# Patient Record
Sex: FEMALE | Race: WHITE | Employment: UNEMPLOYED | URBAN - METROPOLITAN AREA
[De-identification: names, ages, dates, MRNs, and addresses within clinical notes are randomized per-mention and may not be internally consistent; named-entity substitution may affect disease eponyms.]

---

## 2017-01-01 ENCOUNTER — OFFICE VISIT (OUTPATIENT)
Dept: CARDIOLOGY | Age: 47
End: 2017-01-01

## 2017-01-01 ENCOUNTER — APPOINTMENT (OUTPATIENT)
Dept: GENERAL RADIOLOGY | Age: 47
DRG: 420 | End: 2017-01-01
Payer: MEDICAID

## 2017-01-01 ENCOUNTER — TELEPHONE (OUTPATIENT)
Dept: WOUND CARE | Age: 47
End: 2017-01-01

## 2017-01-01 ENCOUNTER — APPOINTMENT (OUTPATIENT)
Dept: CT IMAGING | Age: 47
DRG: 420 | End: 2017-01-01
Payer: MEDICAID

## 2017-01-01 ENCOUNTER — CARE COORDINATION (OUTPATIENT)
Dept: CARE COORDINATION | Age: 47
End: 2017-01-01

## 2017-01-01 ENCOUNTER — OFFICE VISIT (OUTPATIENT)
Dept: FAMILY MEDICINE CLINIC | Age: 47
End: 2017-01-01

## 2017-01-01 ENCOUNTER — HOSPITAL ENCOUNTER (INPATIENT)
Age: 47
LOS: 5 days | Discharge: HOME | DRG: 420 | End: 2017-11-17
Attending: FAMILY MEDICINE | Admitting: INTERNAL MEDICINE
Payer: MEDICAID

## 2017-01-01 ENCOUNTER — OFFICE VISIT (OUTPATIENT)
Dept: SURGERY | Age: 47
End: 2017-01-01

## 2017-01-01 VITALS
SYSTOLIC BLOOD PRESSURE: 100 MMHG | TEMPERATURE: 97.9 F | HEIGHT: 63 IN | WEIGHT: 94.8 LBS | DIASTOLIC BLOOD PRESSURE: 57 MMHG | OXYGEN SATURATION: 100 % | RESPIRATION RATE: 16 BRPM | HEART RATE: 91 BPM | BODY MASS INDEX: 16.8 KG/M2

## 2017-01-01 VITALS
SYSTOLIC BLOOD PRESSURE: 111 MMHG | BODY MASS INDEX: 16.48 KG/M2 | DIASTOLIC BLOOD PRESSURE: 70 MMHG | WEIGHT: 93 LBS | HEIGHT: 63 IN | HEART RATE: 95 BPM

## 2017-01-01 VITALS
RESPIRATION RATE: 16 BRPM | WEIGHT: 110 LBS | DIASTOLIC BLOOD PRESSURE: 60 MMHG | TEMPERATURE: 98.1 F | BODY MASS INDEX: 19.49 KG/M2 | SYSTOLIC BLOOD PRESSURE: 102 MMHG | HEIGHT: 63 IN | HEART RATE: 79 BPM | OXYGEN SATURATION: 99 %

## 2017-01-01 VITALS
TEMPERATURE: 98 F | WEIGHT: 110 LBS | HEIGHT: 63 IN | HEART RATE: 76 BPM | RESPIRATION RATE: 14 BRPM | OXYGEN SATURATION: 98 % | SYSTOLIC BLOOD PRESSURE: 96 MMHG | DIASTOLIC BLOOD PRESSURE: 64 MMHG | BODY MASS INDEX: 19.49 KG/M2

## 2017-01-01 DIAGNOSIS — R56.9 SEIZURE (HCC): ICD-10-CM

## 2017-01-01 DIAGNOSIS — I82.402 ACUTE DEEP VEIN THROMBOSIS (DVT) OF LEFT LOWER EXTREMITY, UNSPECIFIED VEIN (HCC): ICD-10-CM

## 2017-01-01 DIAGNOSIS — E11.65 TYPE 2 DIABETES MELLITUS WITH HYPERGLYCEMIA, WITHOUT LONG-TERM CURRENT USE OF INSULIN (HCC): Primary | Chronic | ICD-10-CM

## 2017-01-01 DIAGNOSIS — I50.32 CHRONIC DIASTOLIC HEART FAILURE (HCC): Primary | ICD-10-CM

## 2017-01-01 DIAGNOSIS — L03.116 CELLULITIS OF LEFT LOWER EXTREMITY: ICD-10-CM

## 2017-01-01 DIAGNOSIS — E72.20 HYPERAMMONEMIA (HCC): Chronic | ICD-10-CM

## 2017-01-01 DIAGNOSIS — I21.4 NSTEMI (NON-ST ELEVATED MYOCARDIAL INFARCTION) (HCC): Primary | ICD-10-CM

## 2017-01-01 DIAGNOSIS — K70.30 ALCOHOLIC CIRRHOSIS OF LIVER WITHOUT ASCITES (HCC): ICD-10-CM

## 2017-01-01 DIAGNOSIS — E11.65 UNCONTROLLED TYPE 2 DIABETES MELLITUS WITH COMPLICATION, UNSPECIFIED LONG TERM INSULIN USE STATUS: Primary | ICD-10-CM

## 2017-01-01 DIAGNOSIS — R56.9 SEIZURE (HCC): Primary | ICD-10-CM

## 2017-01-01 DIAGNOSIS — G93.41 METABOLIC ENCEPHALOPATHY: ICD-10-CM

## 2017-01-01 DIAGNOSIS — I21.4 NSTEMI (NON-ST ELEVATED MYOCARDIAL INFARCTION) (HCC): ICD-10-CM

## 2017-01-01 DIAGNOSIS — K70.30 ALCOHOLIC CIRRHOSIS OF LIVER WITHOUT ASCITES (HCC): Chronic | ICD-10-CM

## 2017-01-01 DIAGNOSIS — E11.65 TYPE 2 DIABETES MELLITUS WITH HYPERGLYCEMIA, WITHOUT LONG-TERM CURRENT USE OF INSULIN (HCC): Chronic | ICD-10-CM

## 2017-01-01 DIAGNOSIS — E10.10 DIABETIC KETOACIDOSIS WITHOUT COMA ASSOCIATED WITH TYPE 1 DIABETES MELLITUS (HCC): ICD-10-CM

## 2017-01-01 DIAGNOSIS — F10.10 ALCOHOL ABUSE: ICD-10-CM

## 2017-01-01 DIAGNOSIS — I50.32 CHRONIC DIASTOLIC HEART FAILURE (HCC): ICD-10-CM

## 2017-01-01 DIAGNOSIS — R60.0 LEG EDEMA: ICD-10-CM

## 2017-01-01 DIAGNOSIS — E11.8 UNCONTROLLED TYPE 2 DIABETES MELLITUS WITH COMPLICATION, UNSPECIFIED LONG TERM INSULIN USE STATUS: Primary | ICD-10-CM

## 2017-01-01 LAB
ALBUMIN SERPL-MCNC: 2.3 G/DL (ref 3.9–4.9)
ALBUMIN SERPL-MCNC: 3 G/DL (ref 3.9–4.9)
ALBUMIN SERPL-MCNC: 3.6 G/DL (ref 3.9–4.9)
ALBUMIN SERPL-MCNC: 4.2 G/DL (ref 3.9–4.9)
ALP BLD-CCNC: 102 U/L (ref 40–130)
ALP BLD-CCNC: 130 U/L (ref 40–130)
ALP BLD-CCNC: 160 U/L (ref 40–130)
ALP BLD-CCNC: 166 U/L (ref 40–130)
ALT SERPL-CCNC: 11 U/L (ref 0–33)
ALT SERPL-CCNC: 14 U/L (ref 0–33)
ALT SERPL-CCNC: 14 U/L (ref 0–33)
ALT SERPL-CCNC: 17 U/L (ref 0–33)
AMMONIA: 41 UMOL/L (ref 11–51)
AMMONIA: 48 UMOL/L (ref 11–51)
AMMONIA: 80 UMOL/L (ref 11–51)
AMMONIA: 83 UMOL/L (ref 11–51)
AMMONIA: 85 UMOL/L (ref 11–51)
AMPHETAMINE SCREEN, URINE: NORMAL
AMYLASE: 41 U/L (ref 28–100)
ANION GAP SERPL CALCULATED.3IONS-SCNC: 10 MEQ/L (ref 7–13)
ANION GAP SERPL CALCULATED.3IONS-SCNC: 11 MEQ/L (ref 7–13)
ANION GAP SERPL CALCULATED.3IONS-SCNC: 12 MEQ/L (ref 7–13)
ANION GAP SERPL CALCULATED.3IONS-SCNC: 13 MEQ/L (ref 7–13)
ANION GAP SERPL CALCULATED.3IONS-SCNC: 14 MEQ/L (ref 7–13)
ANION GAP SERPL CALCULATED.3IONS-SCNC: 14 MEQ/L (ref 7–13)
ANION GAP SERPL CALCULATED.3IONS-SCNC: 15 MEQ/L (ref 7–13)
ANION GAP SERPL CALCULATED.3IONS-SCNC: 18 MEQ/L (ref 7–13)
ANION GAP SERPL CALCULATED.3IONS-SCNC: 23 MEQ/L (ref 7–13)
ANION GAP SERPL CALCULATED.3IONS-SCNC: 31 MEQ/L (ref 7–13)
AST SERPL-CCNC: 35 U/L (ref 0–35)
AST SERPL-CCNC: 36 U/L (ref 0–35)
AST SERPL-CCNC: 43 U/L (ref 0–35)
AST SERPL-CCNC: 52 U/L (ref 0–35)
BARBITURATE SCREEN URINE: NORMAL
BASE EXCESS ARTERIAL: 2 (ref -3–3)
BASOPHILS ABSOLUTE: 0 K/UL (ref 0–0.2)
BASOPHILS RELATIVE PERCENT: 0.3 %
BASOPHILS RELATIVE PERCENT: 0.4 %
BASOPHILS RELATIVE PERCENT: 0.4 %
BASOPHILS RELATIVE PERCENT: 0.5 %
BENZODIAZEPINE SCREEN, URINE: NORMAL
BETA-HYDROXYBUTYRATE: 14.5 MG/DL (ref 0.2–2.8)
BETA-HYDROXYBUTYRATE: 17.8 MG/DL (ref 0.2–2.8)
BILIRUB SERPL-MCNC: 1 MG/DL (ref 0–1.2)
BILIRUB SERPL-MCNC: 1 MG/DL (ref 0–1.2)
BILIRUB SERPL-MCNC: 1.1 MG/DL (ref 0–1.2)
BILIRUB SERPL-MCNC: 1.6 MG/DL (ref 0–1.2)
BILIRUBIN DIRECT: 0.4 MG/DL (ref 0–0.3)
BILIRUBIN DIRECT: 0.5 MG/DL (ref 0–0.3)
BILIRUBIN DIRECT: 0.6 MG/DL (ref 0–0.3)
BILIRUBIN URINE: NEGATIVE
BILIRUBIN, INDIRECT: 0.4 MG/DL (ref 0–0.6)
BILIRUBIN, INDIRECT: 0.6 MG/DL (ref 0–0.6)
BILIRUBIN, INDIRECT: 0.6 MG/DL (ref 0–0.6)
BLOOD CULTURE, ROUTINE: NORMAL
BLOOD, URINE: NEGATIVE
BUN BLDV-MCNC: 11 MG/DL (ref 6–20)
BUN BLDV-MCNC: 13 MG/DL (ref 6–20)
BUN BLDV-MCNC: 14 MG/DL (ref 6–20)
BUN BLDV-MCNC: 19 MG/DL (ref 6–20)
BUN BLDV-MCNC: 23 MG/DL (ref 6–20)
BUN BLDV-MCNC: 31 MG/DL (ref 6–20)
BUN BLDV-MCNC: 5 MG/DL (ref 6–20)
BUN BLDV-MCNC: 6 MG/DL (ref 6–20)
BUN BLDV-MCNC: 7 MG/DL (ref 6–20)
BUN BLDV-MCNC: 7 MG/DL (ref 6–20)
BUN BLDV-MCNC: 8 MG/DL (ref 6–20)
BUN BLDV-MCNC: 9 MG/DL (ref 6–20)
C-PEPTIDE: 0.4 NG/ML (ref 0.8–3.5)
C-PEPTIDE: 1.9 NG/ML (ref 0.8–3.5)
CALCIUM SERPL-MCNC: 11 MG/DL (ref 8.6–10.2)
CALCIUM SERPL-MCNC: 8.4 MG/DL (ref 8.6–10.2)
CALCIUM SERPL-MCNC: 8.5 MG/DL (ref 8.6–10.2)
CALCIUM SERPL-MCNC: 8.5 MG/DL (ref 8.6–10.2)
CALCIUM SERPL-MCNC: 8.6 MG/DL (ref 8.6–10.2)
CALCIUM SERPL-MCNC: 8.7 MG/DL (ref 8.6–10.2)
CALCIUM SERPL-MCNC: 8.7 MG/DL (ref 8.6–10.2)
CALCIUM SERPL-MCNC: 8.8 MG/DL (ref 8.6–10.2)
CALCIUM SERPL-MCNC: 8.9 MG/DL (ref 8.6–10.2)
CALCIUM SERPL-MCNC: 9 MG/DL (ref 8.6–10.2)
CALCIUM SERPL-MCNC: 9 MG/DL (ref 8.6–10.2)
CALCIUM SERPL-MCNC: 9.1 MG/DL (ref 8.6–10.2)
CALCIUM SERPL-MCNC: 9.1 MG/DL (ref 8.6–10.2)
CALCIUM SERPL-MCNC: 9.6 MG/DL (ref 8.6–10.2)
CANNABINOID SCREEN URINE: NORMAL
CHLORIDE BLD-SCNC: 104 MEQ/L (ref 98–107)
CHLORIDE BLD-SCNC: 106 MEQ/L (ref 98–107)
CHLORIDE BLD-SCNC: 107 MEQ/L (ref 98–107)
CHLORIDE BLD-SCNC: 108 MEQ/L (ref 98–107)
CHLORIDE BLD-SCNC: 109 MEQ/L (ref 98–107)
CHLORIDE BLD-SCNC: 110 MEQ/L (ref 98–107)
CHLORIDE BLD-SCNC: 112 MEQ/L (ref 98–107)
CHLORIDE BLD-SCNC: 113 MEQ/L (ref 98–107)
CHLORIDE BLD-SCNC: 67 MEQ/L (ref 98–107)
CHLORIDE BLD-SCNC: 93 MEQ/L (ref 98–107)
CHLORIDE BLD-SCNC: 99 MEQ/L (ref 98–107)
CHP ED QC CHECK: NORMAL
CLARITY: CLEAR
CO2: 15 MEQ/L (ref 22–29)
CO2: 17 MEQ/L (ref 22–29)
CO2: 18 MEQ/L (ref 22–29)
CO2: 19 MEQ/L (ref 22–29)
CO2: 21 MEQ/L (ref 22–29)
CO2: 21 MEQ/L (ref 22–29)
CO2: 22 MEQ/L (ref 22–29)
CO2: 24 MEQ/L (ref 22–29)
CO2: 25 MEQ/L (ref 22–29)
CO2: 26 MEQ/L (ref 22–29)
COCAINE METABOLITE SCREEN URINE: NORMAL
COLOR: YELLOW
CREAT SERPL-MCNC: 0.45 MG/DL (ref 0.5–0.9)
CREAT SERPL-MCNC: 0.46 MG/DL (ref 0.5–0.9)
CREAT SERPL-MCNC: 0.5 MG/DL (ref 0.5–0.9)
CREAT SERPL-MCNC: 0.57 MG/DL (ref 0.5–0.9)
CREAT SERPL-MCNC: 0.59 MG/DL (ref 0.5–0.9)
CREAT SERPL-MCNC: 0.59 MG/DL (ref 0.5–0.9)
CREAT SERPL-MCNC: 0.6 MG/DL (ref 0.5–0.9)
CREAT SERPL-MCNC: 0.62 MG/DL (ref 0.5–0.9)
CREAT SERPL-MCNC: 0.63 MG/DL (ref 0.5–0.9)
CREAT SERPL-MCNC: 0.64 MG/DL (ref 0.5–0.9)
CREAT SERPL-MCNC: 0.67 MG/DL (ref 0.5–0.9)
CREAT SERPL-MCNC: 0.7 MG/DL (ref 0.5–0.9)
CREAT SERPL-MCNC: 0.72 MG/DL (ref 0.5–0.9)
CREAT SERPL-MCNC: 0.73 MG/DL (ref 0.5–0.9)
CREAT SERPL-MCNC: 0.78 MG/DL (ref 0.5–0.9)
CREAT SERPL-MCNC: 0.82 MG/DL (ref 0.5–0.9)
CREAT SERPL-MCNC: 0.96 MG/DL (ref 0.5–0.9)
CREAT SERPL-MCNC: 1.2 MG/DL (ref 0.5–0.9)
CREAT SERPL-MCNC: 1.45 MG/DL (ref 0.5–0.9)
CULTURE, BLOOD 2: NORMAL
EKG ATRIAL RATE: 101 BPM
EKG ATRIAL RATE: 84 BPM
EKG ATRIAL RATE: 85 BPM
EKG ATRIAL RATE: 86 BPM
EKG ATRIAL RATE: 87 BPM
EKG ATRIAL RATE: 90 BPM
EKG ATRIAL RATE: 91 BPM
EKG ATRIAL RATE: 93 BPM
EKG ATRIAL RATE: 95 BPM
EKG P AXIS: 29 DEGREES
EKG P AXIS: 32 DEGREES
EKG P AXIS: 33 DEGREES
EKG P AXIS: 36 DEGREES
EKG P AXIS: 42 DEGREES
EKG P AXIS: 45 DEGREES
EKG P AXIS: 49 DEGREES
EKG P AXIS: 53 DEGREES
EKG P AXIS: 61 DEGREES
EKG P-R INTERVAL: 122 MS
EKG P-R INTERVAL: 126 MS
EKG P-R INTERVAL: 128 MS
EKG P-R INTERVAL: 128 MS
EKG P-R INTERVAL: 132 MS
EKG P-R INTERVAL: 134 MS
EKG P-R INTERVAL: 134 MS
EKG P-R INTERVAL: 136 MS
EKG P-R INTERVAL: 148 MS
EKG Q-T INTERVAL: 350 MS
EKG Q-T INTERVAL: 374 MS
EKG Q-T INTERVAL: 376 MS
EKG Q-T INTERVAL: 378 MS
EKG Q-T INTERVAL: 384 MS
EKG Q-T INTERVAL: 384 MS
EKG Q-T INTERVAL: 390 MS
EKG Q-T INTERVAL: 442 MS
EKG Q-T INTERVAL: 454 MS
EKG QRS DURATION: 66 MS
EKG QRS DURATION: 76 MS
EKG QRS DURATION: 76 MS
EKG QRS DURATION: 80 MS
EKG QRS DURATION: 80 MS
EKG QRS DURATION: 82 MS
EKG QRS DURATION: 82 MS
EKG QRS DURATION: 84 MS
EKG QRS DURATION: 84 MS
EKG QTC CALCULATION (BAZETT): 453 MS
EKG QTC CALCULATION (BAZETT): 459 MS
EKG QTC CALCULATION (BAZETT): 459 MS
EKG QTC CALCULATION (BAZETT): 464 MS
EKG QTC CALCULATION (BAZETT): 465 MS
EKG QTC CALCULATION (BAZETT): 472 MS
EKG QTC CALCULATION (BAZETT): 475 MS
EKG QTC CALCULATION (BAZETT): 522 MS
EKG QTC CALCULATION (BAZETT): 546 MS
EKG R AXIS: 64 DEGREES
EKG R AXIS: 67 DEGREES
EKG R AXIS: 69 DEGREES
EKG R AXIS: 71 DEGREES
EKG R AXIS: 73 DEGREES
EKG R AXIS: 74 DEGREES
EKG R AXIS: 75 DEGREES
EKG R AXIS: 82 DEGREES
EKG R AXIS: 99 DEGREES
EKG T AXIS: -11 DEGREES
EKG T AXIS: 16 DEGREES
EKG T AXIS: 16 DEGREES
EKG T AXIS: 17 DEGREES
EKG T AXIS: 19 DEGREES
EKG T AXIS: 30 DEGREES
EKG T AXIS: 32 DEGREES
EKG T AXIS: 38 DEGREES
EKG T AXIS: 47 DEGREES
EKG VENTRICULAR RATE: 101 BPM
EKG VENTRICULAR RATE: 84 BPM
EKG VENTRICULAR RATE: 85 BPM
EKG VENTRICULAR RATE: 86 BPM
EKG VENTRICULAR RATE: 87 BPM
EKG VENTRICULAR RATE: 90 BPM
EKG VENTRICULAR RATE: 91 BPM
EKG VENTRICULAR RATE: 93 BPM
EKG VENTRICULAR RATE: 95 BPM
EOSINOPHILS ABSOLUTE: 0 K/UL (ref 0–0.7)
EOSINOPHILS ABSOLUTE: 0.1 K/UL (ref 0–0.7)
EOSINOPHILS RELATIVE PERCENT: 0.2 %
EOSINOPHILS RELATIVE PERCENT: 1.1 %
EOSINOPHILS RELATIVE PERCENT: 1.3 %
EOSINOPHILS RELATIVE PERCENT: 1.5 %
ETHANOL PERCENT: 0.07 G/DL
ETHANOL PERCENT: NORMAL G/DL
ETHANOL: 85 MG/DL (ref 0–0.08)
ETHANOL: <10 MG/DL (ref 0–0.08)
FOLATE: >20 NG/ML (ref 7.3–26.1)
GFR AFRICAN AMERICAN: 46.7
GFR AFRICAN AMERICAN: 58.1
GFR AFRICAN AMERICAN: >60
GFR NON-AFRICAN AMERICAN: 38.6
GFR NON-AFRICAN AMERICAN: 48.1
GFR NON-AFRICAN AMERICAN: >60
GLOBULIN: 4.1 G/DL (ref 2.3–3.5)
GLUCOSE BLD-MCNC: 1151 MG/DL (ref 74–109)
GLUCOSE BLD-MCNC: 116 MG/DL (ref 60–115)
GLUCOSE BLD-MCNC: 128 MG/DL (ref 60–115)
GLUCOSE BLD-MCNC: 138 MG/DL (ref 60–115)
GLUCOSE BLD-MCNC: 141 MG/DL (ref 60–115)
GLUCOSE BLD-MCNC: 144 MG/DL (ref 60–115)
GLUCOSE BLD-MCNC: 158 MG/DL (ref 74–109)
GLUCOSE BLD-MCNC: 159 MG/DL (ref 60–115)
GLUCOSE BLD-MCNC: 160 MG/DL (ref 60–115)
GLUCOSE BLD-MCNC: 162 MG/DL (ref 60–115)
GLUCOSE BLD-MCNC: 164 MG/DL (ref 60–115)
GLUCOSE BLD-MCNC: 164 MG/DL (ref 60–115)
GLUCOSE BLD-MCNC: 168 MG/DL (ref 60–115)
GLUCOSE BLD-MCNC: 171 MG/DL (ref 74–109)
GLUCOSE BLD-MCNC: 175 MG/DL (ref 60–115)
GLUCOSE BLD-MCNC: 179 MG/DL
GLUCOSE BLD-MCNC: 183 MG/DL (ref 74–109)
GLUCOSE BLD-MCNC: 19 MG/DL (ref 60–115)
GLUCOSE BLD-MCNC: 192 MG/DL (ref 74–109)
GLUCOSE BLD-MCNC: 194 MG/DL (ref 74–109)
GLUCOSE BLD-MCNC: 196 MG/DL (ref 60–115)
GLUCOSE BLD-MCNC: 197 MG/DL (ref 60–115)
GLUCOSE BLD-MCNC: 200 MG/DL (ref 60–115)
GLUCOSE BLD-MCNC: 202 MG/DL (ref 74–109)
GLUCOSE BLD-MCNC: 204 MG/DL (ref 60–115)
GLUCOSE BLD-MCNC: 210 MG/DL (ref 60–115)
GLUCOSE BLD-MCNC: 211 MG/DL (ref 60–115)
GLUCOSE BLD-MCNC: 212 MG/DL (ref 74–109)
GLUCOSE BLD-MCNC: 217 MG/DL (ref 60–115)
GLUCOSE BLD-MCNC: 22 MG/DL (ref 60–115)
GLUCOSE BLD-MCNC: 223 MG/DL (ref 60–115)
GLUCOSE BLD-MCNC: 227 MG/DL (ref 74–109)
GLUCOSE BLD-MCNC: 229 MG/DL (ref 60–115)
GLUCOSE BLD-MCNC: 229 MG/DL (ref 60–115)
GLUCOSE BLD-MCNC: 231 MG/DL (ref 74–109)
GLUCOSE BLD-MCNC: 232 MG/DL (ref 74–109)
GLUCOSE BLD-MCNC: 234 MG/DL (ref 60–115)
GLUCOSE BLD-MCNC: 237 MG/DL (ref 60–115)
GLUCOSE BLD-MCNC: 239 MG/DL (ref 60–115)
GLUCOSE BLD-MCNC: 243 MG/DL (ref 60–115)
GLUCOSE BLD-MCNC: 247 MG/DL (ref 60–115)
GLUCOSE BLD-MCNC: 247 MG/DL (ref 60–115)
GLUCOSE BLD-MCNC: 251 MG/DL (ref 60–115)
GLUCOSE BLD-MCNC: 255 MG/DL (ref 74–109)
GLUCOSE BLD-MCNC: 256 MG/DL (ref 60–115)
GLUCOSE BLD-MCNC: 258 MG/DL (ref 60–115)
GLUCOSE BLD-MCNC: 263 MG/DL (ref 60–115)
GLUCOSE BLD-MCNC: 263 MG/DL (ref 60–115)
GLUCOSE BLD-MCNC: 265 MG/DL (ref 60–115)
GLUCOSE BLD-MCNC: 282 MG/DL (ref 60–115)
GLUCOSE BLD-MCNC: 286 MG/DL (ref 74–109)
GLUCOSE BLD-MCNC: 304 MG/DL (ref 60–115)
GLUCOSE BLD-MCNC: 309 MG/DL (ref 74–109)
GLUCOSE BLD-MCNC: 317 MG/DL (ref 60–115)
GLUCOSE BLD-MCNC: 335 MG/DL (ref 74–109)
GLUCOSE BLD-MCNC: 399 MG/DL (ref 60–115)
GLUCOSE BLD-MCNC: 41 MG/DL (ref 74–109)
GLUCOSE BLD-MCNC: 468 MG/DL (ref 60–115)
GLUCOSE BLD-MCNC: 54 MG/DL (ref 60–115)
GLUCOSE BLD-MCNC: 54 MG/DL (ref 60–115)
GLUCOSE BLD-MCNC: 553 MG/DL (ref 74–109)
GLUCOSE BLD-MCNC: 563 MG/DL (ref 60–115)
GLUCOSE BLD-MCNC: 574 MG/DL (ref 60–115)
GLUCOSE BLD-MCNC: 591 MG/DL
GLUCOSE BLD-MCNC: 591 MG/DL (ref 60–115)
GLUCOSE BLD-MCNC: 65 MG/DL (ref 74–109)
GLUCOSE BLD-MCNC: 82 MG/DL (ref 74–109)
GLUCOSE BLD-MCNC: 97 MG/DL (ref 60–115)
GLUCOSE BLD-MCNC: >600 MG/DL (ref 60–115)
GLUCOSE BLD-MCNC: >600 MG/DL (ref 60–115)
GLUCOSE BLD-MCNC: NORMAL MG/DL
GLUCOSE URINE: >=1000 MG/DL
HBA1C MFR BLD: 13.2 % (ref 4.8–5.9)
HCO3 ARTERIAL: 27 MMOL/L (ref 21–29)
HCT VFR BLD CALC: 28.3 % (ref 37–47)
HCT VFR BLD CALC: 31.5 % (ref 37–47)
HCT VFR BLD CALC: 36.6 % (ref 37–47)
HCT VFR BLD CALC: 44.1 % (ref 37–47)
HEMOGLOBIN: 10.4 G/DL (ref 12–16)
HEMOGLOBIN: 12.4 G/DL (ref 12–16)
HEMOGLOBIN: 14.2 G/DL (ref 12–16)
HEMOGLOBIN: 9.4 G/DL (ref 12–16)
KETONES, URINE: NEGATIVE MG/DL
LACTATE: 2.85 MMOL/L (ref 0.4–2)
LACTIC ACID: 1.6 MMOL/L (ref 0.5–2.2)
LACTIC ACID: 11.1 MMOL/L (ref 0.5–2.2)
LACTIC ACID: 2.5 MMOL/L (ref 0.5–2.2)
LACTIC ACID: 2.7 MMOL/L (ref 0.5–2.2)
LACTIC ACID: 4 MMOL/L (ref 0.5–2.2)
LACTIC ACID: 4.4 MMOL/L (ref 0.5–2.2)
LACTIC ACID: 5.1 MMOL/L (ref 0.5–2.2)
LEUKOCYTE ESTERASE, URINE: NEGATIVE
LIPASE: 22 U/L (ref 13–60)
LV EF: 70 %
LVEF MODALITY: NORMAL
LYMPHOCYTES ABSOLUTE: 1.5 K/UL (ref 1–4.8)
LYMPHOCYTES ABSOLUTE: 1.6 K/UL (ref 1–4.8)
LYMPHOCYTES ABSOLUTE: 1.8 K/UL (ref 1–4.8)
LYMPHOCYTES ABSOLUTE: 2 K/UL (ref 1–4.8)
LYMPHOCYTES RELATIVE PERCENT: 22.7 %
LYMPHOCYTES RELATIVE PERCENT: 22.9 %
LYMPHOCYTES RELATIVE PERCENT: 31.1 %
LYMPHOCYTES RELATIVE PERCENT: 33.4 %
Lab: NORMAL
MAGNESIUM: 1.6 MG/DL (ref 1.7–2.3)
MAGNESIUM: 1.6 MG/DL (ref 1.7–2.3)
MAGNESIUM: 1.7 MG/DL (ref 1.7–2.3)
MAGNESIUM: 1.7 MG/DL (ref 1.7–2.3)
MAGNESIUM: 1.8 MG/DL (ref 1.7–2.3)
MAGNESIUM: 1.9 MG/DL (ref 1.7–2.3)
MAGNESIUM: 1.9 MG/DL (ref 1.7–2.3)
MAGNESIUM: 2 MG/DL (ref 1.7–2.3)
MAGNESIUM: 2.1 MG/DL (ref 1.7–2.3)
MAGNESIUM: 2.2 MG/DL (ref 1.7–2.3)
MAGNESIUM: 2.3 MG/DL (ref 1.7–2.3)
MAGNESIUM: 2.3 MG/DL (ref 1.7–2.3)
MAGNESIUM: 2.5 MG/DL (ref 1.7–2.3)
MAGNESIUM: 3.3 MG/DL (ref 1.7–2.3)
MCH RBC QN AUTO: 31.3 PG (ref 27–31.3)
MCH RBC QN AUTO: 31.6 PG (ref 27–31.3)
MCH RBC QN AUTO: 31.9 PG (ref 27–31.3)
MCH RBC QN AUTO: 31.9 PG (ref 27–31.3)
MCHC RBC AUTO-ENTMCNC: 32.1 % (ref 33–37)
MCHC RBC AUTO-ENTMCNC: 33.2 % (ref 33–37)
MCHC RBC AUTO-ENTMCNC: 33.2 % (ref 33–37)
MCHC RBC AUTO-ENTMCNC: 34 % (ref 33–37)
MCV RBC AUTO: 93.8 FL (ref 82–100)
MCV RBC AUTO: 95.4 FL (ref 82–100)
MCV RBC AUTO: 96.1 FL (ref 82–100)
MCV RBC AUTO: 97.4 FL (ref 82–100)
MONOCYTES ABSOLUTE: 0.7 K/UL (ref 0.2–0.8)
MONOCYTES ABSOLUTE: 0.9 K/UL (ref 0.2–0.8)
MONOCYTES RELATIVE PERCENT: 10.6 %
MONOCYTES RELATIVE PERCENT: 19.8 %
MONOCYTES RELATIVE PERCENT: 20 %
MONOCYTES RELATIVE PERCENT: 8.6 %
NEUTROPHILS ABSOLUTE: 2.1 K/UL (ref 1.4–6.5)
NEUTROPHILS ABSOLUTE: 2.2 K/UL (ref 1.4–6.5)
NEUTROPHILS ABSOLUTE: 5.5 K/UL (ref 1.4–6.5)
NEUTROPHILS ABSOLUTE: 5.8 K/UL (ref 1.4–6.5)
NEUTROPHILS RELATIVE PERCENT: 44.7 %
NEUTROPHILS RELATIVE PERCENT: 47.6 %
NEUTROPHILS RELATIVE PERCENT: 64.9 %
NEUTROPHILS RELATIVE PERCENT: 68 %
NITRITE, URINE: NEGATIVE
O2 SAT, ARTERIAL: 99 % (ref 93–100)
OPIATE SCREEN URINE: NORMAL
PCO2 ARTERIAL: 47 MM HG (ref 35–45)
PDW BLD-RTO: 14.7 % (ref 11.5–14.5)
PDW BLD-RTO: 15.5 % (ref 11.5–14.5)
PDW BLD-RTO: 16.6 % (ref 11.5–14.5)
PDW BLD-RTO: 16.8 % (ref 11.5–14.5)
PERFORMED ON: ABNORMAL
PERFORMED ON: NORMAL
PH ARTERIAL: 7.37 (ref 7.35–7.45)
PH UA: 6 (ref 5–9)
PHENCYCLIDINE SCREEN URINE: NORMAL
PHOSPHORUS: 0.6 MG/DL (ref 2.5–4.5)
PHOSPHORUS: 0.8 MG/DL (ref 2.5–4.5)
PHOSPHORUS: 0.9 MG/DL (ref 2.5–4.5)
PHOSPHORUS: 1.1 MG/DL (ref 2.5–4.5)
PHOSPHORUS: 1.2 MG/DL (ref 2.5–4.5)
PHOSPHORUS: 1.7 MG/DL (ref 2.5–4.5)
PHOSPHORUS: 1.9 MG/DL (ref 2.5–4.5)
PHOSPHORUS: 2 MG/DL (ref 2.5–4.5)
PHOSPHORUS: 2.5 MG/DL (ref 2.5–4.5)
PHOSPHORUS: 2.6 MG/DL (ref 2.5–4.5)
PHOSPHORUS: 2.7 MG/DL (ref 2.5–4.5)
PHOSPHORUS: <0.4 MG/DL (ref 2.5–4.5)
PHOSPHORUS: <0.4 MG/DL (ref 2.5–4.5)
PLATELET # BLD: 130 K/UL (ref 130–400)
PLATELET # BLD: 51 K/UL (ref 130–400)
PLATELET # BLD: 65 K/UL (ref 130–400)
PLATELET # BLD: 89 K/UL (ref 130–400)
PLATELET SLIDE REVIEW: ABNORMAL
PLATELET SLIDE REVIEW: ABNORMAL
PO2 ARTERIAL: 138 MM HG (ref 75–108)
POC FIO2: 2
POC SAMPLE TYPE: ABNORMAL
POTASSIUM SERPL-SCNC: 2.7 MEQ/L (ref 3.5–5.1)
POTASSIUM SERPL-SCNC: 2.8 MEQ/L (ref 3.5–5.1)
POTASSIUM SERPL-SCNC: 2.8 MEQ/L (ref 3.5–5.1)
POTASSIUM SERPL-SCNC: 2.9 MEQ/L (ref 3.5–5.1)
POTASSIUM SERPL-SCNC: 3.1 MEQ/L (ref 3.5–5.1)
POTASSIUM SERPL-SCNC: 3.7 MEQ/L (ref 3.5–5.1)
POTASSIUM SERPL-SCNC: 3.7 MEQ/L (ref 3.5–5.1)
POTASSIUM SERPL-SCNC: 3.9 MEQ/L (ref 3.5–5.1)
POTASSIUM SERPL-SCNC: 4 MEQ/L (ref 3.5–5.1)
POTASSIUM SERPL-SCNC: 4.2 MEQ/L (ref 3.5–5.1)
POTASSIUM SERPL-SCNC: 4.3 MEQ/L (ref 3.5–5.1)
POTASSIUM SERPL-SCNC: 4.6 MEQ/L (ref 3.5–5.1)
POTASSIUM SERPL-SCNC: 4.6 MEQ/L (ref 3.5–5.1)
POTASSIUM SERPL-SCNC: 4.9 MEQ/L (ref 3.5–5.1)
POTASSIUM SERPL-SCNC: 5.4 MEQ/L (ref 3.5–5.1)
POTASSIUM SERPL-SCNC: 5.5 MEQ/L (ref 3.5–5.1)
PREGNANCY TEST URINE, POC: NEGATIVE
PRO-BNP: 331 PG/ML
PROTEIN UA: NEGATIVE MG/DL
RBC # BLD: 2.97 M/UL (ref 4.2–5.4)
RBC # BLD: 3.28 M/UL (ref 4.2–5.4)
RBC # BLD: 3.9 M/UL (ref 4.2–5.4)
RBC # BLD: 4.52 M/UL (ref 4.2–5.4)
SODIUM BLD-SCNC: 120 MEQ/L (ref 132–144)
SODIUM BLD-SCNC: 136 MEQ/L (ref 132–144)
SODIUM BLD-SCNC: 137 MEQ/L (ref 132–144)
SODIUM BLD-SCNC: 138 MEQ/L (ref 132–144)
SODIUM BLD-SCNC: 139 MEQ/L (ref 132–144)
SODIUM BLD-SCNC: 141 MEQ/L (ref 132–144)
SODIUM BLD-SCNC: 142 MEQ/L (ref 132–144)
SODIUM BLD-SCNC: 142 MEQ/L (ref 132–144)
SODIUM BLD-SCNC: 146 MEQ/L (ref 132–144)
SODIUM BLD-SCNC: 148 MEQ/L (ref 132–144)
SODIUM BLD-SCNC: 148 MEQ/L (ref 132–144)
SODIUM BLD-SCNC: 149 MEQ/L (ref 132–144)
SPECIFIC GRAVITY UA: 1.02 (ref 1–1.03)
TCO2 ARTERIAL: 29 (ref 22–29)
TOTAL PROTEIN: 4.6 G/DL (ref 6.4–8.1)
TOTAL PROTEIN: 6 G/DL (ref 6.4–8.1)
TOTAL PROTEIN: 7.1 G/DL (ref 6.4–8.1)
TOTAL PROTEIN: 8.3 G/DL (ref 6.4–8.1)
TROPONIN: 0.02 NG/ML (ref 0–0.01)
TROPONIN: 0.03 NG/ML (ref 0–0.01)
TROPONIN: 0.05 NG/ML (ref 0–0.01)
TROPONIN: 0.06 NG/ML (ref 0–0.01)
TROPONIN: <0.01 NG/ML (ref 0–0.01)
TROPONIN: <0.01 NG/ML (ref 0–0.01)
TSH SERPL DL<=0.05 MIU/L-ACNC: 2.13 UIU/ML (ref 0.27–4.2)
URINE REFLEX TO CULTURE: ABNORMAL
UROBILINOGEN, URINE: 0.2 E.U./DL
VITAMIN B-12: >2000 PG/ML (ref 211–946)
VITAMIN D 25-HYDROXY: 20.2 NG/ML (ref 30–100)
WBC # BLD: 4.7 K/UL (ref 4.8–10.8)
WBC # BLD: 4.7 K/UL (ref 4.8–10.8)
WBC # BLD: 8.1 K/UL (ref 4.8–10.8)
WBC # BLD: 8.8 K/UL (ref 4.8–10.8)

## 2017-01-01 PROCEDURE — 82010 KETONE BODYS QUAN: CPT

## 2017-01-01 PROCEDURE — 6370000000 HC RX 637 (ALT 250 FOR IP): Performed by: PSYCHIATRY & NEUROLOGY

## 2017-01-01 PROCEDURE — 97116 GAIT TRAINING THERAPY: CPT

## 2017-01-01 PROCEDURE — 6370000000 HC RX 637 (ALT 250 FOR IP): Performed by: INTERNAL MEDICINE

## 2017-01-01 PROCEDURE — 99213 OFFICE O/P EST LOW 20 MIN: CPT | Performed by: INTERNAL MEDICINE

## 2017-01-01 PROCEDURE — 82962 GLUCOSE BLOOD TEST: CPT | Performed by: INTERNAL MEDICINE

## 2017-01-01 PROCEDURE — 2580000003 HC RX 258: Performed by: PHYSICIAN ASSISTANT

## 2017-01-01 PROCEDURE — 93005 ELECTROCARDIOGRAM TRACING: CPT

## 2017-01-01 PROCEDURE — 97165 OT EVAL LOW COMPLEX 30 MIN: CPT

## 2017-01-01 PROCEDURE — 85025 COMPLETE CBC W/AUTO DIFF WBC: CPT

## 2017-01-01 PROCEDURE — 83036 HEMOGLOBIN GLYCOSYLATED A1C: CPT

## 2017-01-01 PROCEDURE — 3046F HEMOGLOBIN A1C LEVEL >9.0%: CPT | Performed by: FAMILY MEDICINE

## 2017-01-01 PROCEDURE — 99232 SBSQ HOSP IP/OBS MODERATE 35: CPT | Performed by: INTERNAL MEDICINE

## 2017-01-01 PROCEDURE — 2580000003 HC RX 258: Performed by: INTERNAL MEDICINE

## 2017-01-01 PROCEDURE — 2580000003 HC RX 258: Performed by: FAMILY MEDICINE

## 2017-01-01 PROCEDURE — 80048 BASIC METABOLIC PNL TOTAL CA: CPT

## 2017-01-01 PROCEDURE — 6360000002 HC RX W HCPCS: Performed by: INTERNAL MEDICINE

## 2017-01-01 PROCEDURE — 93010 ELECTROCARDIOGRAM REPORT: CPT | Performed by: INTERNAL MEDICINE

## 2017-01-01 PROCEDURE — 6360000002 HC RX W HCPCS: Performed by: FAMILY MEDICINE

## 2017-01-01 PROCEDURE — 95816 EEG AWAKE AND DROWSY: CPT

## 2017-01-01 PROCEDURE — 99231 SBSQ HOSP IP/OBS SF/LOW 25: CPT | Performed by: NURSE PRACTITIONER

## 2017-01-01 PROCEDURE — 82948 REAGENT STRIP/BLOOD GLUCOSE: CPT

## 2017-01-01 PROCEDURE — G8428 CUR MEDS NOT DOCUMENT: HCPCS | Performed by: INTERNAL MEDICINE

## 2017-01-01 PROCEDURE — 1210000000 HC MED SURG R&B

## 2017-01-01 PROCEDURE — G8420 CALC BMI NORM PARAMETERS: HCPCS | Performed by: INTERNAL MEDICINE

## 2017-01-01 PROCEDURE — 36415 COLL VENOUS BLD VENIPUNCTURE: CPT

## 2017-01-01 PROCEDURE — G9162 LANG EXPRESS CURRENT STATUS: HCPCS

## 2017-01-01 PROCEDURE — 84100 ASSAY OF PHOSPHORUS: CPT

## 2017-01-01 PROCEDURE — 1036F TOBACCO NON-USER: CPT | Performed by: INTERNAL MEDICINE

## 2017-01-01 PROCEDURE — 82140 ASSAY OF AMMONIA: CPT

## 2017-01-01 PROCEDURE — 84484 ASSAY OF TROPONIN QUANT: CPT

## 2017-01-01 PROCEDURE — 82306 VITAMIN D 25 HYDROXY: CPT

## 2017-01-01 PROCEDURE — 6370000000 HC RX 637 (ALT 250 FOR IP): Performed by: PHYSICIAN ASSISTANT

## 2017-01-01 PROCEDURE — 84443 ASSAY THYROID STIM HORMONE: CPT

## 2017-01-01 PROCEDURE — 83735 ASSAY OF MAGNESIUM: CPT

## 2017-01-01 PROCEDURE — 84132 ASSAY OF SERUM POTASSIUM: CPT

## 2017-01-01 PROCEDURE — 99214 OFFICE O/P EST MOD 30 MIN: CPT | Performed by: INTERNAL MEDICINE

## 2017-01-01 PROCEDURE — 80307 DRUG TEST PRSMV CHEM ANLYZR: CPT

## 2017-01-01 PROCEDURE — P9612 CATHETERIZE FOR URINE SPEC: HCPCS

## 2017-01-01 PROCEDURE — 93306 TTE W/DOPPLER COMPLETE: CPT

## 2017-01-01 PROCEDURE — 92523 SPEECH SOUND LANG COMPREHEN: CPT

## 2017-01-01 PROCEDURE — 97161 PT EVAL LOW COMPLEX 20 MIN: CPT

## 2017-01-01 PROCEDURE — G8598 ASA/ANTIPLAT THER USED: HCPCS | Performed by: INTERNAL MEDICINE

## 2017-01-01 PROCEDURE — 80076 HEPATIC FUNCTION PANEL: CPT

## 2017-01-01 PROCEDURE — 99212 OFFICE O/P EST SF 10 MIN: CPT

## 2017-01-01 PROCEDURE — 84681 ASSAY OF C-PEPTIDE: CPT

## 2017-01-01 PROCEDURE — 96376 TX/PRO/DX INJ SAME DRUG ADON: CPT

## 2017-01-01 PROCEDURE — 99214 OFFICE O/P EST MOD 30 MIN: CPT | Performed by: FAMILY MEDICINE

## 2017-01-01 PROCEDURE — G8988 SELF CARE GOAL STATUS: HCPCS

## 2017-01-01 PROCEDURE — G9163 LANG EXPRESS GOAL STATUS: HCPCS

## 2017-01-01 PROCEDURE — 81003 URINALYSIS AUTO W/O SCOPE: CPT

## 2017-01-01 PROCEDURE — 6370000000 HC RX 637 (ALT 250 FOR IP): Performed by: FAMILY MEDICINE

## 2017-01-01 PROCEDURE — G0108 DIAB MANAGE TRN  PER INDIV: HCPCS

## 2017-01-01 PROCEDURE — 2500000003 HC RX 250 WO HCPCS: Performed by: INTERNAL MEDICINE

## 2017-01-01 PROCEDURE — 6370000000 HC RX 637 (ALT 250 FOR IP): Performed by: NURSE PRACTITIONER

## 2017-01-01 PROCEDURE — 6360000002 HC RX W HCPCS: Performed by: NURSE PRACTITIONER

## 2017-01-01 PROCEDURE — 83690 ASSAY OF LIPASE: CPT

## 2017-01-01 PROCEDURE — G8484 FLU IMMUNIZE NO ADMIN: HCPCS | Performed by: FAMILY MEDICINE

## 2017-01-01 PROCEDURE — 2000000000 HC ICU R&B

## 2017-01-01 PROCEDURE — G8427 DOCREV CUR MEDS BY ELIG CLIN: HCPCS | Performed by: INTERNAL MEDICINE

## 2017-01-01 PROCEDURE — G8598 ASA/ANTIPLAT THER USED: HCPCS | Performed by: FAMILY MEDICINE

## 2017-01-01 PROCEDURE — 96374 THER/PROPH/DIAG INJ IV PUSH: CPT

## 2017-01-01 PROCEDURE — G8987 SELF CARE CURRENT STATUS: HCPCS

## 2017-01-01 PROCEDURE — 83605 ASSAY OF LACTIC ACID: CPT

## 2017-01-01 PROCEDURE — 70450 CT HEAD/BRAIN W/O DYE: CPT

## 2017-01-01 PROCEDURE — G8420 CALC BMI NORM PARAMETERS: HCPCS | Performed by: FAMILY MEDICINE

## 2017-01-01 PROCEDURE — G8484 FLU IMMUNIZE NO ADMIN: HCPCS | Performed by: INTERNAL MEDICINE

## 2017-01-01 PROCEDURE — G8427 DOCREV CUR MEDS BY ELIG CLIN: HCPCS | Performed by: FAMILY MEDICINE

## 2017-01-01 PROCEDURE — 82150 ASSAY OF AMYLASE: CPT

## 2017-01-01 PROCEDURE — 71010 XR CHEST PORTABLE: CPT

## 2017-01-01 PROCEDURE — G0480 DRUG TEST DEF 1-7 CLASSES: HCPCS

## 2017-01-01 PROCEDURE — 99285 EMERGENCY DEPT VISIT HI MDM: CPT

## 2017-01-01 PROCEDURE — 82607 VITAMIN B-12: CPT

## 2017-01-01 PROCEDURE — G9164 LANG EXPRESS D/C STATUS: HCPCS

## 2017-01-01 PROCEDURE — 82803 BLOOD GASES ANY COMBINATION: CPT

## 2017-01-01 PROCEDURE — G8419 CALC BMI OUT NRM PARAM NOF/U: HCPCS | Performed by: INTERNAL MEDICINE

## 2017-01-01 PROCEDURE — 6360000002 HC RX W HCPCS: Performed by: PHYSICIAN ASSISTANT

## 2017-01-01 PROCEDURE — 3046F HEMOGLOBIN A1C LEVEL >9.0%: CPT | Performed by: INTERNAL MEDICINE

## 2017-01-01 PROCEDURE — 96361 HYDRATE IV INFUSION ADD-ON: CPT

## 2017-01-01 PROCEDURE — 1036F TOBACCO NON-USER: CPT | Performed by: FAMILY MEDICINE

## 2017-01-01 PROCEDURE — 83880 ASSAY OF NATRIURETIC PEPTIDE: CPT

## 2017-01-01 PROCEDURE — 87040 BLOOD CULTURE FOR BACTERIA: CPT

## 2017-01-01 PROCEDURE — 99222 1ST HOSP IP/OBS MODERATE 55: CPT | Performed by: INTERNAL MEDICINE

## 2017-01-01 PROCEDURE — 82746 ASSAY OF FOLIC ACID SERUM: CPT

## 2017-01-01 PROCEDURE — 96375 TX/PRO/DX INJ NEW DRUG ADDON: CPT

## 2017-01-01 PROCEDURE — 99255 IP/OBS CONSLTJ NEW/EST HI 80: CPT | Performed by: INTERNAL MEDICINE

## 2017-01-01 PROCEDURE — 80053 COMPREHEN METABOLIC PANEL: CPT

## 2017-01-01 RX ORDER — 0.9 % SODIUM CHLORIDE 0.9 %
1000 INTRAVENOUS SOLUTION INTRAVENOUS ONCE
Status: COMPLETED | OUTPATIENT
Start: 2017-01-01 | End: 2017-01-01

## 2017-01-01 RX ORDER — MIDODRINE HYDROCHLORIDE 5 MG/1
5 TABLET ORAL 3 TIMES DAILY
Status: ON HOLD | COMMUNITY
End: 2017-01-01 | Stop reason: HOSPADM

## 2017-01-01 RX ORDER — MIDAZOLAM HYDROCHLORIDE 1 MG/ML
2 INJECTION INTRAMUSCULAR; INTRAVENOUS
Status: DISCONTINUED | OUTPATIENT
Start: 2017-01-01 | End: 2017-01-01 | Stop reason: HOSPADM

## 2017-01-01 RX ORDER — FUROSEMIDE 20 MG/1
20 TABLET ORAL EVERY OTHER DAY
Status: DISCONTINUED | OUTPATIENT
Start: 2017-01-01 | End: 2017-01-01 | Stop reason: HOSPADM

## 2017-01-01 RX ORDER — NICOTINE POLACRILEX 4 MG
15 LOZENGE BUCCAL PRN
Status: DISCONTINUED | OUTPATIENT
Start: 2017-01-01 | End: 2017-01-01 | Stop reason: HOSPADM

## 2017-01-01 RX ORDER — LEVETIRACETAM 500 MG/1
500 TABLET ORAL 2 TIMES DAILY
Qty: 60 TABLET | Refills: 1 | Status: SHIPPED | OUTPATIENT
Start: 2017-01-01 | End: 2017-01-01

## 2017-01-01 RX ORDER — SODIUM CHLORIDE 9 MG/ML
INJECTION, SOLUTION INTRAVENOUS CONTINUOUS
Status: ACTIVE | OUTPATIENT
Start: 2017-01-01 | End: 2017-01-01

## 2017-01-01 RX ORDER — MAGNESIUM SULFATE IN WATER 40 MG/ML
2 INJECTION, SOLUTION INTRAVENOUS ONCE
Status: COMPLETED | OUTPATIENT
Start: 2017-01-01 | End: 2017-01-01

## 2017-01-01 RX ORDER — POTASSIUM CHLORIDE 7.45 MG/ML
10 INJECTION INTRAVENOUS ONCE
Status: DISCONTINUED | OUTPATIENT
Start: 2017-01-01 | End: 2017-01-01 | Stop reason: RX

## 2017-01-01 RX ORDER — LANCETS 30 GAUGE
EACH MISCELLANEOUS
Qty: 100 EACH | Refills: 3 | Status: SHIPPED | OUTPATIENT
Start: 2017-01-01

## 2017-01-01 RX ORDER — INSULIN GLARGINE 100 [IU]/ML
40 INJECTION, SOLUTION SUBCUTANEOUS NIGHTLY
Qty: 1 VIAL | Refills: 3 | DISCHARGE
Start: 2017-01-01 | End: 2017-01-01

## 2017-01-01 RX ORDER — INSULIN GLARGINE 100 [IU]/ML
10 INJECTION, SOLUTION SUBCUTANEOUS NIGHTLY
Status: COMPLETED | OUTPATIENT
Start: 2017-01-01 | End: 2017-01-01

## 2017-01-01 RX ORDER — KETOROLAC TROMETHAMINE 30 MG/ML
30 INJECTION, SOLUTION INTRAMUSCULAR; INTRAVENOUS ONCE
Status: COMPLETED | OUTPATIENT
Start: 2017-01-01 | End: 2017-01-01

## 2017-01-01 RX ORDER — INSULIN GLARGINE 100 [IU]/ML
40 INJECTION, SOLUTION SUBCUTANEOUS NIGHTLY
Status: DISCONTINUED | OUTPATIENT
Start: 2017-01-01 | End: 2017-01-01 | Stop reason: HOSPADM

## 2017-01-01 RX ORDER — INSULIN GLARGINE 100 [IU]/ML
40 INJECTION, SOLUTION SUBCUTANEOUS NIGHTLY
Qty: 1 VIAL | Refills: 3 | Status: SHIPPED | OUTPATIENT
Start: 2017-01-01 | End: 2017-01-01 | Stop reason: HOSPADM

## 2017-01-01 RX ORDER — CEPHALEXIN 500 MG/1
500 CAPSULE ORAL 3 TIMES DAILY
Qty: 30 CAPSULE | Refills: 0 | Status: ON HOLD | OUTPATIENT
Start: 2017-01-01 | End: 2018-01-01 | Stop reason: HOSPADM

## 2017-01-01 RX ORDER — ASPIRIN 81 MG/1
81 TABLET ORAL DAILY
Qty: 30 TABLET | Refills: 5 | Status: SHIPPED | OUTPATIENT
Start: 2017-01-01 | End: 2017-01-01

## 2017-01-01 RX ORDER — FUROSEMIDE 40 MG/1
40 TABLET ORAL 2 TIMES DAILY
Qty: 60 TABLET | Refills: 3 | Status: ON HOLD | OUTPATIENT
Start: 2017-01-01 | End: 2018-01-01 | Stop reason: HOSPADM

## 2017-01-01 RX ORDER — LACTULOSE 10 G/15ML
20 SOLUTION ORAL 3 TIMES DAILY
Refills: 1 | DISCHARGE
Start: 2017-01-01 | End: 2017-01-01

## 2017-01-01 RX ORDER — POTASSIUM CHLORIDE 20 MEQ/1
10 TABLET, EXTENDED RELEASE ORAL
Status: DISCONTINUED | OUTPATIENT
Start: 2017-01-01 | End: 2017-01-01 | Stop reason: HOSPADM

## 2017-01-01 RX ORDER — SODIUM CHLORIDE 9 MG/ML
INJECTION, SOLUTION INTRAVENOUS CONTINUOUS
Status: DISCONTINUED | OUTPATIENT
Start: 2017-01-01 | End: 2017-01-01

## 2017-01-01 RX ORDER — LACTULOSE 10 G/15ML
20 SOLUTION ORAL 3 TIMES DAILY
Qty: 1892 ML | Refills: 3 | Status: SHIPPED | OUTPATIENT
Start: 2017-01-01 | End: 2017-01-01 | Stop reason: HOSPADM

## 2017-01-01 RX ORDER — POTASSIUM CHLORIDE 29.8 MG/ML
60 INJECTION INTRAVENOUS ONCE
Status: DISCONTINUED | OUTPATIENT
Start: 2017-01-01 | End: 2017-01-01 | Stop reason: RX

## 2017-01-01 RX ORDER — POTASSIUM CHLORIDE 20 MEQ/1
40 TABLET, EXTENDED RELEASE ORAL ONCE
Status: COMPLETED | OUTPATIENT
Start: 2017-01-01 | End: 2017-01-01

## 2017-01-01 RX ORDER — DEXTROSE, SODIUM CHLORIDE, AND POTASSIUM CHLORIDE 5; .45; .3 G/100ML; G/100ML; G/100ML
INJECTION INTRAVENOUS CONTINUOUS
Status: DISCONTINUED | OUTPATIENT
Start: 2017-01-01 | End: 2017-01-01

## 2017-01-01 RX ORDER — TRAZODONE HYDROCHLORIDE 50 MG/1
25 TABLET ORAL NIGHTLY
Status: ON HOLD | COMMUNITY
End: 2017-01-01 | Stop reason: HOSPADM

## 2017-01-01 RX ORDER — LORAZEPAM 2 MG/ML
2 INJECTION INTRAMUSCULAR
Status: DISCONTINUED | OUTPATIENT
Start: 2017-01-01 | End: 2017-01-01 | Stop reason: RX

## 2017-01-01 RX ORDER — LORAZEPAM 2 MG/ML
2 INJECTION INTRAMUSCULAR EVERY 10 MIN PRN
Status: DISCONTINUED | OUTPATIENT
Start: 2017-01-01 | End: 2017-01-01 | Stop reason: RX

## 2017-01-01 RX ORDER — FUROSEMIDE 20 MG/1
10 TABLET ORAL EVERY OTHER DAY
Status: DISCONTINUED | OUTPATIENT
Start: 2017-01-01 | End: 2017-01-01

## 2017-01-01 RX ORDER — ISOPROPYL ALCOHOL 0.7 ML/1
1 SWAB TOPICAL
Qty: 1 EACH | Refills: 3 | Status: SHIPPED | OUTPATIENT
Start: 2017-01-01

## 2017-01-01 RX ORDER — SODIUM CHLORIDE 9 MG/ML
250 INJECTION, SOLUTION INTRAVENOUS ONCE
Status: DISCONTINUED | OUTPATIENT
Start: 2017-01-01 | End: 2017-01-01 | Stop reason: HOSPADM

## 2017-01-01 RX ORDER — PANTOPRAZOLE SODIUM 40 MG/1
40 TABLET, DELAYED RELEASE ORAL DAILY
Status: ON HOLD | COMMUNITY
End: 2018-01-01 | Stop reason: HOSPADM

## 2017-01-01 RX ORDER — PANTOPRAZOLE SODIUM 40 MG/1
40 TABLET, DELAYED RELEASE ORAL
Qty: 30 TABLET | Refills: 3 | Status: SHIPPED | OUTPATIENT
Start: 2017-01-01

## 2017-01-01 RX ORDER — BLOOD-GLUCOSE METER
1 KIT MISCELLANEOUS
Qty: 1 KIT | Refills: 0 | Status: SHIPPED | OUTPATIENT
Start: 2017-01-01

## 2017-01-01 RX ORDER — ASCORBIC ACID 500 MG
500 TABLET ORAL DAILY
Status: ON HOLD | COMMUNITY
End: 2018-01-01 | Stop reason: HOSPADM

## 2017-01-01 RX ORDER — ATORVASTATIN CALCIUM 40 MG/1
40 TABLET, FILM COATED ORAL NIGHTLY
Status: DISCONTINUED | OUTPATIENT
Start: 2017-01-01 | End: 2017-01-01

## 2017-01-01 RX ORDER — PROPRANOLOL HCL 60 MG
60 CAPSULE, EXTENDED RELEASE 24HR ORAL DAILY
Qty: 30 CAPSULE | Refills: 3
Start: 2017-01-01 | End: 2017-01-01 | Stop reason: HOSPADM

## 2017-01-01 RX ORDER — TORSEMIDE 20 MG/1
40 TABLET ORAL DAILY
Status: ON HOLD | COMMUNITY
End: 2017-01-01 | Stop reason: HOSPADM

## 2017-01-01 RX ORDER — INSULIN GLARGINE 100 [IU]/ML
35 INJECTION, SOLUTION SUBCUTANEOUS NIGHTLY
Status: DISCONTINUED | OUTPATIENT
Start: 2017-01-01 | End: 2017-01-01

## 2017-01-01 RX ORDER — LORAZEPAM 2 MG/ML
4 INJECTION INTRAMUSCULAR ONCE
Status: COMPLETED | OUTPATIENT
Start: 2017-01-01 | End: 2017-01-01

## 2017-01-01 RX ORDER — ASPIRIN 81 MG/1
81 TABLET ORAL DAILY
Status: DISCONTINUED | OUTPATIENT
Start: 2017-01-01 | End: 2017-01-01 | Stop reason: HOSPADM

## 2017-01-01 RX ORDER — POTASSIUM CHLORIDE 20 MEQ/1
20 TABLET, EXTENDED RELEASE ORAL DAILY
Status: ON HOLD | COMMUNITY
End: 2017-01-01 | Stop reason: HOSPADM

## 2017-01-01 RX ORDER — LIDOCAINE HYDROCHLORIDE 20 MG/ML
5 INJECTION, SOLUTION INFILTRATION; PERINEURAL ONCE
Status: DISCONTINUED | OUTPATIENT
Start: 2017-01-01 | End: 2017-01-01 | Stop reason: HOSPADM

## 2017-01-01 RX ORDER — SODIUM CHLORIDE 0.9 % (FLUSH) 0.9 %
10 SYRINGE (ML) INJECTION EVERY 12 HOURS SCHEDULED
Status: DISCONTINUED | OUTPATIENT
Start: 2017-01-01 | End: 2017-01-01 | Stop reason: HOSPADM

## 2017-01-01 RX ORDER — DEXTROSE MONOHYDRATE 25 G/50ML
12.5 INJECTION, SOLUTION INTRAVENOUS PRN
Status: DISCONTINUED | OUTPATIENT
Start: 2017-01-01 | End: 2017-01-01 | Stop reason: SDUPTHER

## 2017-01-01 RX ORDER — LEVETIRACETAM 500 MG/1
500 TABLET ORAL 2 TIMES DAILY
Status: ON HOLD | COMMUNITY
End: 2018-01-01 | Stop reason: HOSPADM

## 2017-01-01 RX ORDER — LACTULOSE 10 G/15ML
20 SOLUTION ORAL 3 TIMES DAILY
Qty: 1892 ML | Refills: 3 | Status: ON HOLD | OUTPATIENT
Start: 2017-01-01 | End: 2018-01-01

## 2017-01-01 RX ORDER — SODIUM CHLORIDE 0.9 % (FLUSH) 0.9 %
10 SYRINGE (ML) INJECTION PRN
Status: DISCONTINUED | OUTPATIENT
Start: 2017-01-01 | End: 2017-01-01 | Stop reason: HOSPADM

## 2017-01-01 RX ORDER — LORAZEPAM 2 MG/ML
2 INJECTION INTRAMUSCULAR ONCE
Status: COMPLETED | OUTPATIENT
Start: 2017-01-01 | End: 2017-01-01

## 2017-01-01 RX ORDER — POTASSIUM CHLORIDE 750 MG/1
10 TABLET, EXTENDED RELEASE ORAL
Qty: 60 TABLET | Refills: 3 | Status: SHIPPED | OUTPATIENT
Start: 2017-01-01

## 2017-01-01 RX ORDER — GLUCOSAMINE HCL/CHONDROITIN SU 500-400 MG
CAPSULE ORAL
Qty: 200 STRIP | Refills: 3 | Status: SHIPPED | OUTPATIENT
Start: 2017-01-01

## 2017-01-01 RX ORDER — LEVETIRACETAM 500 MG/1
500 TABLET ORAL 2 TIMES DAILY
Qty: 60 TABLET | Refills: 1 | Status: SHIPPED | OUTPATIENT
Start: 2017-01-01

## 2017-01-01 RX ORDER — PROPRANOLOL HCL 60 MG
60 CAPSULE, EXTENDED RELEASE 24HR ORAL DAILY
Status: ON HOLD | COMMUNITY
End: 2017-01-01 | Stop reason: HOSPADM

## 2017-01-01 RX ORDER — LACTULOSE 10 G/15ML
20 SOLUTION ORAL 3 TIMES DAILY
Status: DISCONTINUED | OUTPATIENT
Start: 2017-01-01 | End: 2017-01-01 | Stop reason: HOSPADM

## 2017-01-01 RX ORDER — AMOXICILLIN 500 MG/1
500 CAPSULE ORAL 2 TIMES DAILY
Qty: 10 CAPSULE | Refills: 0 | Status: SHIPPED | OUTPATIENT
Start: 2017-01-01 | End: 2017-01-01

## 2017-01-01 RX ORDER — LEVETIRACETAM 500 MG/1
500 TABLET ORAL 2 TIMES DAILY
Status: DISCONTINUED | OUTPATIENT
Start: 2017-01-01 | End: 2017-01-01 | Stop reason: HOSPADM

## 2017-01-01 RX ORDER — LACTULOSE 10 G/15ML
20 SOLUTION ORAL 3 TIMES DAILY
Status: DISCONTINUED | OUTPATIENT
Start: 2017-01-01 | End: 2017-01-01 | Stop reason: ALTCHOICE

## 2017-01-01 RX ORDER — INSULIN GLARGINE 100 [IU]/ML
INJECTION, SOLUTION SUBCUTANEOUS
Qty: 10 PEN | Refills: 2 | Status: ON HOLD
Start: 2017-01-01 | End: 2018-01-01 | Stop reason: HOSPADM

## 2017-01-01 RX ORDER — FUROSEMIDE 20 MG/1
20 TABLET ORAL
COMMUNITY
End: 2017-01-01 | Stop reason: ALTCHOICE

## 2017-01-01 RX ORDER — DEXTROSE AND SODIUM CHLORIDE 5; .45 G/100ML; G/100ML
INJECTION, SOLUTION INTRAVENOUS CONTINUOUS PRN
Status: DISCONTINUED | OUTPATIENT
Start: 2017-01-01 | End: 2017-01-01

## 2017-01-01 RX ORDER — PANTOPRAZOLE SODIUM 40 MG/1
40 TABLET, DELAYED RELEASE ORAL
Status: DISCONTINUED | OUTPATIENT
Start: 2017-01-01 | End: 2017-01-01 | Stop reason: HOSPADM

## 2017-01-01 RX ORDER — POTASSIUM CHLORIDE AND SODIUM CHLORIDE 900; 300 MG/100ML; MG/100ML
INJECTION, SOLUTION INTRAVENOUS CONTINUOUS
Status: DISCONTINUED | OUTPATIENT
Start: 2017-01-01 | End: 2017-01-01

## 2017-01-01 RX ORDER — FOLIC ACID 1 MG/1
1 TABLET ORAL DAILY
Status: ON HOLD | COMMUNITY
End: 2018-01-01 | Stop reason: HOSPADM

## 2017-01-01 RX ORDER — SPIRONOLACTONE 100 MG/1
100 TABLET, FILM COATED ORAL DAILY
Status: ON HOLD | COMMUNITY
End: 2017-01-01 | Stop reason: HOSPADM

## 2017-01-01 RX ORDER — DEXTROSE MONOHYDRATE 25 G/50ML
12.5 INJECTION, SOLUTION INTRAVENOUS PRN
Status: DISCONTINUED | OUTPATIENT
Start: 2017-01-01 | End: 2017-01-01 | Stop reason: HOSPADM

## 2017-01-01 RX ORDER — SODIUM CHLORIDE 9 MG/ML
INJECTION, SOLUTION INTRAVENOUS CONTINUOUS
Status: DISCONTINUED | OUTPATIENT
Start: 2017-01-01 | End: 2017-01-01 | Stop reason: HOSPADM

## 2017-01-01 RX ORDER — ASPIRIN 81 MG/1
81 TABLET ORAL DAILY
Qty: 30 TABLET | Refills: 5 | Status: ON HOLD | OUTPATIENT
Start: 2017-01-01 | End: 2018-01-01 | Stop reason: HOSPADM

## 2017-01-01 RX ORDER — PANTOPRAZOLE SODIUM 40 MG/1
40 TABLET, DELAYED RELEASE ORAL
Qty: 30 TABLET | Refills: 3 | Status: SHIPPED | OUTPATIENT
Start: 2017-01-01 | End: 2017-01-01

## 2017-01-01 RX ORDER — FUROSEMIDE 20 MG/1
20 TABLET ORAL EVERY OTHER DAY
Qty: 60 TABLET | Refills: 3 | Status: ON HOLD | OUTPATIENT
Start: 2017-01-01 | End: 2018-01-01

## 2017-01-01 RX ORDER — DEXTROSE MONOHYDRATE 50 MG/ML
100 INJECTION, SOLUTION INTRAVENOUS PRN
Status: DISCONTINUED | OUTPATIENT
Start: 2017-01-01 | End: 2017-01-01 | Stop reason: HOSPADM

## 2017-01-01 RX ORDER — SULFAMETHOXAZOLE AND TRIMETHOPRIM 800; 160 MG/1; MG/1
1 TABLET ORAL 2 TIMES DAILY
Qty: 10 TABLET | Refills: 0 | Status: SHIPPED | OUTPATIENT
Start: 2017-01-01 | End: 2017-01-01

## 2017-01-01 RX ADMIN — INSULIN HUMAN 20 UNITS: 100 INJECTION, SOLUTION PARENTERAL at 14:16

## 2017-01-01 RX ADMIN — POTASSIUM CHLORIDE AND SODIUM CHLORIDE: 900; 300 INJECTION, SOLUTION INTRAVENOUS at 23:41

## 2017-01-01 RX ADMIN — PANTOPRAZOLE SODIUM 40 MG: 40 TABLET, DELAYED RELEASE ORAL at 13:31

## 2017-01-01 RX ADMIN — POTASSIUM CHLORIDE 10 MEQ: 20 TABLET, EXTENDED RELEASE ORAL at 10:02

## 2017-01-01 RX ADMIN — LACTULOSE 20 G: 20 SOLUTION ORAL at 13:50

## 2017-01-01 RX ADMIN — LACTULOSE 20 G: 20 SOLUTION ORAL at 13:48

## 2017-01-01 RX ADMIN — LEVETIRACETAM 500 MG: 500 TABLET, FILM COATED ORAL at 20:18

## 2017-01-01 RX ADMIN — PANTOPRAZOLE SODIUM 40 MG: 40 TABLET, DELAYED RELEASE ORAL at 05:49

## 2017-01-01 RX ADMIN — KETOROLAC TROMETHAMINE 30 MG: 30 INJECTION, SOLUTION INTRAMUSCULAR at 21:32

## 2017-01-01 RX ADMIN — ASPIRIN 81 MG: 81 TABLET, COATED ORAL at 09:27

## 2017-01-01 RX ADMIN — METOPROLOL TARTRATE 25 MG: 25 TABLET ORAL at 13:39

## 2017-01-01 RX ADMIN — Medication 400 MG: at 21:30

## 2017-01-01 RX ADMIN — ENOXAPARIN SODIUM 40 MG: 40 INJECTION, SOLUTION INTRAVENOUS; SUBCUTANEOUS at 09:48

## 2017-01-01 RX ADMIN — LACTULOSE 20 G: 20 SOLUTION ORAL at 07:57

## 2017-01-01 RX ADMIN — ENOXAPARIN SODIUM 40 MG: 40 INJECTION, SOLUTION INTRAVENOUS; SUBCUTANEOUS at 21:36

## 2017-01-01 RX ADMIN — LACTULOSE 20 G: 20 SOLUTION ORAL at 20:54

## 2017-01-01 RX ADMIN — SODIUM CHLORIDE, PRESERVATIVE FREE 10 ML: 5 INJECTION INTRAVENOUS at 10:09

## 2017-01-01 RX ADMIN — LACTULOSE 20 G: 20 SOLUTION ORAL at 20:18

## 2017-01-01 RX ADMIN — DEXTROSE MONOHYDRATE 12.5 G: 25 INJECTION, SOLUTION INTRAVENOUS at 16:59

## 2017-01-01 RX ADMIN — ENOXAPARIN SODIUM 40 MG: 40 INJECTION, SOLUTION INTRAVENOUS; SUBCUTANEOUS at 20:53

## 2017-01-01 RX ADMIN — ENOXAPARIN SODIUM 40 MG: 40 INJECTION, SOLUTION INTRAVENOUS; SUBCUTANEOUS at 21:30

## 2017-01-01 RX ADMIN — INSULIN GLARGINE 40 UNITS: 100 INJECTION, SOLUTION SUBCUTANEOUS at 21:36

## 2017-01-01 RX ADMIN — LORAZEPAM 2 MG: 2 INJECTION INTRAMUSCULAR; INTRAVENOUS at 13:10

## 2017-01-01 RX ADMIN — ASPIRIN 81 MG: 81 TABLET, COATED ORAL at 13:39

## 2017-01-01 RX ADMIN — LACTULOSE 20 G: 20 SOLUTION ORAL at 22:30

## 2017-01-01 RX ADMIN — SODIUM CHLORIDE 1000 ML: 9 INJECTION, SOLUTION INTRAVENOUS at 13:39

## 2017-01-01 RX ADMIN — LACTULOSE 20 G: 20 SOLUTION ORAL at 08:53

## 2017-01-01 RX ADMIN — LEVETIRACETAM 500 MG: 500 TABLET, FILM COATED ORAL at 10:02

## 2017-01-01 RX ADMIN — METOPROLOL TARTRATE 25 MG: 25 TABLET ORAL at 09:26

## 2017-01-01 RX ADMIN — SODIUM CHLORIDE 1000 ML: 9 INJECTION, SOLUTION INTRAVENOUS at 15:42

## 2017-01-01 RX ADMIN — SODIUM CHLORIDE: 900 INJECTION, SOLUTION INTRAVENOUS at 20:35

## 2017-01-01 RX ADMIN — SODIUM CHLORIDE: 900 INJECTION, SOLUTION INTRAVENOUS at 02:10

## 2017-01-01 RX ADMIN — ASPIRIN 81 MG: 81 TABLET, COATED ORAL at 10:04

## 2017-01-01 RX ADMIN — ENOXAPARIN SODIUM 40 MG: 40 INJECTION, SOLUTION INTRAVENOUS; SUBCUTANEOUS at 08:51

## 2017-01-01 RX ADMIN — LIDOCAINE HYDROCHLORIDE: 10 INJECTION, SOLUTION EPIDURAL; INFILTRATION; INTRACAUDAL; PERINEURAL at 10:38

## 2017-01-01 RX ADMIN — SODIUM CHLORIDE: 9 INJECTION, SOLUTION INTRAVENOUS at 17:16

## 2017-01-01 RX ADMIN — LEVETIRACETAM 500 MG: 500 TABLET, FILM COATED ORAL at 09:30

## 2017-01-01 RX ADMIN — SODIUM CHLORIDE 1 UNITS/HR: 9 INJECTION, SOLUTION INTRAVENOUS at 14:29

## 2017-01-01 RX ADMIN — SODIUM CHLORIDE, PRESERVATIVE FREE 10 ML: 5 INJECTION INTRAVENOUS at 21:31

## 2017-01-01 RX ADMIN — SODIUM CHLORIDE: 900 INJECTION, SOLUTION INTRAVENOUS at 16:28

## 2017-01-01 RX ADMIN — SODIUM CHLORIDE, PRESERVATIVE FREE 10 ML: 5 INJECTION INTRAVENOUS at 20:11

## 2017-01-01 RX ADMIN — METOPROLOL TARTRATE 25 MG: 25 TABLET ORAL at 21:22

## 2017-01-01 RX ADMIN — POTASSIUM CHLORIDE, DEXTROSE MONOHYDRATE AND SODIUM CHLORIDE: 300; 5; 450 INJECTION, SOLUTION INTRAVENOUS at 09:21

## 2017-01-01 RX ADMIN — SODIUM CHLORIDE: 900 INJECTION, SOLUTION INTRAVENOUS at 12:23

## 2017-01-01 RX ADMIN — METOPROLOL TARTRATE 25 MG: 25 TABLET ORAL at 09:48

## 2017-01-01 RX ADMIN — ASPIRIN 81 MG: 81 TABLET, COATED ORAL at 09:26

## 2017-01-01 RX ADMIN — LEVETIRACETAM 500 MG: 500 TABLET, FILM COATED ORAL at 21:30

## 2017-01-01 RX ADMIN — ASPIRIN 81 MG: 81 TABLET, COATED ORAL at 09:48

## 2017-01-01 RX ADMIN — LACTULOSE 20 G: 20 SOLUTION ORAL at 20:32

## 2017-01-01 RX ADMIN — METOPROLOL TARTRATE 25 MG: 25 TABLET ORAL at 10:02

## 2017-01-01 RX ADMIN — LEVETIRACETAM 500 MG: 500 TABLET, FILM COATED ORAL at 09:22

## 2017-01-01 RX ADMIN — LEVETIRACETAM 500 MG: 500 TABLET, FILM COATED ORAL at 20:09

## 2017-01-01 RX ADMIN — LACTULOSE 20 G: 20 SOLUTION ORAL at 09:27

## 2017-01-01 RX ADMIN — INSULIN GLARGINE 40 UNITS: 100 INJECTION, SOLUTION SUBCUTANEOUS at 21:05

## 2017-01-01 RX ADMIN — PANTOPRAZOLE SODIUM 40 MG: 40 TABLET, DELAYED RELEASE ORAL at 06:37

## 2017-01-01 RX ADMIN — METOPROLOL TARTRATE 25 MG: 25 TABLET ORAL at 10:04

## 2017-01-01 RX ADMIN — ASPIRIN 81 MG: 81 TABLET, COATED ORAL at 08:54

## 2017-01-01 RX ADMIN — SODIUM CHLORIDE: 900 INJECTION, SOLUTION INTRAVENOUS at 05:06

## 2017-01-01 RX ADMIN — POTASSIUM CHLORIDE 40 MEQ: 20 TABLET, EXTENDED RELEASE ORAL at 21:35

## 2017-01-01 RX ADMIN — ENOXAPARIN SODIUM 40 MG: 40 INJECTION, SOLUTION INTRAVENOUS; SUBCUTANEOUS at 09:27

## 2017-01-01 RX ADMIN — PANTOPRAZOLE SODIUM 40 MG: 40 TABLET, DELAYED RELEASE ORAL at 05:40

## 2017-01-01 RX ADMIN — LEVETIRACETAM 500 MG: 500 TABLET, FILM COATED ORAL at 10:04

## 2017-01-01 RX ADMIN — INSULIN GLARGINE 10 UNITS: 100 INJECTION, SOLUTION SUBCUTANEOUS at 23:27

## 2017-01-01 RX ADMIN — POTASSIUM CHLORIDE 40 MEQ: 20 TABLET, EXTENDED RELEASE ORAL at 10:08

## 2017-01-01 RX ADMIN — DEXTROSE AND SODIUM CHLORIDE: 5; 450 INJECTION, SOLUTION INTRAVENOUS at 07:55

## 2017-01-01 RX ADMIN — METOPROLOL TARTRATE 25 MG: 25 TABLET ORAL at 20:10

## 2017-01-01 RX ADMIN — LEVETIRACETAM 500 MG: 500 TABLET, FILM COATED ORAL at 21:22

## 2017-01-01 RX ADMIN — LORAZEPAM 4 MG: 2 INJECTION INTRAMUSCULAR; INTRAVENOUS at 04:00

## 2017-01-01 RX ADMIN — SODIUM CHLORIDE, PRESERVATIVE FREE 10 ML: 5 INJECTION INTRAVENOUS at 21:22

## 2017-01-01 RX ADMIN — MAGNESIUM SULFATE IN WATER 2 G: 40 INJECTION, SOLUTION INTRAVENOUS at 08:53

## 2017-01-01 RX ADMIN — METOPROLOL TARTRATE 25 MG: 25 TABLET ORAL at 09:30

## 2017-01-01 RX ADMIN — LEVETIRACETAM 500 MG: 500 TABLET, FILM COATED ORAL at 09:26

## 2017-01-01 RX ADMIN — ASPIRIN 81 MG: 81 TABLET, COATED ORAL at 08:51

## 2017-01-01 RX ADMIN — SODIUM CHLORIDE, PRESERVATIVE FREE 10 ML: 5 INJECTION INTRAVENOUS at 21:33

## 2017-01-01 RX ADMIN — LACTULOSE 20 G: 20 SOLUTION ORAL at 21:30

## 2017-01-01 RX ADMIN — POTASSIUM CHLORIDE: 149 INJECTION, SOLUTION, CONCENTRATE INTRAVENOUS at 22:30

## 2017-01-01 RX ADMIN — SODIUM CHLORIDE: 900 INJECTION, SOLUTION INTRAVENOUS at 23:46

## 2017-01-01 RX ADMIN — SODIUM CHLORIDE: 9 INJECTION, SOLUTION INTRAVENOUS at 21:17

## 2017-01-01 RX ADMIN — MAGNESIUM SULFATE IN WATER 2 G: 40 INJECTION, SOLUTION INTRAVENOUS at 11:19

## 2017-01-01 RX ADMIN — LEVETIRACETAM 500 MG: 500 TABLET, FILM COATED ORAL at 22:30

## 2017-01-01 RX ADMIN — PANTOPRAZOLE SODIUM 40 MG: 40 TABLET, DELAYED RELEASE ORAL at 08:51

## 2017-01-01 RX ADMIN — INSULIN GLARGINE 40 UNITS: 100 INJECTION, SOLUTION SUBCUTANEOUS at 21:24

## 2017-01-01 RX ADMIN — SODIUM CHLORIDE 1000 ML: 9 INJECTION, SOLUTION INTRAVENOUS at 13:10

## 2017-01-01 RX ADMIN — PANTOPRAZOLE SODIUM 40 MG: 40 TABLET, DELAYED RELEASE ORAL at 05:05

## 2017-01-01 RX ADMIN — LEVETIRACETAM 500 MG: 500 TABLET, FILM COATED ORAL at 08:54

## 2017-01-01 RX ADMIN — INSULIN LISPRO 1 UNITS: 100 INJECTION, SOLUTION INTRAVENOUS; SUBCUTANEOUS at 21:04

## 2017-01-01 RX ADMIN — INSULIN LISPRO 2 UNITS: 100 INJECTION, SOLUTION INTRAVENOUS; SUBCUTANEOUS at 21:36

## 2017-01-01 RX ADMIN — INSULIN LISPRO 1 UNITS: 100 INJECTION, SOLUTION INTRAVENOUS; SUBCUTANEOUS at 23:26

## 2017-01-01 RX ADMIN — LACTULOSE 20 G: 20 SOLUTION ORAL at 21:35

## 2017-01-01 RX ADMIN — LACTULOSE 20 G: 20 SOLUTION ORAL at 08:51

## 2017-01-01 RX ADMIN — LEVETIRACETAM 500 MG: 500 TABLET, FILM COATED ORAL at 09:47

## 2017-01-01 RX ADMIN — ASPIRIN 81 MG: 81 TABLET, COATED ORAL at 10:02

## 2017-01-01 RX ADMIN — ENOXAPARIN SODIUM 40 MG: 40 INJECTION, SOLUTION INTRAVENOUS; SUBCUTANEOUS at 20:17

## 2017-01-01 RX ADMIN — INSULIN LISPRO 4 UNITS: 100 INJECTION, SOLUTION INTRAVENOUS; SUBCUTANEOUS at 21:23

## 2017-01-01 RX ADMIN — INSULIN GLARGINE 40 UNITS: 100 INJECTION, SOLUTION SUBCUTANEOUS at 22:32

## 2017-01-01 RX ADMIN — SODIUM CHLORIDE 1000 ML: 9 INJECTION, SOLUTION INTRAVENOUS at 13:40

## 2017-01-01 RX ADMIN — LACTULOSE 20 G: 20 SOLUTION ORAL at 10:06

## 2017-01-01 RX ADMIN — LACTULOSE 20 G: 20 SOLUTION ORAL at 13:38

## 2017-01-01 RX ADMIN — LEVETIRACETAM 500 MG: 500 TABLET, FILM COATED ORAL at 20:33

## 2017-01-01 RX ADMIN — SODIUM CHLORIDE: 9 INJECTION, SOLUTION INTRAVENOUS at 23:07

## 2017-01-01 RX ADMIN — LACTULOSE 20 G: 20 SOLUTION ORAL at 10:05

## 2017-01-01 RX ADMIN — Medication 400 MG: at 08:51

## 2017-01-01 RX ADMIN — DEXTROSE AND SODIUM CHLORIDE: 5; 450 INJECTION, SOLUTION INTRAVENOUS at 01:16

## 2017-01-01 RX ADMIN — POTASSIUM CHLORIDE AND SODIUM CHLORIDE: 900; 300 INJECTION, SOLUTION INTRAVENOUS at 13:39

## 2017-01-01 RX ADMIN — SODIUM CHLORIDE, PRESERVATIVE FREE 10 ML: 5 INJECTION INTRAVENOUS at 20:19

## 2017-01-01 RX ADMIN — SODIUM CHLORIDE: 900 INJECTION, SOLUTION INTRAVENOUS at 03:34

## 2017-01-01 RX ADMIN — LACTULOSE 20 G: 20 SOLUTION ORAL at 09:26

## 2017-01-01 RX ADMIN — SODIUM CHLORIDE, PRESERVATIVE FREE 10 ML: 5 INJECTION INTRAVENOUS at 09:23

## 2017-01-01 ASSESSMENT — ENCOUNTER SYMPTOMS
COUGH: 0
COUGH: 0
EYES NEGATIVE: 1
NAUSEA: 0
NAUSEA: 0
ABDOMINAL PAIN: 0
WHEEZING: 0
VOMITING: 0
BLOOD IN STOOL: 0
CHEST TIGHTNESS: 0
RESPIRATORY NEGATIVE: 1
NAUSEA: 0
BLOOD IN STOOL: 0
COUGH: 0
RESPIRATORY NEGATIVE: 1
STRIDOR: 0
COUGH: 0
CHEST TIGHTNESS: 0
WHEEZING: 0
STRIDOR: 0
SHORTNESS OF BREATH: 0
SHORTNESS OF BREATH: 0
ABDOMINAL DISTENTION: 0
WHEEZING: 0
STRIDOR: 0
COUGH: 0
WHEEZING: 0
WHEEZING: 0
STRIDOR: 0
ABDOMINAL DISTENTION: 0
SHORTNESS OF BREATH: 0
ABDOMINAL PAIN: 1
GASTROINTESTINAL NEGATIVE: 1
ABDOMINAL DISTENTION: 0
ABDOMINAL PAIN: 0
EYES NEGATIVE: 1
SHORTNESS OF BREATH: 0
SHORTNESS OF BREATH: 0
ABDOMINAL PAIN: 0
STRIDOR: 0
SHORTNESS OF BREATH: 0

## 2017-01-01 ASSESSMENT — PAIN SCALES - GENERAL
PAINLEVEL_OUTOF10: 0
PAINLEVEL_OUTOF10: 9
PAINLEVEL_OUTOF10: 0
PAINLEVEL_OUTOF10: 9
PAINLEVEL_OUTOF10: 0

## 2017-01-01 ASSESSMENT — PAIN DESCRIPTION - PAIN TYPE
TYPE: ACUTE PAIN
TYPE: ACUTE PAIN

## 2017-01-01 ASSESSMENT — PAIN DESCRIPTION - ORIENTATION
ORIENTATION: RIGHT;LEFT
ORIENTATION: RIGHT;LEFT

## 2017-01-01 ASSESSMENT — PAIN DESCRIPTION - LOCATION
LOCATION: KNEE
LOCATION: KNEE

## 2017-01-03 DIAGNOSIS — D64.9 ANEMIA, UNSPECIFIED TYPE: Primary | ICD-10-CM

## 2017-01-03 DIAGNOSIS — F10.10 ALCOHOL ABUSE: ICD-10-CM

## 2017-05-06 ENCOUNTER — HOSPITAL ENCOUNTER (INPATIENT)
Age: 47
LOS: 3 days | Discharge: HOME | DRG: 710 | End: 2017-05-09
Attending: FAMILY MEDICINE | Admitting: INTERNAL MEDICINE
Payer: MEDICAID

## 2017-05-06 ENCOUNTER — APPOINTMENT (OUTPATIENT)
Dept: ULTRASOUND IMAGING | Age: 47
DRG: 710 | End: 2017-05-06
Payer: MEDICAID

## 2017-05-06 ENCOUNTER — APPOINTMENT (OUTPATIENT)
Dept: CT IMAGING | Age: 47
DRG: 710 | End: 2017-05-06
Payer: MEDICAID

## 2017-05-06 DIAGNOSIS — E80.6 HYPERBILIRUBINEMIA: ICD-10-CM

## 2017-05-06 DIAGNOSIS — K70.11 ALCOHOLIC HEPATITIS WITH ASCITES: Primary | ICD-10-CM

## 2017-05-06 LAB
ABO/RH: NORMAL
ALBUMIN FLUID: 0.3 G/DL
ALBUMIN SERPL-MCNC: 2.1 G/DL (ref 3.9–4.9)
ALP BLD-CCNC: 216 U/L (ref 40–130)
ALT SERPL-CCNC: 17 U/L (ref 0–33)
AMPHETAMINE SCREEN, URINE: NORMAL
AMYLASE: 29 U/L (ref 28–100)
ANION GAP SERPL CALCULATED.3IONS-SCNC: 16 MEQ/L (ref 7–13)
ANISOCYTOSIS: ABNORMAL
ANTIBODY SCREEN: NORMAL
APPEARANCE FLUID: CLEAR
APTT: 47.8 SEC (ref 21.6–35.4)
AST SERPL-CCNC: 85 U/L (ref 0–35)
BACTERIA: ABNORMAL /HPF
BARBITURATE SCREEN URINE: NORMAL
BASOPHILS ABSOLUTE: 0 K/UL (ref 0–0.2)
BASOPHILS RELATIVE PERCENT: 0.1 %
BENZODIAZEPINE SCREEN, URINE: NORMAL
BILIRUB SERPL-MCNC: 24.3 MG/DL (ref 0–1.2)
BILIRUBIN URINE: ABNORMAL
BLOOD, URINE: ABNORMAL
BUN BLDV-MCNC: 10 MG/DL (ref 6–20)
BURR CELLS: ABNORMAL
CALCIUM SERPL-MCNC: 8.1 MG/DL (ref 8.6–10.2)
CANNABINOID SCREEN URINE: NORMAL
CHLORIDE BLD-SCNC: 92 MEQ/L (ref 98–107)
CLARITY: ABNORMAL
CO2: 23 MEQ/L (ref 22–29)
COCAINE METABOLITE SCREEN URINE: NORMAL
COLOR: ABNORMAL
CREAT SERPL-MCNC: 0.96 MG/DL (ref 0.5–0.9)
EOSINOPHILS ABSOLUTE: 0.2 K/UL (ref 0–0.7)
EOSINOPHILS RELATIVE PERCENT: 1 %
EPITHELIAL CELLS, UA: ABNORMAL /HPF
ETHANOL PERCENT: NORMAL G/DL
ETHANOL: <10 MG/DL (ref 0–0.08)
FLUID TYPE: NORMAL
GFR AFRICAN AMERICAN: >60
GFR NON-AFRICAN AMERICAN: >60
GLOBULIN: 3.5 G/DL (ref 2.3–3.5)
GLUCOSE BLD-MCNC: 131 MG/DL (ref 74–109)
GLUCOSE URINE: NEGATIVE MG/DL
HAV IGM SER IA-ACNC: NORMAL
HCT VFR BLD CALC: 25.1 % (ref 37–47)
HEMOGLOBIN: 8 G/DL (ref 12–16)
HEPATITIS B CORE IGM ANTIBODY: NORMAL
HEPATITIS B SURFACE ANTIGEN INTERPRETATION: NORMAL
HEPATITIS C ANTIBODY INTERPRETATION: NORMAL
HEPATITIS INTERPRETATION:: NORMAL
HYPOCHROMIA: ABNORMAL
INR BLD: 2.2
KETONES, URINE: 15 MG/DL
LEUKOCYTE ESTERASE, URINE: ABNORMAL
LIPASE: 7 U/L (ref 13–60)
LYMPHOCYTES ABSOLUTE: 1.3 K/UL (ref 1–4.8)
LYMPHOCYTES RELATIVE PERCENT: 6 %
Lab: NORMAL
MCH RBC QN AUTO: 31.4 PG (ref 27–31.3)
MCHC RBC AUTO-ENTMCNC: 31.9 % (ref 33–37)
MCV RBC AUTO: 98.5 FL (ref 82–100)
MONOCYTES ABSOLUTE: 0.2 K/UL (ref 0.2–0.8)
MONOCYTES RELATIVE PERCENT: 1 %
NEUTROPHILS ABSOLUTE: 19.5 K/UL (ref 1.4–6.5)
NEUTROPHILS RELATIVE PERCENT: 92 %
NITRITE, URINE: POSITIVE
OPIATE SCREEN URINE: NORMAL
PDW BLD-RTO: 25.3 % (ref 11.5–14.5)
PH UA: 5.5 (ref 5–9)
PHENCYCLIDINE SCREEN URINE: NORMAL
PLATELET # BLD: 140 K/UL (ref 130–400)
PLATELET SLIDE REVIEW: NORMAL
POIKILOCYTES: ABNORMAL
POLYCHROMASIA: ABNORMAL
POTASSIUM SERPL-SCNC: 3 MEQ/L (ref 3.5–5.1)
PROTEIN FLUID: 1 G/DL
PROTEIN UA: ABNORMAL MG/DL
PROTHROMBIN TIME: 24.6 SEC (ref 8.1–13.7)
RBC # BLD: 2.55 M/UL (ref 4.2–5.4)
RBC UA: ABNORMAL /HPF (ref 0–2)
SMUDGE CELLS: 1.9
SODIUM BLD-SCNC: 131 MEQ/L (ref 132–144)
SPECIFIC GRAVITY UA: 1.02 (ref 1–1.03)
TARGET CELLS: ABNORMAL
TOTAL PROTEIN: 5.6 G/DL (ref 6.4–8.1)
UROBILINOGEN, URINE: 1 E.U./DL
WBC # BLD: 21.2 K/UL (ref 4.8–10.8)
WBC UA: ABNORMAL /HPF (ref 0–5)

## 2017-05-06 PROCEDURE — 49083 ABD PARACENTESIS W/IMAGING: CPT | Performed by: RADIOLOGY

## 2017-05-06 PROCEDURE — 6360000002 HC RX W HCPCS: Performed by: FAMILY MEDICINE

## 2017-05-06 PROCEDURE — 82042 OTHER SOURCE ALBUMIN QUAN EA: CPT

## 2017-05-06 PROCEDURE — 81001 URINALYSIS AUTO W/SCOPE: CPT

## 2017-05-06 PROCEDURE — 85730 THROMBOPLASTIN TIME PARTIAL: CPT

## 2017-05-06 PROCEDURE — 82248 BILIRUBIN DIRECT: CPT

## 2017-05-06 PROCEDURE — 87077 CULTURE AEROBIC IDENTIFY: CPT

## 2017-05-06 PROCEDURE — 49083 ABD PARACENTESIS W/IMAGING: CPT

## 2017-05-06 PROCEDURE — 86920 COMPATIBILITY TEST SPIN: CPT

## 2017-05-06 PROCEDURE — 85025 COMPLETE CBC W/AUTO DIFF WBC: CPT

## 2017-05-06 PROCEDURE — 85610 PROTHROMBIN TIME: CPT

## 2017-05-06 PROCEDURE — 1210000000 HC MED SURG R&B

## 2017-05-06 PROCEDURE — 82150 ASSAY OF AMYLASE: CPT

## 2017-05-06 PROCEDURE — 80307 DRUG TEST PRSMV CHEM ANLYZR: CPT

## 2017-05-06 PROCEDURE — 87205 SMEAR GRAM STAIN: CPT

## 2017-05-06 PROCEDURE — 84157 ASSAY OF PROTEIN OTHER: CPT

## 2017-05-06 PROCEDURE — 0W9G3ZX DRAINAGE OF PERITONEAL CAVITY, PERCUTANEOUS APPROACH, DIAGNOSTIC: ICD-10-PCS | Performed by: RADIOLOGY

## 2017-05-06 PROCEDURE — 74176 CT ABD & PELVIS W/O CONTRAST: CPT

## 2017-05-06 PROCEDURE — 2580000003 HC RX 258: Performed by: FAMILY MEDICINE

## 2017-05-06 PROCEDURE — 87186 SC STD MICRODIL/AGAR DIL: CPT

## 2017-05-06 PROCEDURE — 86901 BLOOD TYPING SEROLOGIC RH(D): CPT

## 2017-05-06 PROCEDURE — G0480 DRUG TEST DEF 1-7 CLASSES: HCPCS

## 2017-05-06 PROCEDURE — 80074 ACUTE HEPATITIS PANEL: CPT

## 2017-05-06 PROCEDURE — 86850 RBC ANTIBODY SCREEN: CPT

## 2017-05-06 PROCEDURE — 80053 COMPREHEN METABOLIC PANEL: CPT

## 2017-05-06 PROCEDURE — 99285 EMERGENCY DEPT VISIT HI MDM: CPT

## 2017-05-06 PROCEDURE — 83690 ASSAY OF LIPASE: CPT

## 2017-05-06 PROCEDURE — 89051 BODY FLUID CELL COUNT: CPT

## 2017-05-06 PROCEDURE — 36415 COLL VENOUS BLD VENIPUNCTURE: CPT

## 2017-05-06 PROCEDURE — 86900 BLOOD TYPING SEROLOGIC ABO: CPT

## 2017-05-06 PROCEDURE — 87086 URINE CULTURE/COLONY COUNT: CPT

## 2017-05-06 PROCEDURE — 87070 CULTURE OTHR SPECIMN AEROBIC: CPT

## 2017-05-06 RX ORDER — ACETAMINOPHEN 325 MG/1
325 TABLET ORAL EVERY 12 HOURS
Status: DISCONTINUED | OUTPATIENT
Start: 2017-05-06 | End: 2017-05-10 | Stop reason: HOSPADM

## 2017-05-06 RX ORDER — 0.9 % SODIUM CHLORIDE 0.9 %
250 INTRAVENOUS SOLUTION INTRAVENOUS ONCE
Status: COMPLETED | OUTPATIENT
Start: 2017-05-06 | End: 2017-05-07

## 2017-05-06 RX ORDER — SODIUM CHLORIDE 0.9 % (FLUSH) 0.9 %
10 SYRINGE (ML) INJECTION EVERY 12 HOURS SCHEDULED
Status: DISCONTINUED | OUTPATIENT
Start: 2017-05-06 | End: 2017-05-10 | Stop reason: HOSPADM

## 2017-05-06 RX ORDER — SODIUM CHLORIDE 9 MG/ML
INJECTION, SOLUTION INTRAVENOUS CONTINUOUS
Status: DISCONTINUED | OUTPATIENT
Start: 2017-05-06 | End: 2017-05-10 | Stop reason: HOSPADM

## 2017-05-06 RX ORDER — SODIUM CHLORIDE 0.9 % (FLUSH) 0.9 %
10 SYRINGE (ML) INJECTION PRN
Status: DISCONTINUED | OUTPATIENT
Start: 2017-05-06 | End: 2017-05-10 | Stop reason: HOSPADM

## 2017-05-06 RX ORDER — ONDANSETRON 2 MG/ML
4 INJECTION INTRAMUSCULAR; INTRAVENOUS EVERY 6 HOURS PRN
Status: DISCONTINUED | OUTPATIENT
Start: 2017-05-06 | End: 2017-05-10 | Stop reason: HOSPADM

## 2017-05-06 RX ORDER — THIAMINE HYDROCHLORIDE 100 MG/ML
100 INJECTION, SOLUTION INTRAMUSCULAR; INTRAVENOUS ONCE
Status: COMPLETED | OUTPATIENT
Start: 2017-05-06 | End: 2017-05-07

## 2017-05-06 RX ORDER — LACTULOSE 10 G/15ML
20 SOLUTION ORAL 3 TIMES DAILY
Status: DISCONTINUED | OUTPATIENT
Start: 2017-05-06 | End: 2017-05-10 | Stop reason: HOSPADM

## 2017-05-06 RX ORDER — MULTIVITAMIN WITH FOLIC ACID 400 MCG
1 TABLET ORAL DAILY
Status: DISCONTINUED | OUTPATIENT
Start: 2017-05-07 | End: 2017-05-10 | Stop reason: HOSPADM

## 2017-05-06 RX ORDER — FOLIC ACID 1 MG/1
1 TABLET ORAL DAILY
Status: DISCONTINUED | OUTPATIENT
Start: 2017-05-07 | End: 2017-05-10 | Stop reason: HOSPADM

## 2017-05-06 RX ORDER — THIAMINE MONONITRATE (VIT B1) 100 MG
100 TABLET ORAL DAILY
Status: DISCONTINUED | OUTPATIENT
Start: 2017-05-07 | End: 2017-05-10 | Stop reason: HOSPADM

## 2017-05-06 RX ADMIN — CEFTRIAXONE 1 G: 1 INJECTION, POWDER, FOR SOLUTION INTRAMUSCULAR; INTRAVENOUS at 17:32

## 2017-05-06 ASSESSMENT — ENCOUNTER SYMPTOMS
ABDOMINAL PAIN: 1
BLOOD IN STOOL: 0
RESPIRATORY NEGATIVE: 1
DIARRHEA: 0
CONSTIPATION: 0
NAUSEA: 0
VOMITING: 0
EYES NEGATIVE: 1

## 2017-05-06 ASSESSMENT — PAIN DESCRIPTION - ORIENTATION: ORIENTATION: MID;LOWER

## 2017-05-06 ASSESSMENT — PAIN DESCRIPTION - LOCATION: LOCATION: ABDOMEN

## 2017-05-06 ASSESSMENT — PAIN DESCRIPTION - FREQUENCY: FREQUENCY: INTERMITTENT

## 2017-05-06 ASSESSMENT — PAIN DESCRIPTION - PAIN TYPE: TYPE: ACUTE PAIN

## 2017-05-06 ASSESSMENT — PAIN DESCRIPTION - DESCRIPTORS: DESCRIPTORS: BURNING;PRESSURE

## 2017-05-06 ASSESSMENT — PAIN SCALES - GENERAL: PAINLEVEL_OUTOF10: 4

## 2017-05-07 ENCOUNTER — APPOINTMENT (OUTPATIENT)
Dept: ULTRASOUND IMAGING | Age: 47
DRG: 710 | End: 2017-05-07
Payer: MEDICAID

## 2017-05-07 LAB
ANION GAP SERPL CALCULATED.3IONS-SCNC: 15 MEQ/L (ref 7–13)
ANISOCYTOSIS: ABNORMAL
APPEARANCE FLUID: CLEAR
BASOPHILS ABSOLUTE: 0 K/UL (ref 0–0.2)
BASOPHILS RELATIVE PERCENT: 0.2 %
BILIRUBIN DIRECT: 16.8 MG/DL (ref 0–0.3)
BILIRUBIN, INDIRECT: 7.5 MG/DL (ref 0–0.6)
BLOOD BANK DISPENSE STATUS: NORMAL
BLOOD BANK DISPENSE STATUS: NORMAL
BLOOD BANK PRODUCT CODE: NORMAL
BLOOD BANK PRODUCT CODE: NORMAL
BPU ID: NORMAL
BPU ID: NORMAL
BUN BLDV-MCNC: 11 MG/DL (ref 6–20)
CALCIUM SERPL-MCNC: 7.6 MG/DL (ref 8.6–10.2)
CELL COUNT FLUID TYPE: NORMAL
CHLORIDE BLD-SCNC: 94 MEQ/L (ref 98–107)
CLOT EVALUATION: NORMAL
CO2: 24 MEQ/L (ref 22–29)
COLOR FLUID: YELLOW
CREAT SERPL-MCNC: 0.88 MG/DL (ref 0.5–0.9)
DESCRIPTION BLOOD BANK: NORMAL
DESCRIPTION BLOOD BANK: NORMAL
EOSINOPHILS ABSOLUTE: 0 K/UL (ref 0–0.7)
EOSINOPHILS RELATIVE PERCENT: 0.6 %
FLUID PATH CONSULT: YES
GFR AFRICAN AMERICAN: >60
GFR NON-AFRICAN AMERICAN: >60
GLUCOSE BLD-MCNC: 179 MG/DL (ref 74–109)
GRAM STAIN RESULT: NORMAL
HCT VFR BLD CALC: 20 % (ref 37–47)
HEMOGLOBIN: 6.5 G/DL (ref 12–16)
HYPOCHROMIA: ABNORMAL
LACTIC ACID: 2.6 MMOL/L (ref 0.5–2.2)
LACTIC ACID: 3.4 MMOL/L (ref 0.5–2.2)
LYMPHOCYTES ABSOLUTE: 1 K/UL (ref 1–4.8)
LYMPHOCYTES RELATIVE PERCENT: 7 %
LYMPHOCYTES, BODY FLUID: 51 %
MACROCYTES: 0
MAGNESIUM: 1.6 MG/DL (ref 1.7–2.3)
MCH RBC QN AUTO: 31.8 PG (ref 27–31.3)
MCHC RBC AUTO-ENTMCNC: 32.5 % (ref 33–37)
MCV RBC AUTO: 97.9 FL (ref 82–100)
MICROCYTES: 0
MONOCYTE, FLUID: 10 %
MONOCYTES ABSOLUTE: 0.6 K/UL (ref 0.2–0.8)
MONOCYTES RELATIVE PERCENT: 3.9 %
NEUTROPHIL, FLUID: 39 %
NEUTROPHILS ABSOLUTE: 12.5 K/UL (ref 1.4–6.5)
NEUTROPHILS RELATIVE PERCENT: 89 %
NUCLEATED CELLS FLUID: 84 /CUMM
NUMBER OF CELLS COUNTED FLUID: 100
OVALOCYTES: ABNORMAL
PDW BLD-RTO: 24.8 % (ref 11.5–14.5)
PLATELET # BLD: 104 K/UL (ref 130–400)
PLATELET SLIDE REVIEW: ABNORMAL
POIKILOCYTES: ABNORMAL
POLYCHROMASIA: ABNORMAL
POTASSIUM SERPL-SCNC: 2.8 MEQ/L (ref 3.5–5.1)
RBC # BLD: 2.04 M/UL (ref 4.2–5.4)
RBC FLUID: 10 /CUMM
SMUDGE CELLS: 1
SODIUM BLD-SCNC: 133 MEQ/L (ref 132–144)
TARGET CELLS: ABNORMAL
WBC # BLD: 14 K/UL (ref 4.8–10.8)

## 2017-05-07 PROCEDURE — 2580000003 HC RX 258: Performed by: FAMILY MEDICINE

## 2017-05-07 PROCEDURE — G8980 MOBILITY D/C STATUS: HCPCS

## 2017-05-07 PROCEDURE — G8978 MOBILITY CURRENT STATUS: HCPCS

## 2017-05-07 PROCEDURE — 80048 BASIC METABOLIC PNL TOTAL CA: CPT

## 2017-05-07 PROCEDURE — 76705 ECHO EXAM OF ABDOMEN: CPT

## 2017-05-07 PROCEDURE — 1210000000 HC MED SURG R&B

## 2017-05-07 PROCEDURE — 36430 TRANSFUSION BLD/BLD COMPNT: CPT

## 2017-05-07 PROCEDURE — G8989 SELF CARE D/C STATUS: HCPCS

## 2017-05-07 PROCEDURE — 6360000002 HC RX W HCPCS: Performed by: INTERNAL MEDICINE

## 2017-05-07 PROCEDURE — 2580000003 HC RX 258: Performed by: INTERNAL MEDICINE

## 2017-05-07 PROCEDURE — G8988 SELF CARE GOAL STATUS: HCPCS

## 2017-05-07 PROCEDURE — 2580000003 HC RX 258

## 2017-05-07 PROCEDURE — P9017 PLASMA 1 DONOR FRZ W/IN 8 HR: HCPCS

## 2017-05-07 PROCEDURE — G8979 MOBILITY GOAL STATUS: HCPCS

## 2017-05-07 PROCEDURE — 6370000000 HC RX 637 (ALT 250 FOR IP): Performed by: SPECIALIST

## 2017-05-07 PROCEDURE — 36415 COLL VENOUS BLD VENIPUNCTURE: CPT

## 2017-05-07 PROCEDURE — 83605 ASSAY OF LACTIC ACID: CPT

## 2017-05-07 PROCEDURE — 85018 HEMOGLOBIN: CPT

## 2017-05-07 PROCEDURE — 83735 ASSAY OF MAGNESIUM: CPT

## 2017-05-07 PROCEDURE — 85025 COMPLETE CBC W/AUTO DIFF WBC: CPT

## 2017-05-07 PROCEDURE — 97161 PT EVAL LOW COMPLEX 20 MIN: CPT

## 2017-05-07 PROCEDURE — G8987 SELF CARE CURRENT STATUS: HCPCS

## 2017-05-07 PROCEDURE — 97165 OT EVAL LOW COMPLEX 30 MIN: CPT

## 2017-05-07 PROCEDURE — P9016 RBC LEUKOCYTES REDUCED: HCPCS

## 2017-05-07 PROCEDURE — 6370000000 HC RX 637 (ALT 250 FOR IP): Performed by: INTERNAL MEDICINE

## 2017-05-07 PROCEDURE — 85014 HEMATOCRIT: CPT

## 2017-05-07 RX ORDER — POTASSIUM CHLORIDE 20MEQ/15ML
40 LIQUID (ML) ORAL PRN
Status: DISCONTINUED | OUTPATIENT
Start: 2017-05-07 | End: 2017-05-10 | Stop reason: HOSPADM

## 2017-05-07 RX ORDER — POTASSIUM CHLORIDE 20 MEQ/1
40 TABLET, EXTENDED RELEASE ORAL PRN
Status: DISCONTINUED | OUTPATIENT
Start: 2017-05-07 | End: 2017-05-10 | Stop reason: HOSPADM

## 2017-05-07 RX ORDER — 0.9 % SODIUM CHLORIDE 0.9 %
250 INTRAVENOUS SOLUTION INTRAVENOUS ONCE
Status: COMPLETED | OUTPATIENT
Start: 2017-05-07 | End: 2017-05-08

## 2017-05-07 RX ORDER — MORPHINE SULFATE 2 MG/ML
2 INJECTION, SOLUTION INTRAMUSCULAR; INTRAVENOUS EVERY 4 HOURS PRN
Status: DISCONTINUED | OUTPATIENT
Start: 2017-05-07 | End: 2017-05-10 | Stop reason: HOSPADM

## 2017-05-07 RX ORDER — 0.9 % SODIUM CHLORIDE 0.9 %
1000 INTRAVENOUS SOLUTION INTRAVENOUS ONCE
Status: COMPLETED | OUTPATIENT
Start: 2017-05-07 | End: 2017-05-07

## 2017-05-07 RX ORDER — PREDNISOLONE SODIUM PHOSPHATE 15 MG/5ML
30 SOLUTION ORAL DAILY
Status: DISCONTINUED | OUTPATIENT
Start: 2017-05-07 | End: 2017-05-10 | Stop reason: HOSPADM

## 2017-05-07 RX ORDER — POTASSIUM CHLORIDE 7.45 MG/ML
10 INJECTION INTRAVENOUS
Status: DISPENSED | OUTPATIENT
Start: 2017-05-07 | End: 2017-05-07

## 2017-05-07 RX ORDER — POTASSIUM CHLORIDE 7.45 MG/ML
10 INJECTION INTRAVENOUS PRN
Status: DISCONTINUED | OUTPATIENT
Start: 2017-05-07 | End: 2017-05-10 | Stop reason: HOSPADM

## 2017-05-07 RX ORDER — SPIRONOLACTONE 50 MG/1
50 TABLET, FILM COATED ORAL DAILY
Status: DISCONTINUED | OUTPATIENT
Start: 2017-05-07 | End: 2017-05-10 | Stop reason: HOSPADM

## 2017-05-07 RX ORDER — SODIUM CHLORIDE 9 MG/ML
INJECTION, SOLUTION INTRAVENOUS
Status: COMPLETED
Start: 2017-05-07 | End: 2017-05-07

## 2017-05-07 RX ORDER — MAGNESIUM SULFATE IN WATER 40 MG/ML
2 INJECTION, SOLUTION INTRAVENOUS ONCE
Status: COMPLETED | OUTPATIENT
Start: 2017-05-07 | End: 2017-05-07

## 2017-05-07 RX ORDER — FUROSEMIDE 40 MG/1
40 TABLET ORAL DAILY
Status: DISCONTINUED | OUTPATIENT
Start: 2017-05-07 | End: 2017-05-07

## 2017-05-07 RX ADMIN — POTASSIUM CHLORIDE 10 MEQ: 7.46 INJECTION, SOLUTION INTRAVENOUS at 23:21

## 2017-05-07 RX ADMIN — LACTULOSE 20 G: 10 SOLUTION ORAL at 22:06

## 2017-05-07 RX ADMIN — Medication 100 MG: at 10:08

## 2017-05-07 RX ADMIN — SODIUM CHLORIDE, PRESERVATIVE FREE 10 ML: 5 INJECTION INTRAVENOUS at 20:14

## 2017-05-07 RX ADMIN — LACTULOSE 20 G: 10 SOLUTION ORAL at 10:09

## 2017-05-07 RX ADMIN — ACETAMINOPHEN 325 MG: 325 TABLET ORAL at 00:25

## 2017-05-07 RX ADMIN — FUROSEMIDE 40 MG: 40 TABLET ORAL at 10:08

## 2017-05-07 RX ADMIN — MAGNESIUM SULFATE IN WATER 2 G: 40 INJECTION, SOLUTION INTRAVENOUS at 17:33

## 2017-05-07 RX ADMIN — SODIUM CHLORIDE 250 ML: 9 INJECTION, SOLUTION INTRAVENOUS at 15:43

## 2017-05-07 RX ADMIN — CEFTRIAXONE 1 G: 1 INJECTION, POWDER, FOR SOLUTION INTRAMUSCULAR; INTRAVENOUS at 20:13

## 2017-05-07 RX ADMIN — Medication 30 MG: at 13:37

## 2017-05-07 RX ADMIN — LACTULOSE 20 G: 10 SOLUTION ORAL at 13:36

## 2017-05-07 RX ADMIN — POTASSIUM CHLORIDE 10 MEQ: 7.46 INJECTION, SOLUTION INTRAVENOUS at 22:07

## 2017-05-07 RX ADMIN — FOLIC ACID 1 MG: 1 TABLET ORAL at 10:08

## 2017-05-07 RX ADMIN — SODIUM CHLORIDE 1000 ML: 9 INJECTION, SOLUTION INTRAVENOUS at 22:05

## 2017-05-07 RX ADMIN — POTASSIUM CHLORIDE 10 MEQ: 7.46 INJECTION, SOLUTION INTRAVENOUS at 16:32

## 2017-05-07 RX ADMIN — ACETAMINOPHEN 325 MG: 325 TABLET ORAL at 13:35

## 2017-05-07 RX ADMIN — SPIRONOLACTONE 50 MG: 50 TABLET, FILM COATED ORAL at 10:08

## 2017-05-07 RX ADMIN — ENOXAPARIN SODIUM 40 MG: 40 INJECTION SUBCUTANEOUS at 10:09

## 2017-05-07 RX ADMIN — SODIUM CHLORIDE 250 ML: 9 INJECTION, SOLUTION INTRAVENOUS at 02:41

## 2017-05-07 RX ADMIN — SODIUM CHLORIDE: 9 INJECTION, SOLUTION INTRAVENOUS at 05:03

## 2017-05-07 RX ADMIN — LACTULOSE 20 G: 10 SOLUTION ORAL at 00:25

## 2017-05-07 RX ADMIN — SODIUM CHLORIDE 250 ML: 9 INJECTION, SOLUTION INTRAVENOUS at 00:24

## 2017-05-07 RX ADMIN — THERA TABS 1 TABLET: TAB at 10:08

## 2017-05-07 RX ADMIN — SODIUM CHLORIDE, PRESERVATIVE FREE 10 ML: 5 INJECTION INTRAVENOUS at 10:11

## 2017-05-07 RX ADMIN — THIAMINE HYDROCHLORIDE 100 MG: 100 INJECTION, SOLUTION INTRAMUSCULAR; INTRAVENOUS at 00:25

## 2017-05-07 ASSESSMENT — ENCOUNTER SYMPTOMS
HEMATOCHEZIA: 0
COUGH: 0
VOMITING: 0
SORE THROAT: 0
NAUSEA: 1
BELCHING: 0
SHORTNESS OF BREATH: 0
HEMATEMESIS: 0
DIARRHEA: 0
CONSTIPATION: 0

## 2017-05-07 ASSESSMENT — PAIN DESCRIPTION - LOCATION: LOCATION: ABDOMEN

## 2017-05-07 ASSESSMENT — PAIN SCALES - GENERAL
PAINLEVEL_OUTOF10: 4
PAINLEVEL_OUTOF10: 7
PAINLEVEL_OUTOF10: 8

## 2017-05-08 ENCOUNTER — APPOINTMENT (OUTPATIENT)
Dept: NUCLEAR MEDICINE | Age: 47
DRG: 710 | End: 2017-05-08
Payer: MEDICAID

## 2017-05-08 LAB
ANION GAP SERPL CALCULATED.3IONS-SCNC: 15 MEQ/L (ref 7–13)
ANION GAP SERPL CALCULATED.3IONS-SCNC: 19 MEQ/L (ref 7–13)
BASOPHILS ABSOLUTE: 0 K/UL (ref 0–0.2)
BASOPHILS RELATIVE PERCENT: 0.1 %
BUN BLDV-MCNC: 11 MG/DL (ref 6–20)
BUN BLDV-MCNC: 11 MG/DL (ref 6–20)
CALCIUM SERPL-MCNC: 7.4 MG/DL (ref 8.6–10.2)
CALCIUM SERPL-MCNC: 7.8 MG/DL (ref 8.6–10.2)
CHLORIDE BLD-SCNC: 93 MEQ/L (ref 98–107)
CHLORIDE BLD-SCNC: 98 MEQ/L (ref 98–107)
CO2: 20 MEQ/L (ref 22–29)
CO2: 21 MEQ/L (ref 22–29)
CREAT SERPL-MCNC: 0.45 MG/DL (ref 0.5–0.9)
CREAT SERPL-MCNC: 0.47 MG/DL (ref 0.5–0.9)
EOSINOPHILS ABSOLUTE: 0 K/UL (ref 0–0.7)
EOSINOPHILS RELATIVE PERCENT: 0 %
GFR AFRICAN AMERICAN: >60
GFR AFRICAN AMERICAN: >60
GFR NON-AFRICAN AMERICAN: >60
GFR NON-AFRICAN AMERICAN: >60
GLUCOSE BLD-MCNC: 137 MG/DL (ref 74–109)
GLUCOSE BLD-MCNC: 164 MG/DL (ref 74–109)
HCT VFR BLD CALC: 27.2 % (ref 37–47)
HCT VFR BLD CALC: 27.6 % (ref 37–47)
HCT VFR BLD CALC: 29.8 % (ref 37–47)
HCT VFR BLD CALC: 30.1 % (ref 37–47)
HEMOGLOBIN: 10.1 G/DL (ref 12–16)
HEMOGLOBIN: 9 G/DL (ref 12–16)
HEMOGLOBIN: 9.4 G/DL (ref 12–16)
HEMOGLOBIN: 9.9 G/DL (ref 12–16)
INR BLD: 1.9
LACTIC ACID: 2.5 MMOL/L (ref 0.5–2.2)
LACTIC ACID: 4 MMOL/L (ref 0.5–2.2)
LYMPHOCYTES ABSOLUTE: 1.2 K/UL (ref 1–4.8)
LYMPHOCYTES RELATIVE PERCENT: 5.6 %
MCH RBC QN AUTO: 32.1 PG (ref 27–31.3)
MCHC RBC AUTO-ENTMCNC: 33.1 % (ref 33–37)
MCV RBC AUTO: 96.9 FL (ref 82–100)
MONOCYTES ABSOLUTE: 1.4 K/UL (ref 0.2–0.8)
MONOCYTES RELATIVE PERCENT: 6.6 %
NEUTROPHILS ABSOLUTE: 18.5 K/UL (ref 1.4–6.5)
NEUTROPHILS RELATIVE PERCENT: 87.7 %
ORGANISM: ABNORMAL
PDW BLD-RTO: 21.1 % (ref 11.5–14.5)
PLATELET # BLD: 119 K/UL (ref 130–400)
PLATELET SLIDE REVIEW: ABNORMAL
POTASSIUM SERPL-SCNC: 3.4 MEQ/L (ref 3.5–5.1)
POTASSIUM SERPL-SCNC: 3.5 MEQ/L (ref 3.5–5.1)
PROTHROMBIN TIME: 21.2 SEC (ref 8.1–13.7)
RBC # BLD: 2.81 M/UL (ref 4.2–5.4)
SODIUM BLD-SCNC: 132 MEQ/L (ref 132–144)
SODIUM BLD-SCNC: 134 MEQ/L (ref 132–144)
URINE CULTURE, ROUTINE: ABNORMAL
WBC # BLD: 21.1 K/UL (ref 4.8–10.8)

## 2017-05-08 PROCEDURE — 78226 HEPATOBILIARY SYSTEM IMAGING: CPT

## 2017-05-08 PROCEDURE — 1210000000 HC MED SURG R&B

## 2017-05-08 PROCEDURE — 6370000000 HC RX 637 (ALT 250 FOR IP): Performed by: SPECIALIST

## 2017-05-08 PROCEDURE — 2580000003 HC RX 258: Performed by: INTERNAL MEDICINE

## 2017-05-08 PROCEDURE — 6370000000 HC RX 637 (ALT 250 FOR IP): Performed by: INTERNAL MEDICINE

## 2017-05-08 PROCEDURE — 83605 ASSAY OF LACTIC ACID: CPT

## 2017-05-08 PROCEDURE — 85014 HEMATOCRIT: CPT

## 2017-05-08 PROCEDURE — 6360000002 HC RX W HCPCS: Performed by: INTERNAL MEDICINE

## 2017-05-08 PROCEDURE — 80048 BASIC METABOLIC PNL TOTAL CA: CPT

## 2017-05-08 PROCEDURE — 36415 COLL VENOUS BLD VENIPUNCTURE: CPT

## 2017-05-08 PROCEDURE — 3430000000 HC RX DIAGNOSTIC RADIOPHARMACEUTICAL: Performed by: INTERNAL MEDICINE

## 2017-05-08 PROCEDURE — 85025 COMPLETE CBC W/AUTO DIFF WBC: CPT

## 2017-05-08 PROCEDURE — A9537 TC99M MEBROFENIN: HCPCS | Performed by: INTERNAL MEDICINE

## 2017-05-08 PROCEDURE — 85018 HEMOGLOBIN: CPT

## 2017-05-08 PROCEDURE — 85610 PROTHROMBIN TIME: CPT

## 2017-05-08 RX ADMIN — Medication 10 ML: at 11:15

## 2017-05-08 RX ADMIN — POTASSIUM CHLORIDE 10 MEQ: 7.46 INJECTION, SOLUTION INTRAVENOUS at 01:47

## 2017-05-08 RX ADMIN — SODIUM CHLORIDE: 9 INJECTION, SOLUTION INTRAVENOUS at 18:16

## 2017-05-08 RX ADMIN — Medication 7.1 MILLICURIE: at 11:00

## 2017-05-08 RX ADMIN — SPIRONOLACTONE 50 MG: 50 TABLET, FILM COATED ORAL at 15:13

## 2017-05-08 RX ADMIN — Medication 30 MG: at 15:15

## 2017-05-08 RX ADMIN — LACTULOSE 20 G: 10 SOLUTION ORAL at 21:07

## 2017-05-08 RX ADMIN — POTASSIUM CHLORIDE 10 MEQ: 7.46 INJECTION, SOLUTION INTRAVENOUS at 02:49

## 2017-05-08 RX ADMIN — POTASSIUM CHLORIDE 10 MEQ: 7.46 INJECTION, SOLUTION INTRAVENOUS at 00:41

## 2017-05-08 RX ADMIN — SODIUM CHLORIDE: 9 INJECTION, SOLUTION INTRAVENOUS at 10:28

## 2017-05-08 RX ADMIN — SODIUM CHLORIDE, PRESERVATIVE FREE 10 ML: 5 INJECTION INTRAVENOUS at 21:07

## 2017-05-08 RX ADMIN — LACTULOSE 20 G: 10 SOLUTION ORAL at 15:13

## 2017-05-08 RX ADMIN — POTASSIUM CHLORIDE 40 MEQ: 20 TABLET, EXTENDED RELEASE ORAL at 16:10

## 2017-05-08 RX ADMIN — CEFTRIAXONE 1 G: 1 INJECTION, POWDER, FOR SOLUTION INTRAMUSCULAR; INTRAVENOUS at 15:12

## 2017-05-08 RX ADMIN — THERA TABS 1 TABLET: TAB at 15:13

## 2017-05-08 RX ADMIN — ACETAMINOPHEN 325 MG: 325 TABLET ORAL at 20:37

## 2017-05-08 RX ADMIN — SODIUM CHLORIDE: 9 INJECTION, SOLUTION INTRAVENOUS at 02:20

## 2017-05-08 RX ADMIN — Medication 100 MG: at 15:13

## 2017-05-08 RX ADMIN — FOLIC ACID 1 MG: 1 TABLET ORAL at 15:13

## 2017-05-08 ASSESSMENT — PAIN SCALES - GENERAL
PAINLEVEL_OUTOF10: 6
PAINLEVEL_OUTOF10: 6
PAINLEVEL_OUTOF10: 0

## 2017-05-09 VITALS
SYSTOLIC BLOOD PRESSURE: 123 MMHG | RESPIRATION RATE: 18 BRPM | HEART RATE: 78 BPM | DIASTOLIC BLOOD PRESSURE: 64 MMHG | TEMPERATURE: 98.2 F | BODY MASS INDEX: 18.61 KG/M2 | WEIGHT: 105 LBS | HEIGHT: 63 IN | OXYGEN SATURATION: 100 %

## 2017-05-09 PROBLEM — D62 ACUTE BLOOD LOSS ANEMIA: Status: ACTIVE | Noted: 2017-05-09

## 2017-05-09 PROBLEM — D68.9 COAGULOPATHY (HCC): Chronic | Status: ACTIVE | Noted: 2017-05-09

## 2017-05-09 PROBLEM — K70.31 ASCITES DUE TO ALCOHOLIC CIRRHOSIS (HCC): Chronic | Status: ACTIVE | Noted: 2017-05-09

## 2017-05-09 PROBLEM — E87.20 LACTIC ACID ACIDOSIS: Status: ACTIVE | Noted: 2017-05-09

## 2017-05-09 PROBLEM — N39.0 E. COLI UTI (URINARY TRACT INFECTION): Status: ACTIVE | Noted: 2017-05-09

## 2017-05-09 PROBLEM — E72.20 HYPERAMMONEMIA (HCC): Chronic | Status: ACTIVE | Noted: 2017-05-09

## 2017-05-09 PROBLEM — K76.89 JAUNDICE, HEPATOCELLULAR: Chronic | Status: ACTIVE | Noted: 2017-05-09

## 2017-05-09 PROBLEM — K80.20 CHOLELITHIASIS: Chronic | Status: ACTIVE | Noted: 2017-05-09

## 2017-05-09 PROBLEM — K70.30 ALCOHOLIC CIRRHOSIS OF LIVER (HCC): Chronic | Status: ACTIVE | Noted: 2017-05-09

## 2017-05-09 PROBLEM — B96.20 E. COLI UTI (URINARY TRACT INFECTION): Status: ACTIVE | Noted: 2017-05-09

## 2017-05-09 PROBLEM — K70.10 STEATOHEPATITIS, ALCOHOLIC: Chronic | Status: ACTIVE | Noted: 2017-05-09

## 2017-05-09 PROBLEM — R65.20 SEVERE SEPSIS WITHOUT SEPTIC SHOCK (CODE) (HCC): Status: ACTIVE | Noted: 2017-05-09

## 2017-05-09 LAB
ALBUMIN SERPL-MCNC: 2.2 G/DL (ref 3.9–4.9)
ALP BLD-CCNC: 187 U/L (ref 40–130)
ALT SERPL-CCNC: 14 U/L (ref 0–33)
AMMONIA: 74 UMOL/L (ref 11–51)
ANION GAP SERPL CALCULATED.3IONS-SCNC: 10 MEQ/L (ref 7–13)
AST SERPL-CCNC: 57 U/L (ref 0–35)
BASOPHILS ABSOLUTE: 0 K/UL (ref 0–0.2)
BASOPHILS RELATIVE PERCENT: 0.1 %
BILIRUB SERPL-MCNC: 24 MG/DL (ref 0–1.2)
BILIRUBIN DIRECT: 18.6 MG/DL (ref 0–0.3)
BILIRUBIN, INDIRECT: 5.4 MG/DL (ref 0–0.6)
BUN BLDV-MCNC: 11 MG/DL (ref 6–20)
CALCIUM SERPL-MCNC: 7.7 MG/DL (ref 8.6–10.2)
CHLORIDE BLD-SCNC: 103 MEQ/L (ref 98–107)
CO2: 20 MEQ/L (ref 22–29)
CREAT SERPL-MCNC: 0.46 MG/DL (ref 0.5–0.9)
EOSINOPHILS ABSOLUTE: 0 K/UL (ref 0–0.7)
EOSINOPHILS RELATIVE PERCENT: 0 %
GFR AFRICAN AMERICAN: >60
GFR NON-AFRICAN AMERICAN: >60
GLOBULIN: 3.1 G/DL (ref 2.3–3.5)
GLUCOSE BLD-MCNC: 144 MG/DL (ref 74–109)
HCT VFR BLD CALC: 28.6 % (ref 37–47)
HCT VFR BLD CALC: 30.1 % (ref 37–47)
HEMOGLOBIN: 9.3 G/DL (ref 12–16)
HEMOGLOBIN: 9.9 G/DL (ref 12–16)
LACTIC ACID: 2.2 MMOL/L (ref 0.5–2.2)
LACTIC ACID: 3.6 MMOL/L (ref 0.5–2.2)
LYMPHOCYTES ABSOLUTE: 0.9 K/UL (ref 1–4.8)
LYMPHOCYTES RELATIVE PERCENT: 4.3 %
MCH RBC QN AUTO: 31.8 PG (ref 27–31.3)
MCHC RBC AUTO-ENTMCNC: 32.5 % (ref 33–37)
MCV RBC AUTO: 97.7 FL (ref 82–100)
MONOCYTES ABSOLUTE: 0.9 K/UL (ref 0.2–0.8)
MONOCYTES RELATIVE PERCENT: 4.7 %
NEUTROPHILS ABSOLUTE: 18.2 K/UL (ref 1.4–6.5)
NEUTROPHILS RELATIVE PERCENT: 90.9 %
PDW BLD-RTO: 21.4 % (ref 11.5–14.5)
PLATELET # BLD: 134 K/UL (ref 130–400)
PLATELET SLIDE REVIEW: ADEQUATE
POTASSIUM SERPL-SCNC: 4.2 MEQ/L (ref 3.5–5.1)
RBC # BLD: 2.92 M/UL (ref 4.2–5.4)
SODIUM BLD-SCNC: 133 MEQ/L (ref 132–144)
TOTAL PROTEIN: 5.3 G/DL (ref 6.4–8.1)
WBC # BLD: 20 K/UL (ref 4.8–10.8)

## 2017-05-09 PROCEDURE — 82248 BILIRUBIN DIRECT: CPT

## 2017-05-09 PROCEDURE — 85025 COMPLETE CBC W/AUTO DIFF WBC: CPT

## 2017-05-09 PROCEDURE — 85018 HEMOGLOBIN: CPT

## 2017-05-09 PROCEDURE — 6360000002 HC RX W HCPCS: Performed by: INTERNAL MEDICINE

## 2017-05-09 PROCEDURE — 83605 ASSAY OF LACTIC ACID: CPT

## 2017-05-09 PROCEDURE — 2580000003 HC RX 258: Performed by: INTERNAL MEDICINE

## 2017-05-09 PROCEDURE — 80053 COMPREHEN METABOLIC PANEL: CPT

## 2017-05-09 PROCEDURE — 6370000000 HC RX 637 (ALT 250 FOR IP): Performed by: SPECIALIST

## 2017-05-09 PROCEDURE — 6370000000 HC RX 637 (ALT 250 FOR IP): Performed by: INTERNAL MEDICINE

## 2017-05-09 PROCEDURE — 82140 ASSAY OF AMMONIA: CPT

## 2017-05-09 PROCEDURE — 36415 COLL VENOUS BLD VENIPUNCTURE: CPT

## 2017-05-09 PROCEDURE — 85014 HEMATOCRIT: CPT

## 2017-05-09 RX ORDER — PREDNISOLONE SODIUM PHOSPHATE 15 MG/5ML
30 SOLUTION ORAL DAILY
Qty: 210 ML | Refills: 0 | Status: ON HOLD | OUTPATIENT
Start: 2017-05-09 | End: 2017-06-06 | Stop reason: HOSPADM

## 2017-05-09 RX ORDER — SPIRONOLACTONE 50 MG/1
50 TABLET, FILM COATED ORAL DAILY
Qty: 30 TABLET | Refills: 3 | Status: SHIPPED | OUTPATIENT
Start: 2017-05-09 | End: 2017-07-12 | Stop reason: SDUPTHER

## 2017-05-09 RX ORDER — MULTIVITAMIN WITH FOLIC ACID 400 MCG
1 TABLET ORAL DAILY
Qty: 90 TABLET | Refills: 0 | Status: SHIPPED | OUTPATIENT
Start: 2017-05-09 | End: 2017-07-12 | Stop reason: SDUPTHER

## 2017-05-09 RX ORDER — FOLIC ACID 1 MG/1
1 TABLET ORAL DAILY
Qty: 30 TABLET | Refills: 3 | Status: SHIPPED | OUTPATIENT
Start: 2017-05-09 | End: 2017-07-12 | Stop reason: SDUPTHER

## 2017-05-09 RX ORDER — PREDNISONE 20 MG/1
TABLET ORAL
Qty: 7 TABLET | Refills: 0 | Status: SHIPPED | OUTPATIENT
Start: 2017-05-30 | End: 2017-07-12 | Stop reason: SDUPTHER

## 2017-05-09 RX ORDER — LACTULOSE 10 G/15ML
20 SOLUTION ORAL 3 TIMES DAILY
Qty: 500 ML | Refills: 1 | Status: SHIPPED | OUTPATIENT
Start: 2017-05-09 | End: 2017-07-12 | Stop reason: SDUPTHER

## 2017-05-09 RX ORDER — CIPROFLOXACIN 500 MG/1
500 TABLET, FILM COATED ORAL 2 TIMES DAILY
Qty: 14 TABLET | Refills: 0 | Status: SHIPPED | OUTPATIENT
Start: 2017-05-09 | End: 2017-05-16

## 2017-05-09 RX ORDER — LANOLIN ALCOHOL/MO/W.PET/CERES
100 CREAM (GRAM) TOPICAL DAILY
Qty: 30 TABLET | Refills: 1 | Status: SHIPPED | OUTPATIENT
Start: 2017-05-09 | End: 2017-07-12 | Stop reason: SDUPTHER

## 2017-05-09 RX ADMIN — ACETAMINOPHEN 325 MG: 325 TABLET ORAL at 11:43

## 2017-05-09 RX ADMIN — SPIRONOLACTONE 50 MG: 50 TABLET, FILM COATED ORAL at 09:22

## 2017-05-09 RX ADMIN — FOLIC ACID 1 MG: 1 TABLET ORAL at 09:22

## 2017-05-09 RX ADMIN — THERA TABS 1 TABLET: TAB at 09:22

## 2017-05-09 RX ADMIN — Medication 100 MG: at 09:22

## 2017-05-09 RX ADMIN — CEFTRIAXONE 1 G: 1 INJECTION, POWDER, FOR SOLUTION INTRAMUSCULAR; INTRAVENOUS at 16:03

## 2017-05-09 RX ADMIN — LACTULOSE 20 G: 10 SOLUTION ORAL at 13:47

## 2017-05-09 RX ADMIN — SODIUM CHLORIDE: 9 INJECTION, SOLUTION INTRAVENOUS at 09:25

## 2017-05-09 RX ADMIN — Medication 30 MG: at 09:22

## 2017-05-09 RX ADMIN — LACTULOSE 20 G: 10 SOLUTION ORAL at 09:22

## 2017-05-09 ASSESSMENT — PAIN SCALES - GENERAL
PAINLEVEL_OUTOF10: 2
PAINLEVEL_OUTOF10: 1
PAINLEVEL_OUTOF10: 0

## 2017-05-09 ASSESSMENT — PAIN DESCRIPTION - PAIN TYPE: TYPE: ACUTE PAIN

## 2017-05-09 ASSESSMENT — PAIN DESCRIPTION - LOCATION: LOCATION: ABDOMEN

## 2017-05-09 ASSESSMENT — PAIN DESCRIPTION - ORIENTATION: ORIENTATION: MID;LOWER

## 2017-05-10 ENCOUNTER — TELEPHONE (OUTPATIENT)
Dept: INTERNAL MEDICINE | Age: 47
End: 2017-05-10

## 2017-05-10 LAB
BLOOD BANK DISPENSE STATUS: NORMAL
BLOOD BANK DISPENSE STATUS: NORMAL
BLOOD BANK PRODUCT CODE: NORMAL
BLOOD BANK PRODUCT CODE: NORMAL
BODY FLUID CULTURE, STERILE: NORMAL
BPU ID: NORMAL
BPU ID: NORMAL
DESCRIPTION BLOOD BANK: NORMAL
DESCRIPTION BLOOD BANK: NORMAL
PATH CONSULT FLUID: NORMAL

## 2017-05-11 ENCOUNTER — TELEPHONE (OUTPATIENT)
Dept: INTERNAL MEDICINE | Age: 47
End: 2017-05-11

## 2017-05-22 ENCOUNTER — HOSPITAL ENCOUNTER (OUTPATIENT)
Dept: ULTRASOUND IMAGING | Age: 47
Discharge: HOME | End: 2017-05-22
Payer: MEDICAID

## 2017-05-22 VITALS
DIASTOLIC BLOOD PRESSURE: 77 MMHG | OXYGEN SATURATION: 100 % | HEART RATE: 86 BPM | RESPIRATION RATE: 18 BRPM | SYSTOLIC BLOOD PRESSURE: 116 MMHG

## 2017-05-22 DIAGNOSIS — K70.31 ASCITES DUE TO ALCOHOLIC CIRRHOSIS (HCC): Primary | Chronic | ICD-10-CM

## 2017-05-22 DIAGNOSIS — K70.31 ASCITES DUE TO ALCOHOLIC CIRRHOSIS (HCC): Chronic | ICD-10-CM

## 2017-05-22 PROCEDURE — 49083 ABD PARACENTESIS W/IMAGING: CPT | Performed by: RADIOLOGY

## 2017-05-22 PROCEDURE — 49083 ABD PARACENTESIS W/IMAGING: CPT

## 2017-05-30 ENCOUNTER — OFFICE VISIT (OUTPATIENT)
Dept: FAMILY MEDICINE CLINIC | Age: 47
End: 2017-05-30

## 2017-05-30 VITALS
HEART RATE: 110 BPM | HEIGHT: 63 IN | TEMPERATURE: 97.7 F | OXYGEN SATURATION: 98 % | DIASTOLIC BLOOD PRESSURE: 58 MMHG | BODY MASS INDEX: 23.92 KG/M2 | WEIGHT: 135 LBS | SYSTOLIC BLOOD PRESSURE: 90 MMHG | RESPIRATION RATE: 14 BRPM

## 2017-05-30 DIAGNOSIS — K70.31 ALCOHOLIC CIRRHOSIS OF LIVER WITH ASCITES (HCC): Chronic | ICD-10-CM

## 2017-05-30 DIAGNOSIS — E72.20 HYPERAMMONEMIA (HCC): Chronic | ICD-10-CM

## 2017-05-30 DIAGNOSIS — N39.0 E. COLI UTI (URINARY TRACT INFECTION): ICD-10-CM

## 2017-05-30 DIAGNOSIS — K76.89 JAUNDICE, HEPATOCELLULAR: Chronic | ICD-10-CM

## 2017-05-30 DIAGNOSIS — F10.10 ALCOHOL ABUSE: ICD-10-CM

## 2017-05-30 DIAGNOSIS — D68.9 COAGULOPATHY (HCC): Chronic | ICD-10-CM

## 2017-05-30 DIAGNOSIS — D64.9 ANEMIA, UNSPECIFIED TYPE: ICD-10-CM

## 2017-05-30 DIAGNOSIS — B96.20 E. COLI UTI (URINARY TRACT INFECTION): ICD-10-CM

## 2017-05-30 DIAGNOSIS — K86.0 ALCOHOL-INDUCED CHRONIC PANCREATITIS (HCC): ICD-10-CM

## 2017-05-30 DIAGNOSIS — R82.998 LEUKOCYTES IN URINE: ICD-10-CM

## 2017-05-30 DIAGNOSIS — K70.31 ALCOHOLIC CIRRHOSIS OF LIVER WITH ASCITES (HCC): Primary | Chronic | ICD-10-CM

## 2017-05-30 DIAGNOSIS — R65.20 SEVERE SEPSIS WITHOUT SEPTIC SHOCK (CODE) (HCC): ICD-10-CM

## 2017-05-30 DIAGNOSIS — K70.10 STEATOHEPATITIS, ALCOHOLIC: Chronic | ICD-10-CM

## 2017-05-30 LAB
ALBUMIN SERPL-MCNC: 2.3 G/DL (ref 3.9–4.9)
ALP BLD-CCNC: 271 U/L (ref 40–130)
ALT SERPL-CCNC: 21 U/L (ref 0–33)
AMMONIA: 62 UMOL/L (ref 11–51)
AMYLASE: 35 U/L (ref 28–100)
ANION GAP SERPL CALCULATED.3IONS-SCNC: 17 MEQ/L (ref 7–13)
AST SERPL-CCNC: 71 U/L (ref 0–35)
BILIRUB SERPL-MCNC: 19 MG/DL (ref 0–1.2)
BILIRUBIN, POC: ABNORMAL
BLOOD URINE, POC: ABNORMAL
BUN BLDV-MCNC: 13 MG/DL (ref 6–20)
CALCIUM SERPL-MCNC: 7.9 MG/DL (ref 8.6–10.2)
CHLORIDE BLD-SCNC: 94 MEQ/L (ref 98–107)
CLARITY, POC: ABNORMAL
CO2: 16 MEQ/L (ref 22–29)
COLOR, POC: ABNORMAL
CREAT SERPL-MCNC: 0.46 MG/DL (ref 0.5–0.9)
GFR AFRICAN AMERICAN: >60
GFR NON-AFRICAN AMERICAN: >60
GLOBULIN: 3.1 G/DL (ref 2.3–3.5)
GLUCOSE BLD-MCNC: 188 MG/DL (ref 74–109)
GLUCOSE URINE, POC: ABNORMAL
HCT VFR BLD CALC: 31.2 % (ref 37–47)
HEMOGLOBIN: 10.1 G/DL (ref 12–16)
INR BLD: 1.5
KETONES, POC: ABNORMAL
LEUKOCYTE EST, POC: ABNORMAL
LIPASE: 9 U/L (ref 13–60)
MCH RBC QN AUTO: 31.6 PG (ref 27–31.3)
MCHC RBC AUTO-ENTMCNC: 32.4 % (ref 33–37)
MCV RBC AUTO: 97.5 FL (ref 82–100)
NITRITE, POC: ABNORMAL
PDW BLD-RTO: 18.8 % (ref 11.5–14.5)
PH, POC: 6
PLATELET # BLD: 161 K/UL (ref 130–400)
POTASSIUM SERPL-SCNC: 3.6 MEQ/L (ref 3.5–5.1)
PROTEIN, POC: ABNORMAL
PROTHROMBIN TIME: 16.7 SEC (ref 8.1–13.7)
RBC # BLD: 3.2 M/UL (ref 4.2–5.4)
SODIUM BLD-SCNC: 127 MEQ/L (ref 132–144)
SPECIFIC GRAVITY, POC: 1.02
TOTAL PROTEIN: 5.4 G/DL (ref 6.4–8.1)
UROBILINOGEN, POC: ABNORMAL
WBC # BLD: 24.2 K/UL (ref 4.8–10.8)

## 2017-05-30 PROCEDURE — 81003 URINALYSIS AUTO W/O SCOPE: CPT | Performed by: FAMILY MEDICINE

## 2017-05-30 PROCEDURE — 99214 OFFICE O/P EST MOD 30 MIN: CPT | Performed by: FAMILY MEDICINE

## 2017-06-01 LAB — URINE CULTURE, ROUTINE: NORMAL

## 2017-06-04 ENCOUNTER — APPOINTMENT (OUTPATIENT)
Dept: GENERAL RADIOLOGY | Age: 47
DRG: 710 | End: 2017-06-04
Payer: MEDICAID

## 2017-06-04 ENCOUNTER — HOSPITAL ENCOUNTER (INPATIENT)
Age: 47
LOS: 4 days | Discharge: NURSING, REHAB OR LONG-TERM CARE | DRG: 710 | End: 2017-06-08
Attending: STUDENT IN AN ORGANIZED HEALTH CARE EDUCATION/TRAINING PROGRAM | Admitting: INTERNAL MEDICINE
Payer: MEDICAID

## 2017-06-04 DIAGNOSIS — D68.9 COAGULOPATHY (HCC): ICD-10-CM

## 2017-06-04 DIAGNOSIS — E87.1 HYPONATREMIA: Primary | ICD-10-CM

## 2017-06-04 DIAGNOSIS — R27.8 ASTERIXIS: ICD-10-CM

## 2017-06-04 DIAGNOSIS — E87.20 METABOLIC ACIDOSIS: ICD-10-CM

## 2017-06-04 DIAGNOSIS — K72.10 CHRONIC LIVER FAILURE WITHOUT HEPATIC COMA (HCC): ICD-10-CM

## 2017-06-04 DIAGNOSIS — E86.0 DEHYDRATION: ICD-10-CM

## 2017-06-04 DIAGNOSIS — N39.0 RECURRENT URINARY TRACT INFECTION: ICD-10-CM

## 2017-06-04 LAB
ABO/RH: NORMAL
ALBUMIN SERPL-MCNC: 2.3 G/DL (ref 3.9–4.9)
ALP BLD-CCNC: 302 U/L (ref 40–130)
ALT SERPL-CCNC: 21 U/L (ref 0–33)
AMMONIA: 34 UMOL/L (ref 11–51)
AMPHETAMINE SCREEN, URINE: NORMAL
ANION GAP SERPL CALCULATED.3IONS-SCNC: 12 MEQ/L (ref 7–13)
ANION GAP SERPL CALCULATED.3IONS-SCNC: 14 MEQ/L (ref 7–13)
ANION GAP SERPL CALCULATED.3IONS-SCNC: 14 MEQ/L (ref 7–13)
ANTIBODY SCREEN: NORMAL
APTT: 32.6 SEC (ref 21.6–35.4)
AST SERPL-CCNC: 83 U/L (ref 0–35)
BACTERIA: NORMAL /HPF
BARBITURATE SCREEN URINE: NORMAL
BASOPHILS ABSOLUTE: 0.1 K/UL (ref 0–0.2)
BASOPHILS RELATIVE PERCENT: 0.4 %
BENZODIAZEPINE SCREEN, URINE: NORMAL
BILIRUB SERPL-MCNC: 18.1 MG/DL (ref 0–1.2)
BILIRUBIN URINE: ABNORMAL
BLOOD, URINE: NEGATIVE
BUN BLDV-MCNC: 19 MG/DL (ref 6–20)
CALCIUM SERPL-MCNC: 7.7 MG/DL (ref 8.6–10.2)
CALCIUM SERPL-MCNC: 7.7 MG/DL (ref 8.6–10.2)
CALCIUM SERPL-MCNC: 8.1 MG/DL (ref 8.6–10.2)
CANNABINOID SCREEN URINE: NORMAL
CHLORIDE BLD-SCNC: 91 MEQ/L (ref 98–107)
CHLORIDE BLD-SCNC: 93 MEQ/L (ref 98–107)
CHLORIDE BLD-SCNC: 95 MEQ/L (ref 98–107)
CLARITY: ABNORMAL
CO2: 17 MEQ/L (ref 22–29)
CO2: 17 MEQ/L (ref 22–29)
CO2: 19 MEQ/L (ref 22–29)
COCAINE METABOLITE SCREEN URINE: NORMAL
COLOR: ABNORMAL
CREAT SERPL-MCNC: 0.24 MG/DL (ref 0.5–0.9)
CREAT SERPL-MCNC: 0.36 MG/DL (ref 0.5–0.9)
CREAT SERPL-MCNC: 0.36 MG/DL (ref 0.5–0.9)
CREATININE URINE: 69.5 MG/DL
EOSINOPHILS ABSOLUTE: 0 K/UL (ref 0–0.7)
EOSINOPHILS RELATIVE PERCENT: 0 %
EPITHELIAL CELLS, UA: NORMAL /HPF
ETHANOL PERCENT: NORMAL G/DL
ETHANOL: <10 MG/DL (ref 0–0.08)
GFR AFRICAN AMERICAN: >60
GFR NON-AFRICAN AMERICAN: >60
GLOBULIN: 3.1 G/DL (ref 2.3–3.5)
GLUCOSE BLD-MCNC: 167 MG/DL (ref 74–109)
GLUCOSE BLD-MCNC: 177 MG/DL (ref 74–109)
GLUCOSE BLD-MCNC: 195 MG/DL (ref 74–109)
GLUCOSE URINE: NEGATIVE MG/DL
HCT VFR BLD CALC: 33 % (ref 37–47)
HEMOGLOBIN: 10.5 G/DL (ref 12–16)
INR BLD: 1.5
KETONES, URINE: NEGATIVE MG/DL
LEUKOCYTE ESTERASE, URINE: ABNORMAL
LIPASE: 8 U/L (ref 13–60)
LYMPHOCYTES ABSOLUTE: 3.8 K/UL (ref 1–4.8)
LYMPHOCYTES RELATIVE PERCENT: 18.4 %
Lab: NORMAL
MAGNESIUM: 1.6 MG/DL (ref 1.7–2.3)
MCH RBC QN AUTO: 31.2 PG (ref 27–31.3)
MCHC RBC AUTO-ENTMCNC: 31.8 % (ref 33–37)
MCV RBC AUTO: 98.2 FL (ref 82–100)
MONOCYTES ABSOLUTE: 0.7 K/UL (ref 0.2–0.8)
MONOCYTES RELATIVE PERCENT: 3.6 %
NEUTROPHILS ABSOLUTE: 15.9 K/UL (ref 1.4–6.5)
NEUTROPHILS RELATIVE PERCENT: 77.6 %
NITRITE, URINE: NEGATIVE
OPIATE SCREEN URINE: NORMAL
OSMOLALITY URINE: 717 MOSM/KG (ref 300–900)
OSMOLALITY: 269 MOSM/KG (ref 280–303)
PDW BLD-RTO: 18.3 % (ref 11.5–14.5)
PH UA: 6 (ref 5–9)
PHENCYCLIDINE SCREEN URINE: NORMAL
PLATELET # BLD: 150 K/UL (ref 130–400)
POTASSIUM SERPL-SCNC: 3.7 MEQ/L (ref 3.5–5.1)
POTASSIUM SERPL-SCNC: 3.8 MEQ/L (ref 3.5–5.1)
POTASSIUM SERPL-SCNC: 4.1 MEQ/L (ref 3.5–5.1)
PREGNANCY TEST URINE, POC: NEGATIVE
PROTEIN UA: NEGATIVE MG/DL
PROTHROMBIN TIME: 16.9 SEC (ref 8.1–13.7)
RBC # BLD: 3.36 M/UL (ref 4.2–5.4)
RBC UA: NORMAL /HPF (ref 0–2)
SODIUM BLD-SCNC: 122 MEQ/L (ref 132–144)
SODIUM BLD-SCNC: 124 MEQ/L (ref 132–144)
SODIUM BLD-SCNC: 126 MEQ/L (ref 132–144)
SODIUM URINE: 101 MEQ/L
SPECIFIC GRAVITY UA: 1.02 (ref 1–1.03)
TOTAL PROTEIN: 5.4 G/DL (ref 6.4–8.1)
TROPONIN: <0.01 NG/ML (ref 0–0.01)
URINE REFLEX TO CULTURE: YES
UROBILINOGEN, URINE: 1 E.U./DL
WBC # BLD: 20.5 K/UL (ref 4.8–10.8)
WBC UA: NORMAL /HPF (ref 0–5)

## 2017-06-04 PROCEDURE — 2580000003 HC RX 258: Performed by: STUDENT IN AN ORGANIZED HEALTH CARE EDUCATION/TRAINING PROGRAM

## 2017-06-04 PROCEDURE — 80053 COMPREHEN METABOLIC PANEL: CPT

## 2017-06-04 PROCEDURE — 85025 COMPLETE CBC W/AUTO DIFF WBC: CPT

## 2017-06-04 PROCEDURE — 86850 RBC ANTIBODY SCREEN: CPT

## 2017-06-04 PROCEDURE — 87077 CULTURE AEROBIC IDENTIFY: CPT

## 2017-06-04 PROCEDURE — 83935 ASSAY OF URINE OSMOLALITY: CPT

## 2017-06-04 PROCEDURE — 85610 PROTHROMBIN TIME: CPT

## 2017-06-04 PROCEDURE — 82140 ASSAY OF AMMONIA: CPT

## 2017-06-04 PROCEDURE — 82570 ASSAY OF URINE CREATININE: CPT

## 2017-06-04 PROCEDURE — 6370000000 HC RX 637 (ALT 250 FOR IP): Performed by: INTERNAL MEDICINE

## 2017-06-04 PROCEDURE — 6360000002 HC RX W HCPCS: Performed by: STUDENT IN AN ORGANIZED HEALTH CARE EDUCATION/TRAINING PROGRAM

## 2017-06-04 PROCEDURE — 86901 BLOOD TYPING SEROLOGIC RH(D): CPT

## 2017-06-04 PROCEDURE — 83690 ASSAY OF LIPASE: CPT

## 2017-06-04 PROCEDURE — 84300 ASSAY OF URINE SODIUM: CPT

## 2017-06-04 PROCEDURE — 87186 SC STD MICRODIL/AGAR DIL: CPT

## 2017-06-04 PROCEDURE — 2580000003 HC RX 258: Performed by: INTERNAL MEDICINE

## 2017-06-04 PROCEDURE — 6360000002 HC RX W HCPCS: Performed by: INTERNAL MEDICINE

## 2017-06-04 PROCEDURE — 83930 ASSAY OF BLOOD OSMOLALITY: CPT

## 2017-06-04 PROCEDURE — 99285 EMERGENCY DEPT VISIT HI MDM: CPT

## 2017-06-04 PROCEDURE — 71010 XR CHEST PORTABLE: CPT

## 2017-06-04 PROCEDURE — 1210000000 HC MED SURG R&B

## 2017-06-04 PROCEDURE — 81001 URINALYSIS AUTO W/SCOPE: CPT

## 2017-06-04 PROCEDURE — 36415 COLL VENOUS BLD VENIPUNCTURE: CPT

## 2017-06-04 PROCEDURE — 87086 URINE CULTURE/COLONY COUNT: CPT

## 2017-06-04 PROCEDURE — 84484 ASSAY OF TROPONIN QUANT: CPT

## 2017-06-04 PROCEDURE — 80307 DRUG TEST PRSMV CHEM ANLYZR: CPT

## 2017-06-04 PROCEDURE — 96374 THER/PROPH/DIAG INJ IV PUSH: CPT

## 2017-06-04 PROCEDURE — G0480 DRUG TEST DEF 1-7 CLASSES: HCPCS

## 2017-06-04 PROCEDURE — 86900 BLOOD TYPING SEROLOGIC ABO: CPT

## 2017-06-04 PROCEDURE — 85730 THROMBOPLASTIN TIME PARTIAL: CPT

## 2017-06-04 PROCEDURE — 83735 ASSAY OF MAGNESIUM: CPT

## 2017-06-04 RX ORDER — SODIUM CHLORIDE 9 MG/ML
INJECTION, SOLUTION INTRAVENOUS CONTINUOUS
Status: DISCONTINUED | OUTPATIENT
Start: 2017-06-04 | End: 2017-06-05

## 2017-06-04 RX ORDER — CIPROFLOXACIN 2 MG/ML
400 INJECTION, SOLUTION INTRAVENOUS ONCE
Status: COMPLETED | OUTPATIENT
Start: 2017-06-04 | End: 2017-06-04

## 2017-06-04 RX ORDER — SODIUM CHLORIDE 0.9 % (FLUSH) 0.9 %
10 SYRINGE (ML) INJECTION EVERY 12 HOURS SCHEDULED
Status: DISCONTINUED | OUTPATIENT
Start: 2017-06-04 | End: 2017-06-08 | Stop reason: HOSPADM

## 2017-06-04 RX ORDER — ACETAMINOPHEN 325 MG/1
650 TABLET ORAL EVERY 4 HOURS PRN
Status: DISCONTINUED | OUTPATIENT
Start: 2017-06-04 | End: 2017-06-08 | Stop reason: HOSPADM

## 2017-06-04 RX ORDER — LACTULOSE 10 G/15ML
20 SOLUTION ORAL 3 TIMES DAILY
Status: DISCONTINUED | OUTPATIENT
Start: 2017-06-04 | End: 2017-06-08 | Stop reason: HOSPADM

## 2017-06-04 RX ORDER — ONDANSETRON 2 MG/ML
4 INJECTION INTRAMUSCULAR; INTRAVENOUS EVERY 6 HOURS PRN
Status: DISCONTINUED | OUTPATIENT
Start: 2017-06-04 | End: 2017-06-08 | Stop reason: HOSPADM

## 2017-06-04 RX ORDER — SPIRONOLACTONE 50 MG/1
50 TABLET, FILM COATED ORAL DAILY
Status: DISCONTINUED | OUTPATIENT
Start: 2017-06-04 | End: 2017-06-08 | Stop reason: HOSPADM

## 2017-06-04 RX ORDER — PHYTONADIONE 5 MG/1
5 TABLET ORAL ONCE
Status: COMPLETED | OUTPATIENT
Start: 2017-06-04 | End: 2017-06-04

## 2017-06-04 RX ORDER — SODIUM CHLORIDE 9 MG/ML
INJECTION, SOLUTION INTRAVENOUS CONTINUOUS
Status: DISCONTINUED | OUTPATIENT
Start: 2017-06-04 | End: 2017-06-04

## 2017-06-04 RX ORDER — FOLIC ACID 1 MG/1
1 TABLET ORAL DAILY
Status: DISCONTINUED | OUTPATIENT
Start: 2017-06-04 | End: 2017-06-08 | Stop reason: HOSPADM

## 2017-06-04 RX ORDER — MULTIVITAMIN WITH FOLIC ACID 400 MCG
1 TABLET ORAL DAILY
Status: DISCONTINUED | OUTPATIENT
Start: 2017-06-04 | End: 2017-06-08 | Stop reason: HOSPADM

## 2017-06-04 RX ORDER — SODIUM CHLORIDE 0.9 % (FLUSH) 0.9 %
10 SYRINGE (ML) INJECTION PRN
Status: DISCONTINUED | OUTPATIENT
Start: 2017-06-04 | End: 2017-06-08 | Stop reason: HOSPADM

## 2017-06-04 RX ORDER — TOLVAPTAN 15 MG/1
15 TABLET ORAL ONCE
Status: COMPLETED | OUTPATIENT
Start: 2017-06-04 | End: 2017-06-04

## 2017-06-04 RX ORDER — 0.9 % SODIUM CHLORIDE 0.9 %
250 INTRAVENOUS SOLUTION INTRAVENOUS ONCE
Status: COMPLETED | OUTPATIENT
Start: 2017-06-04 | End: 2017-06-04

## 2017-06-04 RX ORDER — CIPROFLOXACIN 2 MG/ML
400 INJECTION, SOLUTION INTRAVENOUS EVERY 12 HOURS
Status: COMPLETED | OUTPATIENT
Start: 2017-06-04 | End: 2017-06-06

## 2017-06-04 RX ADMIN — Medication 1 TABLET: at 11:15

## 2017-06-04 RX ADMIN — SODIUM CHLORIDE 250 ML: 9 INJECTION, SOLUTION INTRAVENOUS at 09:36

## 2017-06-04 RX ADMIN — CIPROFLOXACIN 400 MG: 2 INJECTION, SOLUTION INTRAVENOUS at 09:34

## 2017-06-04 RX ADMIN — SODIUM CHLORIDE, PRESERVATIVE FREE 10 ML: 5 INJECTION INTRAVENOUS at 12:10

## 2017-06-04 RX ADMIN — LACTULOSE 20 G: 20 SOLUTION ORAL at 21:38

## 2017-06-04 RX ADMIN — SODIUM CHLORIDE: 9 INJECTION, SOLUTION INTRAVENOUS at 21:39

## 2017-06-04 RX ADMIN — SODIUM CHLORIDE: 9 INJECTION, SOLUTION INTRAVENOUS at 12:09

## 2017-06-04 RX ADMIN — PHYTONADIONE 5 MG: 5 TABLET ORAL at 13:06

## 2017-06-04 RX ADMIN — CIPROFLOXACIN 400 MG: 2 INJECTION, SOLUTION INTRAVENOUS at 21:38

## 2017-06-04 RX ADMIN — SODIUM CHLORIDE, PRESERVATIVE FREE 10 ML: 5 INJECTION INTRAVENOUS at 17:59

## 2017-06-04 RX ADMIN — LACTULOSE 20 G: 20 SOLUTION ORAL at 11:14

## 2017-06-04 RX ADMIN — LACTULOSE 20 G: 20 SOLUTION ORAL at 13:06

## 2017-06-04 RX ADMIN — SODIUM CHLORIDE, PRESERVATIVE FREE 10 ML: 5 INJECTION INTRAVENOUS at 21:38

## 2017-06-04 RX ADMIN — TOLVAPTAN 15 MG: 15 TABLET ORAL at 13:06

## 2017-06-04 RX ADMIN — FOLIC ACID 1 MG: 1 TABLET ORAL at 11:15

## 2017-06-04 RX ADMIN — SPIRONOLACTONE 50 MG: 50 TABLET, FILM COATED ORAL at 12:09

## 2017-06-04 RX ADMIN — ONDANSETRON 4 MG: 2 INJECTION, SOLUTION INTRAMUSCULAR; INTRAVENOUS at 17:57

## 2017-06-04 ASSESSMENT — ENCOUNTER SYMPTOMS
BACK PAIN: 0
COUGH: 0
ABDOMINAL PAIN: 1
DIARRHEA: 0
VOMITING: 0
ROS SKIN COMMENTS: JAUNDICE
CHEST TIGHTNESS: 0
SHORTNESS OF BREATH: 0
TROUBLE SWALLOWING: 0
SINUS PRESSURE: 0

## 2017-06-05 ENCOUNTER — APPOINTMENT (OUTPATIENT)
Dept: ULTRASOUND IMAGING | Age: 47
DRG: 710 | End: 2017-06-05
Payer: MEDICAID

## 2017-06-05 LAB
ACANTHOCYTES: ABNORMAL
ALBUMIN FLUID: <0.2 G/DL
ALBUMIN SERPL-MCNC: 1.9 G/DL (ref 3.9–4.9)
ALP BLD-CCNC: 231 U/L (ref 40–130)
ALT SERPL-CCNC: 16 U/L (ref 0–33)
ANION GAP SERPL CALCULATED.3IONS-SCNC: 13 MEQ/L (ref 7–13)
ANISOCYTOSIS: ABNORMAL
APPEARANCE FLUID: CLEAR
APTT: 36.4 SEC (ref 21.6–35.4)
AST SERPL-CCNC: 66 U/L (ref 0–35)
BASOPHILS ABSOLUTE: 0 K/UL (ref 0–0.2)
BASOPHILS RELATIVE PERCENT: 0.4 %
BILIRUB SERPL-MCNC: 13.6 MG/DL (ref 0–1.2)
BILIRUBIN DIRECT: 9.9 MG/DL (ref 0–0.3)
BILIRUBIN, INDIRECT: 3.7 MG/DL (ref 0–0.6)
BUN BLDV-MCNC: 18 MG/DL (ref 6–20)
CALCIUM SERPL-MCNC: 7.2 MG/DL (ref 8.6–10.2)
CELL COUNT FLUID TYPE: NORMAL
CHLORIDE BLD-SCNC: 95 MEQ/L (ref 98–107)
CLOT EVALUATION: NORMAL
CO2: 15 MEQ/L (ref 22–29)
COLOR FLUID: NORMAL
CREAT SERPL-MCNC: 0.34 MG/DL (ref 0.5–0.9)
EOSINOPHILS ABSOLUTE: 0.2 K/UL (ref 0–0.7)
EOSINOPHILS RELATIVE PERCENT: 1 %
FLUID TYPE: NORMAL
GFR AFRICAN AMERICAN: >60
GFR NON-AFRICAN AMERICAN: >60
GLUCOSE BLD-MCNC: 171 MG/DL (ref 74–109)
HCT VFR BLD CALC: 28.1 % (ref 37–47)
HEMOGLOBIN: 8.9 G/DL (ref 12–16)
HYPOCHROMIA: ABNORMAL
INR BLD: 1.5
LD, FLUID: 36 U/L
LIPASE BODY FLUID: 3 U/L
LYMPHOCYTES ABSOLUTE: 0.4 K/UL (ref 1–4.8)
LYMPHOCYTES RELATIVE PERCENT: 2 %
MACROCYTES: 0
MAGNESIUM: 2.2 MG/DL (ref 1.7–2.3)
MCH RBC QN AUTO: 31.3 PG (ref 27–31.3)
MCHC RBC AUTO-ENTMCNC: 31.7 % (ref 33–37)
MCV RBC AUTO: 98.7 FL (ref 82–100)
MICROCYTES: 0
MONOCYTES ABSOLUTE: 0.9 K/UL (ref 0.2–0.8)
MONOCYTES RELATIVE PERCENT: 3.8 %
NEUTROPHILS ABSOLUTE: 19.9 K/UL (ref 1.4–6.5)
NEUTROPHILS RELATIVE PERCENT: 93 %
NUCLEATED CELLS FLUID: 9 /CUMM
NUMBER OF CELLS COUNTED FLUID: 100
OVALOCYTES: ABNORMAL
PDW BLD-RTO: 18.8 % (ref 11.5–14.5)
PLATELET # BLD: 127 K/UL (ref 130–400)
PLATELET SLIDE REVIEW: ABNORMAL
POIKILOCYTES: ABNORMAL
POLYCHROMASIA: 0
POTASSIUM SERPL-SCNC: 3.7 MEQ/L (ref 3.5–5.1)
PROTEIN FLUID: 0.4 G/DL
PROTHROMBIN TIME: 16.7 SEC (ref 8.1–13.7)
RBC # BLD: 2.85 M/UL (ref 4.2–5.4)
RBC FLUID: 2 /CUMM
SMUDGE CELLS: 3.8
SODIUM BLD-SCNC: 123 MEQ/L (ref 132–144)
TARGET CELLS: ABNORMAL
TOTAL PROTEIN: 4.3 G/DL (ref 6.4–8.1)
URINE CULTURE, ROUTINE: NORMAL
WBC # BLD: 21.4 K/UL (ref 4.8–10.8)

## 2017-06-05 PROCEDURE — 2500000003 HC RX 250 WO HCPCS: Performed by: RADIOLOGY

## 2017-06-05 PROCEDURE — 0W9G3ZX DRAINAGE OF PERITONEAL CAVITY, PERCUTANEOUS APPROACH, DIAGNOSTIC: ICD-10-PCS | Performed by: RADIOLOGY

## 2017-06-05 PROCEDURE — 80048 BASIC METABOLIC PNL TOTAL CA: CPT

## 2017-06-05 PROCEDURE — 88112 CYTOPATH CELL ENHANCE TECH: CPT

## 2017-06-05 PROCEDURE — 6360000002 HC RX W HCPCS: Performed by: INTERNAL MEDICINE

## 2017-06-05 PROCEDURE — 83690 ASSAY OF LIPASE: CPT

## 2017-06-05 PROCEDURE — 85025 COMPLETE CBC W/AUTO DIFF WBC: CPT

## 2017-06-05 PROCEDURE — 87205 SMEAR GRAM STAIN: CPT

## 2017-06-05 PROCEDURE — C1729 CATH, DRAINAGE: HCPCS

## 2017-06-05 PROCEDURE — 85610 PROTHROMBIN TIME: CPT

## 2017-06-05 PROCEDURE — 84157 ASSAY OF PROTEIN OTHER: CPT

## 2017-06-05 PROCEDURE — 6370000000 HC RX 637 (ALT 250 FOR IP): Performed by: INTERNAL MEDICINE

## 2017-06-05 PROCEDURE — 1210000000 HC MED SURG R&B

## 2017-06-05 PROCEDURE — 49083 ABD PARACENTESIS W/IMAGING: CPT | Performed by: RADIOLOGY

## 2017-06-05 PROCEDURE — 87070 CULTURE OTHR SPECIMN AEROBIC: CPT

## 2017-06-05 PROCEDURE — 2580000003 HC RX 258: Performed by: INTERNAL MEDICINE

## 2017-06-05 PROCEDURE — P9046 ALBUMIN (HUMAN), 25%, 20 ML: HCPCS | Performed by: RADIOLOGY

## 2017-06-05 PROCEDURE — 83615 LACTATE (LD) (LDH) ENZYME: CPT

## 2017-06-05 PROCEDURE — 6360000002 HC RX W HCPCS: Performed by: RADIOLOGY

## 2017-06-05 PROCEDURE — 85730 THROMBOPLASTIN TIME PARTIAL: CPT

## 2017-06-05 PROCEDURE — 80076 HEPATIC FUNCTION PANEL: CPT

## 2017-06-05 PROCEDURE — 82042 OTHER SOURCE ALBUMIN QUAN EA: CPT

## 2017-06-05 PROCEDURE — 83735 ASSAY OF MAGNESIUM: CPT

## 2017-06-05 PROCEDURE — 36415 COLL VENOUS BLD VENIPUNCTURE: CPT

## 2017-06-05 PROCEDURE — 88305 TISSUE EXAM BY PATHOLOGIST: CPT

## 2017-06-05 PROCEDURE — 99254 IP/OBS CNSLTJ NEW/EST MOD 60: CPT | Performed by: RADIOLOGY

## 2017-06-05 PROCEDURE — 89051 BODY FLUID CELL COUNT: CPT

## 2017-06-05 RX ORDER — MAGNESIUM SULFATE IN WATER 40 MG/ML
2 INJECTION, SOLUTION INTRAVENOUS ONCE
Status: COMPLETED | OUTPATIENT
Start: 2017-06-05 | End: 2017-06-05

## 2017-06-05 RX ORDER — TOLVAPTAN 15 MG/1
30 TABLET ORAL ONCE
Status: COMPLETED | OUTPATIENT
Start: 2017-06-05 | End: 2017-06-05

## 2017-06-05 RX ORDER — TORSEMIDE 20 MG/1
20 TABLET ORAL DAILY
Status: DISCONTINUED | OUTPATIENT
Start: 2017-06-05 | End: 2017-06-07

## 2017-06-05 RX ORDER — ALBUMIN (HUMAN) 12.5 G/50ML
25 SOLUTION INTRAVENOUS ONCE
Status: COMPLETED | OUTPATIENT
Start: 2017-06-05 | End: 2017-06-05

## 2017-06-05 RX ORDER — LIDOCAINE HYDROCHLORIDE 20 MG/ML
INJECTION, SOLUTION INFILTRATION; PERINEURAL
Status: COMPLETED | OUTPATIENT
Start: 2017-06-05 | End: 2017-06-05

## 2017-06-05 RX ADMIN — CIPROFLOXACIN 400 MG: 2 INJECTION, SOLUTION INTRAVENOUS at 21:33

## 2017-06-05 RX ADMIN — SODIUM CHLORIDE, PRESERVATIVE FREE 10 ML: 5 INJECTION INTRAVENOUS at 10:27

## 2017-06-05 RX ADMIN — ALBUMIN (HUMAN) 25 G: 0.25 INJECTION, SOLUTION INTRAVENOUS at 17:24

## 2017-06-05 RX ADMIN — Medication 1 TABLET: at 10:28

## 2017-06-05 RX ADMIN — TORSEMIDE 20 MG: 20 TABLET ORAL at 10:27

## 2017-06-05 RX ADMIN — LACTULOSE 20 G: 20 SOLUTION ORAL at 13:39

## 2017-06-05 RX ADMIN — CIPROFLOXACIN 400 MG: 2 INJECTION, SOLUTION INTRAVENOUS at 09:14

## 2017-06-05 RX ADMIN — TOLVAPTAN 30 MG: 15 TABLET ORAL at 11:22

## 2017-06-05 RX ADMIN — LIDOCAINE HYDROCHLORIDE 5 ML: 20 INJECTION, SOLUTION INFILTRATION; PERINEURAL at 16:30

## 2017-06-05 RX ADMIN — LACTULOSE 20 G: 20 SOLUTION ORAL at 21:33

## 2017-06-05 RX ADMIN — MAGNESIUM SULFATE IN WATER 2 G: 40 INJECTION, SOLUTION INTRAVENOUS at 01:03

## 2017-06-05 RX ADMIN — LACTULOSE 20 G: 20 SOLUTION ORAL at 09:14

## 2017-06-05 RX ADMIN — SPIRONOLACTONE 50 MG: 50 TABLET, FILM COATED ORAL at 10:27

## 2017-06-05 RX ADMIN — FOLIC ACID 1 MG: 1 TABLET ORAL at 09:14

## 2017-06-05 ASSESSMENT — ENCOUNTER SYMPTOMS
BACK PAIN: 0
SPUTUM PRODUCTION: 0
HEMOPTYSIS: 0
HEARTBURN: 0
SHORTNESS OF BREATH: 0
RESPIRATORY NEGATIVE: 1
NAUSEA: 1
COUGH: 0
VOMITING: 0
DOUBLE VISION: 0
BLURRED VISION: 0
CONSTIPATION: 0
ABDOMINAL PAIN: 1
DIARRHEA: 1
EYES NEGATIVE: 1
WHEEZING: 0
PHOTOPHOBIA: 0

## 2017-06-05 ASSESSMENT — PAIN - FUNCTIONAL ASSESSMENT: PAIN_FUNCTIONAL_ASSESSMENT: 0-10

## 2017-06-06 LAB
ALBUMIN SERPL-MCNC: 2.3 G/DL (ref 3.9–4.9)
ALP BLD-CCNC: 233 U/L (ref 40–130)
ALT SERPL-CCNC: 17 U/L (ref 0–33)
ANION GAP SERPL CALCULATED.3IONS-SCNC: 17 MEQ/L (ref 7–13)
AST SERPL-CCNC: 86 U/L (ref 0–35)
BILIRUB SERPL-MCNC: 17.1 MG/DL (ref 0–1.2)
BUN BLDV-MCNC: 14 MG/DL (ref 6–20)
CALCIUM SERPL-MCNC: 7.9 MG/DL (ref 8.6–10.2)
CHLORIDE BLD-SCNC: 97 MEQ/L (ref 98–107)
CO2: 17 MEQ/L (ref 22–29)
CREAT SERPL-MCNC: 0.46 MG/DL (ref 0.5–0.9)
GFR AFRICAN AMERICAN: >60
GFR NON-AFRICAN AMERICAN: >60
GLOBULIN: 2.5 G/DL (ref 2.3–3.5)
GLUCOSE BLD-MCNC: 175 MG/DL (ref 74–109)
MAGNESIUM: 1.9 MG/DL (ref 1.7–2.3)
ORGANISM: ABNORMAL
PHOSPHORUS: 2.7 MG/DL (ref 2.5–4.5)
POTASSIUM SERPL-SCNC: 3.2 MEQ/L (ref 3.5–5.1)
SODIUM BLD-SCNC: 131 MEQ/L (ref 132–144)
TOTAL PROTEIN: 4.8 G/DL (ref 6.4–8.1)
URINE CULTURE, ROUTINE: ABNORMAL

## 2017-06-06 PROCEDURE — 36415 COLL VENOUS BLD VENIPUNCTURE: CPT

## 2017-06-06 PROCEDURE — 1210000000 HC MED SURG R&B

## 2017-06-06 PROCEDURE — G8979 MOBILITY GOAL STATUS: HCPCS

## 2017-06-06 PROCEDURE — G8978 MOBILITY CURRENT STATUS: HCPCS

## 2017-06-06 PROCEDURE — 97167 OT EVAL HIGH COMPLEX 60 MIN: CPT

## 2017-06-06 PROCEDURE — 6370000000 HC RX 637 (ALT 250 FOR IP): Performed by: INTERNAL MEDICINE

## 2017-06-06 PROCEDURE — 83735 ASSAY OF MAGNESIUM: CPT

## 2017-06-06 PROCEDURE — 80053 COMPREHEN METABOLIC PANEL: CPT

## 2017-06-06 PROCEDURE — 6360000002 HC RX W HCPCS: Performed by: INTERNAL MEDICINE

## 2017-06-06 PROCEDURE — 2580000003 HC RX 258: Performed by: INTERNAL MEDICINE

## 2017-06-06 PROCEDURE — G8987 SELF CARE CURRENT STATUS: HCPCS

## 2017-06-06 PROCEDURE — G8988 SELF CARE GOAL STATUS: HCPCS

## 2017-06-06 PROCEDURE — 97163 PT EVAL HIGH COMPLEX 45 MIN: CPT

## 2017-06-06 PROCEDURE — 84100 ASSAY OF PHOSPHORUS: CPT

## 2017-06-06 RX ORDER — POTASSIUM CHLORIDE 20 MEQ/1
20 TABLET, EXTENDED RELEASE ORAL DAILY
Qty: 30 TABLET | Refills: 0 | Status: SHIPPED | OUTPATIENT
Start: 2017-06-06 | End: 2017-07-12 | Stop reason: SDUPTHER

## 2017-06-06 RX ORDER — POTASSIUM CHLORIDE 20 MEQ/1
40 TABLET, EXTENDED RELEASE ORAL ONCE
Status: COMPLETED | OUTPATIENT
Start: 2017-06-06 | End: 2017-06-06

## 2017-06-06 RX ORDER — TORSEMIDE 20 MG/1
20 TABLET ORAL DAILY
Qty: 30 TABLET | Refills: 3 | Status: SHIPPED | OUTPATIENT
Start: 2017-06-06 | End: 2017-07-12 | Stop reason: SDUPTHER

## 2017-06-06 RX ADMIN — CIPROFLOXACIN 400 MG: 2 INJECTION, SOLUTION INTRAVENOUS at 09:25

## 2017-06-06 RX ADMIN — CIPROFLOXACIN 400 MG: 2 INJECTION, SOLUTION INTRAVENOUS at 20:31

## 2017-06-06 RX ADMIN — ACETAMINOPHEN 650 MG: 325 TABLET ORAL at 13:43

## 2017-06-06 RX ADMIN — LACTULOSE 20 G: 20 SOLUTION ORAL at 09:25

## 2017-06-06 RX ADMIN — LACTULOSE 20 G: 20 SOLUTION ORAL at 20:31

## 2017-06-06 RX ADMIN — Medication 1 TABLET: at 09:25

## 2017-06-06 RX ADMIN — POTASSIUM CHLORIDE 40 MEQ: 20 TABLET, EXTENDED RELEASE ORAL at 09:25

## 2017-06-06 RX ADMIN — LACTULOSE 20 G: 20 SOLUTION ORAL at 14:50

## 2017-06-06 RX ADMIN — SODIUM CHLORIDE, PRESERVATIVE FREE 10 ML: 5 INJECTION INTRAVENOUS at 20:31

## 2017-06-06 RX ADMIN — FOLIC ACID 1 MG: 1 TABLET ORAL at 09:25

## 2017-06-06 RX ADMIN — SODIUM CHLORIDE, PRESERVATIVE FREE 10 ML: 5 INJECTION INTRAVENOUS at 09:25

## 2017-06-06 RX ADMIN — SPIRONOLACTONE 50 MG: 50 TABLET, FILM COATED ORAL at 09:25

## 2017-06-06 RX ADMIN — TORSEMIDE 20 MG: 20 TABLET ORAL at 09:25

## 2017-06-06 ASSESSMENT — PAIN SCALES - GENERAL
PAINLEVEL_OUTOF10: 3
PAINLEVEL_OUTOF10: 0

## 2017-06-07 LAB
ALBUMIN SERPL-MCNC: 2 G/DL (ref 3.9–4.9)
ALP BLD-CCNC: 213 U/L (ref 40–130)
ALT SERPL-CCNC: 16 U/L (ref 0–33)
ANION GAP SERPL CALCULATED.3IONS-SCNC: 14 MEQ/L (ref 7–13)
AST SERPL-CCNC: 73 U/L (ref 0–35)
BASOPHILS ABSOLUTE: 0.1 K/UL (ref 0–0.2)
BASOPHILS RELATIVE PERCENT: 0.5 %
BILIRUB SERPL-MCNC: 15.2 MG/DL (ref 0–1.2)
BUN BLDV-MCNC: 12 MG/DL (ref 6–20)
CALCIUM SERPL-MCNC: 7 MG/DL (ref 8.6–10.2)
CHLORIDE BLD-SCNC: 94 MEQ/L (ref 98–107)
CO2: 19 MEQ/L (ref 22–29)
CREAT SERPL-MCNC: 0.46 MG/DL (ref 0.5–0.9)
EOSINOPHILS ABSOLUTE: 0 K/UL (ref 0–0.7)
EOSINOPHILS RELATIVE PERCENT: 0.3 %
GFR AFRICAN AMERICAN: >60
GFR NON-AFRICAN AMERICAN: >60
GLOBULIN: 2.1 G/DL (ref 2.3–3.5)
GLUCOSE BLD-MCNC: 241 MG/DL (ref 74–109)
HCT VFR BLD CALC: 24.1 % (ref 37–47)
HEMOGLOBIN: 8 G/DL (ref 12–16)
LYMPHOCYTES ABSOLUTE: 1.4 K/UL (ref 1–4.8)
LYMPHOCYTES RELATIVE PERCENT: 8.3 %
MCH RBC QN AUTO: 31.6 PG (ref 27–31.3)
MCHC RBC AUTO-ENTMCNC: 33.2 % (ref 33–37)
MCV RBC AUTO: 95.1 FL (ref 82–100)
MONOCYTES ABSOLUTE: 1.2 K/UL (ref 0.2–0.8)
MONOCYTES RELATIVE PERCENT: 7.1 %
NEUTROPHILS ABSOLUTE: 14.4 K/UL (ref 1.4–6.5)
NEUTROPHILS RELATIVE PERCENT: 83.8 %
PDW BLD-RTO: 18.4 % (ref 11.5–14.5)
PLATELET # BLD: 104 K/UL (ref 130–400)
POTASSIUM SERPL-SCNC: 2.6 MEQ/L (ref 3.5–5.1)
RBC # BLD: 2.53 M/UL (ref 4.2–5.4)
SODIUM BLD-SCNC: 127 MEQ/L (ref 132–144)
TOTAL PROTEIN: 4.1 G/DL (ref 6.4–8.1)
WBC # BLD: 17.1 K/UL (ref 4.8–10.8)

## 2017-06-07 PROCEDURE — 85025 COMPLETE CBC W/AUTO DIFF WBC: CPT

## 2017-06-07 PROCEDURE — 97116 GAIT TRAINING THERAPY: CPT

## 2017-06-07 PROCEDURE — 6370000000 HC RX 637 (ALT 250 FOR IP): Performed by: INTERNAL MEDICINE

## 2017-06-07 PROCEDURE — 80053 COMPREHEN METABOLIC PANEL: CPT

## 2017-06-07 PROCEDURE — 99255 IP/OBS CONSLTJ NEW/EST HI 80: CPT | Performed by: RADIOLOGY

## 2017-06-07 PROCEDURE — 97110 THERAPEUTIC EXERCISES: CPT

## 2017-06-07 PROCEDURE — 1210000000 HC MED SURG R&B

## 2017-06-07 PROCEDURE — 36415 COLL VENOUS BLD VENIPUNCTURE: CPT

## 2017-06-07 PROCEDURE — 2580000003 HC RX 258: Performed by: INTERNAL MEDICINE

## 2017-06-07 RX ORDER — POTASSIUM CHLORIDE 20 MEQ/1
40 TABLET, EXTENDED RELEASE ORAL 2 TIMES DAILY WITH MEALS
Status: DISCONTINUED | OUTPATIENT
Start: 2017-06-07 | End: 2017-06-07

## 2017-06-07 RX ORDER — POTASSIUM CHLORIDE 20 MEQ/1
40 TABLET, EXTENDED RELEASE ORAL EVERY 4 HOURS
Status: COMPLETED | OUTPATIENT
Start: 2017-06-07 | End: 2017-06-07

## 2017-06-07 RX ORDER — TOLVAPTAN 15 MG/1
15 TABLET ORAL ONCE
Status: COMPLETED | OUTPATIENT
Start: 2017-06-07 | End: 2017-06-07

## 2017-06-07 RX ADMIN — LACTULOSE 20 G: 20 SOLUTION ORAL at 09:24

## 2017-06-07 RX ADMIN — Medication 1 TABLET: at 09:23

## 2017-06-07 RX ADMIN — POTASSIUM CHLORIDE 40 MEQ: 20 TABLET, EXTENDED RELEASE ORAL at 18:19

## 2017-06-07 RX ADMIN — FOLIC ACID 1 MG: 1 TABLET ORAL at 09:24

## 2017-06-07 RX ADMIN — SPIRONOLACTONE 50 MG: 50 TABLET, FILM COATED ORAL at 09:24

## 2017-06-07 RX ADMIN — LACTULOSE 20 G: 20 SOLUTION ORAL at 23:39

## 2017-06-07 RX ADMIN — POTASSIUM CHLORIDE 40 MEQ: 20 TABLET, EXTENDED RELEASE ORAL at 09:23

## 2017-06-07 RX ADMIN — LACTULOSE 20 G: 20 SOLUTION ORAL at 16:29

## 2017-06-07 RX ADMIN — ACETAMINOPHEN 650 MG: 325 TABLET ORAL at 09:33

## 2017-06-07 RX ADMIN — TOLVAPTAN 15 MG: 15 TABLET ORAL at 13:17

## 2017-06-07 RX ADMIN — SODIUM CHLORIDE, PRESERVATIVE FREE 10 ML: 5 INJECTION INTRAVENOUS at 23:40

## 2017-06-07 RX ADMIN — POTASSIUM CHLORIDE 40 MEQ: 20 TABLET, EXTENDED RELEASE ORAL at 13:16

## 2017-06-07 RX ADMIN — SODIUM CHLORIDE, PRESERVATIVE FREE 10 ML: 5 INJECTION INTRAVENOUS at 09:24

## 2017-06-07 ASSESSMENT — PAIN DESCRIPTION - DESCRIPTORS: DESCRIPTORS: ACHING

## 2017-06-07 ASSESSMENT — PAIN DESCRIPTION - LOCATION: LOCATION: ABDOMEN

## 2017-06-07 ASSESSMENT — ENCOUNTER SYMPTOMS
BLURRED VISION: 0
RESPIRATORY NEGATIVE: 1
NAUSEA: 0
HEMOPTYSIS: 0
VOMITING: 0
DIARRHEA: 0
EYES NEGATIVE: 1
ABDOMINAL PAIN: 1
DOUBLE VISION: 0
PHOTOPHOBIA: 0
HEARTBURN: 0
COUGH: 0
SPUTUM PRODUCTION: 0
WHEEZING: 0
SHORTNESS OF BREATH: 0
CONSTIPATION: 0
BACK PAIN: 0

## 2017-06-07 ASSESSMENT — PAIN SCALES - GENERAL: PAINLEVEL_OUTOF10: 4

## 2017-06-07 ASSESSMENT — PAIN DESCRIPTION - ORIENTATION: ORIENTATION: ANTERIOR

## 2017-06-07 ASSESSMENT — PAIN DESCRIPTION - PAIN TYPE: TYPE: ACUTE PAIN

## 2017-06-08 VITALS
WEIGHT: 133 LBS | RESPIRATION RATE: 19 BRPM | BODY MASS INDEX: 23.57 KG/M2 | TEMPERATURE: 98.8 F | HEIGHT: 63 IN | OXYGEN SATURATION: 99 % | SYSTOLIC BLOOD PRESSURE: 96 MMHG | HEART RATE: 96 BPM | DIASTOLIC BLOOD PRESSURE: 59 MMHG

## 2017-06-08 LAB
ANION GAP SERPL CALCULATED.3IONS-SCNC: 11 MEQ/L (ref 7–13)
BASOPHILS ABSOLUTE: 0.1 K/UL (ref 0–0.2)
BASOPHILS RELATIVE PERCENT: 0.7 %
BODY FLUID CULTURE, STERILE: NORMAL
BUN BLDV-MCNC: 14 MG/DL (ref 6–20)
CALCIUM SERPL-MCNC: 6.8 MG/DL (ref 8.6–10.2)
CHLORIDE BLD-SCNC: 98 MEQ/L (ref 98–107)
CO2: 18 MEQ/L (ref 22–29)
CREAT SERPL-MCNC: 0.46 MG/DL (ref 0.5–0.9)
EOSINOPHILS ABSOLUTE: 0 K/UL (ref 0–0.7)
EOSINOPHILS RELATIVE PERCENT: 0.2 %
GFR AFRICAN AMERICAN: >60
GFR NON-AFRICAN AMERICAN: >60
GLUCOSE BLD-MCNC: 230 MG/DL (ref 60–115)
GLUCOSE BLD-MCNC: 258 MG/DL (ref 74–109)
HCT VFR BLD CALC: 24 % (ref 37–47)
HEMOGLOBIN: 8 G/DL (ref 12–16)
LYMPHOCYTES ABSOLUTE: 1.1 K/UL (ref 1–4.8)
LYMPHOCYTES RELATIVE PERCENT: 6 %
MCH RBC QN AUTO: 31.8 PG (ref 27–31.3)
MCHC RBC AUTO-ENTMCNC: 33.5 % (ref 33–37)
MCV RBC AUTO: 94.9 FL (ref 82–100)
MONOCYTES ABSOLUTE: 1.3 K/UL (ref 0.2–0.8)
MONOCYTES RELATIVE PERCENT: 7.2 %
NEUTROPHILS ABSOLUTE: 15.6 K/UL (ref 1.4–6.5)
NEUTROPHILS RELATIVE PERCENT: 85.9 %
PDW BLD-RTO: 18.3 % (ref 11.5–14.5)
PERFORMED ON: ABNORMAL
PLATELET # BLD: 99 K/UL (ref 130–400)
POTASSIUM SERPL-SCNC: 4.2 MEQ/L (ref 3.5–5.1)
RBC # BLD: 2.53 M/UL (ref 4.2–5.4)
SODIUM BLD-SCNC: 127 MEQ/L (ref 132–144)
WBC # BLD: 18.1 K/UL (ref 4.8–10.8)

## 2017-06-08 PROCEDURE — 6360000002 HC RX W HCPCS: Performed by: INTERNAL MEDICINE

## 2017-06-08 PROCEDURE — 85025 COMPLETE CBC W/AUTO DIFF WBC: CPT

## 2017-06-08 PROCEDURE — 6370000000 HC RX 637 (ALT 250 FOR IP): Performed by: INTERNAL MEDICINE

## 2017-06-08 PROCEDURE — 97535 SELF CARE MNGMENT TRAINING: CPT

## 2017-06-08 PROCEDURE — 80048 BASIC METABOLIC PNL TOTAL CA: CPT

## 2017-06-08 PROCEDURE — 36415 COLL VENOUS BLD VENIPUNCTURE: CPT

## 2017-06-08 PROCEDURE — 97116 GAIT TRAINING THERAPY: CPT

## 2017-06-08 PROCEDURE — 2580000003 HC RX 258: Performed by: INTERNAL MEDICINE

## 2017-06-08 RX ORDER — SODIUM CHLORIDE 9 MG/ML
INJECTION, SOLUTION INTRAVENOUS
Status: DISCONTINUED
Start: 2017-06-08 | End: 2017-06-08 | Stop reason: HOSPADM

## 2017-06-08 RX ORDER — KETOROLAC TROMETHAMINE 15 MG/ML
15 INJECTION, SOLUTION INTRAMUSCULAR; INTRAVENOUS ONCE
Status: COMPLETED | OUTPATIENT
Start: 2017-06-08 | End: 2017-06-08

## 2017-06-08 RX ORDER — SODIUM CHLORIDE 9 MG/ML
INJECTION, SOLUTION INTRAVENOUS ONCE
Status: COMPLETED | OUTPATIENT
Start: 2017-06-08 | End: 2017-06-08

## 2017-06-08 RX ORDER — NICOTINE POLACRILEX 4 MG
15 LOZENGE BUCCAL PRN
Status: DISCONTINUED | OUTPATIENT
Start: 2017-06-08 | End: 2017-06-08 | Stop reason: HOSPADM

## 2017-06-08 RX ORDER — TORSEMIDE 10 MG/1
10 TABLET ORAL DAILY
Status: DISCONTINUED | OUTPATIENT
Start: 2017-06-08 | End: 2017-06-08 | Stop reason: HOSPADM

## 2017-06-08 RX ORDER — DEXTROSE MONOHYDRATE 50 MG/ML
100 INJECTION, SOLUTION INTRAVENOUS PRN
Status: DISCONTINUED | OUTPATIENT
Start: 2017-06-08 | End: 2017-06-08 | Stop reason: HOSPADM

## 2017-06-08 RX ORDER — POTASSIUM CHLORIDE 20 MEQ/1
20 TABLET, EXTENDED RELEASE ORAL
Status: DISCONTINUED | OUTPATIENT
Start: 2017-06-09 | End: 2017-06-08 | Stop reason: HOSPADM

## 2017-06-08 RX ORDER — INSULIN GLARGINE 100 [IU]/ML
10 INJECTION, SOLUTION SUBCUTANEOUS NIGHTLY
Status: DISCONTINUED | OUTPATIENT
Start: 2017-06-08 | End: 2017-06-08 | Stop reason: HOSPADM

## 2017-06-08 RX ORDER — INSULIN GLARGINE 100 [IU]/ML
10 INJECTION, SOLUTION SUBCUTANEOUS NIGHTLY
Qty: 1 VIAL | Refills: 3 | Status: SHIPPED | OUTPATIENT
Start: 2017-06-08 | End: 2017-07-12 | Stop reason: SDUPTHER

## 2017-06-08 RX ORDER — DEXTROSE MONOHYDRATE 25 G/50ML
12.5 INJECTION, SOLUTION INTRAVENOUS PRN
Status: DISCONTINUED | OUTPATIENT
Start: 2017-06-08 | End: 2017-06-08 | Stop reason: HOSPADM

## 2017-06-08 RX ADMIN — Medication 1 TABLET: at 13:58

## 2017-06-08 RX ADMIN — TORSEMIDE 10 MG: 10 TABLET ORAL at 17:43

## 2017-06-08 RX ADMIN — FOLIC ACID 1 MG: 1 TABLET ORAL at 08:39

## 2017-06-08 RX ADMIN — LACTULOSE 20 G: 20 SOLUTION ORAL at 08:39

## 2017-06-08 RX ADMIN — SODIUM CHLORIDE: 9 INJECTION, SOLUTION INTRAVENOUS at 10:46

## 2017-06-08 RX ADMIN — KETOROLAC TROMETHAMINE 15 MG: 15 INJECTION, SOLUTION INTRAMUSCULAR; INTRAVENOUS at 03:21

## 2017-06-08 RX ADMIN — INSULIN GLARGINE 10 UNITS: 100 INJECTION, SOLUTION SUBCUTANEOUS at 15:04

## 2017-06-08 RX ADMIN — SODIUM CHLORIDE: 9 INJECTION, SOLUTION INTRAVENOUS at 08:43

## 2017-06-08 RX ADMIN — LACTULOSE 20 G: 20 SOLUTION ORAL at 13:57

## 2017-06-08 ASSESSMENT — PAIN DESCRIPTION - LOCATION: LOCATION: ABDOMEN

## 2017-06-08 ASSESSMENT — PAIN DESCRIPTION - ORIENTATION: ORIENTATION: ANTERIOR

## 2017-06-08 ASSESSMENT — PAIN SCALES - GENERAL: PAINLEVEL_OUTOF10: 8

## 2017-06-08 ASSESSMENT — PAIN DESCRIPTION - PAIN TYPE: TYPE: ACUTE PAIN

## 2017-06-12 LAB
ALBUMIN SERPL-MCNC: 2 G/DL (ref 3.9–4.9)
ALP BLD-CCNC: 267 U/L (ref 40–130)
ALT SERPL-CCNC: 16 U/L (ref 0–33)
ANION GAP SERPL CALCULATED.3IONS-SCNC: 18 MEQ/L (ref 7–13)
AST SERPL-CCNC: 90 U/L (ref 0–35)
BILIRUB SERPL-MCNC: 14.4 MG/DL (ref 0–1.2)
BUN BLDV-MCNC: 21 MG/DL (ref 6–20)
CALCIUM SERPL-MCNC: 7.4 MG/DL (ref 8.6–10.2)
CHLORIDE BLD-SCNC: 92 MEQ/L (ref 98–107)
CO2: 16 MEQ/L (ref 22–29)
CREAT SERPL-MCNC: 0.46 MG/DL (ref 0.5–0.9)
GFR AFRICAN AMERICAN: >60
GFR NON-AFRICAN AMERICAN: >60
GLOBULIN: 2.6 G/DL (ref 2.3–3.5)
GLUCOSE BLD-MCNC: 161 MG/DL (ref 74–109)
POTASSIUM SERPL-SCNC: 3.7 MEQ/L (ref 3.5–5.1)
SODIUM BLD-SCNC: 126 MEQ/L (ref 132–144)
TOTAL PROTEIN: 4.6 G/DL (ref 6.4–8.1)

## 2017-06-19 ENCOUNTER — HOSPITAL ENCOUNTER (OUTPATIENT)
Dept: ULTRASOUND IMAGING | Age: 47
Discharge: HOME | End: 2017-06-19
Payer: MEDICAID

## 2017-06-19 VITALS
BODY MASS INDEX: 18.61 KG/M2 | OXYGEN SATURATION: 97 % | SYSTOLIC BLOOD PRESSURE: 104 MMHG | WEIGHT: 105 LBS | DIASTOLIC BLOOD PRESSURE: 58 MMHG | HEIGHT: 63 IN | HEART RATE: 102 BPM | RESPIRATION RATE: 16 BRPM

## 2017-06-19 DIAGNOSIS — N28.9 ABNORMAL KIDNEY FUNCTION: Primary | ICD-10-CM

## 2017-06-19 DIAGNOSIS — R18.8 OTHER ASCITES: ICD-10-CM

## 2017-06-19 PROCEDURE — P9046 ALBUMIN (HUMAN), 25%, 20 ML: HCPCS | Performed by: RADIOLOGY

## 2017-06-19 PROCEDURE — 49083 ABD PARACENTESIS W/IMAGING: CPT | Performed by: RADIOLOGY

## 2017-06-19 PROCEDURE — C1729 CATH, DRAINAGE: HCPCS

## 2017-06-19 PROCEDURE — 6360000002 HC RX W HCPCS: Performed by: RADIOLOGY

## 2017-06-19 RX ORDER — ALBUMIN (HUMAN) 12.5 G/50ML
25 SOLUTION INTRAVENOUS ONCE
Status: COMPLETED | OUTPATIENT
Start: 2017-06-19 | End: 2017-06-19

## 2017-06-19 RX ADMIN — ALBUMIN (HUMAN) 25 G: 5 SOLUTION INTRAVENOUS at 12:44

## 2017-06-19 ASSESSMENT — PAIN DESCRIPTION - LOCATION: LOCATION: ABDOMEN

## 2017-06-19 ASSESSMENT — PAIN DESCRIPTION - DESCRIPTORS: DESCRIPTORS: CONSTANT;TIGHTNESS

## 2017-06-19 ASSESSMENT — PAIN SCALES - GENERAL
PAINLEVEL_OUTOF10: 1
PAINLEVEL_OUTOF10: 0

## 2017-06-19 ASSESSMENT — PAIN DESCRIPTION - PAIN TYPE: TYPE: CHRONIC PAIN

## 2017-06-19 ASSESSMENT — PAIN - FUNCTIONAL ASSESSMENT: PAIN_FUNCTIONAL_ASSESSMENT: 0-10

## 2017-07-03 LAB
ACANTHOCYTES: ABNORMAL
ANISOCYTOSIS: ABNORMAL
BASOPHILS ABSOLUTE: 0 K/UL (ref 0–0.2)
BASOPHILS RELATIVE PERCENT: 0.3 %
BURR CELLS: ABNORMAL
EOSINOPHILS ABSOLUTE: 0.2 K/UL (ref 0–0.7)
EOSINOPHILS RELATIVE PERCENT: 1 %
HCT VFR BLD CALC: 24.2 % (ref 37–47)
HEMOGLOBIN: 7.8 G/DL (ref 12–16)
HYPOCHROMIA: ABNORMAL
LYMPHOCYTES ABSOLUTE: 1.2 K/UL (ref 1–4.8)
LYMPHOCYTES RELATIVE PERCENT: 7 %
MACROCYTES: ABNORMAL
MCH RBC QN AUTO: 30.5 PG (ref 27–31.3)
MCHC RBC AUTO-ENTMCNC: 32.4 % (ref 33–37)
MCV RBC AUTO: 94.2 FL (ref 82–100)
MONOCYTES ABSOLUTE: 1.1 K/UL (ref 0.2–0.8)
MONOCYTES RELATIVE PERCENT: 5.8 %
NEUTROPHILS ABSOLUTE: 15.4 K/UL (ref 1.4–6.5)
NEUTROPHILS RELATIVE PERCENT: 87 %
PDW BLD-RTO: 16 % (ref 11.5–14.5)
PLATELET # BLD: 139 K/UL (ref 130–400)
PLATELET SLIDE REVIEW: NORMAL
POIKILOCYTES: ABNORMAL
RBC # BLD: 2.57 M/UL (ref 4.2–5.4)
SCHISTOCYTES: ABNORMAL
SMUDGE CELLS: 2.9
TARGET CELLS: ABNORMAL
TEAR DROP CELLS: ABNORMAL
TOXIC GRANULATION: ABNORMAL
VACUOLATED NEUTROPHILS: ABNORMAL
WBC # BLD: 17.7 K/UL (ref 4.8–10.8)

## 2017-07-06 ENCOUNTER — HOSPITAL ENCOUNTER (INPATIENT)
Age: 47
LOS: 5 days | Discharge: NURSING, REHAB OR LONG-TERM CARE | DRG: 720 | End: 2017-07-11
Attending: INTERNAL MEDICINE
Payer: MEDICAID

## 2017-07-06 ENCOUNTER — APPOINTMENT (OUTPATIENT)
Dept: CT IMAGING | Age: 47
DRG: 720 | End: 2017-07-06
Payer: MEDICAID

## 2017-07-06 ENCOUNTER — APPOINTMENT (OUTPATIENT)
Dept: GENERAL RADIOLOGY | Age: 47
DRG: 720 | End: 2017-07-06
Payer: MEDICAID

## 2017-07-06 DIAGNOSIS — J18.9 PNEUMONIA DUE TO ORGANISM: ICD-10-CM

## 2017-07-06 DIAGNOSIS — R77.8 ELEVATED TROPONIN: ICD-10-CM

## 2017-07-06 DIAGNOSIS — A41.9 SEPSIS, DUE TO UNSPECIFIED ORGANISM: ICD-10-CM

## 2017-07-06 DIAGNOSIS — K70.31 ALCOHOLIC CIRRHOSIS OF LIVER WITH ASCITES (HCC): Primary | ICD-10-CM

## 2017-07-06 DIAGNOSIS — K76.82 HEPATIC ENCEPHALOPATHY: ICD-10-CM

## 2017-07-06 LAB
ALBUMIN SERPL-MCNC: 2.2 G/DL (ref 3.9–4.9)
ALP BLD-CCNC: 293 U/L (ref 40–130)
ALT SERPL-CCNC: 11 U/L (ref 0–33)
AMMONIA: 79 UMOL/L (ref 11–51)
AMYLASE: 48 U/L (ref 28–100)
ANION GAP SERPL CALCULATED.3IONS-SCNC: 16 MEQ/L (ref 7–13)
ANISOCYTOSIS: ABNORMAL
APTT: 45 SEC (ref 21.6–35.4)
AST SERPL-CCNC: 82 U/L (ref 0–35)
BASOPHILS ABSOLUTE: 0.1 K/UL (ref 0–0.2)
BASOPHILS RELATIVE PERCENT: 0.4 %
BILIRUB SERPL-MCNC: 9.5 MG/DL (ref 0–1.2)
BUN BLDV-MCNC: 19 MG/DL (ref 6–20)
CALCIUM SERPL-MCNC: 7.4 MG/DL (ref 8.6–10.2)
CHLORIDE BLD-SCNC: 99 MEQ/L (ref 98–107)
CO2: 15 MEQ/L (ref 22–29)
CREAT SERPL-MCNC: 0.78 MG/DL (ref 0.5–0.9)
EOSINOPHILS ABSOLUTE: 0.1 K/UL (ref 0–0.7)
EOSINOPHILS RELATIVE PERCENT: 0.4 %
GFR AFRICAN AMERICAN: >60
GFR NON-AFRICAN AMERICAN: >60
GLOBULIN: 3.7 G/DL (ref 2.3–3.5)
GLUCOSE BLD-MCNC: 121 MG/DL (ref 60–115)
GLUCOSE BLD-MCNC: 249 MG/DL (ref 74–109)
HCT VFR BLD CALC: 28.1 % (ref 37–47)
HEMATOLOGY PATH CONSULT: YES
HEMOGLOBIN: 8.9 G/DL (ref 12–16)
HYPOCHROMIA: 0
INR BLD: 1.6
LACTIC ACID: 4.5 MMOL/L (ref 0.5–2.2)
LACTIC ACID: 5.1 MMOL/L (ref 0.5–2.2)
LIPASE: 9 U/L (ref 13–60)
LYMPHOCYTES ABSOLUTE: 1.9 K/UL (ref 1–4.8)
LYMPHOCYTES RELATIVE PERCENT: 11.7 %
MACROCYTES: 0
MCH RBC QN AUTO: 30.1 PG (ref 27–31.3)
MCHC RBC AUTO-ENTMCNC: 31.8 % (ref 33–37)
MCV RBC AUTO: 94.9 FL (ref 82–100)
MICROCYTES: 0
MONOCYTES ABSOLUTE: 1.6 K/UL (ref 0.2–0.8)
MONOCYTES RELATIVE PERCENT: 10.1 %
NEUTROPHILS ABSOLUTE: 12.3 K/UL (ref 1.4–6.5)
NEUTROPHILS RELATIVE PERCENT: 77.4 %
PDW BLD-RTO: 17.1 % (ref 11.5–14.5)
PERFORMED ON: ABNORMAL
PLATELET # BLD: 168 K/UL (ref 130–400)
PLATELET SLIDE REVIEW: ADEQUATE
POC CREATININE WHOLE BLOOD: 1.2
POIKILOCYTES: ABNORMAL
POLYCHROMASIA: 0
POTASSIUM SERPL-SCNC: 3.5 MEQ/L (ref 3.5–5.1)
PROTHROMBIN TIME: 17.4 SEC (ref 8.1–13.7)
RBC # BLD: 2.96 M/UL (ref 4.2–5.4)
SCHISTOCYTES: ABNORMAL
SLIDE REVIEW: ABNORMAL
SODIUM BLD-SCNC: 130 MEQ/L (ref 132–144)
SPHEROCYTES: ABNORMAL
TARGET CELLS: ABNORMAL
TOTAL CK: 37 U/L (ref 0–170)
TOTAL PROTEIN: 5.9 G/DL (ref 6.4–8.1)
TROPONIN: 0.07 NG/ML (ref 0–0.01)
TROPONIN: 0.08 NG/ML (ref 0–0.01)
WBC # BLD: 15.9 K/UL (ref 4.8–10.8)

## 2017-07-06 PROCEDURE — 80053 COMPREHEN METABOLIC PANEL: CPT

## 2017-07-06 PROCEDURE — 74177 CT ABD & PELVIS W/CONTRAST: CPT

## 2017-07-06 PROCEDURE — 82140 ASSAY OF AMMONIA: CPT

## 2017-07-06 PROCEDURE — 99285 EMERGENCY DEPT VISIT HI MDM: CPT

## 2017-07-06 PROCEDURE — 2580000003 HC RX 258

## 2017-07-06 PROCEDURE — 82550 ASSAY OF CK (CPK): CPT

## 2017-07-06 PROCEDURE — 6360000002 HC RX W HCPCS: Performed by: NURSE PRACTITIONER

## 2017-07-06 PROCEDURE — 2580000003 HC RX 258: Performed by: NURSE PRACTITIONER

## 2017-07-06 PROCEDURE — 2580000003 HC RX 258: Performed by: PHYSICIAN ASSISTANT

## 2017-07-06 PROCEDURE — 36415 COLL VENOUS BLD VENIPUNCTURE: CPT

## 2017-07-06 PROCEDURE — 99254 IP/OBS CNSLTJ NEW/EST MOD 60: CPT | Performed by: INTERNAL MEDICINE

## 2017-07-06 PROCEDURE — 84484 ASSAY OF TROPONIN QUANT: CPT

## 2017-07-06 PROCEDURE — 1210000000 HC MED SURG R&B

## 2017-07-06 PROCEDURE — 71010 XR CHEST PORTABLE: CPT

## 2017-07-06 PROCEDURE — 85730 THROMBOPLASTIN TIME PARTIAL: CPT

## 2017-07-06 PROCEDURE — 6370000000 HC RX 637 (ALT 250 FOR IP): Performed by: PHYSICIAN ASSISTANT

## 2017-07-06 PROCEDURE — 93005 ELECTROCARDIOGRAM TRACING: CPT

## 2017-07-06 PROCEDURE — 83690 ASSAY OF LIPASE: CPT

## 2017-07-06 PROCEDURE — 85025 COMPLETE CBC W/AUTO DIFF WBC: CPT

## 2017-07-06 PROCEDURE — 96365 THER/PROPH/DIAG IV INF INIT: CPT

## 2017-07-06 PROCEDURE — 83605 ASSAY OF LACTIC ACID: CPT

## 2017-07-06 PROCEDURE — 82150 ASSAY OF AMYLASE: CPT

## 2017-07-06 PROCEDURE — 6360000002 HC RX W HCPCS: Performed by: PHYSICIAN ASSISTANT

## 2017-07-06 PROCEDURE — 85610 PROTHROMBIN TIME: CPT

## 2017-07-06 PROCEDURE — 6360000004 HC RX CONTRAST MEDICATION: Performed by: RADIOLOGY

## 2017-07-06 PROCEDURE — 87040 BLOOD CULTURE FOR BACTERIA: CPT

## 2017-07-06 RX ORDER — SODIUM CHLORIDE 9 MG/ML
INJECTION, SOLUTION INTRAVENOUS CONTINUOUS
Status: DISCONTINUED | OUTPATIENT
Start: 2017-07-06 | End: 2017-07-12 | Stop reason: HOSPADM

## 2017-07-06 RX ORDER — SODIUM CHLORIDE 0.9 % (FLUSH) 0.9 %
10 SYRINGE (ML) INJECTION PRN
Status: DISCONTINUED | OUTPATIENT
Start: 2017-07-06 | End: 2017-07-12 | Stop reason: HOSPADM

## 2017-07-06 RX ORDER — LACTULOSE 10 G/15ML
20 SOLUTION ORAL 2 TIMES DAILY
Status: DISCONTINUED | OUTPATIENT
Start: 2017-07-06 | End: 2017-07-12 | Stop reason: HOSPADM

## 2017-07-06 RX ORDER — SODIUM CHLORIDE 9 MG/ML
INJECTION, SOLUTION INTRAVENOUS
Status: COMPLETED
Start: 2017-07-06 | End: 2017-07-06

## 2017-07-06 RX ORDER — SODIUM CHLORIDE 0.9 % (FLUSH) 0.9 %
10 SYRINGE (ML) INJECTION EVERY 12 HOURS SCHEDULED
Status: DISCONTINUED | OUTPATIENT
Start: 2017-07-06 | End: 2017-07-12 | Stop reason: HOSPADM

## 2017-07-06 RX ORDER — ACETAMINOPHEN 325 MG/1
650 TABLET ORAL EVERY 4 HOURS PRN
Status: DISCONTINUED | OUTPATIENT
Start: 2017-07-06 | End: 2017-07-07

## 2017-07-06 RX ORDER — LACTULOSE 10 G/15ML
30 SOLUTION ORAL ONCE
Status: DISCONTINUED | OUTPATIENT
Start: 2017-07-06 | End: 2017-07-06 | Stop reason: SDUPTHER

## 2017-07-06 RX ORDER — 0.9 % SODIUM CHLORIDE 0.9 %
30 INTRAVENOUS SOLUTION INTRAVENOUS ONCE
Status: COMPLETED | OUTPATIENT
Start: 2017-07-06 | End: 2017-07-06

## 2017-07-06 RX ORDER — ONDANSETRON 2 MG/ML
4 INJECTION INTRAMUSCULAR; INTRAVENOUS EVERY 6 HOURS PRN
Status: DISCONTINUED | OUTPATIENT
Start: 2017-07-06 | End: 2017-07-12 | Stop reason: HOSPADM

## 2017-07-06 RX ORDER — CIPROFLOXACIN 2 MG/ML
400 INJECTION, SOLUTION INTRAVENOUS ONCE
Status: COMPLETED | OUTPATIENT
Start: 2017-07-06 | End: 2017-07-06

## 2017-07-06 RX ADMIN — CEFTRIAXONE 1 G: 1 INJECTION, POWDER, FOR SOLUTION INTRAMUSCULAR; INTRAVENOUS at 21:34

## 2017-07-06 RX ADMIN — ENOXAPARIN SODIUM 40 MG: 40 INJECTION SUBCUTANEOUS at 21:35

## 2017-07-06 RX ADMIN — CIPROFLOXACIN 400 MG: 2 INJECTION, SOLUTION INTRAVENOUS at 18:08

## 2017-07-06 RX ADMIN — SODIUM CHLORIDE: 9 INJECTION, SOLUTION INTRAVENOUS at 21:34

## 2017-07-06 RX ADMIN — IOPAMIDOL 100 ML: 755 INJECTION, SOLUTION INTRAVENOUS at 15:14

## 2017-07-06 RX ADMIN — SODIUM CHLORIDE, PRESERVATIVE FREE 10 ML: 5 INJECTION INTRAVENOUS at 21:41

## 2017-07-06 RX ADMIN — Medication 1000 ML: at 14:07

## 2017-07-06 RX ADMIN — LACTULOSE 20 G: 20 SOLUTION ORAL at 21:34

## 2017-07-06 RX ADMIN — AZITHROMYCIN MONOHYDRATE 500 MG: 500 INJECTION, POWDER, LYOPHILIZED, FOR SOLUTION INTRAVENOUS at 23:27

## 2017-07-06 RX ADMIN — SODIUM CHLORIDE 1000 ML: 9 INJECTION, SOLUTION INTRAVENOUS at 14:07

## 2017-07-06 RX ADMIN — PIPERACILLIN SODIUM AND TAZOBACTAM SODIUM 3.38 G: 3; .375 INJECTION, POWDER, LYOPHILIZED, FOR SOLUTION INTRAVENOUS at 16:11

## 2017-07-06 ASSESSMENT — ENCOUNTER SYMPTOMS
COUGH: 0
ABDOMINAL PAIN: 1
NAUSEA: 0
VOMITING: 0
ABDOMINAL DISTENTION: 1
DIARRHEA: 0
SHORTNESS OF BREATH: 1

## 2017-07-07 ENCOUNTER — APPOINTMENT (OUTPATIENT)
Dept: ULTRASOUND IMAGING | Age: 47
DRG: 720 | End: 2017-07-07
Payer: MEDICAID

## 2017-07-07 LAB
ACANTHOCYTES: ABNORMAL
ALBUMIN SERPL-MCNC: 1.8 G/DL (ref 3.9–4.9)
ALP BLD-CCNC: 243 U/L (ref 40–130)
ALT SERPL-CCNC: 11 U/L (ref 0–33)
AMMONIA: 68 UMOL/L (ref 11–51)
AMYLASE: 37 U/L (ref 28–100)
ANION GAP SERPL CALCULATED.3IONS-SCNC: 15 MEQ/L (ref 7–13)
ANISOCYTOSIS: ABNORMAL
APTT: 51.7 SEC (ref 21.6–35.4)
AST SERPL-CCNC: 86 U/L (ref 0–35)
ATYPICAL LYMPHOCYTE RELATIVE PERCENT: 5 %
BANDED NEUTROPHILS RELATIVE PERCENT: 2 % (ref 5–11)
BASOPHILS ABSOLUTE: 0 K/UL (ref 0–0.2)
BASOPHILS RELATIVE PERCENT: 0.4 %
BILIRUB SERPL-MCNC: 9 MG/DL (ref 0–1.2)
BILIRUBIN DIRECT: 5 MG/DL (ref 0–0.3)
BILIRUBIN, INDIRECT: 4 MG/DL (ref 0–0.6)
BUN BLDV-MCNC: 15 MG/DL (ref 6–20)
CALCIUM SERPL-MCNC: 7.5 MG/DL (ref 8.6–10.2)
CHLORIDE BLD-SCNC: 105 MEQ/L (ref 98–107)
CO2: 13 MEQ/L (ref 22–29)
CREAT SERPL-MCNC: 0.28 MG/DL (ref 0.5–0.9)
EOSINOPHILS ABSOLUTE: 0.5 K/UL (ref 0–0.7)
EOSINOPHILS RELATIVE PERCENT: 3 %
GFR AFRICAN AMERICAN: >60
GFR NON-AFRICAN AMERICAN: >60
GLUCOSE BLD-MCNC: 101 MG/DL (ref 74–109)
GLUCOSE BLD-MCNC: 104 MG/DL (ref 60–115)
GLUCOSE BLD-MCNC: 112 MG/DL (ref 60–115)
GLUCOSE BLD-MCNC: 117 MG/DL (ref 60–115)
GLUCOSE BLD-MCNC: 162 MG/DL (ref 60–115)
HCT VFR BLD CALC: 25.5 % (ref 37–47)
HEMATOLOGY PATH CONSULT: NO
HEMOGLOBIN: 8.2 G/DL (ref 12–16)
HYPOCHROMIA: ABNORMAL
INR BLD: 1.7
LACTATE DEHYDROGENASE: 426 U/L (ref 135–214)
LACTIC ACID: 3.5 MMOL/L (ref 0.5–2.2)
LIPASE: 14 U/L (ref 13–60)
LYMPHOCYTES ABSOLUTE: 3.1 K/UL (ref 1–4.8)
LYMPHOCYTES RELATIVE PERCENT: 15 %
MACROCYTES: ABNORMAL
MCH RBC QN AUTO: 30.6 PG (ref 27–31.3)
MCHC RBC AUTO-ENTMCNC: 32.2 % (ref 33–37)
MCV RBC AUTO: 95.1 FL (ref 82–100)
MICROCYTES: 0
MONOCYTES ABSOLUTE: 1.4 K/UL (ref 0.2–0.8)
MONOCYTES RELATIVE PERCENT: 9.3 %
NEUTROPHILS ABSOLUTE: 10.7 K/UL (ref 1.4–6.5)
NEUTROPHILS RELATIVE PERCENT: 66 %
PDW BLD-RTO: 17 % (ref 11.5–14.5)
PERFORMED ON: ABNORMAL
PERFORMED ON: ABNORMAL
PERFORMED ON: NORMAL
PERFORMED ON: NORMAL
PLATELET # BLD: 161 K/UL (ref 130–400)
PLATELET SLIDE REVIEW: ADEQUATE
POIKILOCYTES: ABNORMAL
POLYCHROMASIA: 0
POTASSIUM SERPL-SCNC: 3.5 MEQ/L (ref 3.5–5.1)
PROTHROMBIN TIME: 18.1 SEC (ref 8.1–13.7)
RBC # BLD: 2.68 M/UL (ref 4.2–5.4)
SCHISTOCYTES: ABNORMAL
SODIUM BLD-SCNC: 133 MEQ/L (ref 132–144)
TOTAL PROTEIN: 5.5 G/DL (ref 6.4–8.1)
TROPONIN: 0.07 NG/ML (ref 0–0.01)
TROPONIN: 0.07 NG/ML (ref 0–0.01)
TROPONIN: 0.09 NG/ML (ref 0–0.01)
WBC # BLD: 15.7 K/UL (ref 4.8–10.8)

## 2017-07-07 PROCEDURE — 80048 BASIC METABOLIC PNL TOTAL CA: CPT

## 2017-07-07 PROCEDURE — 97166 OT EVAL MOD COMPLEX 45 MIN: CPT

## 2017-07-07 PROCEDURE — 36415 COLL VENOUS BLD VENIPUNCTURE: CPT

## 2017-07-07 PROCEDURE — 83690 ASSAY OF LIPASE: CPT

## 2017-07-07 PROCEDURE — G8978 MOBILITY CURRENT STATUS: HCPCS

## 2017-07-07 PROCEDURE — G8988 SELF CARE GOAL STATUS: HCPCS

## 2017-07-07 PROCEDURE — 83615 LACTATE (LD) (LDH) ENZYME: CPT

## 2017-07-07 PROCEDURE — 85025 COMPLETE CBC W/AUTO DIFF WBC: CPT

## 2017-07-07 PROCEDURE — 82140 ASSAY OF AMMONIA: CPT

## 2017-07-07 PROCEDURE — 82150 ASSAY OF AMYLASE: CPT

## 2017-07-07 PROCEDURE — 6360000002 HC RX W HCPCS: Performed by: RADIOLOGY

## 2017-07-07 PROCEDURE — 83010 ASSAY OF HAPTOGLOBIN QUANT: CPT

## 2017-07-07 PROCEDURE — 85610 PROTHROMBIN TIME: CPT

## 2017-07-07 PROCEDURE — 84484 ASSAY OF TROPONIN QUANT: CPT

## 2017-07-07 PROCEDURE — 0W9G3ZZ DRAINAGE OF PERITONEAL CAVITY, PERCUTANEOUS APPROACH: ICD-10-PCS | Performed by: RADIOLOGY

## 2017-07-07 PROCEDURE — 2500000003 HC RX 250 WO HCPCS: Performed by: INTERNAL MEDICINE

## 2017-07-07 PROCEDURE — 83605 ASSAY OF LACTIC ACID: CPT

## 2017-07-07 PROCEDURE — 6370000000 HC RX 637 (ALT 250 FOR IP): Performed by: PHYSICIAN ASSISTANT

## 2017-07-07 PROCEDURE — 85730 THROMBOPLASTIN TIME PARTIAL: CPT

## 2017-07-07 PROCEDURE — P9046 ALBUMIN (HUMAN), 25%, 20 ML: HCPCS | Performed by: RADIOLOGY

## 2017-07-07 PROCEDURE — C1729 CATH, DRAINAGE: HCPCS

## 2017-07-07 PROCEDURE — 97162 PT EVAL MOD COMPLEX 30 MIN: CPT

## 2017-07-07 PROCEDURE — 99212 OFFICE O/P EST SF 10 MIN: CPT

## 2017-07-07 PROCEDURE — 1210000000 HC MED SURG R&B

## 2017-07-07 PROCEDURE — G8987 SELF CARE CURRENT STATUS: HCPCS

## 2017-07-07 PROCEDURE — 99254 IP/OBS CNSLTJ NEW/EST MOD 60: CPT | Performed by: RADIOLOGY

## 2017-07-07 PROCEDURE — 6360000002 HC RX W HCPCS: Performed by: PHYSICIAN ASSISTANT

## 2017-07-07 PROCEDURE — 49083 ABD PARACENTESIS W/IMAGING: CPT | Performed by: RADIOLOGY

## 2017-07-07 PROCEDURE — G8979 MOBILITY GOAL STATUS: HCPCS

## 2017-07-07 PROCEDURE — 2700000000 HC OXYGEN THERAPY PER DAY

## 2017-07-07 PROCEDURE — 80076 HEPATIC FUNCTION PANEL: CPT

## 2017-07-07 PROCEDURE — 2580000003 HC RX 258: Performed by: PHYSICIAN ASSISTANT

## 2017-07-07 RX ORDER — ALBUMIN (HUMAN) 12.5 G/50ML
25 SOLUTION INTRAVENOUS ONCE
Status: COMPLETED | OUTPATIENT
Start: 2017-07-07 | End: 2017-07-07

## 2017-07-07 RX ADMIN — SODIUM CHLORIDE: 9 INJECTION, SOLUTION INTRAVENOUS at 09:45

## 2017-07-07 RX ADMIN — Medication: at 22:00

## 2017-07-07 RX ADMIN — ALBUMIN (HUMAN) 25 G: 0.25 INJECTION, SOLUTION INTRAVENOUS at 15:42

## 2017-07-07 RX ADMIN — SODIUM CHLORIDE, PRESERVATIVE FREE 10 ML: 5 INJECTION INTRAVENOUS at 21:33

## 2017-07-07 RX ADMIN — SODIUM CHLORIDE: 9 INJECTION, SOLUTION INTRAVENOUS at 15:43

## 2017-07-07 RX ADMIN — CEFTRIAXONE 1 G: 1 INJECTION, POWDER, FOR SOLUTION INTRAMUSCULAR; INTRAVENOUS at 21:33

## 2017-07-07 RX ADMIN — LACTULOSE 20 G: 20 SOLUTION ORAL at 09:45

## 2017-07-07 RX ADMIN — ENOXAPARIN SODIUM 40 MG: 40 INJECTION SUBCUTANEOUS at 21:33

## 2017-07-07 RX ADMIN — SODIUM CHLORIDE, PRESERVATIVE FREE 10 ML: 5 INJECTION INTRAVENOUS at 09:46

## 2017-07-07 RX ADMIN — AZITHROMYCIN MONOHYDRATE 500 MG: 500 INJECTION, POWDER, LYOPHILIZED, FOR SOLUTION INTRAVENOUS at 23:36

## 2017-07-08 LAB
ALBUMIN SERPL-MCNC: 2.1 G/DL (ref 3.9–4.9)
ALP BLD-CCNC: 203 U/L (ref 40–130)
ALT SERPL-CCNC: 8 U/L (ref 0–33)
ANION GAP SERPL CALCULATED.3IONS-SCNC: 14 MEQ/L (ref 7–13)
AST SERPL-CCNC: 62 U/L (ref 0–35)
BILIRUB SERPL-MCNC: 8.5 MG/DL (ref 0–1.2)
BUN BLDV-MCNC: 12 MG/DL (ref 6–20)
CALCIUM SERPL-MCNC: 7 MG/DL (ref 8.6–10.2)
CHLORIDE BLD-SCNC: 110 MEQ/L (ref 98–107)
CO2: 15 MEQ/L (ref 22–29)
CREAT SERPL-MCNC: 0.71 MG/DL (ref 0.5–0.9)
GFR AFRICAN AMERICAN: >60
GFR NON-AFRICAN AMERICAN: >60
GLOBULIN: 2.8 G/DL (ref 2.3–3.5)
GLUCOSE BLD-MCNC: 143 MG/DL (ref 74–109)
GLUCOSE BLD-MCNC: 153 MG/DL (ref 60–115)
PERFORMED ON: ABNORMAL
POTASSIUM SERPL-SCNC: 2.8 MEQ/L (ref 3.5–5.1)
SODIUM BLD-SCNC: 139 MEQ/L (ref 132–144)
TOTAL PROTEIN: 4.9 G/DL (ref 6.4–8.1)

## 2017-07-08 PROCEDURE — 6360000002 HC RX W HCPCS: Performed by: PHYSICIAN ASSISTANT

## 2017-07-08 PROCEDURE — 6370000000 HC RX 637 (ALT 250 FOR IP): Performed by: INTERNAL MEDICINE

## 2017-07-08 PROCEDURE — 6360000002 HC RX W HCPCS: Performed by: INTERNAL MEDICINE

## 2017-07-08 PROCEDURE — 36415 COLL VENOUS BLD VENIPUNCTURE: CPT

## 2017-07-08 PROCEDURE — 97110 THERAPEUTIC EXERCISES: CPT

## 2017-07-08 PROCEDURE — 80053 COMPREHEN METABOLIC PANEL: CPT

## 2017-07-08 PROCEDURE — 1210000000 HC MED SURG R&B

## 2017-07-08 PROCEDURE — 92610 EVALUATE SWALLOWING FUNCTION: CPT

## 2017-07-08 PROCEDURE — 97535 SELF CARE MNGMENT TRAINING: CPT

## 2017-07-08 PROCEDURE — 2580000003 HC RX 258: Performed by: PHYSICIAN ASSISTANT

## 2017-07-08 RX ORDER — POTASSIUM CHLORIDE 20 MEQ/1
40 TABLET, EXTENDED RELEASE ORAL ONCE
Status: COMPLETED | OUTPATIENT
Start: 2017-07-08 | End: 2017-07-08

## 2017-07-08 RX ORDER — AZITHROMYCIN 250 MG/1
TABLET, FILM COATED ORAL
Qty: 1 PACKET | Refills: 0 | Status: SHIPPED | OUTPATIENT
Start: 2017-07-08 | End: 2017-07-12 | Stop reason: SDUPTHER

## 2017-07-08 RX ORDER — POTASSIUM CHLORIDE 7.45 MG/ML
10 INJECTION INTRAVENOUS
Status: DISPENSED | OUTPATIENT
Start: 2017-07-08 | End: 2017-07-08

## 2017-07-08 RX ORDER — POTASSIUM CHLORIDE 7.45 MG/ML
40 INJECTION INTRAVENOUS ONCE
Status: DISCONTINUED | OUTPATIENT
Start: 2017-07-08 | End: 2017-07-08 | Stop reason: RX

## 2017-07-08 RX ADMIN — LACTULOSE 20 G: 20 SOLUTION ORAL at 08:22

## 2017-07-08 RX ADMIN — POTASSIUM CHLORIDE 10 MEQ: 10 INJECTION, SOLUTION INTRAVENOUS at 10:19

## 2017-07-08 RX ADMIN — POTASSIUM CHLORIDE 10 MEQ: 10 INJECTION, SOLUTION INTRAVENOUS at 12:11

## 2017-07-08 RX ADMIN — SODIUM CHLORIDE, PRESERVATIVE FREE 10 ML: 5 INJECTION INTRAVENOUS at 08:22

## 2017-07-08 RX ADMIN — POTASSIUM CHLORIDE 40 MEQ: 1500 TABLET, EXTENDED RELEASE ORAL at 15:59

## 2017-07-08 RX ADMIN — CEFTRIAXONE 1 G: 1 INJECTION, POWDER, FOR SOLUTION INTRAMUSCULAR; INTRAVENOUS at 22:38

## 2017-07-08 RX ADMIN — Medication: at 16:00

## 2017-07-08 RX ADMIN — Medication: at 08:24

## 2017-07-08 RX ADMIN — ENOXAPARIN SODIUM 40 MG: 40 INJECTION SUBCUTANEOUS at 22:39

## 2017-07-08 RX ADMIN — SODIUM CHLORIDE: 9 INJECTION, SOLUTION INTRAVENOUS at 22:42

## 2017-07-08 RX ADMIN — SODIUM CHLORIDE, PRESERVATIVE FREE 10 ML: 5 INJECTION INTRAVENOUS at 22:41

## 2017-07-08 RX ADMIN — POTASSIUM CHLORIDE 10 MEQ: 10 INJECTION, SOLUTION INTRAVENOUS at 13:40

## 2017-07-08 RX ADMIN — SODIUM CHLORIDE: 9 INJECTION, SOLUTION INTRAVENOUS at 06:19

## 2017-07-08 RX ADMIN — Medication: at 22:40

## 2017-07-09 LAB
ALBUMIN SERPL-MCNC: 2.2 G/DL (ref 3.9–4.9)
ALP BLD-CCNC: 211 U/L (ref 40–130)
ALT SERPL-CCNC: 9 U/L (ref 0–33)
ANION GAP SERPL CALCULATED.3IONS-SCNC: 12 MEQ/L (ref 7–13)
AST SERPL-CCNC: 59 U/L (ref 0–35)
BILIRUB SERPL-MCNC: 7.5 MG/DL (ref 0–1.2)
BUN BLDV-MCNC: 7 MG/DL (ref 6–20)
CALCIUM SERPL-MCNC: 7.3 MG/DL (ref 8.6–10.2)
CHLORIDE BLD-SCNC: 111 MEQ/L (ref 98–107)
CO2: 14 MEQ/L (ref 22–29)
CREAT SERPL-MCNC: 0.41 MG/DL (ref 0.5–0.9)
GFR AFRICAN AMERICAN: 58
GFR AFRICAN AMERICAN: >60
GFR NON-AFRICAN AMERICAN: 48
GFR NON-AFRICAN AMERICAN: >60
GLOBULIN: 3 G/DL (ref 2.3–3.5)
GLUCOSE BLD-MCNC: 118 MG/DL (ref 74–109)
HAPTOGLOBIN: 34 MG/DL (ref 30–200)
PERFORMED ON: ABNORMAL
POC CREATININE: 1.2 MG/DL (ref 0.6–1.1)
POC SAMPLE TYPE: ABNORMAL
POTASSIUM SERPL-SCNC: 3.7 MEQ/L (ref 3.5–5.1)
SODIUM BLD-SCNC: 137 MEQ/L (ref 132–144)
TOTAL PROTEIN: 5.2 G/DL (ref 6.4–8.1)

## 2017-07-09 PROCEDURE — 80053 COMPREHEN METABOLIC PANEL: CPT

## 2017-07-09 PROCEDURE — 2580000003 HC RX 258: Performed by: PHYSICIAN ASSISTANT

## 2017-07-09 PROCEDURE — 36415 COLL VENOUS BLD VENIPUNCTURE: CPT

## 2017-07-09 PROCEDURE — 97116 GAIT TRAINING THERAPY: CPT

## 2017-07-09 PROCEDURE — 1210000000 HC MED SURG R&B

## 2017-07-09 PROCEDURE — 6360000002 HC RX W HCPCS: Performed by: PHYSICIAN ASSISTANT

## 2017-07-09 RX ADMIN — AZITHROMYCIN MONOHYDRATE 500 MG: 500 INJECTION, POWDER, LYOPHILIZED, FOR SOLUTION INTRAVENOUS at 00:43

## 2017-07-09 RX ADMIN — Medication: at 04:31

## 2017-07-09 RX ADMIN — SODIUM CHLORIDE: 9 INJECTION, SOLUTION INTRAVENOUS at 21:31

## 2017-07-09 RX ADMIN — ENOXAPARIN SODIUM 40 MG: 40 INJECTION SUBCUTANEOUS at 21:30

## 2017-07-09 RX ADMIN — SODIUM CHLORIDE: 9 INJECTION, SOLUTION INTRAVENOUS at 10:01

## 2017-07-09 RX ADMIN — CEFTRIAXONE 1 G: 1 INJECTION, POWDER, FOR SOLUTION INTRAMUSCULAR; INTRAVENOUS at 21:30

## 2017-07-09 RX ADMIN — Medication: at 21:37

## 2017-07-09 ASSESSMENT — PAIN DESCRIPTION - ORIENTATION: ORIENTATION: LOWER

## 2017-07-09 ASSESSMENT — PAIN SCALES - GENERAL: PAINLEVEL_OUTOF10: 2

## 2017-07-09 ASSESSMENT — PAIN DESCRIPTION - DESCRIPTORS: DESCRIPTORS: PRESSURE

## 2017-07-09 ASSESSMENT — PAIN DESCRIPTION - LOCATION: LOCATION: SACRUM

## 2017-07-10 ENCOUNTER — APPOINTMENT (OUTPATIENT)
Dept: ULTRASOUND IMAGING | Age: 47
DRG: 720 | End: 2017-07-10
Payer: MEDICAID

## 2017-07-10 LAB
ALBUMIN SERPL-MCNC: 2 G/DL (ref 3.9–4.9)
ALP BLD-CCNC: 213 U/L (ref 40–130)
ALT SERPL-CCNC: 9 U/L (ref 0–33)
ANION GAP SERPL CALCULATED.3IONS-SCNC: 11 MEQ/L (ref 7–13)
AST SERPL-CCNC: 57 U/L (ref 0–35)
BILIRUB SERPL-MCNC: 6.4 MG/DL (ref 0–1.2)
BUN BLDV-MCNC: 6 MG/DL (ref 6–20)
CALCIUM SERPL-MCNC: 7.1 MG/DL (ref 8.6–10.2)
CHLORIDE BLD-SCNC: 110 MEQ/L (ref 98–107)
CO2: 13 MEQ/L (ref 22–29)
CREAT SERPL-MCNC: 0.37 MG/DL (ref 0.5–0.9)
GFR AFRICAN AMERICAN: >60
GFR NON-AFRICAN AMERICAN: >60
GLOBULIN: 3.1 G/DL (ref 2.3–3.5)
GLUCOSE BLD-MCNC: 118 MG/DL (ref 74–109)
GLUCOSE BLD-MCNC: 136 MG/DL (ref 60–115)
GLUCOSE BLD-MCNC: 177 MG/DL (ref 60–115)
HEMATOLOGY PATH CONSULT: NORMAL
PERFORMED ON: ABNORMAL
PERFORMED ON: ABNORMAL
POTASSIUM SERPL-SCNC: 3.5 MEQ/L (ref 3.5–5.1)
SODIUM BLD-SCNC: 134 MEQ/L (ref 132–144)
TOTAL PROTEIN: 5.1 G/DL (ref 6.4–8.1)

## 2017-07-10 PROCEDURE — 49083 ABD PARACENTESIS W/IMAGING: CPT | Performed by: RADIOLOGY

## 2017-07-10 PROCEDURE — C1729 CATH, DRAINAGE: HCPCS

## 2017-07-10 PROCEDURE — 6360000002 HC RX W HCPCS: Performed by: PHYSICIAN ASSISTANT

## 2017-07-10 PROCEDURE — 80053 COMPREHEN METABOLIC PANEL: CPT

## 2017-07-10 PROCEDURE — 99254 IP/OBS CNSLTJ NEW/EST MOD 60: CPT | Performed by: RADIOLOGY

## 2017-07-10 PROCEDURE — 36415 COLL VENOUS BLD VENIPUNCTURE: CPT

## 2017-07-10 PROCEDURE — 2580000003 HC RX 258: Performed by: PHYSICIAN ASSISTANT

## 2017-07-10 PROCEDURE — 6370000000 HC RX 637 (ALT 250 FOR IP): Performed by: INTERNAL MEDICINE

## 2017-07-10 PROCEDURE — 1210000000 HC MED SURG R&B

## 2017-07-10 RX ORDER — POTASSIUM CHLORIDE 20 MEQ/1
40 TABLET, EXTENDED RELEASE ORAL ONCE
Status: COMPLETED | OUTPATIENT
Start: 2017-07-10 | End: 2017-07-10

## 2017-07-10 RX ORDER — SPIRONOLACTONE 50 MG/1
50 TABLET, FILM COATED ORAL DAILY
Status: DISCONTINUED | OUTPATIENT
Start: 2017-07-10 | End: 2017-07-12 | Stop reason: HOSPADM

## 2017-07-10 RX ORDER — MULTIVITAMIN WITH FOLIC ACID 400 MCG
1 TABLET ORAL DAILY
Status: DISCONTINUED | OUTPATIENT
Start: 2017-07-10 | End: 2017-07-12 | Stop reason: HOSPADM

## 2017-07-10 RX ORDER — THIAMINE MONONITRATE (VIT B1) 100 MG
100 TABLET ORAL DAILY
Status: DISCONTINUED | OUTPATIENT
Start: 2017-07-10 | End: 2017-07-12 | Stop reason: HOSPADM

## 2017-07-10 RX ORDER — FOLIC ACID 1 MG/1
1 TABLET ORAL DAILY
Status: DISCONTINUED | OUTPATIENT
Start: 2017-07-10 | End: 2017-07-12 | Stop reason: HOSPADM

## 2017-07-10 RX ORDER — TORSEMIDE 20 MG/1
20 TABLET ORAL DAILY
Status: DISCONTINUED | OUTPATIENT
Start: 2017-07-10 | End: 2017-07-12 | Stop reason: HOSPADM

## 2017-07-10 RX ADMIN — AZITHROMYCIN MONOHYDRATE 500 MG: 500 INJECTION, POWDER, LYOPHILIZED, FOR SOLUTION INTRAVENOUS at 00:36

## 2017-07-10 RX ADMIN — Medication: at 22:30

## 2017-07-10 RX ADMIN — SPIRONOLACTONE 50 MG: 50 TABLET ORAL at 12:21

## 2017-07-10 RX ADMIN — ENOXAPARIN SODIUM 40 MG: 40 INJECTION SUBCUTANEOUS at 20:39

## 2017-07-10 RX ADMIN — Medication: at 12:23

## 2017-07-10 RX ADMIN — THERA TABS 1 TABLET: TAB at 12:21

## 2017-07-10 RX ADMIN — POTASSIUM CHLORIDE 40 MEQ: 1500 TABLET, EXTENDED RELEASE ORAL at 12:18

## 2017-07-10 RX ADMIN — CEFTRIAXONE 1 G: 1 INJECTION, POWDER, FOR SOLUTION INTRAMUSCULAR; INTRAVENOUS at 22:26

## 2017-07-10 RX ADMIN — FOLIC ACID 1 MG: 1 TABLET ORAL at 12:21

## 2017-07-10 RX ADMIN — SODIUM CHLORIDE: 9 INJECTION, SOLUTION INTRAVENOUS at 18:14

## 2017-07-10 RX ADMIN — TORSEMIDE 20 MG: 20 TABLET ORAL at 12:21

## 2017-07-10 ASSESSMENT — ENCOUNTER SYMPTOMS
HEMOPTYSIS: 0
RESPIRATORY NEGATIVE: 1
VOMITING: 0
SHORTNESS OF BREATH: 1
DIARRHEA: 0
COUGH: 0
WHEEZING: 0
COUGH: 1
PHOTOPHOBIA: 0
SHORTNESS OF BREATH: 0
EYES NEGATIVE: 1
HEARTBURN: 0
BLURRED VISION: 0
BACK PAIN: 0
DOUBLE VISION: 0
NAUSEA: 0
ABDOMINAL PAIN: 1
CONSTIPATION: 0
SPUTUM PRODUCTION: 0

## 2017-07-10 ASSESSMENT — PAIN - FUNCTIONAL ASSESSMENT: PAIN_FUNCTIONAL_ASSESSMENT: 0-10

## 2017-07-11 VITALS
RESPIRATION RATE: 18 BRPM | BODY MASS INDEX: 19.53 KG/M2 | OXYGEN SATURATION: 100 % | HEART RATE: 109 BPM | HEIGHT: 63 IN | WEIGHT: 110.23 LBS | DIASTOLIC BLOOD PRESSURE: 51 MMHG | SYSTOLIC BLOOD PRESSURE: 106 MMHG | TEMPERATURE: 98.2 F

## 2017-07-11 LAB
ALBUMIN SERPL-MCNC: 2 G/DL (ref 3.9–4.9)
ALP BLD-CCNC: 207 U/L (ref 40–130)
ALT SERPL-CCNC: 9 U/L (ref 0–33)
ANION GAP SERPL CALCULATED.3IONS-SCNC: 15 MEQ/L (ref 7–13)
AST SERPL-CCNC: 54 U/L (ref 0–35)
BILIRUB SERPL-MCNC: 6.1 MG/DL (ref 0–1.2)
BLOOD CULTURE, ROUTINE: NORMAL
BUN BLDV-MCNC: 4 MG/DL (ref 6–20)
CALCIUM SERPL-MCNC: 6.6 MG/DL (ref 8.6–10.2)
CHLORIDE BLD-SCNC: 105 MEQ/L (ref 98–107)
CO2: 13 MEQ/L (ref 22–29)
CREAT SERPL-MCNC: 0.4 MG/DL (ref 0.5–0.9)
CULTURE, BLOOD 2: NORMAL
GFR AFRICAN AMERICAN: >60
GFR NON-AFRICAN AMERICAN: >60
GLOBULIN: 3.1 G/DL (ref 2.3–3.5)
GLUCOSE BLD-MCNC: 113 MG/DL (ref 60–115)
GLUCOSE BLD-MCNC: 118 MG/DL (ref 74–109)
GLUCOSE BLD-MCNC: 161 MG/DL (ref 60–115)
GLUCOSE BLD-MCNC: 163 MG/DL (ref 60–115)
PERFORMED ON: ABNORMAL
PERFORMED ON: ABNORMAL
PERFORMED ON: NORMAL
POTASSIUM SERPL-SCNC: 2.9 MEQ/L (ref 3.5–5.1)
SODIUM BLD-SCNC: 133 MEQ/L (ref 132–144)
TOTAL PROTEIN: 5.1 G/DL (ref 6.4–8.1)

## 2017-07-11 PROCEDURE — 6360000002 HC RX W HCPCS: Performed by: INTERNAL MEDICINE

## 2017-07-11 PROCEDURE — 2580000003 HC RX 258: Performed by: PHYSICIAN ASSISTANT

## 2017-07-11 PROCEDURE — 0W9G3ZZ DRAINAGE OF PERITONEAL CAVITY, PERCUTANEOUS APPROACH: ICD-10-PCS | Performed by: RADIOLOGY

## 2017-07-11 PROCEDURE — 6360000002 HC RX W HCPCS: Performed by: PHYSICIAN ASSISTANT

## 2017-07-11 PROCEDURE — 80053 COMPREHEN METABOLIC PANEL: CPT

## 2017-07-11 PROCEDURE — 6370000000 HC RX 637 (ALT 250 FOR IP): Performed by: INTERNAL MEDICINE

## 2017-07-11 PROCEDURE — 92526 ORAL FUNCTION THERAPY: CPT

## 2017-07-11 PROCEDURE — 36415 COLL VENOUS BLD VENIPUNCTURE: CPT

## 2017-07-11 RX ORDER — POTASSIUM CHLORIDE 20 MEQ/1
40 TABLET, EXTENDED RELEASE ORAL PRN
Status: DISCONTINUED | OUTPATIENT
Start: 2017-07-11 | End: 2017-07-12 | Stop reason: HOSPADM

## 2017-07-11 RX ORDER — POTASSIUM CHLORIDE 20MEQ/15ML
40 LIQUID (ML) ORAL PRN
Status: DISCONTINUED | OUTPATIENT
Start: 2017-07-11 | End: 2017-07-12 | Stop reason: HOSPADM

## 2017-07-11 RX ORDER — POTASSIUM CHLORIDE 7.45 MG/ML
10 INJECTION INTRAVENOUS PRN
Status: DISCONTINUED | OUTPATIENT
Start: 2017-07-11 | End: 2017-07-12 | Stop reason: HOSPADM

## 2017-07-11 RX ADMIN — TORSEMIDE 20 MG: 20 TABLET ORAL at 08:44

## 2017-07-11 RX ADMIN — POTASSIUM CHLORIDE 10 MEQ: 10 INJECTION, SOLUTION INTRAVENOUS at 12:28

## 2017-07-11 RX ADMIN — THERA TABS 1 TABLET: TAB at 08:44

## 2017-07-11 RX ADMIN — FOLIC ACID 1 MG: 1 TABLET ORAL at 08:44

## 2017-07-11 RX ADMIN — POTASSIUM CHLORIDE 10 MEQ: 10 INJECTION, SOLUTION INTRAVENOUS at 11:27

## 2017-07-11 RX ADMIN — Medication: at 08:45

## 2017-07-11 RX ADMIN — AZITHROMYCIN MONOHYDRATE 500 MG: 500 INJECTION, POWDER, LYOPHILIZED, FOR SOLUTION INTRAVENOUS at 01:22

## 2017-07-11 RX ADMIN — SPIRONOLACTONE 50 MG: 50 TABLET ORAL at 08:44

## 2017-07-11 RX ADMIN — SODIUM CHLORIDE: 9 INJECTION, SOLUTION INTRAVENOUS at 06:45

## 2017-07-11 RX ADMIN — POTASSIUM CHLORIDE 10 MEQ: 10 INJECTION, SOLUTION INTRAVENOUS at 10:29

## 2017-07-11 RX ADMIN — POTASSIUM CHLORIDE 10 MEQ: 10 INJECTION, SOLUTION INTRAVENOUS at 14:51

## 2017-07-11 RX ADMIN — Medication 100 MG: at 08:44

## 2017-07-11 RX ADMIN — POTASSIUM CHLORIDE 10 MEQ: 10 INJECTION, SOLUTION INTRAVENOUS at 13:42

## 2017-07-11 ASSESSMENT — PAIN SCALES - GENERAL: PAINLEVEL_OUTOF10: 0

## 2017-07-12 ENCOUNTER — TELEPHONE (OUTPATIENT)
Dept: FAMILY MEDICINE CLINIC | Age: 47
End: 2017-07-12

## 2017-07-12 DIAGNOSIS — K70.31 ALCOHOLIC CIRRHOSIS OF LIVER WITH ASCITES (HCC): Primary | ICD-10-CM

## 2017-07-12 DIAGNOSIS — R18.8 OTHER ASCITES: ICD-10-CM

## 2017-07-12 DIAGNOSIS — F10.10 ALCOHOL ABUSE: ICD-10-CM

## 2017-07-12 RX ORDER — AZITHROMYCIN 250 MG/1
250 TABLET, FILM COATED ORAL DAILY
COMMUNITY
Start: 2017-07-13 | End: 2017-07-17

## 2017-07-12 RX ORDER — TORSEMIDE 20 MG/1
20 TABLET ORAL DAILY
Status: ON HOLD | COMMUNITY
End: 2017-08-02 | Stop reason: HOSPADM

## 2017-07-12 RX ORDER — SPIRONOLACTONE 50 MG/1
50 TABLET, FILM COATED ORAL DAILY
Status: ON HOLD | COMMUNITY
End: 2017-08-02 | Stop reason: HOSPADM

## 2017-07-12 RX ORDER — POTASSIUM CHLORIDE 750 MG/1
20 CAPSULE, EXTENDED RELEASE ORAL DAILY
Status: ON HOLD | COMMUNITY
End: 2017-08-02 | Stop reason: HOSPADM

## 2017-07-12 RX ORDER — FOLIC ACID 1 MG/1
1 TABLET ORAL DAILY
COMMUNITY
End: 2017-08-03 | Stop reason: SDUPTHER

## 2017-07-12 RX ORDER — ACETAMINOPHEN 650 MG/1
650 SUPPOSITORY RECTAL EVERY 4 HOURS PRN
COMMUNITY
End: 2017-07-20

## 2017-07-12 RX ORDER — LANOLIN ALCOHOL/MO/W.PET/CERES
100 CREAM (GRAM) TOPICAL DAILY
COMMUNITY
End: 2017-08-03 | Stop reason: SDUPTHER

## 2017-07-12 RX ORDER — INSULIN GLARGINE 100 [IU]/ML
10 INJECTION, SOLUTION SUBCUTANEOUS NIGHTLY
COMMUNITY
End: 2017-07-20 | Stop reason: DRUGHIGH

## 2017-07-12 RX ORDER — ACETAMINOPHEN 325 MG/1
650 TABLET ORAL EVERY 4 HOURS PRN
COMMUNITY
End: 2017-07-20

## 2017-07-12 RX ORDER — MULTIVITAMIN
TABLET ORAL EVERY MORNING
COMMUNITY
End: 2017-08-03 | Stop reason: SDUPTHER

## 2017-07-13 LAB
EKG ATRIAL RATE: 119 BPM
EKG P AXIS: 77 DEGREES
EKG P-R INTERVAL: 196 MS
EKG Q-T INTERVAL: 372 MS
EKG QRS DURATION: 58 MS
EKG QTC CALCULATION (BAZETT): 523 MS
EKG R AXIS: 85 DEGREES
EKG T AXIS: 56 DEGREES
EKG VENTRICULAR RATE: 119 BPM

## 2017-07-19 ENCOUNTER — HOSPITAL ENCOUNTER (OUTPATIENT)
Dept: ULTRASOUND IMAGING | Age: 47
Discharge: HOME | End: 2017-07-19
Payer: MEDICAID

## 2017-07-19 ENCOUNTER — OFFICE VISIT (OUTPATIENT)
Dept: GERIATRIC MEDICINE | Age: 47
End: 2017-07-19

## 2017-07-19 VITALS
SYSTOLIC BLOOD PRESSURE: 117 MMHG | DIASTOLIC BLOOD PRESSURE: 77 MMHG | HEART RATE: 111 BPM | RESPIRATION RATE: 16 BRPM | OXYGEN SATURATION: 98 %

## 2017-07-19 DIAGNOSIS — K70.31 ALCOHOLIC CIRRHOSIS OF LIVER WITH ASCITES (HCC): Primary | Chronic | ICD-10-CM

## 2017-07-19 DIAGNOSIS — E72.20 HYPERAMMONEMIA (HCC): Chronic | ICD-10-CM

## 2017-07-19 DIAGNOSIS — R73.09 ELEVATED GLUCOSE: ICD-10-CM

## 2017-07-19 PROCEDURE — 99309 SBSQ NF CARE MODERATE MDM 30: CPT | Performed by: NURSE PRACTITIONER

## 2017-07-19 PROCEDURE — 49083 ABD PARACENTESIS W/IMAGING: CPT

## 2017-07-20 ENCOUNTER — OFFICE VISIT (OUTPATIENT)
Dept: GERIATRIC MEDICINE | Age: 47
End: 2017-07-20

## 2017-07-20 VITALS
DIASTOLIC BLOOD PRESSURE: 80 MMHG | TEMPERATURE: 96.4 F | OXYGEN SATURATION: 98 % | BODY MASS INDEX: 24.27 KG/M2 | WEIGHT: 137 LBS | HEIGHT: 63 IN | HEART RATE: 87 BPM | RESPIRATION RATE: 20 BRPM | SYSTOLIC BLOOD PRESSURE: 118 MMHG

## 2017-07-20 DIAGNOSIS — E11.9 TYPE 2 DIABETES MELLITUS WITHOUT COMPLICATION, UNSPECIFIED LONG TERM INSULIN USE STATUS: Primary | ICD-10-CM

## 2017-07-20 DIAGNOSIS — D63.8 ANEMIA, CHRONIC DISEASE: ICD-10-CM

## 2017-07-20 LAB
AMMONIA: 71 UMOL/L (ref 11–51)
ANION GAP SERPL CALCULATED.3IONS-SCNC: 15 MEQ/L (ref 7–13)
BUN BLDV-MCNC: 40 MG/DL (ref 6–20)
CALCIUM SERPL-MCNC: 7.5 MG/DL (ref 8.6–10.2)
CHLORIDE BLD-SCNC: 99 MEQ/L (ref 98–107)
CO2: 13 MEQ/L (ref 22–29)
CREAT SERPL-MCNC: 2.79 MG/DL (ref 0.5–0.9)
GFR AFRICAN AMERICAN: 22
GFR NON-AFRICAN AMERICAN: 18.2
GLUCOSE BLD-MCNC: 98 MG/DL (ref 74–109)
HCT VFR BLD CALC: 23.5 % (ref 37–47)
HEMOGLOBIN: 7.8 G/DL (ref 12–16)
MCH RBC QN AUTO: 30.4 PG (ref 27–31.3)
MCHC RBC AUTO-ENTMCNC: 33 % (ref 33–37)
MCV RBC AUTO: 92.3 FL (ref 82–100)
PDW BLD-RTO: 17.3 % (ref 11.5–14.5)
PLATELET # BLD: 141 K/UL (ref 130–400)
POTASSIUM SERPL-SCNC: 3.9 MEQ/L (ref 3.5–5.1)
RBC # BLD: 2.55 M/UL (ref 4.2–5.4)
SODIUM BLD-SCNC: 127 MEQ/L (ref 132–144)
WBC # BLD: 13.5 K/UL (ref 4.8–10.8)

## 2017-07-20 PROCEDURE — 99309 SBSQ NF CARE MODERATE MDM 30: CPT | Performed by: INTERNAL MEDICINE

## 2017-07-20 ASSESSMENT — ENCOUNTER SYMPTOMS
EYE PAIN: 0
COUGH: 0
WHEEZING: 0
SHORTNESS OF BREATH: 1
ABDOMINAL DISTENTION: 1
EYE REDNESS: 0
ABDOMINAL PAIN: 1
NAUSEA: 0
CONSTIPATION: 0

## 2017-07-26 ENCOUNTER — OFFICE VISIT (OUTPATIENT)
Dept: GERIATRIC MEDICINE | Age: 47
End: 2017-07-26

## 2017-07-26 DIAGNOSIS — K70.31 ALCOHOLIC CIRRHOSIS OF LIVER WITH ASCITES (HCC): Primary | Chronic | ICD-10-CM

## 2017-07-26 DIAGNOSIS — R73.09 ELEVATED GLUCOSE: ICD-10-CM

## 2017-07-26 DIAGNOSIS — E72.20 HYPERAMMONEMIA (HCC): Chronic | ICD-10-CM

## 2017-07-26 DIAGNOSIS — N28.9 WORSENING RENAL FUNCTION: ICD-10-CM

## 2017-07-26 PROCEDURE — 99308 SBSQ NF CARE LOW MDM 20: CPT | Performed by: NURSE PRACTITIONER

## 2017-07-26 ASSESSMENT — ENCOUNTER SYMPTOMS
ABDOMINAL PAIN: 1
SHORTNESS OF BREATH: 1
COUGH: 0
CONSTIPATION: 0
NAUSEA: 0
ABDOMINAL DISTENTION: 1
WHEEZING: 0
EYE PAIN: 0
EYE REDNESS: 0

## 2017-07-27 ENCOUNTER — APPOINTMENT (OUTPATIENT)
Dept: GENERAL RADIOLOGY | Age: 47
DRG: 447 | End: 2017-07-27
Payer: MEDICAID

## 2017-07-27 ENCOUNTER — HOSPITAL ENCOUNTER (INPATIENT)
Age: 47
LOS: 6 days | Discharge: NURSING, REHAB OR LONG-TERM CARE | DRG: 447 | End: 2017-08-02
Attending: EMERGENCY MEDICINE | Admitting: INTERNAL MEDICINE
Payer: MEDICAID

## 2017-07-27 VITALS
TEMPERATURE: 97.8 F | SYSTOLIC BLOOD PRESSURE: 100 MMHG | BODY MASS INDEX: 21.97 KG/M2 | HEIGHT: 63 IN | WEIGHT: 124 LBS | OXYGEN SATURATION: 95 % | RESPIRATION RATE: 18 BRPM | DIASTOLIC BLOOD PRESSURE: 62 MMHG | HEART RATE: 78 BPM

## 2017-07-27 DIAGNOSIS — N17.9 ACUTE RENAL FAILURE, UNSPECIFIED ACUTE RENAL FAILURE TYPE (HCC): Primary | ICD-10-CM

## 2017-07-27 DIAGNOSIS — D64.9 ANEMIA, UNSPECIFIED TYPE: ICD-10-CM

## 2017-07-27 DIAGNOSIS — K74.60 CIRRHOSIS OF LIVER WITH ASCITES, UNSPECIFIED HEPATIC CIRRHOSIS TYPE (HCC): ICD-10-CM

## 2017-07-27 DIAGNOSIS — R18.8 CIRRHOSIS OF LIVER WITH ASCITES, UNSPECIFIED HEPATIC CIRRHOSIS TYPE (HCC): ICD-10-CM

## 2017-07-27 DIAGNOSIS — E87.6 HYPOKALEMIA: ICD-10-CM

## 2017-07-27 DIAGNOSIS — G93.40 ENCEPHALOPATHY: ICD-10-CM

## 2017-07-27 LAB
ALBUMIN SERPL-MCNC: 2.1 G/DL (ref 3.9–4.9)
ALBUMIN SERPL-MCNC: 2.3 G/DL (ref 3.9–4.9)
ALP BLD-CCNC: 295 U/L (ref 40–130)
ALP BLD-CCNC: 305 U/L (ref 40–130)
ALT SERPL-CCNC: 11 U/L (ref 0–33)
ALT SERPL-CCNC: 12 U/L (ref 0–33)
AMMONIA: 123 UMOL/L (ref 11–51)
AMMONIA: 133 UMOL/L (ref 11–51)
ANION GAP SERPL CALCULATED.3IONS-SCNC: 16 MEQ/L (ref 7–13)
ANION GAP SERPL CALCULATED.3IONS-SCNC: 17 MEQ/L (ref 7–13)
APTT: 40.6 SEC (ref 21.6–35.4)
AST SERPL-CCNC: 46 U/L (ref 0–35)
AST SERPL-CCNC: 49 U/L (ref 0–35)
BILIRUB SERPL-MCNC: 4.8 MG/DL (ref 0–1.2)
BILIRUB SERPL-MCNC: 5.3 MG/DL (ref 0–1.2)
BUN BLDV-MCNC: 58 MG/DL (ref 6–20)
BUN BLDV-MCNC: 58 MG/DL (ref 6–20)
CALCIUM SERPL-MCNC: 7.7 MG/DL (ref 8.6–10.2)
CALCIUM SERPL-MCNC: 7.8 MG/DL (ref 8.6–10.2)
CHLORIDE BLD-SCNC: 102 MEQ/L (ref 98–107)
CHLORIDE BLD-SCNC: 103 MEQ/L (ref 98–107)
CO2: 11 MEQ/L (ref 22–29)
CO2: 12 MEQ/L (ref 22–29)
CREAT SERPL-MCNC: 3.2 MG/DL (ref 0.5–0.9)
CREAT SERPL-MCNC: 3.33 MG/DL (ref 0.5–0.9)
GFR AFRICAN AMERICAN: 17.9
GFR AFRICAN AMERICAN: 18.8
GFR NON-AFRICAN AMERICAN: 14.8
GFR NON-AFRICAN AMERICAN: 15.5
GLOBULIN: 3.7 G/DL (ref 2.3–3.5)
GLOBULIN: 3.8 G/DL (ref 2.3–3.5)
GLUCOSE BLD-MCNC: 136 MG/DL (ref 74–109)
GLUCOSE BLD-MCNC: 224 MG/DL (ref 60–115)
GLUCOSE BLD-MCNC: 95 MG/DL (ref 74–109)
HBA1C MFR BLD: 4.5 % (ref 4.8–5.9)
HCT VFR BLD CALC: 22.3 % (ref 37–47)
HCT VFR BLD CALC: 22.5 % (ref 37–47)
HEMOGLOBIN: 7.3 G/DL (ref 12–16)
HEMOGLOBIN: 7.3 G/DL (ref 12–16)
INR BLD: 1.4
MCH RBC QN AUTO: 29.8 PG (ref 27–31.3)
MCH RBC QN AUTO: 30.2 PG (ref 27–31.3)
MCHC RBC AUTO-ENTMCNC: 32.4 % (ref 33–37)
MCHC RBC AUTO-ENTMCNC: 32.6 % (ref 33–37)
MCV RBC AUTO: 91.5 FL (ref 82–100)
MCV RBC AUTO: 93.1 FL (ref 82–100)
PDW BLD-RTO: 17.8 % (ref 11.5–14.5)
PDW BLD-RTO: 18.1 % (ref 11.5–14.5)
PERFORMED ON: ABNORMAL
PLATELET # BLD: 143 K/UL (ref 130–400)
PLATELET # BLD: 150 K/UL (ref 130–400)
POTASSIUM SERPL-SCNC: 3 MEQ/L (ref 3.5–5.1)
POTASSIUM SERPL-SCNC: 3.2 MEQ/L (ref 3.5–5.1)
PROTHROMBIN TIME: 14.5 SEC (ref 8.1–13.7)
RBC # BLD: 2.42 M/UL (ref 4.2–5.4)
RBC # BLD: 2.44 M/UL (ref 4.2–5.4)
SODIUM BLD-SCNC: 130 MEQ/L (ref 132–144)
SODIUM BLD-SCNC: 131 MEQ/L (ref 132–144)
TOTAL PROTEIN: 5.8 G/DL (ref 6.4–8.1)
TOTAL PROTEIN: 6.1 G/DL (ref 6.4–8.1)
WBC # BLD: 11.3 K/UL (ref 4.8–10.8)
WBC # BLD: 14.3 K/UL (ref 4.8–10.8)

## 2017-07-27 PROCEDURE — 99284 EMERGENCY DEPT VISIT MOD MDM: CPT

## 2017-07-27 PROCEDURE — 71010 XR CHEST PORTABLE: CPT

## 2017-07-27 PROCEDURE — 85610 PROTHROMBIN TIME: CPT

## 2017-07-27 PROCEDURE — 36415 COLL VENOUS BLD VENIPUNCTURE: CPT

## 2017-07-27 PROCEDURE — 80053 COMPREHEN METABOLIC PANEL: CPT

## 2017-07-27 PROCEDURE — 85730 THROMBOPLASTIN TIME PARTIAL: CPT

## 2017-07-27 PROCEDURE — 1210000000 HC MED SURG R&B

## 2017-07-27 PROCEDURE — 82140 ASSAY OF AMMONIA: CPT

## 2017-07-27 PROCEDURE — 85027 COMPLETE CBC AUTOMATED: CPT

## 2017-07-27 PROCEDURE — 93005 ELECTROCARDIOGRAM TRACING: CPT

## 2017-07-27 RX ORDER — FOLIC ACID 1 MG/1
1 TABLET ORAL DAILY
Status: DISCONTINUED | OUTPATIENT
Start: 2017-07-28 | End: 2017-08-02 | Stop reason: HOSPADM

## 2017-07-27 RX ORDER — NICOTINE POLACRILEX 4 MG
15 LOZENGE BUCCAL PRN
Status: DISCONTINUED | OUTPATIENT
Start: 2017-07-27 | End: 2017-07-28

## 2017-07-27 RX ORDER — DEXTROSE MONOHYDRATE 25 G/50ML
12.5 INJECTION, SOLUTION INTRAVENOUS PRN
Status: DISCONTINUED | OUTPATIENT
Start: 2017-07-27 | End: 2017-08-02 | Stop reason: HOSPADM

## 2017-07-27 RX ORDER — DEXTROSE MONOHYDRATE 50 MG/ML
100 INJECTION, SOLUTION INTRAVENOUS PRN
Status: DISCONTINUED | OUTPATIENT
Start: 2017-07-27 | End: 2017-08-02 | Stop reason: HOSPADM

## 2017-07-27 RX ORDER — SODIUM CHLORIDE 0.9 % (FLUSH) 0.9 %
10 SYRINGE (ML) INJECTION PRN
Status: DISCONTINUED | OUTPATIENT
Start: 2017-07-27 | End: 2017-08-02 | Stop reason: HOSPADM

## 2017-07-27 RX ORDER — SODIUM CHLORIDE 9 MG/ML
INJECTION, SOLUTION INTRAVENOUS CONTINUOUS
Status: DISCONTINUED | OUTPATIENT
Start: 2017-07-27 | End: 2017-07-28

## 2017-07-27 RX ORDER — LACTULOSE 10 G/15ML
30 SOLUTION ORAL 3 TIMES DAILY
Status: DISCONTINUED | OUTPATIENT
Start: 2017-07-27 | End: 2017-07-30

## 2017-07-27 RX ORDER — INSULIN GLARGINE 100 [IU]/ML
13 INJECTION, SOLUTION SUBCUTANEOUS NIGHTLY
Status: DISCONTINUED | OUTPATIENT
Start: 2017-07-27 | End: 2017-08-02 | Stop reason: HOSPADM

## 2017-07-27 RX ORDER — LACTULOSE 10 G/15ML
20 SOLUTION ORAL 3 TIMES DAILY
Status: ON HOLD | COMMUNITY
End: 2017-08-02 | Stop reason: HOSPADM

## 2017-07-27 RX ORDER — SODIUM CHLORIDE 0.9 % (FLUSH) 0.9 %
10 SYRINGE (ML) INJECTION EVERY 12 HOURS SCHEDULED
Status: DISCONTINUED | OUTPATIENT
Start: 2017-07-27 | End: 2017-08-02 | Stop reason: HOSPADM

## 2017-07-27 RX ORDER — ACETAMINOPHEN 325 MG/1
650 TABLET ORAL EVERY 4 HOURS PRN
Status: DISCONTINUED | OUTPATIENT
Start: 2017-07-27 | End: 2017-08-02 | Stop reason: HOSPADM

## 2017-07-27 RX ORDER — ONDANSETRON 2 MG/ML
4 INJECTION INTRAMUSCULAR; INTRAVENOUS EVERY 6 HOURS PRN
Status: DISCONTINUED | OUTPATIENT
Start: 2017-07-27 | End: 2017-08-02 | Stop reason: HOSPADM

## 2017-07-27 RX ORDER — THIAMINE MONONITRATE (VIT B1) 100 MG
100 TABLET ORAL DAILY
Status: DISCONTINUED | OUTPATIENT
Start: 2017-07-28 | End: 2017-08-02 | Stop reason: HOSPADM

## 2017-07-27 RX ORDER — POTASSIUM CHLORIDE 20 MEQ/1
40 TABLET, EXTENDED RELEASE ORAL ONCE
Status: COMPLETED | OUTPATIENT
Start: 2017-07-27 | End: 2017-07-28

## 2017-07-27 RX ORDER — GUAIFENESIN 100 MG/5ML
200 SYRUP ORAL EVERY 4 HOURS PRN
Status: ON HOLD | COMMUNITY
End: 2017-08-02 | Stop reason: HOSPADM

## 2017-07-27 RX ORDER — GUAIFENESIN 100 MG/5ML
200 SYRUP ORAL EVERY 4 HOURS PRN
Status: DISCONTINUED | OUTPATIENT
Start: 2017-07-27 | End: 2017-07-27

## 2017-07-27 RX ORDER — HEPARIN SODIUM 5000 [USP'U]/ML
5000 INJECTION, SOLUTION INTRAVENOUS; SUBCUTANEOUS EVERY 8 HOURS SCHEDULED
Status: DISCONTINUED | OUTPATIENT
Start: 2017-07-27 | End: 2017-07-28

## 2017-07-27 RX ORDER — HYDROXYZINE HYDROCHLORIDE 25 MG/1
25 TABLET, FILM COATED ORAL EVERY 6 HOURS PRN
Status: DISCONTINUED | OUTPATIENT
Start: 2017-07-27 | End: 2017-07-31

## 2017-07-27 RX ORDER — HYDROXYZINE HYDROCHLORIDE 25 MG/1
25 TABLET, FILM COATED ORAL EVERY 6 HOURS PRN
Status: ON HOLD | COMMUNITY
End: 2017-08-02 | Stop reason: HOSPADM

## 2017-07-27 RX ORDER — GUAIFENESIN 100 MG/5ML
200 SOLUTION ORAL EVERY 4 HOURS PRN
Status: DISCONTINUED | OUTPATIENT
Start: 2017-07-27 | End: 2017-08-02 | Stop reason: HOSPADM

## 2017-07-27 ASSESSMENT — ENCOUNTER SYMPTOMS
ABDOMINAL PAIN: 0
COLOR CHANGE: 0
COUGH: 0
CONSTIPATION: 0
RHINORRHEA: 0
ABDOMINAL DISTENTION: 0
BACK PAIN: 0
CHEST TIGHTNESS: 0
SORE THROAT: 0
SHORTNESS OF BREATH: 0
EYE DISCHARGE: 0

## 2017-07-28 ENCOUNTER — APPOINTMENT (OUTPATIENT)
Dept: ULTRASOUND IMAGING | Age: 47
DRG: 447 | End: 2017-07-28
Payer: MEDICAID

## 2017-07-28 LAB
ABO/RH: NORMAL
ALBUMIN SERPL-MCNC: 2.1 G/DL (ref 3.9–4.9)
ALP BLD-CCNC: 270 U/L (ref 40–130)
ALT SERPL-CCNC: 11 U/L (ref 0–33)
AMMONIA: 135 UMOL/L (ref 11–51)
ANION GAP SERPL CALCULATED.3IONS-SCNC: 17 MEQ/L (ref 7–13)
ANTIBODY SCREEN: NORMAL
APTT: 40.4 SEC (ref 21.6–35.4)
AST SERPL-CCNC: 47 U/L (ref 0–35)
BASOPHILS ABSOLUTE: 0 K/UL (ref 0–0.2)
BASOPHILS RELATIVE PERCENT: 0.2 %
BILIRUB SERPL-MCNC: 5.2 MG/DL (ref 0–1.2)
BUN BLDV-MCNC: 58 MG/DL (ref 6–20)
CALCIUM SERPL-MCNC: 7.6 MG/DL (ref 8.6–10.2)
CHLORIDE BLD-SCNC: 103 MEQ/L (ref 98–107)
CO2: 12 MEQ/L (ref 22–29)
CREAT SERPL-MCNC: 2.69 MG/DL (ref 0.5–0.9)
EOSINOPHILS ABSOLUTE: 0.1 K/UL (ref 0–0.7)
EOSINOPHILS RELATIVE PERCENT: 1.2 %
GFR AFRICAN AMERICAN: 22.9
GFR NON-AFRICAN AMERICAN: 19
GLOBULIN: 3.7 G/DL (ref 2.3–3.5)
GLUCOSE BLD-MCNC: 118 MG/DL (ref 74–109)
GLUCOSE BLD-MCNC: 119 MG/DL (ref 60–115)
GLUCOSE BLD-MCNC: 124 MG/DL (ref 60–115)
GLUCOSE BLD-MCNC: 149 MG/DL (ref 60–115)
GLUCOSE BLD-MCNC: 151 MG/DL (ref 60–115)
HCT VFR BLD CALC: 21.9 % (ref 37–47)
HEMOGLOBIN: 7.2 G/DL (ref 12–16)
INR BLD: 1.4
LYMPHOCYTES ABSOLUTE: 2.1 K/UL (ref 1–4.8)
LYMPHOCYTES RELATIVE PERCENT: 17 %
MCH RBC QN AUTO: 30.5 PG (ref 27–31.3)
MCHC RBC AUTO-ENTMCNC: 32.7 % (ref 33–37)
MCV RBC AUTO: 93.1 FL (ref 82–100)
MONOCYTES ABSOLUTE: 1.1 K/UL (ref 0.2–0.8)
MONOCYTES RELATIVE PERCENT: 9.1 %
NEUTROPHILS ABSOLUTE: 8.9 K/UL (ref 1.4–6.5)
NEUTROPHILS RELATIVE PERCENT: 72.5 %
PDW BLD-RTO: 18.1 % (ref 11.5–14.5)
PERFORMED ON: ABNORMAL
PLATELET # BLD: 146 K/UL (ref 130–400)
POTASSIUM SERPL-SCNC: 3.5 MEQ/L (ref 3.5–5.1)
PROTHROMBIN TIME: 15 SEC (ref 8.1–13.7)
RBC # BLD: 2.35 M/UL (ref 4.2–5.4)
SODIUM BLD-SCNC: 132 MEQ/L (ref 132–144)
TOTAL PROTEIN: 5.8 G/DL (ref 6.4–8.1)
WBC # BLD: 12.2 K/UL (ref 4.8–10.8)

## 2017-07-28 PROCEDURE — 82140 ASSAY OF AMMONIA: CPT

## 2017-07-28 PROCEDURE — 85730 THROMBOPLASTIN TIME PARTIAL: CPT

## 2017-07-28 PROCEDURE — 99254 IP/OBS CNSLTJ NEW/EST MOD 60: CPT | Performed by: RADIOLOGY

## 2017-07-28 PROCEDURE — 87205 SMEAR GRAM STAIN: CPT

## 2017-07-28 PROCEDURE — 80053 COMPREHEN METABOLIC PANEL: CPT

## 2017-07-28 PROCEDURE — 85025 COMPLETE CBC W/AUTO DIFF WBC: CPT

## 2017-07-28 PROCEDURE — 85610 PROTHROMBIN TIME: CPT

## 2017-07-28 PROCEDURE — 88112 CYTOPATH CELL ENHANCE TECH: CPT

## 2017-07-28 PROCEDURE — 2580000003 HC RX 258: Performed by: NURSE PRACTITIONER

## 2017-07-28 PROCEDURE — 6370000000 HC RX 637 (ALT 250 FOR IP): Performed by: INTERNAL MEDICINE

## 2017-07-28 PROCEDURE — 36430 TRANSFUSION BLD/BLD COMPNT: CPT

## 2017-07-28 PROCEDURE — 87070 CULTURE OTHR SPECIMN AEROBIC: CPT

## 2017-07-28 PROCEDURE — 86900 BLOOD TYPING SEROLOGIC ABO: CPT

## 2017-07-28 PROCEDURE — P9016 RBC LEUKOCYTES REDUCED: HCPCS

## 2017-07-28 PROCEDURE — 6360000002 HC RX W HCPCS: Performed by: NURSE PRACTITIONER

## 2017-07-28 PROCEDURE — 36415 COLL VENOUS BLD VENIPUNCTURE: CPT

## 2017-07-28 PROCEDURE — 88305 TISSUE EXAM BY PATHOLOGIST: CPT

## 2017-07-28 PROCEDURE — 86850 RBC ANTIBODY SCREEN: CPT

## 2017-07-28 PROCEDURE — 49083 ABD PARACENTESIS W/IMAGING: CPT | Performed by: RADIOLOGY

## 2017-07-28 PROCEDURE — 86901 BLOOD TYPING SEROLOGIC RH(D): CPT

## 2017-07-28 PROCEDURE — 86923 COMPATIBILITY TEST ELECTRIC: CPT

## 2017-07-28 PROCEDURE — C1729 CATH, DRAINAGE: HCPCS

## 2017-07-28 PROCEDURE — 6360000002 HC RX W HCPCS: Performed by: INTERNAL MEDICINE

## 2017-07-28 PROCEDURE — 0W9G3ZX DRAINAGE OF PERITONEAL CAVITY, PERCUTANEOUS APPROACH, DIAGNOSTIC: ICD-10-PCS | Performed by: RADIOLOGY

## 2017-07-28 PROCEDURE — 76775 US EXAM ABDO BACK WALL LIM: CPT

## 2017-07-28 PROCEDURE — 6370000000 HC RX 637 (ALT 250 FOR IP): Performed by: NURSE PRACTITIONER

## 2017-07-28 PROCEDURE — 1210000000 HC MED SURG R&B

## 2017-07-28 PROCEDURE — 49083 ABD PARACENTESIS W/IMAGING: CPT

## 2017-07-28 RX ORDER — SODIUM CHLORIDE 9 MG/ML
INJECTION, SOLUTION INTRAVENOUS
Status: DISPENSED
Start: 2017-07-28 | End: 2017-07-29

## 2017-07-28 RX ORDER — PHYTONADIONE 5 MG/1
5 TABLET ORAL ONCE
Status: DISCONTINUED | OUTPATIENT
Start: 2017-07-28 | End: 2017-08-02 | Stop reason: HOSPADM

## 2017-07-28 RX ORDER — 0.9 % SODIUM CHLORIDE 0.9 %
250 INTRAVENOUS SOLUTION INTRAVENOUS ONCE
Status: DISCONTINUED | OUTPATIENT
Start: 2017-07-28 | End: 2017-07-28

## 2017-07-28 RX ORDER — SPIRONOLACTONE 50 MG/1
100 TABLET, FILM COATED ORAL DAILY
Status: DISCONTINUED | OUTPATIENT
Start: 2017-07-28 | End: 2017-08-02 | Stop reason: HOSPADM

## 2017-07-28 RX ORDER — ALBUMIN (HUMAN) 12.5 G/50ML
25 SOLUTION INTRAVENOUS EVERY 8 HOURS
Status: DISCONTINUED | OUTPATIENT
Start: 2017-07-28 | End: 2017-07-30 | Stop reason: SDUPTHER

## 2017-07-28 RX ORDER — MORPHINE SULFATE 4 MG/ML
2 INJECTION, SOLUTION INTRAMUSCULAR; INTRAVENOUS EVERY 4 HOURS PRN
Status: DISCONTINUED | OUTPATIENT
Start: 2017-07-28 | End: 2017-08-02 | Stop reason: HOSPADM

## 2017-07-28 RX ADMIN — FOLIC ACID 1 MG: 1 TABLET ORAL at 12:06

## 2017-07-28 RX ADMIN — INSULIN GLARGINE 13 UNITS: 100 INJECTION, SOLUTION SUBCUTANEOUS at 21:49

## 2017-07-28 RX ADMIN — LACTULOSE 30 G: 20 SOLUTION ORAL at 18:01

## 2017-07-28 RX ADMIN — Medication 10 ML: at 00:37

## 2017-07-28 RX ADMIN — SODIUM CHLORIDE: 9 INJECTION, SOLUTION INTRAVENOUS at 00:30

## 2017-07-28 RX ADMIN — MORPHINE SULFATE 2 MG: 4 INJECTION, SOLUTION INTRAMUSCULAR; INTRAVENOUS at 19:57

## 2017-07-28 RX ADMIN — SPIRONOLACTONE 100 MG: 50 TABLET ORAL at 21:49

## 2017-07-28 RX ADMIN — HEPARIN SODIUM 5000 UNITS: 5000 INJECTION, SOLUTION INTRAVENOUS; SUBCUTANEOUS at 11:54

## 2017-07-28 RX ADMIN — Medication 100 MG: at 11:55

## 2017-07-28 RX ADMIN — INSULIN GLARGINE 13 UNITS: 100 INJECTION, SOLUTION SUBCUTANEOUS at 00:33

## 2017-07-28 RX ADMIN — LACTULOSE 30 G: 20 SOLUTION ORAL at 11:53

## 2017-07-28 RX ADMIN — HEPARIN SODIUM 5000 UNITS: 5000 INJECTION, SOLUTION INTRAVENOUS; SUBCUTANEOUS at 00:33

## 2017-07-28 RX ADMIN — INSULIN LISPRO 1 UNITS: 100 INJECTION, SOLUTION INTRAVENOUS; SUBCUTANEOUS at 18:44

## 2017-07-28 RX ADMIN — LACTULOSE 30 G: 20 SOLUTION ORAL at 00:30

## 2017-07-28 RX ADMIN — POTASSIUM CHLORIDE 40 MEQ: 1500 TABLET, EXTENDED RELEASE ORAL at 00:31

## 2017-07-28 RX ADMIN — LACTULOSE 30 G: 20 SOLUTION ORAL at 21:47

## 2017-07-28 ASSESSMENT — PAIN SCALES - GENERAL
PAINLEVEL_OUTOF10: 7
PAINLEVEL_OUTOF10: 0

## 2017-07-28 ASSESSMENT — PAIN - FUNCTIONAL ASSESSMENT
PAIN_FUNCTIONAL_ASSESSMENT: 0-10
PAIN_FUNCTIONAL_ASSESSMENT: 0-10

## 2017-07-29 LAB
ALBUMIN SERPL-MCNC: 2.7 G/DL (ref 3.9–4.9)
ALP BLD-CCNC: 248 U/L (ref 40–130)
ALT SERPL-CCNC: 10 U/L (ref 0–33)
AMMONIA: 196 UMOL/L (ref 11–51)
ANION GAP SERPL CALCULATED.3IONS-SCNC: 20 MEQ/L (ref 7–13)
APPEARANCE FLUID: CLEAR
APPEARANCE FLUID: CLEAR
AST SERPL-CCNC: 45 U/L (ref 0–35)
BASOPHILS ABSOLUTE: 0 K/UL (ref 0–0.2)
BASOPHILS RELATIVE PERCENT: 0.1 %
BILIRUB SERPL-MCNC: 7.5 MG/DL (ref 0–1.2)
BILIRUBIN DIRECT: 3.9 MG/DL (ref 0–0.3)
BILIRUBIN, INDIRECT: 3.6 MG/DL (ref 0–0.6)
BUN BLDV-MCNC: 53 MG/DL (ref 6–20)
CALCIUM SERPL-MCNC: 8.1 MG/DL (ref 8.6–10.2)
CELL COUNT FLUID TYPE: NORMAL
CHLORIDE BLD-SCNC: 105 MEQ/L (ref 98–107)
CLOT EVALUATION: NORMAL
CO2: 13 MEQ/L (ref 22–29)
COLOR FLUID: YELLOW
CREAT SERPL-MCNC: 2.52 MG/DL (ref 0.5–0.9)
CREATININE URINE: 68.1 MG/DL
EOSINOPHILS ABSOLUTE: 0.1 K/UL (ref 0–0.7)
EOSINOPHILS RELATIVE PERCENT: 0.8 %
FLUID TYPE: NORMAL
GFR AFRICAN AMERICAN: 24.7
GFR NON-AFRICAN AMERICAN: 20.4
GLUCOSE BLD-MCNC: 151 MG/DL (ref 60–115)
GLUCOSE BLD-MCNC: 156 MG/DL (ref 60–115)
GLUCOSE BLD-MCNC: 97 MG/DL (ref 60–115)
GLUCOSE BLD-MCNC: 98 MG/DL (ref 60–115)
GLUCOSE BLD-MCNC: 98 MG/DL (ref 74–109)
GLUCOSE, FLUID: 161.5 MG/DL
GRAM STAIN RESULT: NORMAL
HCT VFR BLD CALC: 32.9 % (ref 37–47)
HEMOGLOBIN: 11.1 G/DL (ref 12–16)
LD, FLUID: 32 U/L
LYMPHOCYTES ABSOLUTE: 0.9 K/UL (ref 1–4.8)
LYMPHOCYTES RELATIVE PERCENT: 8.4 %
MAGNESIUM: 1.4 MG/DL (ref 1.7–2.3)
MCH RBC QN AUTO: 30.2 PG (ref 27–31.3)
MCHC RBC AUTO-ENTMCNC: 33.8 % (ref 33–37)
MCV RBC AUTO: 89.2 FL (ref 82–100)
MONOCYTES ABSOLUTE: 0.9 K/UL (ref 0.2–0.8)
MONOCYTES RELATIVE PERCENT: 8.5 %
NEUTROPHILS ABSOLUTE: 8.8 K/UL (ref 1.4–6.5)
NEUTROPHILS RELATIVE PERCENT: 82.2 %
NUCLEATED CELLS FLUID: 3 /CUMM
NUMBER OF CELLS COUNTED FLUID: 100
PDW BLD-RTO: 16.5 % (ref 11.5–14.5)
PERFORMED ON: ABNORMAL
PERFORMED ON: ABNORMAL
PERFORMED ON: NORMAL
PERFORMED ON: NORMAL
PHOSPHORUS: 6.1 MG/DL (ref 2.5–4.5)
PLATELET # BLD: 113 K/UL (ref 130–400)
POTASSIUM SERPL-SCNC: 3 MEQ/L (ref 3.5–5.1)
PROTEIN FLUID: 0.4 G/DL
RBC # BLD: 3.69 M/UL (ref 4.2–5.4)
RBC FLUID: 2 /CUMM
SODIUM BLD-SCNC: 138 MEQ/L (ref 132–144)
SODIUM URINE: 27 MEQ/L
TOTAL PROTEIN: 6.3 G/DL (ref 6.4–8.1)
WBC # BLD: 10.7 K/UL (ref 4.8–10.8)

## 2017-07-29 PROCEDURE — 82140 ASSAY OF AMMONIA: CPT

## 2017-07-29 PROCEDURE — G8979 MOBILITY GOAL STATUS: HCPCS

## 2017-07-29 PROCEDURE — 6370000000 HC RX 637 (ALT 250 FOR IP): Performed by: INTERNAL MEDICINE

## 2017-07-29 PROCEDURE — 2580000003 HC RX 258: Performed by: INTERNAL MEDICINE

## 2017-07-29 PROCEDURE — 80048 BASIC METABOLIC PNL TOTAL CA: CPT

## 2017-07-29 PROCEDURE — 80076 HEPATIC FUNCTION PANEL: CPT

## 2017-07-29 PROCEDURE — 6360000002 HC RX W HCPCS: Performed by: NURSE PRACTITIONER

## 2017-07-29 PROCEDURE — 97166 OT EVAL MOD COMPLEX 45 MIN: CPT

## 2017-07-29 PROCEDURE — 2580000003 HC RX 258: Performed by: NURSE PRACTITIONER

## 2017-07-29 PROCEDURE — 86923 COMPATIBILITY TEST ELECTRIC: CPT

## 2017-07-29 PROCEDURE — G8988 SELF CARE GOAL STATUS: HCPCS

## 2017-07-29 PROCEDURE — 85025 COMPLETE CBC W/AUTO DIFF WBC: CPT

## 2017-07-29 PROCEDURE — 84157 ASSAY OF PROTEIN OTHER: CPT

## 2017-07-29 PROCEDURE — 1210000000 HC MED SURG R&B

## 2017-07-29 PROCEDURE — G8987 SELF CARE CURRENT STATUS: HCPCS

## 2017-07-29 PROCEDURE — 6370000000 HC RX 637 (ALT 250 FOR IP): Performed by: NURSE PRACTITIONER

## 2017-07-29 PROCEDURE — 6360000002 HC RX W HCPCS: Performed by: INTERNAL MEDICINE

## 2017-07-29 PROCEDURE — 84300 ASSAY OF URINE SODIUM: CPT

## 2017-07-29 PROCEDURE — G8978 MOBILITY CURRENT STATUS: HCPCS

## 2017-07-29 PROCEDURE — 51702 INSERT TEMP BLADDER CATH: CPT

## 2017-07-29 PROCEDURE — 36415 COLL VENOUS BLD VENIPUNCTURE: CPT

## 2017-07-29 PROCEDURE — 97162 PT EVAL MOD COMPLEX 30 MIN: CPT

## 2017-07-29 PROCEDURE — 82945 GLUCOSE OTHER FLUID: CPT

## 2017-07-29 PROCEDURE — 83615 LACTATE (LD) (LDH) ENZYME: CPT

## 2017-07-29 PROCEDURE — 84132 ASSAY OF SERUM POTASSIUM: CPT

## 2017-07-29 PROCEDURE — 89051 BODY FLUID CELL COUNT: CPT

## 2017-07-29 PROCEDURE — 36430 TRANSFUSION BLD/BLD COMPNT: CPT

## 2017-07-29 PROCEDURE — 83735 ASSAY OF MAGNESIUM: CPT

## 2017-07-29 PROCEDURE — 2580000003 HC RX 258

## 2017-07-29 PROCEDURE — 84100 ASSAY OF PHOSPHORUS: CPT

## 2017-07-29 PROCEDURE — P9046 ALBUMIN (HUMAN), 25%, 20 ML: HCPCS | Performed by: INTERNAL MEDICINE

## 2017-07-29 PROCEDURE — P9016 RBC LEUKOCYTES REDUCED: HCPCS

## 2017-07-29 PROCEDURE — 82570 ASSAY OF URINE CREATININE: CPT

## 2017-07-29 RX ORDER — MAGNESIUM SULFATE IN WATER 40 MG/ML
4 INJECTION, SOLUTION INTRAVENOUS ONCE
Status: DISCONTINUED | OUTPATIENT
Start: 2017-07-29 | End: 2017-07-29

## 2017-07-29 RX ORDER — FUROSEMIDE 10 MG/ML
80 INJECTION INTRAMUSCULAR; INTRAVENOUS 3 TIMES DAILY
Status: DISCONTINUED | OUTPATIENT
Start: 2017-07-29 | End: 2017-07-31

## 2017-07-29 RX ORDER — POTASSIUM CHLORIDE 1.5 G/1.77G
20 POWDER, FOR SOLUTION ORAL 2 TIMES DAILY
Status: DISCONTINUED | OUTPATIENT
Start: 2017-07-29 | End: 2017-08-02 | Stop reason: HOSPADM

## 2017-07-29 RX ORDER — SODIUM CHLORIDE 9 MG/ML
INJECTION, SOLUTION INTRAVENOUS
Status: COMPLETED
Start: 2017-07-29 | End: 2017-07-29

## 2017-07-29 RX ORDER — ALBUMIN (HUMAN) 12.5 G/50ML
25 SOLUTION INTRAVENOUS 3 TIMES DAILY
Status: DISCONTINUED | OUTPATIENT
Start: 2017-07-29 | End: 2017-07-31

## 2017-07-29 RX ADMIN — RIFAXIMIN 200 MG: 200 TABLET ORAL at 20:59

## 2017-07-29 RX ADMIN — SODIUM CHLORIDE: 9 INJECTION, SOLUTION INTRAVENOUS at 00:35

## 2017-07-29 RX ADMIN — FOLIC ACID 1 MG: 1 TABLET ORAL at 10:21

## 2017-07-29 RX ADMIN — MORPHINE SULFATE 2 MG: 4 INJECTION, SOLUTION INTRAMUSCULAR; INTRAVENOUS at 05:59

## 2017-07-29 RX ADMIN — Medication 10 ML: at 10:26

## 2017-07-29 RX ADMIN — POTASSIUM CHLORIDE 20 MEQ: 1.5 POWDER, FOR SOLUTION ORAL at 13:40

## 2017-07-29 RX ADMIN — POTASSIUM CHLORIDE 20 MEQ: 1.5 POWDER, FOR SOLUTION ORAL at 20:59

## 2017-07-29 RX ADMIN — Medication 10 ML: at 00:01

## 2017-07-29 RX ADMIN — Medication 100 MG: at 10:20

## 2017-07-29 RX ADMIN — INSULIN GLARGINE 13 UNITS: 100 INJECTION, SOLUTION SUBCUTANEOUS at 22:30

## 2017-07-29 RX ADMIN — SPIRONOLACTONE 100 MG: 50 TABLET ORAL at 10:21

## 2017-07-29 RX ADMIN — LACTULOSE 30 G: 20 SOLUTION ORAL at 10:20

## 2017-07-29 RX ADMIN — FUROSEMIDE 80 MG: 10 INJECTION, SOLUTION INTRAMUSCULAR; INTRAVENOUS at 22:05

## 2017-07-29 RX ADMIN — Medication 10 ML: at 22:10

## 2017-07-29 RX ADMIN — MAGNESIUM SULFATE HEPTAHYDRATE: 500 INJECTION, SOLUTION INTRAMUSCULAR; INTRAVENOUS at 11:50

## 2017-07-29 RX ADMIN — ALBUMIN (HUMAN) 25 G: 0.25 INJECTION, SOLUTION INTRAVENOUS at 03:26

## 2017-07-29 RX ADMIN — INSULIN LISPRO 1 UNITS: 100 INJECTION, SOLUTION INTRAVENOUS; SUBCUTANEOUS at 18:25

## 2017-07-29 RX ADMIN — HYDROXYZINE HYDROCHLORIDE 25 MG: 25 TABLET, FILM COATED ORAL at 11:50

## 2017-07-29 RX ADMIN — ALBUMIN (HUMAN) 25 G: 0.25 INJECTION, SOLUTION INTRAVENOUS at 10:21

## 2017-07-29 RX ADMIN — ONDANSETRON 4 MG: 2 INJECTION INTRAMUSCULAR; INTRAVENOUS at 07:47

## 2017-07-29 RX ADMIN — HYDROXYZINE HYDROCHLORIDE 25 MG: 25 TABLET, FILM COATED ORAL at 23:35

## 2017-07-29 RX ADMIN — LACTULOSE 30 G: 20 SOLUTION ORAL at 14:55

## 2017-07-29 RX ADMIN — LACTULOSE 30 G: 20 SOLUTION ORAL at 20:59

## 2017-07-29 RX ADMIN — MORPHINE SULFATE 2 MG: 4 INJECTION, SOLUTION INTRAMUSCULAR; INTRAVENOUS at 18:37

## 2017-07-29 RX ADMIN — ALBUMIN (HUMAN) 25 G: 0.25 INJECTION, SOLUTION INTRAVENOUS at 19:44

## 2017-07-29 ASSESSMENT — PAIN SCALES - GENERAL
PAINLEVEL_OUTOF10: 10
PAINLEVEL_OUTOF10: 7
PAINLEVEL_OUTOF10: 0

## 2017-07-30 LAB
ALBUMIN SERPL-MCNC: 3.6 G/DL (ref 3.9–4.9)
AMMONIA: 88 UMOL/L (ref 11–51)
ANION GAP SERPL CALCULATED.3IONS-SCNC: 19 MEQ/L (ref 7–13)
BUN BLDV-MCNC: 49 MG/DL (ref 6–20)
CALCIUM SERPL-MCNC: 8.9 MG/DL (ref 8.6–10.2)
CHLORIDE BLD-SCNC: 111 MEQ/L (ref 98–107)
CO2: 12 MEQ/L (ref 22–29)
CREAT SERPL-MCNC: 2.05 MG/DL (ref 0.5–0.9)
CREATININE URINE: 15 MG/DL
GFR AFRICAN AMERICAN: 31.4
GFR NON-AFRICAN AMERICAN: 25.9
GLUCOSE BLD-MCNC: 137 MG/DL (ref 60–115)
GLUCOSE BLD-MCNC: 181 MG/DL (ref 60–115)
GLUCOSE BLD-MCNC: 222 MG/DL (ref 60–115)
GLUCOSE BLD-MCNC: 93 MG/DL (ref 60–115)
GLUCOSE BLD-MCNC: 95 MG/DL (ref 74–109)
MAGNESIUM: 2.7 MG/DL (ref 1.7–2.3)
OSMOLALITY URINE: 302 MOSM/KG (ref 300–900)
PERFORMED ON: ABNORMAL
PERFORMED ON: NORMAL
POTASSIUM SERPL-SCNC: 3.3 MEQ/L (ref 3.5–5.1)
POTASSIUM SERPL-SCNC: 3.4 MEQ/L (ref 3.5–5.1)
POTASSIUM SERPL-SCNC: 3.9 MEQ/L (ref 3.5–5.1)
POTASSIUM SERPL-SCNC: 4 MEQ/L (ref 3.5–5.1)
SODIUM BLD-SCNC: 142 MEQ/L (ref 132–144)
SODIUM URINE: 112 MEQ/L

## 2017-07-30 PROCEDURE — 6370000000 HC RX 637 (ALT 250 FOR IP): Performed by: INTERNAL MEDICINE

## 2017-07-30 PROCEDURE — 6370000000 HC RX 637 (ALT 250 FOR IP): Performed by: NURSE PRACTITIONER

## 2017-07-30 PROCEDURE — 83735 ASSAY OF MAGNESIUM: CPT

## 2017-07-30 PROCEDURE — 6360000002 HC RX W HCPCS: Performed by: INTERNAL MEDICINE

## 2017-07-30 PROCEDURE — 2580000003 HC RX 258: Performed by: NURSE PRACTITIONER

## 2017-07-30 PROCEDURE — 1210000000 HC MED SURG R&B

## 2017-07-30 PROCEDURE — 84132 ASSAY OF SERUM POTASSIUM: CPT

## 2017-07-30 PROCEDURE — 82140 ASSAY OF AMMONIA: CPT

## 2017-07-30 PROCEDURE — 36415 COLL VENOUS BLD VENIPUNCTURE: CPT

## 2017-07-30 PROCEDURE — 82570 ASSAY OF URINE CREATININE: CPT

## 2017-07-30 PROCEDURE — 83935 ASSAY OF URINE OSMOLALITY: CPT

## 2017-07-30 PROCEDURE — 80048 BASIC METABOLIC PNL TOTAL CA: CPT

## 2017-07-30 PROCEDURE — 84300 ASSAY OF URINE SODIUM: CPT

## 2017-07-30 PROCEDURE — 82040 ASSAY OF SERUM ALBUMIN: CPT

## 2017-07-30 PROCEDURE — P9046 ALBUMIN (HUMAN), 25%, 20 ML: HCPCS | Performed by: INTERNAL MEDICINE

## 2017-07-30 RX ORDER — PROPRANOLOL HCL 60 MG
60 CAPSULE, EXTENDED RELEASE 24HR ORAL DAILY
Status: DISCONTINUED | OUTPATIENT
Start: 2017-07-30 | End: 2017-08-02 | Stop reason: HOSPADM

## 2017-07-30 RX ORDER — LACTULOSE 10 G/15ML
30 SOLUTION ORAL 2 TIMES DAILY
Status: DISCONTINUED | OUTPATIENT
Start: 2017-07-30 | End: 2017-08-02 | Stop reason: HOSPADM

## 2017-07-30 RX ADMIN — FOLIC ACID 1 MG: 1 TABLET ORAL at 09:45

## 2017-07-30 RX ADMIN — FUROSEMIDE 80 MG: 10 INJECTION, SOLUTION INTRAMUSCULAR; INTRAVENOUS at 22:20

## 2017-07-30 RX ADMIN — INSULIN GLARGINE 13 UNITS: 100 INJECTION, SOLUTION SUBCUTANEOUS at 21:26

## 2017-07-30 RX ADMIN — FUROSEMIDE 80 MG: 10 INJECTION, SOLUTION INTRAMUSCULAR; INTRAVENOUS at 05:45

## 2017-07-30 RX ADMIN — Medication 100 MG: at 09:45

## 2017-07-30 RX ADMIN — LACTULOSE 30 G: 20 SOLUTION ORAL at 09:45

## 2017-07-30 RX ADMIN — FUROSEMIDE 80 MG: 10 INJECTION, SOLUTION INTRAMUSCULAR; INTRAVENOUS at 09:45

## 2017-07-30 RX ADMIN — RIFAXIMIN 200 MG: 200 TABLET ORAL at 09:44

## 2017-07-30 RX ADMIN — INSULIN LISPRO 2 UNITS: 100 INJECTION, SOLUTION INTRAVENOUS; SUBCUTANEOUS at 12:58

## 2017-07-30 RX ADMIN — POTASSIUM CHLORIDE 20 MEQ: 1.5 POWDER, FOR SOLUTION ORAL at 09:44

## 2017-07-30 RX ADMIN — RIFAXIMIN 200 MG: 200 TABLET ORAL at 21:13

## 2017-07-30 RX ADMIN — Medication 10 ML: at 09:51

## 2017-07-30 RX ADMIN — LACTULOSE 30 G: 20 SOLUTION ORAL at 21:13

## 2017-07-30 RX ADMIN — ALBUMIN (HUMAN) 25 G: 0.25 INJECTION, SOLUTION INTRAVENOUS at 04:41

## 2017-07-30 RX ADMIN — Medication 10 ML: at 21:13

## 2017-07-30 RX ADMIN — PROPRANOLOL HYDROCHLORIDE 60 MG: 60 CAPSULE, EXTENDED RELEASE ORAL at 14:23

## 2017-07-30 RX ADMIN — POTASSIUM CHLORIDE 20 MEQ: 1.5 POWDER, FOR SOLUTION ORAL at 21:13

## 2017-07-30 RX ADMIN — Medication 10 ML: at 04:41

## 2017-07-30 RX ADMIN — SPIRONOLACTONE 100 MG: 50 TABLET ORAL at 09:44

## 2017-07-30 RX ADMIN — ALBUMIN (HUMAN) 25 G: 0.25 INJECTION, SOLUTION INTRAVENOUS at 21:13

## 2017-07-30 RX ADMIN — FUROSEMIDE 80 MG: 10 INJECTION, SOLUTION INTRAMUSCULAR; INTRAVENOUS at 14:23

## 2017-07-30 RX ADMIN — HYDROXYZINE HYDROCHLORIDE 25 MG: 25 TABLET, FILM COATED ORAL at 12:57

## 2017-07-30 ASSESSMENT — PAIN SCALES - GENERAL: PAINLEVEL_OUTOF10: 0

## 2017-07-31 LAB
ALBUMIN SERPL-MCNC: 4 G/DL (ref 3.9–4.9)
AMMONIA: 69 UMOL/L (ref 11–51)
ANION GAP SERPL CALCULATED.3IONS-SCNC: 18 MEQ/L (ref 7–13)
BUN BLDV-MCNC: 47 MG/DL (ref 6–20)
CALCIUM SERPL-MCNC: 8.9 MG/DL (ref 8.6–10.2)
CHLORIDE BLD-SCNC: 112 MEQ/L (ref 98–107)
CO2: 14 MEQ/L (ref 22–29)
CREAT SERPL-MCNC: 2.59 MG/DL (ref 0.5–0.9)
GFR AFRICAN AMERICAN: 24
GFR NON-AFRICAN AMERICAN: 19.8
GLUCOSE BLD-MCNC: 100 MG/DL (ref 60–115)
GLUCOSE BLD-MCNC: 126 MG/DL (ref 60–115)
GLUCOSE BLD-MCNC: 158 MG/DL (ref 60–115)
GLUCOSE BLD-MCNC: 179 MG/DL (ref 60–115)
GLUCOSE BLD-MCNC: 98 MG/DL (ref 74–109)
PERFORMED ON: ABNORMAL
PERFORMED ON: NORMAL
POTASSIUM SERPL-SCNC: 3.8 MEQ/L (ref 3.5–5.1)
POTASSIUM SERPL-SCNC: 4 MEQ/L (ref 3.5–5.1)
SODIUM BLD-SCNC: 144 MEQ/L (ref 132–144)

## 2017-07-31 PROCEDURE — 6370000000 HC RX 637 (ALT 250 FOR IP): Performed by: INTERNAL MEDICINE

## 2017-07-31 PROCEDURE — 97112 NEUROMUSCULAR REEDUCATION: CPT

## 2017-07-31 PROCEDURE — 6360000002 HC RX W HCPCS: Performed by: INTERNAL MEDICINE

## 2017-07-31 PROCEDURE — 80048 BASIC METABOLIC PNL TOTAL CA: CPT

## 2017-07-31 PROCEDURE — P9046 ALBUMIN (HUMAN), 25%, 20 ML: HCPCS | Performed by: INTERNAL MEDICINE

## 2017-07-31 PROCEDURE — 1210000000 HC MED SURG R&B

## 2017-07-31 PROCEDURE — 97116 GAIT TRAINING THERAPY: CPT

## 2017-07-31 PROCEDURE — 82040 ASSAY OF SERUM ALBUMIN: CPT

## 2017-07-31 PROCEDURE — 2580000003 HC RX 258: Performed by: NURSE PRACTITIONER

## 2017-07-31 PROCEDURE — 82140 ASSAY OF AMMONIA: CPT

## 2017-07-31 PROCEDURE — 36415 COLL VENOUS BLD VENIPUNCTURE: CPT

## 2017-07-31 RX ORDER — TORSEMIDE 20 MG/1
40 TABLET ORAL DAILY
Status: DISCONTINUED | OUTPATIENT
Start: 2017-07-31 | End: 2017-08-02 | Stop reason: HOSPADM

## 2017-07-31 RX ADMIN — POTASSIUM CHLORIDE 20 MEQ: 1.5 POWDER, FOR SOLUTION ORAL at 09:13

## 2017-07-31 RX ADMIN — INSULIN LISPRO 1 UNITS: 100 INJECTION, SOLUTION INTRAVENOUS; SUBCUTANEOUS at 17:37

## 2017-07-31 RX ADMIN — FOLIC ACID 1 MG: 1 TABLET ORAL at 09:12

## 2017-07-31 RX ADMIN — SPIRONOLACTONE 100 MG: 50 TABLET ORAL at 09:13

## 2017-07-31 RX ADMIN — Medication 10 ML: at 09:13

## 2017-07-31 RX ADMIN — Medication 100 MG: at 09:13

## 2017-07-31 RX ADMIN — MORPHINE SULFATE 2 MG: 4 INJECTION, SOLUTION INTRAMUSCULAR; INTRAVENOUS at 11:42

## 2017-07-31 RX ADMIN — RIFAXIMIN 200 MG: 200 TABLET ORAL at 22:50

## 2017-07-31 RX ADMIN — ALBUMIN (HUMAN) 25 G: 0.25 INJECTION, SOLUTION INTRAVENOUS at 09:11

## 2017-07-31 RX ADMIN — INSULIN GLARGINE 13 UNITS: 100 INJECTION, SOLUTION SUBCUTANEOUS at 22:52

## 2017-07-31 RX ADMIN — RIFAXIMIN 200 MG: 200 TABLET ORAL at 09:13

## 2017-07-31 RX ADMIN — TORSEMIDE 40 MG: 20 TABLET ORAL at 14:30

## 2017-07-31 RX ADMIN — PROPRANOLOL HYDROCHLORIDE 60 MG: 60 CAPSULE, EXTENDED RELEASE ORAL at 09:13

## 2017-07-31 RX ADMIN — LACTULOSE 30 G: 20 SOLUTION ORAL at 09:12

## 2017-07-31 RX ADMIN — Medication 10 ML: at 22:59

## 2017-07-31 RX ADMIN — FUROSEMIDE 80 MG: 10 INJECTION, SOLUTION INTRAMUSCULAR; INTRAVENOUS at 10:47

## 2017-07-31 RX ADMIN — POTASSIUM CHLORIDE 20 MEQ: 1.5 POWDER, FOR SOLUTION ORAL at 22:50

## 2017-07-31 RX ADMIN — LACTULOSE 30 G: 20 SOLUTION ORAL at 22:50

## 2017-07-31 ASSESSMENT — ENCOUNTER SYMPTOMS
EYES NEGATIVE: 1
VOMITING: 0
HEMOPTYSIS: 0
NAUSEA: 0
CONSTIPATION: 0
RESPIRATORY NEGATIVE: 1
BLURRED VISION: 0
HEARTBURN: 0
ABDOMINAL PAIN: 1
DIARRHEA: 0
BACK PAIN: 0
SHORTNESS OF BREATH: 0
PHOTOPHOBIA: 0
WHEEZING: 0
COUGH: 0
DOUBLE VISION: 0
SPUTUM PRODUCTION: 0

## 2017-07-31 ASSESSMENT — PAIN SCALES - GENERAL: PAINLEVEL_OUTOF10: 8

## 2017-08-01 LAB
ALBUMIN SERPL-MCNC: 3.9 G/DL (ref 3.9–4.9)
AMMONIA: 64 UMOL/L (ref 11–51)
ANION GAP SERPL CALCULATED.3IONS-SCNC: 18 MEQ/L (ref 7–13)
BLOOD BANK DISPENSE STATUS: NORMAL
BLOOD BANK PRODUCT CODE: NORMAL
BODY FLUID CULTURE, STERILE: NORMAL
BPU ID: NORMAL
BUN BLDV-MCNC: 48 MG/DL (ref 6–20)
CALCIUM SERPL-MCNC: 9.3 MG/DL (ref 8.6–10.2)
CHLORIDE BLD-SCNC: 114 MEQ/L (ref 98–107)
CO2: 14 MEQ/L (ref 22–29)
CREAT SERPL-MCNC: 2.61 MG/DL (ref 0.5–0.9)
DESCRIPTION BLOOD BANK: NORMAL
GFR AFRICAN AMERICAN: 23.7
GFR NON-AFRICAN AMERICAN: 19.6
GLUCOSE BLD-MCNC: 173 MG/DL (ref 60–115)
GLUCOSE BLD-MCNC: 185 MG/DL (ref 60–115)
GLUCOSE BLD-MCNC: 221 MG/DL (ref 60–115)
GLUCOSE BLD-MCNC: 82 MG/DL (ref 74–109)
GLUCOSE BLD-MCNC: 84 MG/DL (ref 60–115)
PERFORMED ON: ABNORMAL
PERFORMED ON: NORMAL
POTASSIUM SERPL-SCNC: 4.2 MEQ/L (ref 3.5–5.1)
SODIUM BLD-SCNC: 146 MEQ/L (ref 132–144)

## 2017-08-01 PROCEDURE — 2580000003 HC RX 258: Performed by: NURSE PRACTITIONER

## 2017-08-01 PROCEDURE — 6360000002 HC RX W HCPCS: Performed by: INTERNAL MEDICINE

## 2017-08-01 PROCEDURE — 82040 ASSAY OF SERUM ALBUMIN: CPT

## 2017-08-01 PROCEDURE — 97110 THERAPEUTIC EXERCISES: CPT

## 2017-08-01 PROCEDURE — 6370000000 HC RX 637 (ALT 250 FOR IP): Performed by: INTERNAL MEDICINE

## 2017-08-01 PROCEDURE — 82140 ASSAY OF AMMONIA: CPT

## 2017-08-01 PROCEDURE — 99213 OFFICE O/P EST LOW 20 MIN: CPT

## 2017-08-01 PROCEDURE — 97116 GAIT TRAINING THERAPY: CPT

## 2017-08-01 PROCEDURE — 36415 COLL VENOUS BLD VENIPUNCTURE: CPT

## 2017-08-01 PROCEDURE — 1210000000 HC MED SURG R&B

## 2017-08-01 PROCEDURE — 80048 BASIC METABOLIC PNL TOTAL CA: CPT

## 2017-08-01 PROCEDURE — 6370000000 HC RX 637 (ALT 250 FOR IP): Performed by: NURSE PRACTITIONER

## 2017-08-01 RX ORDER — MIDODRINE HYDROCHLORIDE 5 MG/1
5 TABLET ORAL
Status: DISCONTINUED | OUTPATIENT
Start: 2017-08-01 | End: 2017-08-02 | Stop reason: HOSPADM

## 2017-08-01 RX ORDER — OCTREOTIDE ACETATE 100 UG/ML
100 INJECTION, SOLUTION INTRAVENOUS; SUBCUTANEOUS EVERY 12 HOURS
Status: DISCONTINUED | OUTPATIENT
Start: 2017-08-01 | End: 2017-08-02 | Stop reason: HOSPADM

## 2017-08-01 RX ADMIN — LACTULOSE 30 G: 20 SOLUTION ORAL at 09:53

## 2017-08-01 RX ADMIN — LACTULOSE 30 G: 20 SOLUTION ORAL at 23:38

## 2017-08-01 RX ADMIN — OCTREOTIDE ACETATE 100 MCG: 100 INJECTION, SOLUTION INTRAVENOUS; SUBCUTANEOUS at 12:43

## 2017-08-01 RX ADMIN — MORPHINE SULFATE 2 MG: 4 INJECTION, SOLUTION INTRAMUSCULAR; INTRAVENOUS at 17:33

## 2017-08-01 RX ADMIN — PROPRANOLOL HYDROCHLORIDE 60 MG: 60 CAPSULE, EXTENDED RELEASE ORAL at 09:54

## 2017-08-01 RX ADMIN — INSULIN LISPRO 2 UNITS: 100 INJECTION, SOLUTION INTRAVENOUS; SUBCUTANEOUS at 12:43

## 2017-08-01 RX ADMIN — INSULIN LISPRO 1 UNITS: 100 INJECTION, SOLUTION INTRAVENOUS; SUBCUTANEOUS at 17:32

## 2017-08-01 RX ADMIN — INSULIN GLARGINE 13 UNITS: 100 INJECTION, SOLUTION SUBCUTANEOUS at 23:43

## 2017-08-01 RX ADMIN — FOLIC ACID 1 MG: 1 TABLET ORAL at 09:56

## 2017-08-01 RX ADMIN — RIFAXIMIN 200 MG: 200 TABLET ORAL at 09:54

## 2017-08-01 RX ADMIN — POTASSIUM CHLORIDE 20 MEQ: 1.5 POWDER, FOR SOLUTION ORAL at 23:38

## 2017-08-01 RX ADMIN — TORSEMIDE 40 MG: 20 TABLET ORAL at 09:56

## 2017-08-01 RX ADMIN — POTASSIUM CHLORIDE 20 MEQ: 1.5 POWDER, FOR SOLUTION ORAL at 09:56

## 2017-08-01 RX ADMIN — Medication 10 ML: at 09:56

## 2017-08-01 RX ADMIN — RIFAXIMIN 200 MG: 200 TABLET ORAL at 23:38

## 2017-08-01 RX ADMIN — Medication 100 MG: at 09:56

## 2017-08-01 RX ADMIN — OCTREOTIDE ACETATE 100 MCG: 100 INJECTION, SOLUTION INTRAVENOUS; SUBCUTANEOUS at 23:41

## 2017-08-01 RX ADMIN — SPIRONOLACTONE 100 MG: 50 TABLET ORAL at 09:54

## 2017-08-01 RX ADMIN — MIDODRINE HYDROCHLORIDE 5 MG: 5 TABLET ORAL at 12:44

## 2017-08-01 ASSESSMENT — PAIN SCALES - GENERAL
PAINLEVEL_OUTOF10: 0
PAINLEVEL_OUTOF10: 8

## 2017-08-02 VITALS
HEIGHT: 63 IN | SYSTOLIC BLOOD PRESSURE: 93 MMHG | TEMPERATURE: 99.1 F | RESPIRATION RATE: 20 BRPM | DIASTOLIC BLOOD PRESSURE: 47 MMHG | OXYGEN SATURATION: 99 % | WEIGHT: 109.13 LBS | BODY MASS INDEX: 19.34 KG/M2 | HEART RATE: 80 BPM

## 2017-08-02 PROBLEM — N17.9 AKI (ACUTE KIDNEY INJURY) (HCC): Status: ACTIVE | Noted: 2017-08-02

## 2017-08-02 PROBLEM — E11.65 HYPERGLYCEMIA DUE TO TYPE 2 DIABETES MELLITUS (HCC): Chronic | Status: ACTIVE | Noted: 2017-08-02

## 2017-08-02 LAB
ALBUMIN SERPL-MCNC: 3.7 G/DL (ref 3.9–4.9)
AMMONIA: 86 UMOL/L (ref 11–51)
ANION GAP SERPL CALCULATED.3IONS-SCNC: 18 MEQ/L (ref 7–13)
BUN BLDV-MCNC: 45 MG/DL (ref 6–20)
CALCIUM SERPL-MCNC: 9 MG/DL (ref 8.6–10.2)
CHLORIDE BLD-SCNC: 107 MEQ/L (ref 98–107)
CO2: 15 MEQ/L (ref 22–29)
CREAT SERPL-MCNC: 2.29 MG/DL (ref 0.5–0.9)
GFR AFRICAN AMERICAN: 27.6
GFR NON-AFRICAN AMERICAN: 22.8
GLUCOSE BLD-MCNC: 103 MG/DL (ref 74–109)
GLUCOSE BLD-MCNC: 106 MG/DL (ref 60–115)
GLUCOSE BLD-MCNC: 180 MG/DL (ref 60–115)
LV EF: 65 %
LVEF MODALITY: NORMAL
PERFORMED ON: ABNORMAL
PERFORMED ON: NORMAL
POTASSIUM SERPL-SCNC: 4.8 MEQ/L (ref 3.5–5.1)
SODIUM BLD-SCNC: 140 MEQ/L (ref 132–144)

## 2017-08-02 PROCEDURE — 82040 ASSAY OF SERUM ALBUMIN: CPT

## 2017-08-02 PROCEDURE — 6370000000 HC RX 637 (ALT 250 FOR IP): Performed by: INTERNAL MEDICINE

## 2017-08-02 PROCEDURE — 2580000003 HC RX 258: Performed by: NURSE PRACTITIONER

## 2017-08-02 PROCEDURE — 80048 BASIC METABOLIC PNL TOTAL CA: CPT

## 2017-08-02 PROCEDURE — 36415 COLL VENOUS BLD VENIPUNCTURE: CPT

## 2017-08-02 PROCEDURE — 93306 TTE W/DOPPLER COMPLETE: CPT

## 2017-08-02 PROCEDURE — 82140 ASSAY OF AMMONIA: CPT

## 2017-08-02 PROCEDURE — 6360000002 HC RX W HCPCS: Performed by: INTERNAL MEDICINE

## 2017-08-02 PROCEDURE — 97116 GAIT TRAINING THERAPY: CPT

## 2017-08-02 RX ORDER — SPIRONOLACTONE 100 MG/1
100 TABLET, FILM COATED ORAL DAILY
Qty: 30 TABLET | Refills: 3 | DISCHARGE
Start: 2017-08-02 | End: 2017-08-03 | Stop reason: SDUPTHER

## 2017-08-02 RX ORDER — TORSEMIDE 20 MG/1
40 TABLET ORAL DAILY
Qty: 30 TABLET | Refills: 3 | DISCHARGE
Start: 2017-08-02 | End: 2017-08-03 | Stop reason: SDUPTHER

## 2017-08-02 RX ORDER — POTASSIUM CHLORIDE 1.5 G/1.77G
20 POWDER, FOR SOLUTION ORAL 2 TIMES DAILY
Qty: 30 EACH | Refills: 3 | Status: CANCELLED | DISCHARGE
Start: 2017-08-02

## 2017-08-02 RX ORDER — MIDODRINE HYDROCHLORIDE 5 MG/1
5 TABLET ORAL
Qty: 90 TABLET | Refills: 3 | Status: CANCELLED | DISCHARGE
Start: 2017-08-02

## 2017-08-02 RX ORDER — TORSEMIDE 20 MG/1
40 TABLET ORAL DAILY
Qty: 30 TABLET | Refills: 3 | Status: CANCELLED | DISCHARGE
Start: 2017-08-02

## 2017-08-02 RX ORDER — POTASSIUM CHLORIDE 1.5 G/1.77G
20 POWDER, FOR SOLUTION ORAL 2 TIMES DAILY
Qty: 30 EACH | Refills: 3 | DISCHARGE
Start: 2017-08-02 | End: 2017-08-03 | Stop reason: SDUPTHER

## 2017-08-02 RX ORDER — SPIRONOLACTONE 100 MG/1
100 TABLET, FILM COATED ORAL DAILY
Qty: 30 TABLET | Refills: 3 | Status: CANCELLED | DISCHARGE
Start: 2017-08-02

## 2017-08-02 RX ORDER — PROPRANOLOL HCL 60 MG
60 CAPSULE, EXTENDED RELEASE 24HR ORAL DAILY
Qty: 30 CAPSULE | Refills: 3 | DISCHARGE
Start: 2017-08-02 | End: 2017-08-03 | Stop reason: SDUPTHER

## 2017-08-02 RX ORDER — LACTULOSE 10 G/15ML
30 SOLUTION ORAL 2 TIMES DAILY
Refills: 1 | DISCHARGE
Start: 2017-08-02 | End: 2017-08-03 | Stop reason: SDUPTHER

## 2017-08-02 RX ORDER — MIDODRINE HYDROCHLORIDE 5 MG/1
5 TABLET ORAL
Qty: 90 TABLET | Refills: 3 | DISCHARGE
Start: 2017-08-02 | End: 2017-08-03 | Stop reason: SDUPTHER

## 2017-08-02 RX ORDER — LACTULOSE 10 G/15ML
30 SOLUTION ORAL 3 TIMES DAILY
Refills: 1 | Status: CANCELLED | DISCHARGE
Start: 2017-08-02

## 2017-08-02 RX ORDER — PROPRANOLOL HCL 60 MG
60 CAPSULE, EXTENDED RELEASE 24HR ORAL DAILY
Qty: 30 CAPSULE | Refills: 3 | Status: CANCELLED | DISCHARGE
Start: 2017-08-02

## 2017-08-02 RX ADMIN — PROPRANOLOL HYDROCHLORIDE 60 MG: 60 CAPSULE, EXTENDED RELEASE ORAL at 10:05

## 2017-08-02 RX ADMIN — FOLIC ACID 1 MG: 1 TABLET ORAL at 10:05

## 2017-08-02 RX ADMIN — Medication 10 ML: at 10:07

## 2017-08-02 RX ADMIN — Medication 100 MG: at 10:05

## 2017-08-02 RX ADMIN — POTASSIUM CHLORIDE 20 MEQ: 1.5 POWDER, FOR SOLUTION ORAL at 10:04

## 2017-08-02 RX ADMIN — TORSEMIDE 40 MG: 20 TABLET ORAL at 10:04

## 2017-08-02 RX ADMIN — INSULIN LISPRO 1 UNITS: 100 INJECTION, SOLUTION INTRAVENOUS; SUBCUTANEOUS at 12:45

## 2017-08-02 RX ADMIN — MIDODRINE HYDROCHLORIDE 5 MG: 5 TABLET ORAL at 11:27

## 2017-08-02 RX ADMIN — RIFAXIMIN 200 MG: 200 TABLET ORAL at 10:05

## 2017-08-02 RX ADMIN — SPIRONOLACTONE 100 MG: 50 TABLET ORAL at 10:05

## 2017-08-02 RX ADMIN — Medication 10 ML: at 04:30

## 2017-08-02 RX ADMIN — OCTREOTIDE ACETATE 100 MCG: 100 INJECTION, SOLUTION INTRAVENOUS; SUBCUTANEOUS at 11:26

## 2017-08-02 RX ADMIN — LACTULOSE 30 G: 20 SOLUTION ORAL at 10:06

## 2017-08-02 RX ADMIN — MIDODRINE HYDROCHLORIDE 5 MG: 5 TABLET ORAL at 10:05

## 2017-08-02 ASSESSMENT — ENCOUNTER SYMPTOMS
EYES NEGATIVE: 1
CHEST TIGHTNESS: 0
RESPIRATORY NEGATIVE: 1
STRIDOR: 0
ABDOMINAL PAIN: 1
COUGH: 0
SHORTNESS OF BREATH: 0
NAUSEA: 1
ABDOMINAL DISTENTION: 1
WHEEZING: 0
BLOOD IN STOOL: 0

## 2017-08-03 ENCOUNTER — OFFICE VISIT (OUTPATIENT)
Dept: GERIATRIC MEDICINE | Age: 47
End: 2017-08-03

## 2017-08-03 DIAGNOSIS — K76.82 HEPATIC ENCEPHALOPATHY: ICD-10-CM

## 2017-08-03 DIAGNOSIS — F10.20 ALCOHOLISM (HCC): Primary | ICD-10-CM

## 2017-08-03 DIAGNOSIS — R53.1 WEAKNESS: ICD-10-CM

## 2017-08-03 DIAGNOSIS — K70.31 ALCOHOLIC CIRRHOSIS OF LIVER WITH ASCITES (HCC): ICD-10-CM

## 2017-08-03 LAB — PREALBUMIN: 4.6 MG/DL (ref 20–40)

## 2017-08-03 PROCEDURE — 99305 1ST NF CARE MODERATE MDM 35: CPT | Performed by: INTERNAL MEDICINE

## 2017-08-03 RX ORDER — SPIRONOLACTONE 100 MG/1
100 TABLET, FILM COATED ORAL DAILY
COMMUNITY
End: 2017-09-14 | Stop reason: SDUPTHER

## 2017-08-03 RX ORDER — NICOTINE POLACRILEX 2 MG
1 LOZENGE BUCCAL EVERY MORNING
COMMUNITY
End: 2018-01-01

## 2017-08-03 RX ORDER — TORSEMIDE 20 MG/1
40 TABLET ORAL DAILY
COMMUNITY
End: 2017-09-07 | Stop reason: SDUPTHER

## 2017-08-03 RX ORDER — ASCORBIC ACID 500 MG
500 TABLET ORAL 2 TIMES DAILY
COMMUNITY
End: 2017-10-25 | Stop reason: SDUPTHER

## 2017-08-03 RX ORDER — LANOLIN ALCOHOL/MO/W.PET/CERES
100 CREAM (GRAM) TOPICAL DAILY
COMMUNITY

## 2017-08-03 RX ORDER — PROPRANOLOL HYDROCHLORIDE 60 MG/1
60 TABLET ORAL EVERY MORNING
COMMUNITY
End: 2017-09-14 | Stop reason: SDUPTHER

## 2017-08-03 RX ORDER — MIDODRINE HYDROCHLORIDE 5 MG/1
5 TABLET ORAL 3 TIMES DAILY
COMMUNITY
End: 2017-09-14 | Stop reason: SDUPTHER

## 2017-08-03 RX ORDER — INSULIN GLARGINE 100 [IU]/ML
13 INJECTION, SOLUTION SUBCUTANEOUS NIGHTLY
COMMUNITY
End: 2017-09-07 | Stop reason: SDUPTHER

## 2017-08-03 RX ORDER — FOLIC ACID 1 MG/1
1 TABLET ORAL DAILY
COMMUNITY
End: 2017-09-05 | Stop reason: SDUPTHER

## 2017-08-03 RX ORDER — POTASSIUM CHLORIDE 1.5 G/1.77G
20 POWDER, FOR SOLUTION ORAL 2 TIMES DAILY
COMMUNITY
End: 2017-09-19 | Stop reason: SDUPTHER

## 2017-08-04 ENCOUNTER — OFFICE VISIT (OUTPATIENT)
Dept: GERIATRIC MEDICINE | Age: 47
End: 2017-08-04

## 2017-08-04 DIAGNOSIS — R53.1 GENERALIZED WEAKNESS: ICD-10-CM

## 2017-08-04 DIAGNOSIS — K70.31 ALCOHOLIC CIRRHOSIS OF LIVER WITH ASCITES (HCC): Chronic | ICD-10-CM

## 2017-08-04 DIAGNOSIS — R60.0 LEG EDEMA, LEFT: Primary | ICD-10-CM

## 2017-08-04 PROCEDURE — 99309 SBSQ NF CARE MODERATE MDM 30: CPT | Performed by: NURSE PRACTITIONER

## 2017-08-04 ASSESSMENT — ENCOUNTER SYMPTOMS
ABDOMINAL DISTENTION: 1
COUGH: 0
VOMITING: 0
CONSTIPATION: 0
WHEEZING: 0
ABDOMINAL PAIN: 1
COLOR CHANGE: 1
SHORTNESS OF BREATH: 1
NAUSEA: 0

## 2017-08-05 VITALS
TEMPERATURE: 97.4 F | SYSTOLIC BLOOD PRESSURE: 128 MMHG | HEART RATE: 82 BPM | RESPIRATION RATE: 16 BRPM | DIASTOLIC BLOOD PRESSURE: 74 MMHG | OXYGEN SATURATION: 100 %

## 2017-08-05 LAB — PREALBUMIN: 3.8 MG/DL (ref 20–40)

## 2017-08-06 LAB
INR BLD: 1.6
PREALBUMIN: 4.3 MG/DL (ref 20–40)
PROTHROMBIN TIME: 17.1 SEC (ref 8.1–13.7)

## 2017-08-07 VITALS — TEMPERATURE: 98.2 F | DIASTOLIC BLOOD PRESSURE: 58 MMHG | SYSTOLIC BLOOD PRESSURE: 104 MMHG | HEART RATE: 60 BPM

## 2017-08-07 LAB
AMMONIA: 72 UMOL/L (ref 11–51)
ANION GAP SERPL CALCULATED.3IONS-SCNC: 19 MEQ/L (ref 7–13)
BUN BLDV-MCNC: 42 MG/DL (ref 6–20)
CALCIUM SERPL-MCNC: 8.2 MG/DL (ref 8.6–10.2)
CHLORIDE BLD-SCNC: 105 MEQ/L (ref 98–107)
CO2: 15 MEQ/L (ref 22–29)
CREAT SERPL-MCNC: 2.42 MG/DL (ref 0.5–0.9)
GFR AFRICAN AMERICAN: 25.9
GFR NON-AFRICAN AMERICAN: 21.4
GLUCOSE BLD-MCNC: 86 MG/DL (ref 74–109)
POTASSIUM SERPL-SCNC: 4.5 MEQ/L (ref 3.5–5.1)
SODIUM BLD-SCNC: 139 MEQ/L (ref 132–144)

## 2017-08-09 ENCOUNTER — OFFICE VISIT (OUTPATIENT)
Dept: GERIATRIC MEDICINE | Age: 47
End: 2017-08-09

## 2017-08-09 DIAGNOSIS — I82.409 LEG DVT (DEEP VENOUS THROMBOEMBOLISM), ACUTE, UNSPECIFIED LATERALITY (HCC): Primary | ICD-10-CM

## 2017-08-09 PROCEDURE — 99309 SBSQ NF CARE MODERATE MDM 30: CPT | Performed by: INTERNAL MEDICINE

## 2017-08-10 ENCOUNTER — OFFICE VISIT (OUTPATIENT)
Dept: GERIATRIC MEDICINE | Age: 47
End: 2017-08-10

## 2017-08-10 DIAGNOSIS — I82.402 LEG DVT (DEEP VENOUS THROMBOEMBOLISM), ACUTE, LEFT (HCC): Primary | ICD-10-CM

## 2017-08-10 DIAGNOSIS — D64.9 ANEMIA, UNSPECIFIED: ICD-10-CM

## 2017-08-10 DIAGNOSIS — I95.9 HYPOTENSION, UNSPECIFIED HYPOTENSION TYPE: ICD-10-CM

## 2017-08-10 DIAGNOSIS — K70.31 ALCOHOLIC CIRRHOSIS OF LIVER WITH ASCITES (HCC): Chronic | ICD-10-CM

## 2017-08-10 PROCEDURE — 99309 SBSQ NF CARE MODERATE MDM 30: CPT | Performed by: NURSE PRACTITIONER

## 2017-08-11 VITALS
TEMPERATURE: 98.7 F | HEART RATE: 65 BPM | RESPIRATION RATE: 17 BRPM | HEIGHT: 63 IN | DIASTOLIC BLOOD PRESSURE: 60 MMHG | BODY MASS INDEX: 18.43 KG/M2 | OXYGEN SATURATION: 99 % | WEIGHT: 104 LBS | SYSTOLIC BLOOD PRESSURE: 75 MMHG

## 2017-08-15 LAB
EKG ATRIAL RATE: 106 BPM
EKG P AXIS: 54 DEGREES
EKG P-R INTERVAL: 146 MS
EKG Q-T INTERVAL: 396 MS
EKG QRS DURATION: 58 MS
EKG QTC CALCULATION (BAZETT): 526 MS
EKG R AXIS: 76 DEGREES
EKG T AXIS: 33 DEGREES
EKG VENTRICULAR RATE: 106 BPM

## 2017-08-15 ASSESSMENT — ENCOUNTER SYMPTOMS
EYE PAIN: 0
NAUSEA: 0
ABDOMINAL PAIN: 1
WHEEZING: 0
ABDOMINAL DISTENTION: 1
SHORTNESS OF BREATH: 1
COLOR CHANGE: 1
COUGH: 0
CONSTIPATION: 0
VOMITING: 0

## 2017-08-16 ENCOUNTER — OFFICE VISIT (OUTPATIENT)
Dept: GERIATRIC MEDICINE | Age: 47
End: 2017-08-16

## 2017-08-16 DIAGNOSIS — I82.402 ACUTE DEEP VEIN THROMBOSIS (DVT) OF LEFT LOWER EXTREMITY, UNSPECIFIED VEIN (HCC): Primary | ICD-10-CM

## 2017-08-16 DIAGNOSIS — R53.1 GENERALIZED WEAKNESS: ICD-10-CM

## 2017-08-16 DIAGNOSIS — R79.89 ELEVATED SERUM CREATININE: ICD-10-CM

## 2017-08-16 LAB
AMMONIA: 98 UMOL/L (ref 11–51)
HCT VFR BLD CALC: 27.9 % (ref 37–47)
HEMOGLOBIN: 9.3 G/DL (ref 12–16)
MCH RBC QN AUTO: 30.3 PG (ref 27–31.3)
MCHC RBC AUTO-ENTMCNC: 33.6 % (ref 33–37)
MCV RBC AUTO: 90.3 FL (ref 82–100)
PDW BLD-RTO: 19.6 % (ref 11.5–14.5)
PLATELET # BLD: 122 K/UL (ref 130–400)
RBC # BLD: 3.08 M/UL (ref 4.2–5.4)
WBC # BLD: 8.2 K/UL (ref 4.8–10.8)

## 2017-08-16 PROCEDURE — 99308 SBSQ NF CARE LOW MDM 20: CPT | Performed by: NURSE PRACTITIONER

## 2017-08-17 ENCOUNTER — INITIAL CONSULT (OUTPATIENT)
Dept: INTERVENTIONAL RADIOLOGY/VASCULAR | Age: 47
End: 2017-08-17

## 2017-08-17 VITALS
HEART RATE: 71 BPM | OXYGEN SATURATION: 100 % | WEIGHT: 99 LBS | TEMPERATURE: 97.5 F | DIASTOLIC BLOOD PRESSURE: 62 MMHG | RESPIRATION RATE: 18 BRPM | BODY MASS INDEX: 17.54 KG/M2 | SYSTOLIC BLOOD PRESSURE: 112 MMHG | HEIGHT: 63 IN

## 2017-08-17 VITALS — RESPIRATION RATE: 14 BRPM | HEART RATE: 55 BPM | SYSTOLIC BLOOD PRESSURE: 85 MMHG | DIASTOLIC BLOOD PRESSURE: 55 MMHG

## 2017-08-17 DIAGNOSIS — I82.402 ACUTE DEEP VEIN THROMBOSIS (DVT) OF LEFT LOWER EXTREMITY, UNSPECIFIED VEIN (HCC): ICD-10-CM

## 2017-08-17 DIAGNOSIS — R60.0 EDEMA OF LEFT LOWER EXTREMITY: ICD-10-CM

## 2017-08-17 PROCEDURE — 99215 OFFICE O/P EST HI 40 MIN: CPT | Performed by: RADIOLOGY

## 2017-08-17 RX ORDER — LACTULOSE 10 G/15ML
30 SOLUTION ORAL 2 TIMES DAILY
COMMUNITY
Start: 2017-08-03 | End: 2017-01-01 | Stop reason: SDUPTHER

## 2017-08-17 ASSESSMENT — ENCOUNTER SYMPTOMS
NAUSEA: 0
WHEEZING: 0
GASTROINTESTINAL NEGATIVE: 1
HEMOPTYSIS: 0
SHORTNESS OF BREATH: 0
ABDOMINAL PAIN: 1
VOMITING: 0
COUGH: 0
ABDOMINAL PAIN: 0
EYE PAIN: 0
SPUTUM PRODUCTION: 0
DOUBLE VISION: 0
VOMITING: 0
DIARRHEA: 0
COUGH: 0
HEARTBURN: 0
CONSTIPATION: 0
BACK PAIN: 0
SHORTNESS OF BREATH: 0
BLURRED VISION: 0
CONSTIPATION: 0
WHEEZING: 0
SORE THROAT: 0
ABDOMINAL DISTENTION: 1
PHOTOPHOBIA: 0
EYES NEGATIVE: 1
RESPIRATORY NEGATIVE: 1
NAUSEA: 0

## 2017-08-29 ENCOUNTER — OFFICE VISIT (OUTPATIENT)
Dept: GERIATRIC MEDICINE | Age: 47
End: 2017-08-29

## 2017-08-29 DIAGNOSIS — E11.29 TYPE 2 DIABETES MELLITUS WITH OTHER KIDNEY COMPLICATION, UNSPECIFIED LONG TERM INSULIN USE STATUS: ICD-10-CM

## 2017-08-29 DIAGNOSIS — K70.31 ALCOHOLIC CIRRHOSIS OF LIVER WITH ASCITES (HCC): Chronic | ICD-10-CM

## 2017-08-29 DIAGNOSIS — I82.402 ACUTE DEEP VEIN THROMBOSIS (DVT) OF LEFT LOWER EXTREMITY, UNSPECIFIED VEIN (HCC): ICD-10-CM

## 2017-08-29 DIAGNOSIS — R53.1 GENERALIZED WEAKNESS: Primary | ICD-10-CM

## 2017-08-29 DIAGNOSIS — N17.9 AKI (ACUTE KIDNEY INJURY) (HCC): ICD-10-CM

## 2017-08-29 PROCEDURE — 99316 NF DSCHRG MGMT 30 MIN+: CPT | Performed by: NURSE PRACTITIONER

## 2017-08-30 VITALS
OXYGEN SATURATION: 98 % | WEIGHT: 99 LBS | BODY MASS INDEX: 17.54 KG/M2 | TEMPERATURE: 97.6 F | RESPIRATION RATE: 16 BRPM | DIASTOLIC BLOOD PRESSURE: 62 MMHG | HEIGHT: 63 IN | HEART RATE: 67 BPM | SYSTOLIC BLOOD PRESSURE: 114 MMHG

## 2017-08-30 RX ORDER — TRAZODONE HYDROCHLORIDE 50 MG/1
25 TABLET ORAL NIGHTLY
COMMUNITY
End: 2017-09-07 | Stop reason: SDUPTHER

## 2017-08-30 ASSESSMENT — ENCOUNTER SYMPTOMS
VOMITING: 0
EYE PAIN: 0
CONSTIPATION: 0
ABDOMINAL DISTENTION: 0
NAUSEA: 0
ABDOMINAL PAIN: 0
WHEEZING: 0
COUGH: 0
SHORTNESS OF BREATH: 0

## 2017-09-05 RX ORDER — FOLIC ACID 1 MG/1
1 TABLET ORAL DAILY
Qty: 30 TABLET | Refills: 11 | Status: SHIPPED | OUTPATIENT
Start: 2017-09-05 | End: 2017-10-09 | Stop reason: SDUPTHER

## 2017-09-07 ENCOUNTER — OFFICE VISIT (OUTPATIENT)
Dept: FAMILY MEDICINE CLINIC | Age: 47
End: 2017-09-07

## 2017-09-07 VITALS
DIASTOLIC BLOOD PRESSURE: 62 MMHG | SYSTOLIC BLOOD PRESSURE: 100 MMHG | HEART RATE: 67 BPM | WEIGHT: 102 LBS | HEIGHT: 63 IN | TEMPERATURE: 97.3 F | RESPIRATION RATE: 16 BRPM | OXYGEN SATURATION: 99 % | BODY MASS INDEX: 18.07 KG/M2

## 2017-09-07 DIAGNOSIS — D68.9 COAGULOPATHY (HCC): Chronic | ICD-10-CM

## 2017-09-07 DIAGNOSIS — E72.20 HYPERAMMONEMIA (HCC): ICD-10-CM

## 2017-09-07 DIAGNOSIS — K70.31 ALCOHOLIC CIRRHOSIS OF LIVER WITH ASCITES (HCC): Primary | Chronic | ICD-10-CM

## 2017-09-07 DIAGNOSIS — R60.0 EDEMA OF LEFT LOWER EXTREMITY: Chronic | ICD-10-CM

## 2017-09-07 DIAGNOSIS — E11.65 TYPE 2 DIABETES MELLITUS WITH HYPERGLYCEMIA, WITHOUT LONG-TERM CURRENT USE OF INSULIN (HCC): Chronic | ICD-10-CM

## 2017-09-07 DIAGNOSIS — I82.402 ACUTE DEEP VEIN THROMBOSIS (DVT) OF LEFT LOWER EXTREMITY, UNSPECIFIED VEIN (HCC): ICD-10-CM

## 2017-09-07 DIAGNOSIS — F10.10 ALCOHOL ABUSE: Chronic | ICD-10-CM

## 2017-09-07 DIAGNOSIS — K70.31 ALCOHOLIC CIRRHOSIS OF LIVER WITH ASCITES (HCC): Chronic | ICD-10-CM

## 2017-09-07 LAB
ALBUMIN SERPL-MCNC: 3.8 G/DL (ref 3.9–4.9)
ALP BLD-CCNC: 139 U/L (ref 40–130)
ALT SERPL-CCNC: 13 U/L (ref 0–33)
AMMONIA: 73 UMOL/L (ref 11–51)
ANION GAP SERPL CALCULATED.3IONS-SCNC: 20 MEQ/L (ref 7–13)
AST SERPL-CCNC: 59 U/L (ref 0–35)
BILIRUB SERPL-MCNC: 1.9 MG/DL (ref 0–1.2)
BILIRUBIN DIRECT: 1 MG/DL (ref 0–0.3)
BILIRUBIN, INDIRECT: 0.9 MG/DL (ref 0–0.6)
BUN BLDV-MCNC: 36 MG/DL (ref 6–20)
CALCIUM SERPL-MCNC: 9.8 MG/DL (ref 8.6–10.2)
CHLORIDE BLD-SCNC: 96 MEQ/L (ref 98–107)
CHOLESTEROL, TOTAL: 217 MG/DL (ref 0–199)
CO2: 18 MEQ/L (ref 22–29)
CREAT SERPL-MCNC: 1.27 MG/DL (ref 0.5–0.9)
CREATININE URINE: 50.5 MG/DL
GFR AFRICAN AMERICAN: 54.5
GFR NON-AFRICAN AMERICAN: 45
GLUCOSE BLD-MCNC: 128 MG/DL (ref 74–109)
HBA1C MFR BLD: 5 % (ref 4.8–5.9)
HDLC SERPL-MCNC: 59 MG/DL (ref 40–59)
LDL CHOLESTEROL CALCULATED: 145 MG/DL (ref 0–129)
MICROALBUMIN UR-MCNC: <1.2 MG/DL
MICROALBUMIN/CREAT UR-RTO: NORMAL MG/G (ref 0–30)
POTASSIUM SERPL-SCNC: 4.6 MEQ/L (ref 3.5–5.1)
SODIUM BLD-SCNC: 134 MEQ/L (ref 132–144)
TOTAL PROTEIN: 7.9 G/DL (ref 6.4–8.1)
TRIGL SERPL-MCNC: 65 MG/DL (ref 0–200)

## 2017-09-07 RX ORDER — TRAZODONE HYDROCHLORIDE 50 MG/1
25 TABLET ORAL NIGHTLY
Qty: 30 TABLET | Refills: 1 | Status: SHIPPED | OUTPATIENT
Start: 2017-09-07 | End: 2017-09-14 | Stop reason: SDUPTHER

## 2017-09-07 RX ORDER — INSULIN GLARGINE 100 [IU]/ML
INJECTION, SOLUTION SUBCUTANEOUS
Refills: 0 | COMMUNITY
Start: 2017-09-01 | End: 2017-01-01 | Stop reason: SDUPTHER

## 2017-09-07 RX ORDER — TORSEMIDE 20 MG/1
40 TABLET ORAL DAILY
Qty: 60 TABLET | Refills: 2 | Status: SHIPPED | OUTPATIENT
Start: 2017-09-07 | End: 2017-01-01 | Stop reason: ALTCHOICE

## 2017-09-07 RX ORDER — PEN NEEDLE, DIABETIC 32GX 5/32"
NEEDLE, DISPOSABLE MISCELLANEOUS
Refills: 0 | COMMUNITY
Start: 2017-09-02

## 2017-09-07 RX ORDER — BLOOD SUGAR DIAGNOSTIC
STRIP MISCELLANEOUS
Refills: 0 | COMMUNITY
Start: 2017-09-02 | End: 2017-10-09 | Stop reason: SDUPTHER

## 2017-09-07 RX ORDER — BLOOD-GLUCOSE METER
EACH MISCELLANEOUS
Refills: 0 | COMMUNITY
Start: 2017-09-01 | End: 2017-01-01 | Stop reason: CLARIF

## 2017-09-07 ASSESSMENT — ENCOUNTER SYMPTOMS
CONSTIPATION: 0
EYE PAIN: 0
WHEEZING: 0
SORE THROAT: 0
NAUSEA: 0
ABDOMINAL PAIN: 0
EYE DISCHARGE: 0
BACK PAIN: 0
DIARRHEA: 0
COUGH: 0
SHORTNESS OF BREATH: 0
VOMITING: 0

## 2017-09-07 ASSESSMENT — PATIENT HEALTH QUESTIONNAIRE - PHQ9
1. LITTLE INTEREST OR PLEASURE IN DOING THINGS: 0
2. FEELING DOWN, DEPRESSED OR HOPELESS: 0
SUM OF ALL RESPONSES TO PHQ9 QUESTIONS 1 & 2: 0
SUM OF ALL RESPONSES TO PHQ QUESTIONS 1-9: 0

## 2017-09-15 DIAGNOSIS — I82.402 ACUTE DEEP VEIN THROMBOSIS (DVT) OF LEFT LOWER EXTREMITY, UNSPECIFIED VEIN (HCC): Primary | ICD-10-CM

## 2017-09-15 RX ORDER — TRAZODONE HYDROCHLORIDE 50 MG/1
25 TABLET ORAL NIGHTLY
Qty: 30 TABLET | Refills: 1 | Status: SHIPPED | OUTPATIENT
Start: 2017-09-15 | End: 2017-01-01 | Stop reason: ALTCHOICE

## 2017-09-15 RX ORDER — SPIRONOLACTONE 100 MG/1
100 TABLET, FILM COATED ORAL DAILY
Qty: 30 TABLET | Refills: 5 | Status: SHIPPED | OUTPATIENT
Start: 2017-09-15 | End: 2017-01-01 | Stop reason: ALTCHOICE

## 2017-09-15 RX ORDER — PROPRANOLOL HYDROCHLORIDE 60 MG/1
60 TABLET ORAL EVERY MORNING
Qty: 30 TABLET | Refills: 5 | Status: SHIPPED | OUTPATIENT
Start: 2017-09-15 | End: 2017-01-01 | Stop reason: SDUPTHER

## 2017-09-15 RX ORDER — MIDODRINE HYDROCHLORIDE 5 MG/1
5 TABLET ORAL 3 TIMES DAILY
Qty: 90 TABLET | Refills: 3 | Status: SHIPPED | OUTPATIENT
Start: 2017-09-15 | End: 2017-01-01 | Stop reason: ALTCHOICE

## 2017-09-19 LAB
ALBUMIN SERPL-MCNC: 4.1 G/DL (ref 3.9–4.9)
ALP BLD-CCNC: 116 U/L (ref 40–130)
ALT SERPL-CCNC: 15 U/L (ref 0–33)
ANION GAP SERPL CALCULATED.3IONS-SCNC: 18 MEQ/L (ref 7–13)
AST SERPL-CCNC: 66 U/L (ref 0–35)
BILIRUB SERPL-MCNC: 1.7 MG/DL (ref 0–1.2)
BILIRUBIN DIRECT: 0.9 MG/DL (ref 0–0.3)
BILIRUBIN, INDIRECT: 0.8 MG/DL (ref 0–0.6)
BUN BLDV-MCNC: 26 MG/DL (ref 6–20)
CALCIUM SERPL-MCNC: 9.8 MG/DL (ref 8.6–10.2)
CHLORIDE BLD-SCNC: 93 MEQ/L (ref 98–107)
CO2: 22 MEQ/L (ref 22–29)
CREAT SERPL-MCNC: 1.41 MG/DL (ref 0.5–0.9)
GFR AFRICAN AMERICAN: 48.2
GFR NON-AFRICAN AMERICAN: 39.8
GLUCOSE BLD-MCNC: 116 MG/DL (ref 74–109)
HCT VFR BLD CALC: 36 % (ref 37–47)
HEMOGLOBIN: 12.2 G/DL (ref 12–16)
INR BLD: 1.1
MCH RBC QN AUTO: 31.3 PG (ref 27–31.3)
MCHC RBC AUTO-ENTMCNC: 33.8 % (ref 33–37)
MCV RBC AUTO: 92.6 FL (ref 82–100)
PDW BLD-RTO: 18.7 % (ref 11.5–14.5)
PLATELET # BLD: 116 K/UL (ref 130–400)
POTASSIUM SERPL-SCNC: 3.8 MEQ/L (ref 3.5–5.1)
PROTHROMBIN TIME: 11 SEC (ref 8.1–13.7)
RBC # BLD: 3.89 M/UL (ref 4.2–5.4)
SODIUM BLD-SCNC: 133 MEQ/L (ref 132–144)
TOTAL PROTEIN: 8.2 G/DL (ref 6.4–8.1)
WBC # BLD: 6.5 K/UL (ref 4.8–10.8)

## 2017-09-19 RX ORDER — POTASSIUM CHLORIDE 1.5 G/1.77G
20 POWDER, FOR SOLUTION ORAL 2 TIMES DAILY
Qty: 60 EACH | Refills: 5 | Status: SHIPPED | OUTPATIENT
Start: 2017-09-19 | End: 2017-01-01 | Stop reason: ALTCHOICE

## 2017-09-20 ENCOUNTER — TELEPHONE (OUTPATIENT)
Dept: FAMILY MEDICINE CLINIC | Age: 47
End: 2017-09-20

## 2017-09-21 LAB — AFP-TUMOR MARKER: 6 NG/ML (ref 0–9)

## 2017-09-28 ENCOUNTER — HOSPITAL ENCOUNTER (OUTPATIENT)
Dept: ULTRASOUND IMAGING | Age: 47
Discharge: HOME | End: 2017-09-28
Payer: MEDICAID

## 2017-09-28 DIAGNOSIS — K70.31 ALCOHOLIC CIRRHOSIS OF LIVER WITH ASCITES (HCC): ICD-10-CM

## 2017-09-28 PROCEDURE — 76705 ECHO EXAM OF ABDOMEN: CPT

## 2017-10-02 ENCOUNTER — OFFICE VISIT (OUTPATIENT)
Dept: FAMILY MEDICINE CLINIC | Age: 47
End: 2017-10-02

## 2017-10-02 VITALS
HEART RATE: 78 BPM | WEIGHT: 105 LBS | DIASTOLIC BLOOD PRESSURE: 68 MMHG | HEIGHT: 63 IN | RESPIRATION RATE: 14 BRPM | SYSTOLIC BLOOD PRESSURE: 102 MMHG | BODY MASS INDEX: 18.61 KG/M2 | TEMPERATURE: 97.9 F

## 2017-10-02 DIAGNOSIS — B96.20 E. COLI UTI (URINARY TRACT INFECTION): ICD-10-CM

## 2017-10-02 DIAGNOSIS — K70.31 ALCOHOLIC CIRRHOSIS OF LIVER WITH ASCITES (HCC): Chronic | ICD-10-CM

## 2017-10-02 DIAGNOSIS — K70.10 STEATOHEPATITIS, ALCOHOLIC: Chronic | ICD-10-CM

## 2017-10-02 DIAGNOSIS — N17.9 AKI (ACUTE KIDNEY INJURY) (HCC): ICD-10-CM

## 2017-10-02 DIAGNOSIS — E87.20 LACTIC ACID ACIDOSIS: ICD-10-CM

## 2017-10-02 DIAGNOSIS — R65.20 SEVERE SEPSIS WITHOUT SEPTIC SHOCK (CODE) (HCC): ICD-10-CM

## 2017-10-02 DIAGNOSIS — D64.9 ANEMIA, UNSPECIFIED TYPE: ICD-10-CM

## 2017-10-02 DIAGNOSIS — N39.0 E. COLI UTI (URINARY TRACT INFECTION): ICD-10-CM

## 2017-10-02 DIAGNOSIS — E11.65 TYPE 2 DIABETES MELLITUS WITH HYPERGLYCEMIA, WITHOUT LONG-TERM CURRENT USE OF INSULIN (HCC): Primary | Chronic | ICD-10-CM

## 2017-10-02 DIAGNOSIS — K76.89 JAUNDICE, HEPATOCELLULAR: Chronic | ICD-10-CM

## 2017-10-02 DIAGNOSIS — E72.20 HYPERAMMONEMIA (HCC): Chronic | ICD-10-CM

## 2017-10-02 DIAGNOSIS — I82.402 ACUTE DEEP VEIN THROMBOSIS (DVT) OF LEFT LOWER EXTREMITY, UNSPECIFIED VEIN (HCC): ICD-10-CM

## 2017-10-02 DIAGNOSIS — D62 ACUTE BLOOD LOSS ANEMIA: ICD-10-CM

## 2017-10-02 LAB
BILIRUBIN, POC: NORMAL
BLOOD URINE, POC: NORMAL
CLARITY, POC: NORMAL
COLOR, POC: NORMAL
GLUCOSE URINE, POC: NORMAL
KETONES, POC: NORMAL
LEUKOCYTE EST, POC: NORMAL
NITRITE, POC: NORMAL
PH, POC: 6
PROTEIN, POC: NORMAL
SPECIFIC GRAVITY, POC: 1.01
UROBILINOGEN, POC: NORMAL

## 2017-10-02 PROCEDURE — 81003 URINALYSIS AUTO W/O SCOPE: CPT | Performed by: FAMILY MEDICINE

## 2017-10-02 PROCEDURE — 99214 OFFICE O/P EST MOD 30 MIN: CPT | Performed by: FAMILY MEDICINE

## 2017-10-02 NOTE — PROGRESS NOTES
VERIO strip TEST TID  0    BASAGLAR KWIKPEN 100 UNIT/ML injection pen INJECT 13 UNITS SUBCUTANESOULY QHS  0    BD PEN NEEDLE KAIN U/F 32G X 4 MM MISC TEST TID  0    ONETOUCH DELICA LANCETS FINE MISC TEST TID UTD  0    torsemide (DEMADEX) 20 MG tablet Take 2 tablets by mouth daily 60 tablet 2    folic acid (FOLVITE) 1 MG tablet Take 1 tablet by mouth daily 30 tablet 11    lactulose (CHRONULAC) 10 GM/15ML solution Take 30 g by mouth 2 times daily      Multiple Vitamins-Minerals (THERAGRAN-M) TABS Take 1 tablet by mouth every morning       thiamine 100 MG tablet Take 100 mg by mouth daily      Ascorbic Acid (VITAMIN C) 500 MG tablet Take 500 mg by mouth 2 times daily       No current facility-administered medications for this visit. The patient denies any history of      seizures,             heart attack or KNOWN CAD        or stroke. No chest pain, shortness of breath, paroxysmal nocturnal dyspnea. No nausea, vomiting, diarrhea, hematochezia or melena. No paresthesias or headaches. No dysuria, frequency or hematuria. Last labs  Orders Only on 09/19/2017   Component Date Value Ref Range Status    Sodium 09/19/2017 133  132 - 144 mEq/L Final    Potassium 09/19/2017 3.8  3.5 - 5.1 mEq/L Final    Chloride 09/19/2017 93* 98 - 107 mEq/L Final    CO2 09/19/2017 22  22 - 29 mEq/L Final    Anion Gap 09/19/2017 18* 7 - 13 mEq/L Final    Glucose 09/19/2017 116* 74 - 109 mg/dL Final    BUN 09/19/2017 26* 6 - 20 mg/dL Final    CREATININE 09/19/2017 1.41* 0.50 - 0.90 mg/dL Final    GFR Non- 09/19/2017 39.9* >60 Final    Comment: >60 mL/min/1.73m2 EGFR, calc. for ages 25 and older using the  MDRD formula (not corrected for weight), is valid for stable  renal function.       GFR  09/19/2017 48.3* >60 Final    Comment: >60 mL/min/1.73m2 EGFR, calc. for ages 25 and older using the  MDRD formula (not corrected for weight), is valid for stable  renal 0 - 9 ng/mL Final    Comment: INTERPRETIVE INFORMATION: Alpha Fetoprotein Tumor Marker  The Alexandra Marquita Access DxI AFP method is used. Results obtained  with  different assay methods or kits cannot be used interchangeably. AFP is  a  valuable aid in the management of nonseminomatous testicular cancer  patients  when used in conjunction with information available from the clinical  evaluation and other diagnostic procedures. Increased AFP  concentrations have  also been observed in ataxia telangiectasia, hereditary tyrosinemia,  primary  hepatocellular carcinoma, teratocarcinoma, gastrointestinal tract  cancers  with and without liver metastases, and in benign hepatic conditions  such as  acute viral hepatitis, chronic active hepatitis, and cirrhosis. The  result  cannot be interpreted as absolute evidence of the presence or absence  of  malignant disease. The result is not interpretable as a tumor marker in  pregnant females. Access complete set of age- and/or gender-specific reference intervals  for  this loan                           t in the [employer] ([email or web address]). Performed by Norma Franz , [id number] [facility] [phone]  www. She Zamudio MD - Lab. Director     Orders Only on 09/07/2017   Component Date Value Ref Range Status    MICROALBUMIN, RANDOM URINE 09/07/2017 <1.20  Not Established mg/dL Final    Creatinine, Ur 09/07/2017 50.5  Not Established mg/dL Final    Microalb Creat Ratio 09/07/2017 see below  0.0 - 30.0 mg/G Final    Comment: - UR Microalbumin concentration is less than 1.2 mg/dL. - Unable to calculate Microalbumin/Creatinine Ratio without    a Microalbumin concentration.  Cholesterol, Total 09/07/2017 217* 0 - 199 mg/dL Final    ATP III Cholesterol Classification is Borderline High.     Triglycerides 09/07/2017 65  0 - 200 mg/dL Final    ATP III Triglycerides Classification is Normal.    HDL 09/07/2017 59  40 - 59 mg/dL Final Comment: ATP III HDL Cholesterol Classification is Desirable. Expected Values:    Males:    >55 = No Risk            35-55 = Moderate Risk            <35 = High Risk    Females:  >65 = No Risk            45-65 = Moderate Risk            <45 = High Risk    NCEP Guidelines: Third Report May 2001  >59 = negative risk factor for CHD  <40 = major risk factor for CHD      LDL Calculated 09/07/2017 145* 0 - 129 mg/dL Final    ATT III Classification is Borderline High.  Ammonia 09/07/2017 73* 11 - 51 umol/L Final    Total Protein 09/07/2017 7.9  6.4 - 8.1 g/dL Final    Alb 09/07/2017 3.8* 3.9 - 4.9 g/dL Final    Alkaline Phosphatase 09/07/2017 139* 40 - 130 U/L Final    ALT 09/07/2017 13  0 - 33 U/L Final    AST 09/07/2017 59* 0 - 35 U/L Final    Total Bilirubin 09/07/2017 1.9* 0.0 - 1.2 mg/dL Final    Bilirubin, Direct 09/07/2017 1.0* 0.0 - 0.3 mg/dL Final    Bilirubin, Indirect 09/07/2017 0.9* 0.0 - 0.6 mg/dL Final    Sodium 09/07/2017 134  132 - 144 mEq/L Final    Potassium 09/07/2017 4.6  3.5 - 5.1 mEq/L Final    Chloride 09/07/2017 96* 98 - 107 mEq/L Final    CO2 09/07/2017 18* 22 - 29 mEq/L Final    Anion Gap 09/07/2017 20* 7 - 13 mEq/L Final    Glucose 09/07/2017 128* 74 - 109 mg/dL Final    BUN 09/07/2017 36* 6 - 20 mg/dL Final    CREATININE 09/07/2017 1.27* 0.50 - 0.90 mg/dL Final    GFR Non- 09/07/2017 45.0* >60 Final    Comment: >60 mL/min/1.73m2 EGFR, calc. for ages 25 and older using the  MDRD formula (not corrected for weight), is valid for stable  renal function.  GFR  09/07/2017 54.5* >60 Final    Comment: >60 mL/min/1.73m2 EGFR, calc. for ages 25 and older using the  MDRD formula (not corrected for weight), is valid for stable  renal function.       Calcium 09/07/2017 9.8  8.6 - 10.2 mg/dL Final    Hemoglobin A1C 09/07/2017 5.0  4.8 - 5.9 % Final   Orders Only on 08/16/2017   Component Date Value Ref Range Status    Sodium 08/16/2017 137  132 medications. Return in about 5 weeks (around 11/6/2017), or labs then appt.   Again a lengthy conversation had w pt re: guarded to poor prognosis even if she dcs drinking etoh forever and need for better compliance  Has seen roberta maddox and farhana bah tomorrow  Scheduled w dr Avendano Rising  On lovenox qd for dvt  Aware of risks of death/dz teddy w varices (possibility)    Needs hematology as has dvt and liver failure  ivc filter consideration    Seeing [facility]  Ammonia/cmp/ggt/cbc  Add flonase and claritin x 2 wksx for drainage and fluid  Natasha Rodriguez MD

## 2017-10-02 NOTE — MR AVS SNAPSHOT
After Visit Summary             Mitzi Yuan   10/2/2017 9:00 AM   Office Visit    Description:  Female : 1970   Provider:  Franc Brown MD   Department:  [facility]              Your Follow-Up and Future Appointments         Below is a list of your follow-up and future appointments. This may not be a complete list as you may have made appointments directly with providers that we are not aware of or your providers may have made some for you. Please call your providers to confirm appointments. It is important to keep your appointments. Please bring your current insurance card, photo ID, co-pay, and all medication bottles to your appointment. If self-pay, payment is expected at the time of service. Your To-Do List     Future Appointments Provider Department Dept Phone    2017 3:30 PM SCHEDULE, LAB [facility] -[phone]    If this is a fasting lab, please do not eat or drink past midnight other than water. 2017 2:00 PM MD [facility] -[phone]    Please arrive 15 minutes prior to appointment, bring photo ID and insurance card. Follow-Up    Return in about 5 weeks (around 2017), or labs then appt. Information from Your Visit        Department     Name Address Phone Fax    [facility] [address] [facility] [address] [phone] [facility]      You Were Seen for:         Comments    Type 2 diabetes mellitus with hyperglycemia, without long-term current use of insulin ([facility])   [[id number]]         Vital Signs     Blood Pressure Pulse Temperature Respirations Height Weight    102/68 78 97.9 °F (36.6 °C) (Temporal) 14 5' 3\" (1.6 m) 105 lb (47.6 kg)    Breastfeeding?  Body Mass Index Smoking Status             No 18.6 kg/m2 Never Smoker            Medications and Orders      Your Current Medications Are potassium chloride (KLOR-CON) 20 MEQ packet Take 20 mEq by mouth 2 times daily    traZODone (DESYREL) 50 MG tablet Take 0.5 tablets by mouth nightly    midodrine (PROAMATINE) 5 MG tablet Take 1 tablet by mouth 3 times daily    propranolol (INDERAL) 60 MG tablet Take 1 tablet by mouth every morning    spironolactone (ALDACTONE) 100 MG tablet Take 1 tablet by mouth daily    enoxaparin (LOVENOX) 30 MG/0.3ML injection 0.3ml SQ daily    Blood Glucose Monitoring Suppl (ONETOUCH VERIO) w/Device KIT U UTD    ONETOUCH VERIO strip TEST TID    BASAGLAR KWIKPEN 100 UNIT/ML injection pen INJECT 13 UNITS SUBCUTANESOULY QHS    BD PEN NEEDLE KAIN U/F 32G X 4 MM MISC TEST TID    ONETOUCH DELICA LANCETS FINE MISC TEST TID UTD    torsemide (DEMADEX) 20 MG tablet Take 2 tablets by mouth daily    folic acid (FOLVITE) 1 MG tablet Take 1 tablet by mouth daily    lactulose (CHRONULAC) 10 GM/15ML solution Take 30 g by mouth 2 times daily    Multiple Vitamins-Minerals (THERAGRAN-M) TABS Take 1 tablet by mouth every morning     thiamine 100 MG tablet Take 100 mg by mouth daily    Ascorbic Acid (VITAMIN C) 500 MG tablet Take 500 mg by mouth 2 times daily      Allergies           No Known Allergies      We Ordered/Performed the following           POCT Urinalysis No Micro (Auto)     Referral To Unknown External Hematology     Comments: The patient can be scheduled with any member of the group, including the provider with the first available appointments.           Result Summary for POCT Urinalysis No Micro (Auto)      Result Information       Status          Normal Final result (Collected: 10/2/2017  8:53 AM)           10/2/2017  8:54 AM      Component Results     Component Value    Color, UA     Clarity, UA     Glucose, UA POC neg    Bilirubin, UA neg    Ketones, UA neg    Spec Grav, UA 1.015    Blood, UA POC neg    pH, UA 6.0    Protein, UA POC +    Urobilinogen, UA neg    Leukocytes, UA neg    Nitrite, UA neg Additional Information        Basic Information     Date Of Birth Sex Race Ethnicity Preferred Language    1970 Female White / English      Problem List as of 10/2/2017  Date Reviewed: 8/17/2017                Acute deep vein thrombosis (DVT) of left lower extremity (HCC)    Edema of left lower extremity    ANTONIO (acute kidney injury) ([facility] [address]    Hyperglycemia due to type 2 diabetes mellitus (HCC) (Chronic)    Alcoholic cirrhosis of liver (HCC) (Chronic)    Steatohepatitis, alcoholic (Chronic)    Severe sepsis without septic shock (CODE)    E. coli UTI (urinary tract infection)    Cholelithiasis (Chronic)    Jaundice, hepatocellular (Chronic)    Acute blood loss anemia    Coagulopathy (HCC) (Chronic)    Lactic acid acidosis    Hyperammonemia (HCC) (Chronic)    Basal cell carcinoma of leg    Anemia    Alcohol abuse      Preventive Care        Date Due    Eye Exam By An Eye Doctor 5/25/1980    Pap Smear 5/25/1991    Pneumococcal Vaccine - Pneumovax for adults aged 19-64 years with: chronic heart disease, chronic lung disease, diabetes mellitus, alcoholism, chronic liver disease, or cigarette smoking. (1 of 1 - PPSV23) 5/30/2018 (Originally 5/25/1989)    HIV screening is recommended for all people regardless of risk factors  aged 15-65 years at least once (lifetime) who have never been HIV tested. 9/7/2018 (Originally 5/25/1985)    Tetanus Combination Vaccine (1 - Tdap) 10/2/2018 (Originally 5/25/1989)    Yearly Flu Vaccine (1) 10/2/2018 (Originally 9/1/2017)    Diabetic Foot Exam 9/7/2018    Hemoglobin A1C (Test For Long-Term Glucose Control) 9/7/2018    Urine Check For Kidney Problems 9/7/2018    Cholesterol Screening 9/7/2018            Lap[employer]acet Signup           Our records indicate that you have an active [employer] account. You can view your After Visit Summary by going to [email or web address] and logging in with your [employer] username and password. If you don't have a [employer] username and password but a parent or guardian has access to your record, the parent or guardian should login with their own [employer] username and password and access your record to view the After Visit Summary. Additional Information  If you have questions, please contact the physician practice where you receive care. Remember, [employer] is NOT to be used for urgent needs. For medical emergencies, dial 911. For questions regarding your [employer] account call [phone]. If you have a clinical question, please call your doctor's office.

## 2017-10-02 NOTE — TELEPHONE ENCOUNTER
Upon check out she was told she'll be given a prescription for FLONASE, Claritin. Patient also following up on her disability papers, she said SS sent papers over for  [facility] to sign.

## 2017-10-03 ENCOUNTER — HOSPITAL ENCOUNTER (OUTPATIENT)
Age: 47
Setting detail: OUTPATIENT SURGERY
Discharge: HOME | End: 2017-10-03
Attending: SPECIALIST | Admitting: SPECIALIST
Payer: MEDICAID

## 2017-10-03 ENCOUNTER — ANESTHESIA (OUTPATIENT)
Dept: ENDOSCOPY | Age: 47
End: 2017-10-03
Payer: MEDICAID

## 2017-10-03 ENCOUNTER — ANESTHESIA EVENT (OUTPATIENT)
Dept: ENDOSCOPY | Age: 47
End: 2017-10-03
Payer: MEDICAID

## 2017-10-03 VITALS
SYSTOLIC BLOOD PRESSURE: 102 MMHG | TEMPERATURE: 97.7 F | OXYGEN SATURATION: 100 % | WEIGHT: 105 LBS | HEIGHT: 63 IN | HEART RATE: 75 BPM | RESPIRATION RATE: 16 BRPM | DIASTOLIC BLOOD PRESSURE: 48 MMHG | BODY MASS INDEX: 18.61 KG/M2

## 2017-10-03 VITALS
SYSTOLIC BLOOD PRESSURE: 111 MMHG | TEMPERATURE: 97.7 F | RESPIRATION RATE: 13 BRPM | DIASTOLIC BLOOD PRESSURE: 63 MMHG | OXYGEN SATURATION: 99 %

## 2017-10-03 PROCEDURE — 7100000010 HC PHASE II RECOVERY - FIRST 15 MIN: Performed by: SPECIALIST

## 2017-10-03 PROCEDURE — 2500000003 HC RX 250 WO HCPCS: Performed by: NURSE ANESTHETIST, CERTIFIED REGISTERED

## 2017-10-03 PROCEDURE — 3700000000 HC ANESTHESIA ATTENDED CARE: Performed by: SPECIALIST

## 2017-10-03 PROCEDURE — 2580000003 HC RX 258: Performed by: SPECIALIST

## 2017-10-03 PROCEDURE — 3609017100 HC EGD: Performed by: SPECIALIST

## 2017-10-03 PROCEDURE — 6360000002 HC RX W HCPCS: Performed by: NURSE ANESTHETIST, CERTIFIED REGISTERED

## 2017-10-03 RX ORDER — SODIUM CHLORIDE 0.9 % (FLUSH) 0.9 %
10 SYRINGE (ML) INJECTION EVERY 12 HOURS SCHEDULED
Status: DISCONTINUED | OUTPATIENT
Start: 2017-10-03 | End: 2017-10-03 | Stop reason: HOSPADM

## 2017-10-03 RX ORDER — LIDOCAINE HYDROCHLORIDE 10 MG/ML
1 INJECTION, SOLUTION EPIDURAL; INFILTRATION; INTRACAUDAL; PERINEURAL
Status: DISCONTINUED | OUTPATIENT
Start: 2017-10-03 | End: 2017-10-03 | Stop reason: HOSPADM

## 2017-10-03 RX ORDER — LIDOCAINE HYDROCHLORIDE 20 MG/ML
INJECTION, SOLUTION EPIDURAL; INFILTRATION; INTRACAUDAL; PERINEURAL PRN
Status: DISCONTINUED | OUTPATIENT
Start: 2017-10-03 | End: 2017-10-03 | Stop reason: SDUPTHER

## 2017-10-03 RX ORDER — SODIUM CHLORIDE 9 MG/ML
INJECTION, SOLUTION INTRAVENOUS CONTINUOUS
Status: DISCONTINUED | OUTPATIENT
Start: 2017-10-03 | End: 2017-10-03 | Stop reason: HOSPADM

## 2017-10-03 RX ORDER — SODIUM CHLORIDE 0.9 % (FLUSH) 0.9 %
10 SYRINGE (ML) INJECTION PRN
Status: DISCONTINUED | OUTPATIENT
Start: 2017-10-03 | End: 2017-10-03 | Stop reason: HOSPADM

## 2017-10-03 RX ORDER — PROPOFOL 10 MG/ML
INJECTION, EMULSION INTRAVENOUS PRN
Status: DISCONTINUED | OUTPATIENT
Start: 2017-10-03 | End: 2017-10-03 | Stop reason: SDUPTHER

## 2017-10-03 RX ADMIN — LIDOCAINE HYDROCHLORIDE 50 MG: 20 INJECTION, SOLUTION EPIDURAL; INFILTRATION; INTRACAUDAL; PERINEURAL at 10:15

## 2017-10-03 RX ADMIN — SODIUM CHLORIDE: 9 INJECTION, SOLUTION INTRAVENOUS at 09:44

## 2017-10-03 RX ADMIN — PROPOFOL 150 MG: 10 INJECTION, EMULSION INTRAVENOUS at 10:15

## 2017-10-03 ASSESSMENT — PAIN - FUNCTIONAL ASSESSMENT: PAIN_FUNCTIONAL_ASSESSMENT: 0-10

## 2017-10-03 NOTE — ANESTHESIA PRE PROCEDURE
Department of Anesthesiology  Preprocedure Note       Name:  Viktor Cheatham   Age:  52 y.o.  :  1970                                          MRN:  [id number]         Date:  10/3/2017      Surgeon: Ny Miller):  Stefan Jones MD    Procedure: Procedure(s):  EGD ESOPHAGOGASTRODUODENOSCOPY    Medications prior to admission:   Prior to Admission medications    Medication Sig Start Date End Date Taking?  Authorizing Provider   potassium chloride (KLOR-CON) 20 MEQ packet Take 20 mEq by mouth 2 times daily 17  Yes Pedrito Chou MD   traZODone (DESYREL) 50 MG tablet Take 0.5 tablets by mouth nightly 9/15/17  Yes Pedrito Chou MD   midodrine (PROAMATINE) 5 MG tablet Take 1 tablet by mouth 3 times daily 9/15/17  Yes Pedrito Chou MD   propranolol (INDERAL) 60 MG tablet Take 1 tablet by mouth every morning 9/15/17  Yes Pedirto Chou MD   spironolactone (ALDACTONE) 100 MG tablet Take 1 tablet by mouth daily 9/15/17  Yes Pedrito Chou MD   enoxaparin (LOVENOX) 30 MG/0.3ML injection 0.3ml SQ daily 9/15/17  Yes Pooja Mckeon NP   BASAGLAR KWIKPEN 100 UNIT/ML injection pen INJECT [address] QHS 17  Yes Historical Provider, MD   torsemide (DEMADEX) 20 MG tablet Take 2 tablets by mouth daily 17  Yes Marylene Pollen, NP   lactulose (CHRONULAC) 10 GM/15ML solution Take 30 g by mouth 2 times daily 8/3/17  Yes Historical Provider, MD   Multiple Vitamins-Minerals (THERAGRAN-M) TABS Take 1 tablet by mouth every morning    Yes Historical Provider, MD   thiamine 100 MG tablet Take 100 mg by mouth daily   Yes Historical Provider, MD   Ascorbic Acid (VITAMIN C) 500 MG tablet Take 500 mg by mouth 2 times daily   Yes Historical Provider, MD   Blood Glucose Monitoring Suppl (Brooklynntiffany Odonnell) w/Device KIT U UTD 17   Historical ProviderMD [place] strip TEST TID 17   Historical Provider, MD   BD PEN NEEDLE KAIN U/F 32G X 4 MM MISC TEST TID 17   Historical Provider, MD Joshua Holcomb LANCETS FINE MISC TEST TID UTD 9/2/17   Historical Provider, MD   folic acid (FOLVITE) 1 MG tablet Take 1 tablet by mouth daily 9/5/17   Arley Hammonds MD       Current medications:    Current Facility-Administered Medications   Medication Dose Route Frequency Provider Last Rate Last Dose    0.9 % sodium chloride infusion   Intravenous Continuous Yonis Rios MD        sodium chloride flush 0.9 % injection 10 mL  10 mL Intravenous 2 times per day Yonis Rios MD        sodium chloride flush 0.9 % injection 10 mL  10 mL Intravenous PRN Yonis Rios MD        lidocaine PF 1 % injection 1 mL  1 mL Intradermal Once PRN Yonis Rios MD           Allergies:  No Known Allergies    Problem List:    Patient Active Problem List   Diagnosis Code    Alcohol abuse F10.10    Anemia D64.9    Basal cell carcinoma of leg R31.777    Alcoholic cirrhosis of liver (HCC) K70.30    Steatohepatitis, alcoholic N64.59    Severe sepsis without septic shock (CODE) R65.20    E. coli UTI (urinary tract infection) N39.0, B96.20    Cholelithiasis K80.20    Jaundice, hepatocellular K76.89    Acute blood loss anemia D62    Coagulopathy (HCC) D68.9    Lactic acid acidosis E87.2    Hyperammonemia (HCC) E72.20    ANTONIO (acute kidney injury) ([address] N17.9    Hyperglycemia due to type 2 diabetes mellitus (HCC) E11.65    Acute deep vein thrombosis (DVT) of left lower extremity (HCC) I82.402    Edema of left lower extremity R60.0       Past Medical History:        Diagnosis Date    ANTONIO (acute kidney injury) ([address] 8/2/2017    Alcohol abuse     Alcoholic cirrhosis of liver ([address] 5/9/2017    Basal cell carcinoma of leg 10/3/2016    left leg    Blood clot in vein 10/03/2017    Being treated presently with Lovanox    Cirrhosis ([address]     Hx of blood clots     Type 2 diabetes mellitus without complication ([address]     Withdrawal seizures ([address]        Past Surgical History:        Procedure 11.0 09/19/2017    INR 1.1 09/19/2017    APTT 40.4 07/28/2017       HCG (If Applicable):   Lab Results   Component Value Date    PREGTESTUR negative 06/04/2017        ABGs: No results found for: PHART, PO2ART, SLU9NEF, VTS3KZP, BEART, B2SHBVTQ     Type & Screen (If Applicable):  No results found for: LABABO, LABRH    Anesthesia Evaluation    Airway: Mallampati: II        Dental:          Pulmonary:negative ROS breath sounds clear to auscultation         Cardiovascular:            Rhythm: regular             ROS comment: Dependent edema        Neuro/Psych:      GI/Hepatic/Renal:   (+) liver disease: coagulopathy from hepatic dysfunction,         Endo/Other:    (+) Type II DM, , blood dyscrasia: no interval change:. Abdominal:       Abdomen: soft. Anesthesia Plan    ASA 3     MAC     intravenous induction   Anesthetic plan and risks discussed with patient.     Plan discussed with attending and surgical team.            Brandin Kan CRNA   10/3/2017

## 2017-10-09 ENCOUNTER — TELEPHONE (OUTPATIENT)
Dept: FAMILY MEDICINE CLINIC | Age: 47
End: 2017-10-09

## 2017-10-09 RX ORDER — FLUTICASONE PROPIONATE 50 MCG
1 SPRAY, SUSPENSION (ML) NASAL DAILY
Qty: 1 BOTTLE | Refills: 5 | Status: SHIPPED | OUTPATIENT
Start: 2017-10-09 | End: 2017-01-01 | Stop reason: ALTCHOICE

## 2017-10-09 RX ORDER — FOLIC ACID 1 MG/1
1 TABLET ORAL DAILY
Qty: 30 TABLET | Refills: 11 | Status: SHIPPED | OUTPATIENT
Start: 2017-10-09 | End: 2017-01-01 | Stop reason: SDUPTHER

## 2017-10-09 RX ORDER — BLOOD SUGAR DIAGNOSTIC
STRIP MISCELLANEOUS
Qty: 100 EACH | Refills: 3 | Status: SHIPPED | OUTPATIENT
Start: 2017-10-09

## 2017-10-18 ENCOUNTER — OFFICE VISIT (OUTPATIENT)
Dept: FAMILY MEDICINE CLINIC | Age: 47
End: 2017-10-18

## 2017-10-18 VITALS
BODY MASS INDEX: 17.54 KG/M2 | HEIGHT: 63 IN | RESPIRATION RATE: 14 BRPM | TEMPERATURE: 97.8 F | HEART RATE: 78 BPM | SYSTOLIC BLOOD PRESSURE: 98 MMHG | WEIGHT: 99 LBS | DIASTOLIC BLOOD PRESSURE: 64 MMHG

## 2017-10-18 DIAGNOSIS — N17.9 AKI (ACUTE KIDNEY INJURY) (HCC): ICD-10-CM

## 2017-10-18 DIAGNOSIS — D64.9 ANEMIA, UNSPECIFIED TYPE: ICD-10-CM

## 2017-10-18 DIAGNOSIS — E11.65 TYPE 2 DIABETES MELLITUS WITH HYPERGLYCEMIA, WITHOUT LONG-TERM CURRENT USE OF INSULIN (HCC): Chronic | ICD-10-CM

## 2017-10-18 DIAGNOSIS — K70.31 ALCOHOLIC CIRRHOSIS OF LIVER WITH ASCITES (HCC): Chronic | ICD-10-CM

## 2017-10-18 DIAGNOSIS — D62 ACUTE BLOOD LOSS ANEMIA: ICD-10-CM

## 2017-10-18 DIAGNOSIS — L03.116 CELLULITIS OF LEFT LOWER EXTREMITY: Primary | ICD-10-CM

## 2017-10-18 PROCEDURE — 96372 THER/PROPH/DIAG INJ SC/IM: CPT | Performed by: FAMILY MEDICINE

## 2017-10-18 PROCEDURE — 99214 OFFICE O/P EST MOD 30 MIN: CPT | Performed by: FAMILY MEDICINE

## 2017-10-18 RX ORDER — AMOXICILLIN AND CLAVULANATE POTASSIUM 875; 125 MG/1; MG/1
1 TABLET, FILM COATED ORAL 2 TIMES DAILY
Qty: 20 TABLET | Refills: 0 | Status: SHIPPED | OUTPATIENT
Start: 2017-10-18 | End: 2017-10-28

## 2017-10-18 RX ORDER — CEFTRIAXONE 500 MG/1
500 INJECTION, POWDER, FOR SOLUTION INTRAMUSCULAR; INTRAVENOUS ONCE
Status: COMPLETED | OUTPATIENT
Start: 2017-10-18 | End: 2017-10-18

## 2017-10-18 RX ORDER — CEFTRIAXONE 500 MG/1
500 INJECTION, POWDER, FOR SOLUTION INTRAMUSCULAR; INTRAVENOUS ONCE
Qty: 1 G | Refills: 0
Start: 2017-10-18 | End: 2017-10-18 | Stop reason: CLARIF

## 2017-10-18 RX ADMIN — CEFTRIAXONE 500 MG: 500 INJECTION, POWDER, FOR SOLUTION INTRAMUSCULAR; INTRAVENOUS at 14:22

## 2017-10-18 NOTE — PROGRESS NOTES
tablet Take 1 tablet by mouth daily 30 tablet 11    ONETOUCH VERIO strip TEST  each 3    ONETOUCH DELICA LANCETS FINE MISC TEST TID  each 3    fluticasone (FLONASE) 50 MCG/ACT nasal spray 1 spray by Nasal route daily 1 Bottle 5    potassium chloride (KLOR-CON) 20 MEQ packet Take 20 mEq by mouth 2 times daily 60 each 5    traZODone (DESYREL) 50 MG tablet Take 0.5 tablets by mouth nightly 30 tablet 1    midodrine (PROAMATINE) 5 MG tablet Take 1 tablet by mouth 3 times daily 90 tablet 3    propranolol (INDERAL) 60 MG tablet Take 1 tablet by mouth every morning 30 tablet 5    spironolactone (ALDACTONE) 100 MG tablet Take 1 tablet by mouth daily 30 tablet 5    enoxaparin (LOVENOX) 30 MG/0.3ML injection 0.3ml SQ daily 30 Syringe 0    Blood Glucose Monitoring Suppl (ONETOUCH VERIO) w/Device KIT U UTD  0    BASAGLAR KWIKPEN 100 UNIT/ML injection pen INJECT 13 UNITS SUBCUTANESOULY QHS  0    BD PEN NEEDLE KAIN U/F 32G X 4 MM MISC TEST TID  0    torsemide (DEMADEX) 20 MG tablet Take 2 tablets by mouth daily 60 tablet 2    lactulose (CHRONULAC) 10 GM/15ML solution Take 30 g by mouth 2 times daily      Multiple Vitamins-Minerals (THERAGRAN-M) TABS Take 1 tablet by mouth every morning       thiamine 100 MG tablet Take 100 mg by mouth daily      Ascorbic Acid (VITAMIN C) 500 MG tablet Take 500 mg by mouth 2 times daily       No current facility-administered medications for this visit. The patient denies any history of                heart attack or KNOWN CAD        or stroke. No chest pain, shortness of breath, paroxysmal nocturnal dyspnea. No nausea, vomiting, diarrhea, hematochezia or melena. No paresthesias or headaches. No dysuria, frequency or hematuria.       Last labs  Office Visit on 10/02/2017   Component Date Value Ref Range Status    Glucose, UA POC 10/02/2017 neg   Final    Bilirubin, UA 10/02/2017 neg   Final    Ketones, UA 10/02/2017 neg   Final    Spec Grav, UA 10/02/2017 1.015   Final    Blood, UA POC 10/02/2017 neg   Final    pH, UA 10/02/2017 6.0   Final    Protein, UA POC 10/02/2017 +   Final    Urobilinogen, UA 10/02/2017 neg   Final    Leukocytes, UA 10/02/2017 neg   Final    Nitrite, UA 10/02/2017 neg   Final   Orders Only on 09/19/2017   Component Date Value Ref Range Status    AFP-Tumor Marker 09/21/2017 6  0 - 9 ng/mL Final    Comment: INTERPRETIVE INFORMATION: Alpha Fetoprotein Tumor Marker  The Alexandra Marqu[employer] Access DxI AFP method is used. Results obtained  with  different assay methods or kits cannot be used interchangeably. AFP is  a  valuable aid in the management of nonseminomatous testicular cancer  patients  when used in conjunction with information available from the clinical  evaluation and other diagnostic procedures. Increased AFP  concentrations have  also been observed in ataxia telangiectasia, hereditary tyrosinemia,  primary  hepatocellular carcinoma, teratocarcinoma, gastrointestinal tract  cancers  with and without liver metastases, and in benign hepatic conditions  such as  acute viral hepatitis, chronic active hepatitis, and cirrhosis. The  result  cannot be interpreted as absolute evidence of the presence or absence  of  malignant disease. The result is not interpretable as a tumor marker in  pregnant females. Access complete set of age- and/or gender-specific reference intervals  for  this loan                           t in the [facility] ([email or web address]). Performed by Norma Franz , [address] [facility] [phone]  www. Chase Massey MD - Lab.  Director     Orders Only on 09/19/2017   Component Date Value Ref Range Status    Sodium 09/19/2017 133  132 - 144 mEq/L Final    Potassium 09/19/2017 3.8  3.5 - 5.1 mEq/L Final    Chloride 09/19/2017 93* 98 - 107 mEq/L Final    CO2 09/19/2017 22  22 - 29 mEq/L Final    Anion Gap 09/19/2017 18* 7 - 13 mEq/L Final    Glucose 09/19/2017 116* 74 - 109 mg/dL Final    BUN 09/19/2017 26* 6 - 20 mg/dL Final    CREATININE 09/19/2017 1.41* 0.50 - 0.90 mg/dL Final    GFR Non- 09/19/2017 39.9* >60 Final    Comment: >60 mL/min/1.73m2 EGFR, calc. for ages 25 and older using the  MDRD formula (not corrected for weight), is valid for stable  renal function.  GFR  09/19/2017 48.3* >60 Final    Comment: >60 mL/min/1.73m2 EGFR, calc. for ages 25 and older using the  MDRD formula (not corrected for weight), is valid for stable  renal function.  Calcium 09/19/2017 9.8  8.6 - 10.2 mg/dL Final   Orders Only on 09/19/2017   Component Date Value Ref Range Status    Total Protein 09/19/2017 8.2* 6.4 - 8.1 g/dL Final    Alb 09/19/2017 4.1  3.9 - 4.9 g/dL Final    Alkaline Phosphatase 09/19/2017 116  40 - 130 U/L Final    ALT 09/19/2017 15  0 - 33 U/L Final    AST 09/19/2017 66* 0 - 35 U/L Final    Total Bilirubin 09/19/2017 1.7* 0.0 - 1.2 mg/dL Final    Bilirubin, Direct 09/19/2017 0.9* 0.0 - 0.3 mg/dL Final    Bilirubin, Indirect 09/19/2017 0.8* 0.0 - 0.6 mg/dL Final   Orders Only on 09/19/2017   Component Date Value Ref Range Status    Protime 09/19/2017 11.0  8.1 - 13.7 sec Final    INR 09/19/2017 1.1   Final    Comment: Recommended INR therapeutic ranges for oral anticoagulant  therapy    Prophylaxis/treatment of:                       INR     Venous Thrombosis, Pulmonary Embolism       2.0-3  Prevention of Systemic Embolism from:     Atrial Fibrillation                         2.0-3.0     Myocardial Infarction                       2.0-3.0     Mechanical Prosthetics Heart Valves         2.5-3.5     Recurrent Systemic Embolism                 2.5-3.5  Guidelines for patients with coagulopathy, e.g. liver disease:  Use the Protime resulted in seconds.     Mild        12.9-17.0 sec    Moderate    17.1-22.6 sec    Severe      G.T. 22.6 sec     Orders Only on 09/19/2017   Component Date Value Ref Range Status    WBC - 144 mEq/L Final    Potassium 09/07/2017 4.6  3.5 - 5.1 mEq/L Final    Chloride 09/07/2017 96* 98 - 107 mEq/L Final    CO2 09/07/2017 18* 22 - 29 mEq/L Final    Anion Gap 09/07/2017 20* 7 - 13 mEq/L Final    Glucose 09/07/2017 128* 74 - 109 mg/dL Final    BUN 09/07/2017 36* 6 - 20 mg/dL Final    CREATININE 09/07/2017 1.27* 0.50 - 0.90 mg/dL Final    GFR Non- 09/07/2017 45.0* >60 Final    Comment: >60 mL/min/1.73m2 EGFR, calc. for ages 25 and older using the  MDRD formula (not corrected for weight), is valid for stable  renal function.  GFR  09/07/2017 54.5* >60 Final    Comment: >60 mL/min/1.73m2 EGFR, calc. for ages 25 and older using the  MDRD formula (not corrected for weight), is valid for stable  renal function.  Calcium 09/07/2017 9.8  8.6 - 10.2 mg/dL Final    Hemoglobin A1C 09/07/2017 5.0  4.8 - 5.9 % Final   Orders Only on 08/16/2017   Component Date Value Ref Range Status    Sodium 08/16/2017 137  132 - 144 mEq/L Final    Potassium 08/16/2017 3.7  3.5 - 5.1 mEq/L Final    Chloride 08/16/2017 100  98 - 107 mEq/L Final    CO2 08/16/2017 17* 22 - 29 mEq/L Final    Anion Gap 08/16/2017 20* 7 - 13 mEq/L Final    Glucose 08/16/2017 99  74 - 109 mg/dL Final    BUN 08/16/2017 41* 6 - 20 mg/dL Final    CREATININE 08/16/2017 2.13* 0.50 - 0.90 mg/dL Final    GFR Non- 08/16/2017 24.8* >60 Final    Comment: >60 mL/min/1.73m2 EGFR, calc. for ages 25 and older using the  MDRD formula (not corrected for weight), is valid for stable  renal function.  GFR  08/16/2017 30.0* >60 Final    Comment: >60 mL/min/1.73m2 EGFR, calc. for ages 25 and older using the  MDRD formula (not corrected for weight), is valid for stable  renal function.       Calcium 08/16/2017 8.5* 8.6 - 10.2 mg/dL Final    Total Protein 08/16/2017 6.5  6.4 - 8.1 g/dL Final    Alb 08/16/2017 3.3* 3.9 - 4.9 g/dL Final    Total Bilirubin 08/16/2017 3.4* 0.0 - 1.2 °C)   TempSrc: Temporal   Weight: 99 lb (44.9 kg)   Height: 5' 3\" (1.6 m)       PHYSICAL EXAMINATION:        GENERAL:    The patient appears well nourished and well-developed,     Normal affect. Not appearing significantly anxious or depressed. No acute respiratory distress. Alert and oriented times 3. Skin:     No skin rashes. No concerning moles observed. Gait:    Normal gait. No ataxia. HEENT:  Normocephalic, atraumatic. Throat:  Pharynx is clear, no erythema/ edema or exudates   Ears:    TMs normal bilaterally. Canals and ears normal   Eyes:  Extraocular eye motions intact and pain free. Pupils reactive/equal    Sclerae and conjunctivae clear    NECK: No masses or adenopathy palpable. No carotid bruits heard. No asymmetry visible. No thyromegaly. RESPIRATORY:   Clear/ Equal breath sounds /No acute respiratory distress. No wheezes,rales, or rhonchi. No percussive abnormalities    HEART: Regular rhythm without murmur, rub or gallop. ABDOMEN:  Soft, non tender. No masses, guarding or rebound. Normo active bowel sounds. EXTREMITIES:  No edema in any extremity. No cyanosis or clubbing. 2+ dorsalis pedis pulses bilaterally    4 total lesions 2, 2mm 2 <1mm scabbed and surrounding erythema of largest in center is 3-4 cm others minimal erythema around  Below calf on LLE      Assessment & Plan   1. Cellulitis of left lower extremity     2. Type 2 diabetes mellitus with hyperglycemia, without long-term current use of insulin (HCC)     3. Anemia, unspecified type     4. ANTONIO (acute kidney injury) ([address]     5. Alcoholic cirrhosis of liver with ascites ([address]     6. Acute blood loss anemia       No orders of the defined types were placed in this encounter.     Orders Placed This Encounter   Medications    cefTRIAXone (ROCEPHIN) 500 MG injection     Sig: Inject 0.5 g into the muscle once for 1 dose     Dispense:  1 g     Refill:  0    amoxicillin-clavulanate

## 2017-10-19 ENCOUNTER — HOSPITAL ENCOUNTER (EMERGENCY)
Age: 47
Discharge: HOME | End: 2017-10-19
Attending: STUDENT IN AN ORGANIZED HEALTH CARE EDUCATION/TRAINING PROGRAM
Payer: MEDICAID

## 2017-10-19 VITALS
RESPIRATION RATE: 16 BRPM | OXYGEN SATURATION: 100 % | BODY MASS INDEX: 18.4 KG/M2 | SYSTOLIC BLOOD PRESSURE: 102 MMHG | HEART RATE: 70 BPM | TEMPERATURE: 97.9 F | HEIGHT: 62 IN | DIASTOLIC BLOOD PRESSURE: 61 MMHG | WEIGHT: 100 LBS

## 2017-10-19 DIAGNOSIS — Z79.4 TYPE 2 DIABETES MELLITUS WITH HYPERGLYCEMIA, WITH LONG-TERM CURRENT USE OF INSULIN (HCC): Primary | ICD-10-CM

## 2017-10-19 DIAGNOSIS — E11.65 TYPE 2 DIABETES MELLITUS WITH HYPERGLYCEMIA, WITH LONG-TERM CURRENT USE OF INSULIN (HCC): Primary | ICD-10-CM

## 2017-10-19 DIAGNOSIS — E86.0 DEHYDRATION: ICD-10-CM

## 2017-10-19 LAB
ALBUMIN SERPL-MCNC: 4 G/DL (ref 3.9–4.9)
ALP BLD-CCNC: 137 U/L (ref 40–130)
ALT SERPL-CCNC: 13 U/L (ref 0–33)
ANION GAP SERPL CALCULATED.3IONS-SCNC: 19 MEQ/L (ref 7–13)
AST SERPL-CCNC: 35 U/L (ref 0–35)
BASOPHILS ABSOLUTE: 0.1 K/UL (ref 0–0.2)
BASOPHILS RELATIVE PERCENT: 0.8 %
BETA-HYDROXYBUTYRATE: 3.2 MG/DL (ref 0.2–2.8)
BILIRUB SERPL-MCNC: 1.1 MG/DL (ref 0–1.2)
BILIRUBIN URINE: NEGATIVE
BLOOD, URINE: NEGATIVE
BUN BLDV-MCNC: 31 MG/DL (ref 6–20)
CALCIUM SERPL-MCNC: 10.5 MG/DL (ref 8.6–10.2)
CHLORIDE BLD-SCNC: 88 MEQ/L (ref 98–107)
CHP ED QC CHECK: NORMAL
CHP ED QC CHECK: NORMAL
CLARITY: CLEAR
CO2: 24 MEQ/L (ref 22–29)
COLOR: YELLOW
CREAT SERPL-MCNC: 1.27 MG/DL (ref 0.5–0.9)
EOSINOPHILS ABSOLUTE: 0 K/UL (ref 0–0.7)
EOSINOPHILS RELATIVE PERCENT: 0.5 %
GFR AFRICAN AMERICAN: 54.5
GFR NON-AFRICAN AMERICAN: 45
GLOBULIN: 4.1 G/DL (ref 2.3–3.5)
GLUCOSE BLD-MCNC: 249 MG/DL (ref 60–115)
GLUCOSE BLD-MCNC: 526 MG/DL (ref 60–115)
GLUCOSE BLD-MCNC: 571 MG/DL
GLUCOSE BLD-MCNC: 571 MG/DL (ref 60–115)
GLUCOSE BLD-MCNC: 636 MG/DL (ref 74–109)
GLUCOSE URINE: >=1000 MG/DL
HCT VFR BLD CALC: 38.8 % (ref 37–47)
HEMOGLOBIN: 13 G/DL (ref 12–16)
KETONES, URINE: NEGATIVE MG/DL
LACTIC ACID: 1.8 MMOL/L (ref 0.5–2.2)
LEUKOCYTE ESTERASE, URINE: NEGATIVE
LYMPHOCYTES ABSOLUTE: 1.9 K/UL (ref 1–4.8)
LYMPHOCYTES RELATIVE PERCENT: 24 %
MCH RBC QN AUTO: 31 PG (ref 27–31.3)
MCHC RBC AUTO-ENTMCNC: 33.5 % (ref 33–37)
MCV RBC AUTO: 92.5 FL (ref 82–100)
MONOCYTES ABSOLUTE: 0.9 K/UL (ref 0.2–0.8)
MONOCYTES RELATIVE PERCENT: 11.4 %
NEUTROPHILS ABSOLUTE: 4.9 K/UL (ref 1.4–6.5)
NEUTROPHILS RELATIVE PERCENT: 63.3 %
NITRITE, URINE: NEGATIVE
PDW BLD-RTO: 15.6 % (ref 11.5–14.5)
PERFORMED ON: ABNORMAL
PH UA: 6.5 (ref 5–9)
PLATELET # BLD: 130 K/UL (ref 130–400)
POTASSIUM SERPL-SCNC: 3.9 MEQ/L (ref 3.5–5.1)
PREGNANCY TEST URINE, POC: NEGATIVE
PROTEIN UA: NEGATIVE MG/DL
RBC # BLD: 4.19 M/UL (ref 4.2–5.4)
SODIUM BLD-SCNC: 131 MEQ/L (ref 132–144)
SPECIFIC GRAVITY UA: 1.01 (ref 1–1.03)
TOTAL PROTEIN: 8.1 G/DL (ref 6.4–8.1)
URINE REFLEX TO CULTURE: ABNORMAL
UROBILINOGEN, URINE: 0.2 E.U./DL
WBC # BLD: 7.8 K/UL (ref 4.8–10.8)

## 2017-10-19 PROCEDURE — 36415 COLL VENOUS BLD VENIPUNCTURE: CPT

## 2017-10-19 PROCEDURE — 85025 COMPLETE CBC W/AUTO DIFF WBC: CPT

## 2017-10-19 PROCEDURE — 2580000003 HC RX 258: Performed by: STUDENT IN AN ORGANIZED HEALTH CARE EDUCATION/TRAINING PROGRAM

## 2017-10-19 PROCEDURE — 83605 ASSAY OF LACTIC ACID: CPT

## 2017-10-19 PROCEDURE — 96361 HYDRATE IV INFUSION ADD-ON: CPT

## 2017-10-19 PROCEDURE — 80053 COMPREHEN METABOLIC PANEL: CPT

## 2017-10-19 PROCEDURE — 96372 THER/PROPH/DIAG INJ SC/IM: CPT

## 2017-10-19 PROCEDURE — 99285 EMERGENCY DEPT VISIT HI MDM: CPT

## 2017-10-19 PROCEDURE — 96360 HYDRATION IV INFUSION INIT: CPT

## 2017-10-19 PROCEDURE — 81003 URINALYSIS AUTO W/O SCOPE: CPT

## 2017-10-19 PROCEDURE — 82010 KETONE BODYS QUAN: CPT

## 2017-10-19 RX ORDER — 0.9 % SODIUM CHLORIDE 0.9 %
1000 INTRAVENOUS SOLUTION INTRAVENOUS ONCE
Status: COMPLETED | OUTPATIENT
Start: 2017-10-19 | End: 2017-10-19

## 2017-10-19 RX ADMIN — SODIUM CHLORIDE 1000 ML: 9 INJECTION, SOLUTION INTRAVENOUS at 10:46

## 2017-10-19 RX ADMIN — SODIUM CHLORIDE 1000 ML: 9 INJECTION, SOLUTION INTRAVENOUS at 12:35

## 2017-10-19 ASSESSMENT — ENCOUNTER SYMPTOMS
DIARRHEA: 0
BACK PAIN: 0
VOMITING: 0
ABDOMINAL PAIN: 0
CHEST TIGHTNESS: 0
COUGH: 0
SHORTNESS OF BREATH: 0
SINUS PRESSURE: 0
TROUBLE SWALLOWING: 0

## 2017-10-19 NOTE — ED NOTES
Pt amb to restroom with steady gate prior to additional fluids hanging     Joyce Calderon, ANDRA  10/19/17 8949

## 2017-10-19 NOTE — ED PROVIDER NOTES
[facility] ED  eMERGENCY dEPARTMENT eNCOUnter      Pt Name: Jessica Briscoe  MRN: [id number]  [place] 1970  Date of evaluation: 10/19/2017  Provider: Emmy Mujica, [address] [place] [address]       Chief Complaint   Patient presents with    Hyperglycemia         HISTORY OF PRESENT ILLNESS   (Location/Symptom, Timing/Onset, Context/Setting, Quality, Duration, Modifying Factors, Severity)  Note limiting factors. Jessica Briscoe is a 52 y.o. female who presents to the emergency department with complaint of high blood sugar, frequent urination, and excessive thrist.  Patient denies any nausea vomiting or abdominal pain. Patient denies any fever chills or cough. Patient takes insulin once a day and injects in the same site. I asked patient about slight rotation she states that this is never been explained to her. Patient has not seen an endocrinologist for diabetes management. Patient states that she's been diabetic for a few months. HPI    Nursing Notes were reviewed. REVIEW OF SYSTEMS    (2-9 systems for level 4, 10 or more for level 5)     Review of Systems   Constitutional: Negative for activity change, appetite change, chills, fever and unexpected weight change. HENT: Negative for drooling, ear pain, nosebleeds, sinus pressure and trouble swallowing. Respiratory: Negative for cough, chest tightness and shortness of breath. Cardiovascular: Negative for chest pain and leg swelling. Gastrointestinal: Negative for abdominal pain, diarrhea and vomiting. Endocrine: Positive for polydipsia and polyuria. Negative for polyphagia. Genitourinary: Positive for frequency. Negative for dysuria and flank pain. Musculoskeletal: Negative for back pain and myalgias. Skin: Negative for pallor and rash. Neurological: Negative for syncope, weakness and headaches. Hematological: Does not bruise/bleed easily. All other systems reviewed and are negative.       Except as noted above the remainder of the review of systems was reviewed and negative.        PAST MEDICAL HISTORY     Past Medical History:   Diagnosis Date    [facility]TONIO [facility]acute kidney [facility]) ([facility] 8/2/2017    Alcohol abuse     [facility] of liver [facility] 5/9/2017    Basal cell carcinoma of leg 10/3/2016    left leg    Blood clot in vein 10/03/2017    Being treated presently with Lovanox    Cirrhosis ([facility]     Hx of blood clots     Type 2 diabetes mellitus without complication [facility]     Withdrawal seizures ([facility]          SURGICAL HISTORY       Past Surgical History:   Procedure Laterality Date    MT ESOPHAGOGASTRODUODENOSCOPY TRANSORAL DIAGNOSTIC N/A 10/3/2017    EGD ESOPHAGOGASTRODUODENOSCOPY performed by Jerilyn Paul MD at [address] [facility]  N Left     leg    US PARACENTESIS SUBSEQUENT  7/10/2017              CURRENT MEDICATIONS       Previous Medications    AMOXICILLIN-CLAVULANATE (AUGMENTIN) 875-125 MG PER TABLET    Take 1 tablet by mouth 2 times daily for 10 days    ASCORBIC ACID (VITAMIN C) 500 MG TABLET    Take 500 mg by mouth 2 times daily    BASAGLAR KWIKPEN 100 UNIT/ML INJECTION PEN    INJECT 13 UNITS SUBCUTANESOULY QHS    BD PEN NEEDLE KAIN U/F 32G X 4 MM MISC    TEST TID    BLOOD GLUCOSE MONITORING SUPPL (ONETOUCH VERIO) W/DEVICE KIT    U UTD    ENOXAPARIN (LOVENOX) 30 MG/0.3ML INJECTION    0.3ml SQ daily    FLUTICASONE (FLONASE) 50 MCG/ACT NASAL SPRAY    1 spray by Nasal route daily    FOLIC ACID (FOLVITE) 1 MG TABLET    Take 1 tablet by mouth daily    LACTULOSE (CHRONULAC) 10 GM/15ML SOLUTION    Take 30 g by mouth 2 times daily    MIDODRINE (PROAMATINE) 5 MG TABLET    Take 1 tablet by mouth 3 times daily    MULTIPLE VITAMINS-MINERALS (THERAGRAN-M) TABS    Take 1 tablet by mouth every morning     ONETOUCH DELICA LANCETS FINE MISC    TEST TID UTD    ONETOUCH VERIO STRIP    TEST TID    POTASSIUM CHLORIDE (KLOR-CON) 20 MEQ PACKET    Take 20 mEq by mouth 2 times daily (*)     Glucose 636 (*)     BUN 31 (*)     CREATININE 1.27 (*)     GFR Non- 45.0 (*)     GFR  54.5 (*)     Calcium 10.5 (*)     Alkaline Phosphatase 137 (*)     Globulin 4.1 (*)     All other components within normal limits    Narrative:     CALL  [facility] tel. [phone],  Glucose results called to and read back by Stephan Kellogg, 10/19/2017  11:50, by Mary Medrano - Abnormal; Notable for the following:     Beta-Hydroxybutyrate 3.2 (*)     All other components within normal limits    Narrative:     CALL  [facility] tel. [phone],  Gluc called to [facility]-, 10/19/2017 11:50, by [facility]   URINE RT REFLEX TO CULTURE - Abnormal; Notable for the following:     Glucose, Ur >=1000 (*)     All other components within normal limits   POCT GLUCOSE - Abnormal; Notable for the following:     POC Glucose 571 (*)     All other components within normal limits   POCT GLUCOSE - Abnormal; Notable for the following:     POC Glucose 526 (*)     All other components within normal limits   POCT GLUCOSE - Normal   POC PREGNANCY UR-QUAL - Normal   LACTIC ACID, PLASMA       All other labs were within normal range or not returned as of this dictation. EMERGENCY DEPARTMENT COURSE and DIFFERENTIAL DIAGNOSIS/MDM:   Vitals:    Vitals:    10/19/17 0958 10/19/17 1100 10/19/17 1226   BP: 115/65 105/66 109/70   Pulse: 62 72 65   Resp: 16 16 16   Temp: 97.9 °F (36.6 °C)     TempSrc: Oral  Oral   SpO2: 100% 100% 100%   Weight: 100 lb (45.4 kg)     Height: 5' 2\" (1.575 m)             MDM  Patient received 2 L of IV fluids and subcutaneous insulin. I calculated out this correction that the patient's blood sugar should be close to 150. Patient is not vomiting. Tolerating oral fluids well. Patient has not been rotating her sites and simply taking her insulin injection the exact same spots.   I explained to the patient that she wouldn't scarring of the fatty tissue underneath her skin and have

## 2017-10-19 NOTE — ED NOTES
Bed: 03  Expected date: 10/19/17  Expected time: 9:39 AM  Means of arrival:   Comments:  52 F Hyperglycemia. Joyce Xavier Blood sugar 560 per rainer, New DM this earlier this year     Tae Gutierres, ANDRA  10/19/17 7320

## 2017-10-19 NOTE — ED TRIAGE NOTES
Pt states she has had an increased urinary output x1 week. Pt is a new dx diabetic and takes on ly lantus at night. Pt states she took an additional 2units this morning around 0530 because her glucometer was reading high. Squad states accucheck was 560 enroute to ed with fluids running. Pt is a+o x4 msps intact lungs clear bilat. Pt states she saw her pcp yesterday for her regular checkup. Pt denies any fever chills, n/v/d, or recent illness. Skin is warm pink dry at this time. Pt appears in no distress and denies any complaints except for her sugar being elevated.

## 2017-10-19 NOTE — ED NOTES
Glucose 249 prior to discharge advised pt to followup with Dr Roman Meneses per discharge instructions     Terry Beaver RN  10/19/17 5211

## 2017-10-25 ENCOUNTER — OFFICE VISIT (OUTPATIENT)
Dept: FAMILY MEDICINE CLINIC | Age: 47
End: 2017-10-25

## 2017-10-25 VITALS
WEIGHT: 95 LBS | TEMPERATURE: 95.3 F | BODY MASS INDEX: 17.38 KG/M2 | DIASTOLIC BLOOD PRESSURE: 70 MMHG | OXYGEN SATURATION: 98 % | HEART RATE: 64 BPM | SYSTOLIC BLOOD PRESSURE: 110 MMHG

## 2017-10-25 DIAGNOSIS — E11.65 TYPE 2 DIABETES MELLITUS WITH HYPERGLYCEMIA, WITHOUT LONG-TERM CURRENT USE OF INSULIN (HCC): Chronic | ICD-10-CM

## 2017-10-25 DIAGNOSIS — R73.9 HYPERGLYCEMIA: ICD-10-CM

## 2017-10-25 DIAGNOSIS — E72.20 HYPERAMMONEMIA (HCC): Chronic | ICD-10-CM

## 2017-10-25 DIAGNOSIS — R65.20 SEVERE SEPSIS WITHOUT SEPTIC SHOCK (CODE) (HCC): ICD-10-CM

## 2017-10-25 DIAGNOSIS — C44.719 BASAL CELL CARCINOMA OF LEFT LOWER EXTREMITY: ICD-10-CM

## 2017-10-25 DIAGNOSIS — D68.9 COAGULOPATHY (HCC): Chronic | ICD-10-CM

## 2017-10-25 DIAGNOSIS — F10.20 ALCOHOLISM (HCC): ICD-10-CM

## 2017-10-25 DIAGNOSIS — K76.89 JAUNDICE, HEPATOCELLULAR: Chronic | ICD-10-CM

## 2017-10-25 DIAGNOSIS — H10.31 ACUTE BACTERIAL CONJUNCTIVITIS OF RIGHT EYE: ICD-10-CM

## 2017-10-25 DIAGNOSIS — E11.65 TYPE 2 DIABETES MELLITUS WITH HYPERGLYCEMIA, WITHOUT LONG-TERM CURRENT USE OF INSULIN (HCC): Primary | Chronic | ICD-10-CM

## 2017-10-25 LAB
ALBUMIN SERPL-MCNC: 4.3 G/DL (ref 3.9–4.9)
ALP BLD-CCNC: 135 U/L (ref 40–130)
ALT SERPL-CCNC: 12 U/L (ref 0–33)
AMYLASE: 57 U/L (ref 28–100)
ANION GAP SERPL CALCULATED.3IONS-SCNC: 23 MEQ/L (ref 7–13)
AST SERPL-CCNC: 38 U/L (ref 0–35)
BILIRUB SERPL-MCNC: 1.3 MG/DL (ref 0–1.2)
BUN BLDV-MCNC: 25 MG/DL (ref 6–20)
CALCIUM SERPL-MCNC: 11 MG/DL (ref 8.6–10.2)
CHLORIDE BLD-SCNC: 79 MEQ/L (ref 98–107)
CO2: 20 MEQ/L (ref 22–29)
CREAT SERPL-MCNC: 1.25 MG/DL (ref 0.5–0.9)
GFR AFRICAN AMERICAN: 55.5
GFR NON-AFRICAN AMERICAN: 45.9
GLOBULIN: 4.4 G/DL (ref 2.3–3.5)
GLUCOSE BLD-MCNC: 584 MG/DL (ref 74–109)
HBA1C MFR BLD: 9.4 % (ref 4.8–5.9)
HCT VFR BLD CALC: 41.9 % (ref 37–47)
HEMOGLOBIN: 14.2 G/DL (ref 12–16)
LIPASE: 20 U/L (ref 13–60)
MCH RBC QN AUTO: 31.6 PG (ref 27–31.3)
MCHC RBC AUTO-ENTMCNC: 33.8 % (ref 33–37)
MCV RBC AUTO: 93.3 FL (ref 82–100)
PDW BLD-RTO: 15.4 % (ref 11.5–14.5)
PLATELET # BLD: 143 K/UL (ref 130–400)
POTASSIUM SERPL-SCNC: 4.2 MEQ/L (ref 3.5–5.1)
RBC # BLD: 4.5 M/UL (ref 4.2–5.4)
SODIUM BLD-SCNC: 122 MEQ/L (ref 132–144)
TOTAL PROTEIN: 8.7 G/DL (ref 6.4–8.1)
WBC # BLD: 7.5 K/UL (ref 4.8–10.8)

## 2017-10-25 PROCEDURE — G8419 CALC BMI OUT NRM PARAM NOF/U: HCPCS | Performed by: FAMILY MEDICINE

## 2017-10-25 PROCEDURE — 1036F TOBACCO NON-USER: CPT | Performed by: FAMILY MEDICINE

## 2017-10-25 PROCEDURE — G8484 FLU IMMUNIZE NO ADMIN: HCPCS | Performed by: FAMILY MEDICINE

## 2017-10-25 PROCEDURE — G8427 DOCREV CUR MEDS BY ELIG CLIN: HCPCS | Performed by: FAMILY MEDICINE

## 2017-10-25 PROCEDURE — 99214 OFFICE O/P EST MOD 30 MIN: CPT | Performed by: FAMILY MEDICINE

## 2017-10-25 PROCEDURE — 3044F HG A1C LEVEL LT 7.0%: CPT | Performed by: FAMILY MEDICINE

## 2017-10-25 RX ORDER — ASCORBIC ACID 500 MG
500 TABLET ORAL 2 TIMES DAILY
Qty: 30 TABLET | Refills: 5 | Status: SHIPPED | OUTPATIENT
Start: 2017-10-25 | End: 2017-01-01 | Stop reason: SDUPTHER

## 2017-10-25 NOTE — PROGRESS NOTES
Subjective  Kip [place], 52 y.o. female presents today with:  Chief Complaint   Patient presents with    Follow-up     infection of left leg    Eye Pain     rt eye redness and pain     Still taking augmentin  Had new contact solution yesterday and since significant eye erythema on R and fluorescein by me today no ulcers  Leg healing  Sugars WAY up lately  13U/d  Could be pancreatic cause ? ? Past Medical History:   Diagnosis Date    ANTONIO (acute kidney injury) ([facility] [address] 8/2/2017    Alcohol abuse     Alcoholic cirrhosis of liver ([facility] [address] 5/9/2017    Basal cell carcinoma of leg 10/3/2016    left leg    Blood clot in vein 10/03/2017    Being treated presently with Lovanox    Cirrhosis ([facility] [facility]     Hx of blood clots     Type 2 diabetes mellitus without complication ([facility] [facility]     Withdrawal seizures ([facility] [address]      Past Surgical History:   Procedure Laterality Date    [place] ESOPHAGOGASTRODUODENOSCOPY TRANSORAL DIAGNOSTIC N/A 10/3/2017    EGD ESOPHAGOGASTRODUODENOSCOPY performed by Spencer Potts MD at [address]  [address] Left     leg   [address]  7/10/2017          Social History     Social History    Marital status: Single     Spouse name: N/A    Number of children: N/A    Years of education: N/A     Occupational History    Not on file. Social History Main Topics    Smoking status: Never Smoker    Smokeless tobacco: Never Used    Alcohol use 0.6 - 3.6 oz/week     1 - 6 Cans of beer per week      Comment: States quit May 3, 1917    Drug use: No    Sexual activity: Not Currently     Other Topics Concern    Not on file     Social History Narrative    No narrative on file     No family history on file. No Known Allergies  Current Outpatient Prescriptions   Medication Sig Dispense Refill    vitamin C (ASCORBIC ACID) 500 MG tablet Take 1 tablet by mouth 2 times daily 30 tablet 5    gentamicin (GENTAK) 0.3 % ophthalmic ointment 3 times daily.  1 Tube 0  amoxicillin-clavulanate (AUGMENTIN) 875-125 MG per tablet Take 1 tablet by mouth 2 times daily for 10 days 20 tablet 0    folic acid (FOLVITE) 1 MG tablet Take 1 tablet by mouth daily 30 tablet 11    ONETOUCH VERIO strip TEST  each 3    ONETOUCH DELICA LANCETS FINE MISC TEST TID  each 3    fluticasone (FLONASE) 50 MCG/ACT nasal spray 1 spray by Nasal route daily 1 Bottle 5    potassium chloride (KLOR-CON) 20 MEQ packet Take 20 mEq by mouth 2 times daily 60 each 5    traZODone (DESYREL) 50 MG tablet Take 0.5 tablets by mouth nightly 30 tablet 1    midodrine (PROAMATINE) 5 MG tablet Take 1 tablet by mouth 3 times daily 90 tablet 3    propranolol (INDERAL) 60 MG tablet Take 1 tablet by mouth every morning 30 tablet 5    spironolactone (ALDACTONE) 100 MG tablet Take 1 tablet by mouth daily 30 tablet 5    enoxaparin (LOVENOX) 30 MG/0.3ML injection 0.3ml SQ daily 30 Syringe 0    Blood Glucose Monitoring Suppl (ONETOUCH VERIO) w/Device KIT U UTD  0    BASAGLAR KWIKPEN 100 UNIT/ML injection pen INJECT 13 UNITS SUBCUTANESOULY QHS  0    BD PEN NEEDLE KAIN U/F 32G X 4 MM MISC TEST TID  0    torsemide (DEMADEX) 20 MG tablet Take 2 tablets by mouth daily 60 tablet 2    lactulose (CHRONULAC) 10 GM/15ML solution Take 30 g by mouth 2 times daily      Multiple Vitamins-Minerals (THERAGRAN-M) TABS Take 1 tablet by mouth every morning       thiamine 100 MG tablet Take 100 mg by mouth daily       No current facility-administered medications for this visit. The patient denies any history of      seizures,             heart attack or KNOWN CAD        or stroke. No chest pain, shortness of breath, paroxysmal nocturnal dyspnea. No nausea, vomiting, diarrhea, hematochezia or melena. No paresthesias or headaches. No dysuria, frequency or hematuria.       Last labs  Admission on 10/19/2017, Discharged on 10/19/2017   Component Date Value Ref Range Status    Glucose 10/19/2017 571  mg/dL - 8.1 g/dL Final    Alb 10/19/2017 4.0  3.9 - 4.9 g/dL Final    Total Bilirubin 10/19/2017 1.1  0.0 - 1.2 mg/dL Final    Alkaline Phosphatase 10/19/2017 137* 40 - 130 U/L Final    ALT 10/19/2017 13  0 - 33 U/L Final    AST 10/19/2017 35  0 - 35 U/L Final    Globulin 10/19/2017 4.1* 2.3 - 3.5 g/dL Final    Beta-Hydroxybutyrate 10/19/2017 3.2* 0.2 - 2.8 mg/dL Final    Color, UA 10/19/2017 Yellow  Straw/Yellow Final    Clarity, UA 10/19/2017 Clear  Clear Final    Glucose, Ur 10/19/2017 >=1000* Negative mg/dL Final    Bilirubin Urine 10/19/2017 Negative  Negative Final    Ketones, Urine 10/19/2017 Negative  Negative mg/dL Final    Specific Gravity, UA 10/19/2017 1.012  1.005 - 1.030 Final    Blood, Urine 10/19/2017 Negative  Negative Final    pH, UA 10/19/2017 6.5  5.0 - 9.0 Final    Protein, UA 10/19/2017 Negative  Negative mg/dL Final    Urobilinogen, Urine 10/19/2017 0.2  <2.0 E.U./dL Final    Nitrite, Urine 10/19/2017 Negative  Negative Final    Leukocyte Esterase, Urine 10/19/2017 Negative  Negative Final    Urine Reflex to Culture 10/19/2017 Not Indicated   Final    Lactic Acid 10/19/2017 1.8  0.5 - 2.2 mmol/L Final    Preg Test, Ur 10/19/2017 negative   Final    QC OK? 10/19/2017 poc   Final    POC Glucose 10/19/2017 526* 60 - 115 mg/dl Final    Performed on 10/19/2017 ACCU-CHEK   Final    POC Glucose 10/19/2017 249* 60 - 115 mg/dl Final    Performed on 10/19/2017 ACCU-CHEK   Final   Office Visit on 10/02/2017   Component Date Value Ref Range Status    Glucose, UA POC 10/02/2017 neg   Final    Bilirubin, UA 10/02/2017 neg   Final    Ketones, UA 10/02/2017 neg   Final    Spec Grav, UA 10/02/2017 1.015   Final    Blood, UA POC 10/02/2017 neg   Final    pH, UA 10/02/2017 6.0   Final    Protein, UA POC 10/02/2017 +   Final    Urobilinogen, UA 10/02/2017 neg   Final    Leukocytes, UA 10/02/2017 neg   Final    Nitrite, UA 10/02/2017 neg   Final   Orders Only on 09/19/2017 Component Date Value Ref Range Status    AFP-Tumor Marker 09/21/2017 6  0 - 9 ng/mL Final    Comment: INTERPRETIVE INFORMATION: Alpha Fetoprotein Tumor Marker  The Alexandra Marquita Access DxI AFP method is used. Results obtained  with  different assay methods or kits cannot be used interchangeably. AFP is  a  valuable aid in the management of nonseminomatous testicular cancer  patients  when used in conjunction with information available from the clinical  evaluation and other diagnostic procedures. Increased AFP  concentrations have  also been observed in ataxia telangiectasia, hereditary tyrosinemia,  primary  hepatocellular carcinoma, teratocarcinoma, gastrointestinal tract  cancers  with and without liver metastases, and in benign hepatic conditions  such as  acute viral hepatitis, chronic active hepatitis, and cirrhosis. The  result  cannot be interpreted as absolute evidence of the presence or absence  of  malignant disease. The result is not interpretable as a tumor marker in  pregnant females. Access complete set of age- and/or gender-specific reference intervals  for  this loan                           t in the [employer] Directory ([employer]). Performed by Norma Franz , [address] [facility] [phone]  www. Doug Nash MD - Lab.  Director     Orders Only on 09/19/2017   Component Date Value Ref Range Status    Sodium 09/19/2017 133  132 - 144 mEq/L Final    Potassium 09/19/2017 3.8  3.5 - 5.1 mEq/L Final    Chloride 09/19/2017 93* 98 - 107 mEq/L Final    CO2 09/19/2017 22  22 - 29 mEq/L Final    Anion Gap 09/19/2017 18* 7 - 13 mEq/L Final    Glucose 09/19/2017 116* 74 - 109 mg/dL Final    BUN 09/19/2017 26* 6 - 20 mg/dL Final    CREATININE 09/19/2017 1.41* 0.50 - 0.90 mg/dL Final    GFR Non- 09/19/2017 39.9* >60 Final    Comment: >60 mL/min/1.73m2 EGFR, calc. for ages 25 and older using the  MDRD formula (not corrected for weight), is valid for stable  renal function.  GFR  09/19/2017 48.3* >60 Final    Comment: >60 mL/min/1.73m2 EGFR, calc. for ages 25 and older using the  MDRD formula (not corrected for weight), is valid for stable  renal function.  Calcium 09/19/2017 9.8  8.6 - 10.2 mg/dL Final   Orders Only on 09/19/2017   Component Date Value Ref Range Status    Total Protein 09/19/2017 8.2* 6.4 - 8.1 g/dL Final    Alb 09/19/2017 4.1  3.9 - 4.9 g/dL Final    Alkaline Phosphatase 09/19/2017 116  40 - 130 U/L Final    ALT 09/19/2017 15  0 - 33 U/L Final    AST 09/19/2017 66* 0 - 35 U/L Final    Total Bilirubin 09/19/2017 1.7* 0.0 - 1.2 mg/dL Final    Bilirubin, Direct 09/19/2017 0.9* 0.0 - 0.3 mg/dL Final    Bilirubin, Indirect 09/19/2017 0.8* 0.0 - 0.6 mg/dL Final   Orders Only on 09/19/2017   Component Date Value Ref Range Status    Protime 09/19/2017 11.0  8.1 - 13.7 sec Final    INR 09/19/2017 1.1   Final    Comment: Recommended INR therapeutic ranges for oral anticoagulant  therapy    Prophylaxis/treatment of:                       INR     Venous Thrombosis, Pulmonary Embolism       2.0-3  Prevention of Systemic Embolism from:     Atrial Fibrillation                         2.0-3.0     Myocardial Infarction                       2.0-3.0     Mechanical Prosthetics Heart Valves         2.5-3.5     Recurrent Systemic Embolism                 2.5-3.5  Guidelines for patients with coagulopathy, e.g. liver disease:  Use the Protime resulted in seconds.     Mild        12.9-17.0 sec    Moderate    17.1-22.6 sec    Severe      G.T. 22.6 sec     Orders Only on 09/19/2017   Component Date Value Ref Range Status    WBC 09/19/2017 6.5  4.8 - 10.8 K/uL Final    RBC 09/19/2017 3.89* 4.20 - 5.40 M/uL Final    Hemoglobin 09/19/2017 12.2  12.0 - 16.0 g/dL Final    Hematocrit 09/19/2017 36.0* 37.0 - 47.0 % Final    MCV 09/19/2017 92.6  82.0 - 100.0 fL Final    MCH 09/19/2017 31.3  27.0 - 31.3 pg Final    MCHC Final    CREATININE 09/07/2017 1.27* 0.50 - 0.90 mg/dL Final    GFR Non- 09/07/2017 45.0* >60 Final    Comment: >60 mL/min/1.73m2 EGFR, calc. for ages 25 and older using the  MDRD formula (not corrected for weight), is valid for stable  renal function.  GFR  09/07/2017 54.5* >60 Final    Comment: >60 mL/min/1.73m2 EGFR, calc. for ages 25 and older using the  MDRD formula (not corrected for weight), is valid for stable  renal function.  Calcium 09/07/2017 9.8  8.6 - 10.2 mg/dL Final    Hemoglobin A1C 09/07/2017 5.0  4.8 - 5.9 % Final   Orders Only on 08/16/2017   Component Date Value Ref Range Status    Sodium 08/16/2017 137  132 - 144 mEq/L Final    Potassium 08/16/2017 3.7  3.5 - 5.1 mEq/L Final    Chloride 08/16/2017 100  98 - 107 mEq/L Final    CO2 08/16/2017 17* 22 - 29 mEq/L Final    Anion Gap 08/16/2017 20* 7 - 13 mEq/L Final    Glucose 08/16/2017 99  74 - 109 mg/dL Final    BUN 08/16/2017 41* 6 - 20 mg/dL Final    CREATININE 08/16/2017 2.13* 0.50 - 0.90 mg/dL Final    GFR Non- 08/16/2017 24.8* >60 Final    Comment: >60 mL/min/1.73m2 EGFR, calc. for ages 25 and older using the  MDRD formula (not corrected for weight), is valid for stable  renal function.  GFR  08/16/2017 30.0* >60 Final    Comment: >60 mL/min/1.73m2 EGFR, calc. for ages 25 and older using the  MDRD formula (not corrected for weight), is valid for stable  renal function.       Calcium 08/16/2017 8.5* 8.6 - 10.2 mg/dL Final    Total Protein 08/16/2017 6.5  6.4 - 8.1 g/dL Final    Alb 08/16/2017 3.3* 3.9 - 4.9 g/dL Final    Total Bilirubin 08/16/2017 3.4* 0.0 - 1.2 mg/dL Final    Alkaline Phosphatase 08/16/2017 131* 40 - 130 U/L Final    ALT 08/16/2017 6  0 - 33 U/L Final    AST 08/16/2017 36* 0 - 35 U/L Final    Globulin 08/16/2017 3.2  2.3 - 3.5 g/dL Final   Orders Only on 08/16/2017   Component Date Value Ref Range Status    WBC 08/16/2017 8.2 4.8 - 10.8 K/uL Final    RBC 08/16/2017 3.08* 4.20 - 5.40 M/uL Final    Hemoglobin 08/16/2017 9.3* 12.0 - 16.0 g/dL Final    Hematocrit 08/16/2017 27.9* 37.0 - 47.0 % Final    MCV 08/16/2017 90.3  82.0 - 100.0 fL Final    MCH 08/16/2017 30.3  27.0 - 31.3 pg Final    MCHC 08/16/2017 33.6  33.0 - 37.0 % Final    RDW 08/16/2017 19.6* 11.5 - 14.5 % Final    Platelets 94/43/4718 122* 130 - 400 K/uL Final   There may be more visits with results that are not included. Health Maintenance   Topic Date Due    Diabetic retinal exam  05/25/1980    Cervical cancer screen  05/25/1991    HIV screen  09/07/2018 (Originally 5/25/1985)    DTaP/Tdap/Td vaccine (1 - Tdap) 10/02/2018 (Originally 5/25/1989)    Flu vaccine (1) 10/02/2018 (Originally 9/1/2017)    Pneumococcal highest risk (1 of 3 - PCV13) 10/18/2018 (Originally 5/25/1989)    Diabetic foot exam  09/07/2018    Diabetic hemoglobin A1C test  09/07/2018    Diabetic microalbuminuria test  09/07/2018    Lipid screen  09/07/2018       No results found for this visit on 10/25/17. Objective    Vitals:    10/25/17 0928   BP: 110/70   Pulse: 64   Temp: 95.3 °F (35.2 °C)   TempSrc: Tympanic   SpO2: 98%   Weight: 95 lb (43.1 kg)       PHYSICAL EXAMINATION:        GENERAL:    The patient appears well nourished and well-developed,     Normal affect. Not appearing significantly anxious or depressed. No acute respiratory distress. Alert and oriented times 3. Skin:     No skin rashes. No concerning moles observed. Gait:    Normal gait. No ataxia. HEENT:  Normocephalic, atraumatic. Throat:  Pharynx is clear, no erythema/ edema or exudates   Ears:    TMs normal bilaterally. Canals and ears normal   Eyes:  Extraocular eye motions intact and pain free. Pupils reactive/equal    Sclerae and conjunctivae a bit red on R but stain ok      NECK: No masses or adenopathy palpable. No carotid bruits heard. No asymmetry visible.      No Acid (VITAMIN C) 500 MG tablet Reorder     No Follow-up on file. Wound bandaged-care addressed  Still NOT drinking  The patient was reminded that an antibiotic has been prescribed the predicted course is improvement to cure with no persistent issues. Take antibiotics as directed. If any problems occur, an appointment should be made or ER visit if severe. Because of the risk with ANY antibiotic of C. Difficile colitis if persistent diarrhea or abdominal pain or any concerning symptoms, we should be notified. To reduce this risk, a probiotic pill, yogurt or other preparations containing active cultures should be ingested daily -particularly while on the antibiotic. If any persistent symptoms of illness, follow up appointment should be made in a timely fashion with a physician. Reed Malik is advised to follow up ASAP if condition deteriorates or problems arise and if no information on test results to patient in the next 1 month they are advised to call us. Empiric gentak if redness persists  If present fri-will call and see optho asap  No blurred vision  AA attending!   Gradually increase insulin prn-d/w pt   Primitivo Viera MD

## 2017-10-27 DIAGNOSIS — R73.09 ABNORMAL BLOOD SUGAR: Primary | ICD-10-CM

## 2017-10-28 DIAGNOSIS — R73.09 ABNORMAL BLOOD SUGAR: ICD-10-CM

## 2017-10-28 LAB
ALBUMIN SERPL-MCNC: 4.2 G/DL (ref 3.9–4.9)
ALP BLD-CCNC: 132 U/L (ref 40–130)
ALT SERPL-CCNC: 12 U/L (ref 0–33)
ANION GAP SERPL CALCULATED.3IONS-SCNC: 18 MEQ/L (ref 7–13)
AST SERPL-CCNC: 41 U/L (ref 0–35)
BILIRUB SERPL-MCNC: 1.2 MG/DL (ref 0–1.2)
BUN BLDV-MCNC: 15 MG/DL (ref 6–20)
CALCIUM SERPL-MCNC: 11.1 MG/DL (ref 8.6–10.2)
CHLORIDE BLD-SCNC: 91 MEQ/L (ref 98–107)
CO2: 22 MEQ/L (ref 22–29)
CREAT SERPL-MCNC: 1 MG/DL (ref 0.5–0.9)
GFR AFRICAN AMERICAN: >60
GFR NON-AFRICAN AMERICAN: 59.3
GLOBULIN: 4.2 G/DL (ref 2.3–3.5)
GLUCOSE BLD-MCNC: 393 MG/DL (ref 74–109)
POTASSIUM SERPL-SCNC: 4.5 MEQ/L (ref 3.5–5.1)
SODIUM BLD-SCNC: 131 MEQ/L (ref 132–144)
TOTAL PROTEIN: 8.4 G/DL (ref 6.4–8.1)

## 2017-10-31 ENCOUNTER — OFFICE VISIT (OUTPATIENT)
Dept: FAMILY MEDICINE CLINIC | Age: 47
End: 2017-10-31

## 2017-10-31 VITALS
BODY MASS INDEX: 16.66 KG/M2 | HEART RATE: 68 BPM | DIASTOLIC BLOOD PRESSURE: 64 MMHG | HEIGHT: 63 IN | TEMPERATURE: 97.7 F | SYSTOLIC BLOOD PRESSURE: 94 MMHG | RESPIRATION RATE: 14 BRPM | WEIGHT: 94 LBS

## 2017-10-31 DIAGNOSIS — K70.31 ALCOHOLIC CIRRHOSIS OF LIVER WITH ASCITES (HCC): Chronic | ICD-10-CM

## 2017-10-31 DIAGNOSIS — E83.52 HYPERCALCEMIA: ICD-10-CM

## 2017-10-31 DIAGNOSIS — E11.65 TYPE 2 DIABETES MELLITUS WITH HYPERGLYCEMIA, WITHOUT LONG-TERM CURRENT USE OF INSULIN (HCC): Primary | Chronic | ICD-10-CM

## 2017-10-31 DIAGNOSIS — S81.802D WOUND OF LEFT LOWER EXTREMITY, SUBSEQUENT ENCOUNTER: ICD-10-CM

## 2017-10-31 DIAGNOSIS — N17.9 AKI (ACUTE KIDNEY INJURY) (HCC): ICD-10-CM

## 2017-10-31 PROCEDURE — 1036F TOBACCO NON-USER: CPT | Performed by: FAMILY MEDICINE

## 2017-10-31 PROCEDURE — 99213 OFFICE O/P EST LOW 20 MIN: CPT | Performed by: FAMILY MEDICINE

## 2017-10-31 PROCEDURE — G8419 CALC BMI OUT NRM PARAM NOF/U: HCPCS | Performed by: FAMILY MEDICINE

## 2017-10-31 PROCEDURE — G8484 FLU IMMUNIZE NO ADMIN: HCPCS | Performed by: FAMILY MEDICINE

## 2017-10-31 PROCEDURE — 3046F HEMOGLOBIN A1C LEVEL >9.0%: CPT | Performed by: FAMILY MEDICINE

## 2017-10-31 PROCEDURE — G8427 DOCREV CUR MEDS BY ELIG CLIN: HCPCS | Performed by: FAMILY MEDICINE

## 2017-10-31 RX ORDER — AMOXICILLIN AND CLAVULANATE POTASSIUM 875; 125 MG/1; MG/1
1 TABLET, FILM COATED ORAL 2 TIMES DAILY
Qty: 10 TABLET | Refills: 0 | Status: SHIPPED | OUTPATIENT
Start: 2017-10-31 | End: 2017-01-01

## 2017-11-12 NOTE — ED PROVIDER NOTES
file    Smokeless tobacco: Not on file    Alcohol use Not on file    Drug use: Unknown    Sexual activity: Not on file     Other Topics Concern    Not on file     Social History Narrative    No narrative on file       SCREENINGS             PHYSICAL EXAM    (up to 7 for level 4, 8 or more for level 5)     ED Triage Vitals [11/12/17 1245]   BP Temp Temp src Pulse Resp SpO2 Height Weight   (!) 240/208 98.9 °F (37.2 °C) -- -- 26 100 % -- --       Physical Exam   Constitutional: She appears lethargic. She appears cachectic. She appears toxic. She has a sickly appearance. She appears ill. On arrival patient was actively seizing  With focal seizure movement in both upper extremities. .  Of arousal and between   HENT:   Mouth/Throat: Mucous membranes are pale and dry. Eyes: Conjunctivae and EOM are normal. Pupils are equal, round, and reactive to light. Neck: Normal range of motion. Neck supple. No JVD present. No tracheal deviation present. No thyromegaly present. Cardiovascular: Normal rate, regular rhythm, normal heart sounds and intact distal pulses. Pulmonary/Chest: Breath sounds normal. No stridor. No respiratory distress. She has no wheezes. She has no rales. She exhibits no tenderness. Abdominal: Soft. Bowel sounds are normal.   Musculoskeletal: She exhibits no edema, tenderness or deformity. Lymphadenopathy:     She has no cervical adenopathy. Neurological: She appears lethargic. GCS eye subscore is 3. GCS verbal subscore is 4. GCS motor subscore is 4. On arrival patient was actively seizing focal  seizure to the upper extremities  Was given Ativan 2 mg IV some mild improvement Ativan was repeated again at 4 mg IV after which the patient became drowsy but seizure have resolved  Patient continued to be Sleepy and disoriented   Skin: Skin is warm and dry. Nursing note and vitals reviewed.       DIAGNOSTIC RESULTS     EKG: All EKG's are interpreted by the Emergency Department Physician who either signs or Co-signs this chart in the absence of a cardiologist.    EKG: normal sinus rhythm, Rate 87 no ST or T-wave changes and normal QRS with the duration of 84. RADIOLOGY:   Non-plain film images such as CT, Ultrasound and MRI are read by the radiologist. Plain radiographic images are visualized and preliminarily interpreted by the emergency physician with the below findings:     Interpretation per the Radiologist below, if available at the time of this note:    XR Chest Portable   Final Result   NO  ACTIVE LUNG DISEASE. CT HEAD WO CONTRAST   Final Result   NO ACUTE INTRACRANIAL PATHOLOGY.                        ED BEDSIDE ULTRASOUND:   Performed by ED Physician - none    LABS:  Labs Reviewed   CBC WITH AUTO DIFFERENTIAL - Abnormal; Notable for the following:        Result Value    MCHC 32.1 (*)     RDW 15.5 (*)     All other components within normal limits   COMPREHENSIVE METABOLIC PANEL - Abnormal; Notable for the following:     Sodium 120 (*)     Chloride 67 (*)     Anion Gap 31 (*)     Glucose 1,151 (*)     BUN 31 (*)     CREATININE 1.45 (*)     GFR Non- 38.6 (*)     GFR  46.7 (*)     Calcium 11.0 (*)     Total Protein 8.3 (*)     Total Bilirubin 1.6 (*)     Alkaline Phosphatase 160 (*)     AST 43 (*)     Globulin 4.1 (*)     All other components within normal limits    Narrative:     CALL  [facility] tel. [phone],  GLUCOSE called to Dr. Austen Barrera, 11/12/2017 14:17, by Azeem See  Preliminary glucose and the Sodium called to Dr. Austen Barrera, 11/12/2017 14:08, by  Felipa Daugherty - Abnormal; Notable for the following:     Beta-Hydroxybutyrate 17.8 (*)     All other components within normal limits    Narrative:     CALL  [facility] tel. [phone],  GLUCOSE called to Dr. Austen Barrera, 11/12/2017 14:17, by Azeem See  Preliminary glucose and the Sodium called to Dr. Austen Barrera, 11/12/2017 14:08, by  Alexa Garcia - Abnormal; Notable for the following:     Ammonia 80 (*)     All other components within normal limits   LACTIC ACID, PLASMA - Abnormal; Notable for the following:     Lactic Acid 11.1 (*)     All other components within normal limits    Narrative:     Haim Perez  [facility] tel. [phone],  Lactic Acid called to Dr. Patrick Bennett, 11/12/2017 14:08, by Tex Campbell  [address] - Abnormal; Notable for the following:     Glucose, Ur >=1000 (*)     All other components within normal limits   MAGNESIUM - Abnormal; Notable for the following:     Magnesium 3.3 (*)     All other components within normal limits    Narrative:     CALL  [facility]  [facility] tel. [phone],  GLUCOSE called to Dr. Patrick Bennett, 11/12/2017 14:17, by Juan Dupont  Preliminary glucose and the Sodium called to Dr. Patrick Bennett, 11/12/2017 14:08, by  Juan Dupont   TROPONIN - Abnormal; Notable for the following:     Troponin 0.062 (*)     All other components within normal limits    Narrative:     [facility] tel. [phone],  Trop called to Dr. Patrick Bennett, 11/12/2017 14:09, by Juan Dupont   POCT GLUCOSE - Abnormal; Notable for the following:     POC Glucose >600 (*)     All other components within normal limits   POCT URINE PREGNANCY - Normal   POCT GLUCOSE - Normal   CULTURE BLOOD #1   CULTURE BLOOD #2   BRAIN NATRIURETIC PEPTIDE    Narrative:     [facility] tel. [phone],  Trop called to Dr. Patrick Bennett, 11/12/2017 14:09, by Eli Ortega    Narrative:     [facility] tel. [phone],  Preliminary glucose and the Sodium called to Dr. Patrick Bennett, 11/12/2017 14:08, by  Vani Rinaldi    Narrative:     [facility] tel. [phone],  Preliminary glucose and the Sodium called to Dr. Patrick Bennett, 11/12/2017 14:08, by  Logan Gr    Narrative:     Haim Perez  [facility] tel. [phone],  Lactic Acid called to Dr. Patrick Bennett, 11/12/2017 14:08, by [facility]   POCT ISTAT BLOOD GAS, CREATININE, ACT-K     ABG : on 2 liter   PH 7.39 CO2 35 PO2 137 HCO3 26.8 base -4.2 SPO2 99 TCO2 further management    Critical Care  Total time providing critical care: 30-74 minutes    Patient Progress  Patient progress: improved      CRITICAL CARE TIME   Total Critical Care time was 50 minutes, excluding separately reportable procedures. There was a high probability of clinically significant/life threatening deterioration in the patient's condition which required my urgent intervention. Was given IV Ativan ×2 and close monitoring    CONSULTS:  IP CONSULT TO ENDOCRINOLOGY  IP CONSULT TO CRITICAL CARE  IP CONSULT TO NEUROLOGY    PROCEDURES:  Unless otherwise noted below, none     Procedures    FINAL IMPRESSION      1. Seizure ([facility] [facility]    2. Diabetic ketoacidosis without coma associated with type 1 diabetes mellitus ([facility] [address]    3.  Metabolic encephalopathy          DISPOSITION/PLAN   DISPOSITION Admitted    PATIENT REFERRED TO:  Hansa Redd MD  [facility] [phone]            DISCHARGE MEDICATIONS:  New Prescriptions    No medications on file          (Please note that portions of this note were completed with a voice recognition program.  Efforts were made to edit the dictations but occasionally words are mis-transcribed.)    Sallie Love MD (electronically signed)  Attending Emergency Physician          Erlin Livingston MD  11/12/17 [address] [facility] [address] [place] MD  11/12/17 8591 Venancio Gunter MD  11/12/17 9824

## 2017-11-12 NOTE — H&P
Hospital Medicine History & Physical      PCP: Gary Lee MD    Date of Admission: 11/12/2017    Date of Service: 11/12/17      Chief Complaint:  seizure      History Of Present Illness:  52 y.o. female who presented to [facility] ED for complaints of high blood sugar and seizures. The patient has a known history of alcoholic cirrhosis with multiple admissions for DKA. She is lethargic and continuing to have  seizures after receiving iv ativan. Past Medical History:      No past medical history on file. Past Surgical History:      No past surgical history on file. Medications Prior to Admission:      Prior to Admission medications    Not on File       Allergies:  Review of patient's allergies indicates not on file. Social History:      The patient currently lives home    TOBACCO:   has no tobacco history on file. ETOH:   has no alcohol history on file. Family History:       Positive as follows:    No family history on file. REVIEW OF SYSTEMS:   Pertinent positives as noted in the HPI. All other systems reviewed and negative. PHYSICAL EXAM:    /64   Pulse 85   Temp 98.9 °F (37.2 °C)   Resp 14   Ht 5' 3\" (1.6 m)   Wt 94 lb (42.6 kg)   SpO2 100%   BMI 16.65 kg/m²     General appearance:  No apparent distress, appears stated age and cooperative. HEENT:  Normal cephalic, atraumatic without obvious deformity. Pupils equal, round, and reactive to light. Extra ocular muscles intact. Conjunctivae/corneas clear. Neck: Supple, with full range of motion. No jugular venous distention. Trachea midline. Respiratory:  Normal respiratory effort. Clear to auscultation, bilaterally without Rales/Wheezes/Rhonchi. Cardiovascular:  Regular rate and rhythm with normal S1/S2 without murmurs, rubs or gallops. Abdomen: Soft, non-tender, non-distended with normal bowel sounds. Musculoskeletal:  No clubbing, cyanosis or edema bilaterally. Full range of motion without deformity.   Skin: Skin color, texture, turgor normal.  No rashes or lesions. jaundice  Neurologic:  lethargic  Psychiatric:  Alert and oriented, thought content appropriate, normal insight  Capillary Refill: Brisk,< 3 seconds   Peripheral Pulses: +2 palpable, equal bilaterally       Labs:     Recent Labs      11/12/17   1315   WBC  8.1   HGB  14.2   HCT  44.1   PLT  130     Recent Labs      11/12/17   1315   NA  120*   K  4.6   CL  67*   CO2  22   BUN  31*   CREATININE  1.45*   CALCIUM  11.0*     Recent Labs      11/12/17   1315   AST  43*   ALT  17   BILITOT  1.6*   ALKPHOS  160*     No results for input(s): INR in the last 72 hours. Recent Labs      11/12/17   1315   TROPONINI  0.062*       Urinalysis:      Lab Results   Component Value Date    NITRU Negative 11/12/2017    BLOODU Negative 11/12/2017    SPECGRAV 1.023 11/12/2017    GLUCOSEU >=1000 11/12/2017       Radiology:     CXR: I have reviewed the CXR with the following interpretation: pending  EKG:  I have reviewed the EKG with the following interpretation: pending    CT HEAD WO CONTRAST   Final Result   NO ACUTE INTRACRANIAL PATHOLOGY. XR Chest Portable    (Results Pending)       ASSESSMENT:        PLAN:        DVT Prophylaxis: lovenox  Diet:    Code Status: No Order    PT/OT Eval Status: eval and tx    1. DKA-will admit to the ICU and consult Dr. Jean-Claude Beverly. Will place on an iv insuling gtt and monitor accuchecks hourly. Will hydrate with ivf  2. Status epilepticus-will consult Dr. Oliver Phillips and medicate with iv ativan. Obtain an EEG  3. Alcoholic cirrhosis  4. Hyponatremia  5. ANTONIO  6  Lactic acidosis  7. Hyperammonemia-will medicate with lactulose  8. hypercalcemia  9. Hypochloremia   10. Hyperglycemia with DM2-uncontrolled  Admit to ICU, insulin drip, IVF, BMP q6 hours, neuro checks, monitor 02 HALLIE amezcua 75 min  Pt was seen and evaluated and discussed care with PA.  Agree with assessment and plan  Electronically signed by Doni Castaneda MD on 11/12/17 at

## 2017-11-13 PROBLEM — K70.30 ALCOHOLIC CIRRHOSIS (HCC): Status: ACTIVE | Noted: 2017-01-01

## 2017-11-13 PROBLEM — R56.9 SEIZURE (HCC): Status: ACTIVE | Noted: 2017-01-01

## 2017-11-13 PROBLEM — R77.8 ELEVATED TROPONIN: Status: ACTIVE | Noted: 2017-01-01

## 2017-11-13 PROBLEM — E11.10 DKA (DIABETIC KETOACIDOSES): Status: ACTIVE | Noted: 2017-01-01

## 2017-11-13 PROBLEM — E10.10 DIABETIC KETOACIDOSIS WITHOUT COMA ASSOCIATED WITH TYPE 1 DIABETES MELLITUS (HCC): Status: ACTIVE | Noted: 2017-01-01

## 2017-11-13 PROBLEM — I21.4 NSTEMI (NON-ST ELEVATED MYOCARDIAL INFARCTION) (HCC): Status: ACTIVE | Noted: 2017-01-01

## 2017-11-13 NOTE — PROGRESS NOTES
Pt responding to voice. Pt new where she was and requested a drink of water. Will cont to monitor.  No change to insulin gtt

## 2017-11-13 NOTE — FLOWSHEET NOTE
Received critical BG, checked with accucheck and confirmed low BG but pt is alert and talking with family. D50 given with favorable results, pt said she would eat her lunch today but eat <25%. Pts skin warm and dry.

## 2017-11-13 NOTE — CONSULTS
edema or tenderness. Neurological: She is alert and oriented to person, place, and time. Skin: Skin is warm. No cyanosis. Nails show no clubbing. Impression/Plan:    Active Hospital Problems    Diagnosis Date Noted    Seizure [facility]) [R56.9] 11/13/2017     Priority: High    DKA (diabetic ketoacidoses) [facility] [E13.10] 11/13/2017     Priority: High    Alcoholic cirrhosis ([facility] [U76.05] 11/13/2017     Priority: High    Elevated troponin [R74.8] 11/13/2017     Priority: High    NSTEMI (non-ST elevated myocardial infarction) ([facility] [I21.4] 11/13/2017     Priority: High                                  ASA BB no Statin due to lIver issues    More Tronins  Keep on Tel  Recheck K+ after replcaement -discussed with staff.        Estiven Henley MD

## 2017-11-13 NOTE — CONSULTS
Nicki Krishnamurthy [address]                       [address], [address] [facility]                                   CONSULTATION    PATIENT NAME: Bailee Aviles                      :        1970  MED REC NO:   [id number]                            ROOM:       16  ACCOUNT NO:   [de-identified]                           ADMIT DATE: 2017  PROVIDER:     Mir Berger MD    CONSULT DATE:  2017    REASON FOR CONSULTATION:  Diabetic ketoacidosis, management of type 1  diabetes. CHIEF COMPLAINT AND HISTORY OF PRESENT ILLNESS:  This is a 70-year-old  female with known history of diabetes diagnosed over the last 5-6 months,  presenting to the ER with high blood sugars and seizures. She is also  known history of alcoholic cirrhosis due to alcohol abuse and has had  multiple admissions with DKA. Apparently the last time she was seen at  [facility], baseline labs were reviewed from first blood draw low-glucose was  1151. Sodium was 120, potassium 4.6, chloride was 67, CO2 was 22, BUN was  31, creatinine 1.45, anion gap was 31. Her beta-hydroxybutyrate acid was  17.8. A1c was 13.2. The patient was treated with IV fluids and IV insulin  drip. This morning insulin drip was discontinued as she was switched to  subcu insulin. Blood sugars have been going into 200 to 300 range. She is  starting to eat some food, still is weak. Most current chemistries, sodium  was 146, potassium was 3.1, chloride was 109, BUN was 11, creatinine 0.73,  CO2 was 22. Apparently, the patient was only taking one type of insulin  Lantus at night and was not taking any rapid-acting insulin. The patient  is a poor historian and also not very compliant. Denies any complications  of diabetes. PAST MEDICAL HISTORY:  Significant for diabetes type 1, seizure disorder,  cirrhosis. PAST SURGICAL HISTORY:  Negative. FAMILY HISTORY:  Noncontributory.     PERSONAL AND SOCIAL HISTORY:  Does use alcohol. ALLERGIES:  None. HOME MEDICATIONS:  The patient's home medications were reviewed there was  none. MEDICATIONS HERE:  Include Humalog via coverage, D5 half normal saline,  Lovenox, aspirin, Chronulac, lidocaine, Protonix, IV potassium. REVIEW OF SYSTEMS:  Other than seizures and high blood sugar and weight  loss, 14-point review of system was negative. PHYSICAL EXAMINATION:  GENERAL:  The patient is alert, awake, appears to be sleepy, does open eyes  on verbal stimuli and able to answer questions, appears malnourished. BMI  was 16. NECK:  Supple. Tongue was moderately dry. No jaundice was noted. VITAL SIGNS:  Blood pressure was 100/51, pulse rate was 86, respiratory was  15, temperature 98.2. LUNGS:  Clear to auscultation. No wheezing or crackles heard. HEART:  Sounds are normal.  ABDOMEN:  Soft and nonobese. EXTREMITIES:  Pulses are present. Extremities reveal no edema. SKIN:  Dry. LABORATORY DATA:  As above. ASSESSMENT:  DKA resolved, poor compliance, significant weight loss,  nutritional as well as high blood sugars, seizure disorder. PLAN:  Would put her on Lantus 35 at night, Humalog 10 at each meals plus  coverage. Continue with IV fluids for hydration, replace potassium and  other electrolytes. The patient counseled extensively regarding taking her  insulin and keeping his sugars down into 100 to 200 range. A1c of 7. Thank for the consult. Total time spent was 50 minutes.           Sunitha Pearson MD    D: 11/13/2017 15:25:45       T: 11/13/2017 15:38:52     [email or web address]  Job#: [id number]     Doc#: [id number]

## 2017-11-13 NOTE — CONSULTS
use: Unknown    Sexual activity: Not on file           Seizures (h/o liver failure)      Results    Imaging  Ct Head Wo Contrast    Result Date: 11/12/2017  EXAMINATION: CT HEAD WO CONTRAST  DATE AND TIME:11/12/2017 1:00 PM CLINICAL HISTORY:  SEIZURE, NEW, 18-39 YO, NO TRAUMA  COMPARISON: None TECHNIQUE:Axial CT from skull base to vertex without  contrast..  All CT scans at this facility use dose modulation, iterative reconstruction, and/or weight based dosing when appropriate to reduce radiation dose to as low as reasonably achievable. FINDINGS:  Ventricles are normal in size and position. Sulci are appropriate for age                      No abnormal parenchymal densities There is no parenchymal mass, or mass effect, There is no acute parenchymal hemorrhage or extra-axial fluid. The bony calvarium and skull base are normal.   The visualized paranasal sinuses and mastoids are clear. NO ACUTE INTRACRANIAL PATHOLOGY. Xr Chest Portable    Result Date: 11/12/2017  EXAMINATION: XR CHEST PORTABLE. DATE AND TIME:11/12/2017 1:00 PM CLINICAL HISTORY: Shortness of breath   cough  COMPARISONS: None FINDINGS:The heart, mediastinum and pulmonary vasculature are within normal limits. Visualized lung fields are clear. Bones unremarkable. NO  ACTIVE LUNG DISEASE. Labs    CBC:   Recent Labs      11/12/17   1315   WBC  8.1   HGB  14.2   PLT  130     BMP:    Recent Labs      11/12/17   1315  11/12/17   1623  11/12/17   1810   NA  120*   --   138   K  4.6   --   2.7*   CL  67*   --   93*   CO2  22   --   22   BUN  31*   --   23*   CREATININE  1.45*   --   1.20*   GLUCOSE  1,151*  591  553*     TSH: No results for input(s): TSH in the last 72 hours. Folic Acid: No results for input(s): FOLATE in the last 72 hours. B12:  No results found for: [id number]  Vit. D: No components found for: VITAMIND  Lipids: No results for input(s): CHOL, TRIG, HDL, LDLCALC in the last 72 hours.   Ammonia:   Recent Labs      11/12/17 1315   AMMONIA  83*     LFT:   Recent Labs      11/12/17   1315  11/12/17   1811   AST  43*  36*   ALT  17  14   BILITOT  1.6*  1.1   ALKPHOS  160*  130        Urine:   Recent Labs      11/12/17   1315   COLORU  Yellow   PHUR  6.0   CLARITYU  Clear   SPECGRAV  1.023   LEUKOCYTESUR  Negative   UROBILINOGEN  0.2   BILIRUBINUR  Negative   BLOODU  Negative          BP (!) 109/51   Pulse 80   Temp 98.7 °F (37.1 °C) (Oral)   Resp 17   Ht 5' 3\" (1.6 m)   Wt 93 lb 0.6 oz (42.2 kg)   LMP  (LMP Unknown)   SpO2 99%   BMI 16.48 kg/m²    Systemic Exam    All the peripheral pulsations are normal    No bruit are heard over the eyeballs, head, neck, abdominal,aorta,iliacs or the femorals. Heart is regular with normal heart sounds and no murmurs. There is no hepatosplenomegaly. She has a poor personal hygiene  Toenails are somewhat overweight woman     Neurological Examination    On Neurological examination, Vin Ayala is not oriented to day,date, month and the year. Speech, comprehension and expression is impaired     Higher cortical functions acould not be performed with the reliabilitye. Both the pupils are equal and react to light. Visual fields are full grossly    Extraocular movements are full and the fundi could not be performed with the reliability    Face is symmetrical.    The rest of the cranial nerves are within normal limits. She has normal muscle tone and normal muscle mass. No fasciculations or involuntary movements are seen. On Cameron testingcould not be performed with the reliability  The power is ncould not be performed with the reliabilityThe deep tendon reflexes are 1+ bilaterally symmetrical.    Both the plantar responses are flexor. The coordination is normal for the patient's age. No cortical release signs are seen.     The patient's gait could not be performed with the reliabilityl    Romberg position: could not be performed with the reliability    The patient withdraws

## 2017-11-13 NOTE — PROGRESS NOTES
Units  0-6 Units Subcutaneous Nightly Virgil Duncan MD        glucose (GLUTOSE) 40 % oral gel 15 g  15 g Oral PRN Virgil Duncan MD        dextrose 50 % solution 12.5 g  12.5 g Intravenous PRN Kenton Torres MD        glucagon (rDNA) injection 1 mg  1 mg Intramuscular PRN Virgil Duncan MD        dextrose 5 % solution  100 mL/hr Intravenous PRN Kenton Torres MD        aspirin EC tablet 81 mg  81 mg Oral Daily Saundra Tyler MD   81 mg at 11/13/17 1339    metoprolol tartrate (LOPRESSOR) tablet 25 mg  25 mg Oral BID Saundra Tyler MD   25 mg at 11/13/17 1339    [START ON 11/14/2017] pantoprazole (PROTONIX) tablet 40 mg  40 mg Oral QAM AC Saundra Tyler MD        insulin glargine (LANTUS) injection vial 35 Units  35 Units Subcutaneous Nightly Virgil Duncan MD        insulin lispro (HUMALOG) injection vial 10 Units  10 Units Subcutaneous TID  Virgil Duncan MD   10 Units at 11/13/17 1341    insulin lispro (HUMALOG) injection vial 0-12 Units  0-12 Units Subcutaneous TID  Virgil Blevins MD   Stopped at 11/13/17 1343    0.9% NaCl with KCl 40 mEq infusion   Intravenous Continuous Virgil Duncan  mL/hr at 11/13/17 1339      potassium chloride (KLOR-CON M) extended release tablet 40 mEq  40 mEq Oral Once Licha Palm NP        0.9 % sodium chloride infusion   Intravenous Continuous AMY Alexandre   Stopped at 11/13/17 0116    lactulose (CHRONULAC) 10 GM/15ML solution 20 g  20 g Oral TID AMY Sandhu   20 g at 11/13/17 1338    midazolam (VERSED) injection 2 mg  2 mg Intravenous Q2H PRN AMY Sandhu        enoxaparin (LOVENOX) injection 40 mg  40 mg Subcutaneous BID Yvette Lopez MD   Stopped at 11/13/17 0757           Labs:  CBC with Differential:    Lab Results   Component Value Date    WBC 8.8 11/13/2017    RBC 3.90 11/13/2017    HGB 12.4 11/13/2017    HCT 36.6 11/13/2017    PLT 89 11/13/2017    MCV 93.8 11/13/2017    MCH 31.9 11/13/2017    MCHC 34.0 11/13/2017    RDW 14.7 11/13/2017    LYMPHOPCT 22.9 11/13/2017    MONOPCT 10.6 11/13/2017    BASOPCT 0.3 11/13/2017    MONOSABS 0.9 11/13/2017    LYMPHSABS 2.0 11/13/2017    EOSABS 0.1 11/13/2017    BASOSABS 0.0 11/13/2017     CMP:    Lab Results   Component Value Date     11/13/2017    K 3.1 11/13/2017     11/13/2017    CO2 22 11/13/2017    BUN 11 11/13/2017    CREATININE 0.73 11/13/2017    GFRAA >60.0 11/13/2017    LABGLOM >60.0 11/13/2017    GLUCOSE 227 11/13/2017    PROT 6.0 11/13/2017    LABALBU 3.0 11/13/2017    CALCIUM 8.9 11/13/2017    BILITOT 1.0 11/13/2017    ALKPHOS 102 11/13/2017    AST 35 11/13/2017    ALT 11 11/13/2017     Hepatic Function Panel:    Lab Results   Component Value Date    ALKPHOS 102 11/13/2017    ALT 11 11/13/2017    AST 35 11/13/2017    PROT 6.0 11/13/2017    BILITOT 1.0 11/13/2017    BILIDIR 0.4 11/13/2017    IBILI 0.6 11/13/2017    LABALBU 3.0 11/13/2017     Magnesium:    Lab Results   Component Value Date    MG 2.0 11/13/2017     PT/INR:  No results found for: PROTIME, INR  Last 3 Troponin:    Lab Results   Component Value Date    TROPONINI <0.010 11/13/2017    TROPONINI 0.018 11/13/2017    TROPONINI 0.033 11/12/2017     U/A:    Lab Results   Component Value Date    COLORU Yellow 11/12/2017    PHUR 6.0 11/12/2017    CLARITYU Clear 11/12/2017    SPECGRAV 1.023 11/12/2017    LEUKOCYTESUR Negative 11/12/2017    UROBILINOGEN 0.2 11/12/2017    BILIRUBINUR Negative 11/12/2017    BLOODU Negative 11/12/2017    GLUCOSEU >=1000 11/12/2017     ABG:    Lab Results   Component Value Date    PHART 7.371 11/12/2017    HQX4KIY 47 11/12/2017    PO2ART 138 11/12/2017    HNH3ZHA 27.0 11/12/2017    BEART 2 11/12/2017    LWS3MPJ 29 11/12/2017    T6AEGXBL 99 11/12/2017     FLP:  No results found for: TRIG, HDL, LDLCALC, LDLDIRECT, LABVLDL  TSH:    Lab Results   Component Value Date    TSH 2.130 11/12/2017      DATA:   ECG: Sinus Rhythm       ASSESSMENT:   1  dka  2  Seizure  Episodes  3  Alcoholic  Cirrhosis  4

## 2017-11-13 NOTE — CONSULTS
Consult Note  Patient: Yolanda Cruz  Unit/Bed: MF16/HW57-72  YOB: 1970  MRN: [id number]  Acct: [de-identified]   Admitting Diagnosis: DKA, type 2, not at goal [facility]) [E13.10]  Admit Date:  11/12/2017  Hospital Day: 1      Chief Complaint:  Seizure/elevated biomarkers    History of Present Illness:  63-year-old female who is a very poor historian her cousin is at the bedside able to give me the majority of the information at this time. The patient has a long-standing history of liver cirrhosis was at a skilled nursing facility in the last week she recently was signed out was living with her boyfriend where she started drinking again when she was there she started complaining of not feeling right she had a full blown out seizure they have found that she was in DKA and admitted her to ICU she had a troponin of 0.046 so the kindly asked cardiology to get involved with her care. Really only answers yes or no she is lethargic but the is arousable. PMHx:  Past Medical History:   Diagnosis Date    Cirrhosis ([facility]     Diabetes mellitus ([facility]     Seizures ([facility]     d/t alcohol       PSHx:  History reviewed. No pertinent surgical history. Social Hx:  Social History     Social History    Marital status: Single     Spouse name: N/A    Number of children: N/A    Years of education: N/A     Social History Main Topics    Smoking status: Unknown If Ever Smoked    Smokeless tobacco: None    Alcohol use Yes    Drug use: No    Sexual activity: Not Asked     Other Topics Concern    None     Social History Narrative    None       Family Hx:  History reviewed. No pertinent family history. Review of Systems:   Review of Systems   Unable to perform ROS: Mental status change   Constitutional: Positive for fatigue. Neurological: Positive for seizures and weakness.          Physical Examination:    BP 96/61   Pulse 74   Temp 98.2 °F (36.8 °C) (Oral)   Resp 13   Ht 5' 3\" (1.6 m)   Wt 93 lb 0.6 oz (42.2 kg)   LMP  (LMP Unknown)   SpO2 100%   BMI 16.48 kg/m²    Physical Exam   Constitutional: She appears lethargic. She has a sickly appearance. Neck: No JVD present. Cardiovascular: Normal rate, regular rhythm, normal heart sounds and intact distal pulses. No murmur heard. Pulmonary/Chest: Effort normal and breath sounds normal. She has no wheezes. She has no rales. Abdominal: There is tenderness. Musculoskeletal: She exhibits no edema. Neurological: She appears lethargic. Patient is lethargic but arousable  Family at bedside   Skin: Skin is warm and dry.        LABS:  CBC:   Lab Results   Component Value Date    WBC 8.8 11/13/2017    RBC 3.90 11/13/2017    HGB 12.4 11/13/2017    HCT 36.6 11/13/2017    MCV 93.8 11/13/2017    MCH 31.9 11/13/2017    MCHC 34.0 11/13/2017    RDW 14.7 11/13/2017    PLT 89 11/13/2017     CBC with Differential:    Lab Results   Component Value Date    WBC 8.8 11/13/2017    RBC 3.90 11/13/2017    HGB 12.4 11/13/2017    HCT 36.6 11/13/2017    PLT 89 11/13/2017    MCV 93.8 11/13/2017    MCH 31.9 11/13/2017    MCHC 34.0 11/13/2017    RDW 14.7 11/13/2017    LYMPHOPCT 22.9 11/13/2017    MONOPCT 10.6 11/13/2017    BASOPCT 0.3 11/13/2017    MONOSABS 0.9 11/13/2017    LYMPHSABS 2.0 11/13/2017    EOSABS 0.1 11/13/2017    BASOSABS 0.0 11/13/2017     CMP:    Lab Results   Component Value Date     11/13/2017    K 2.8 11/13/2017     11/13/2017    CO2 25 11/13/2017    BUN 13 11/13/2017    CREATININE 0.78 11/13/2017    GFRAA >60.0 11/13/2017    LABGLOM >60.0 11/13/2017    GLUCOSE 158 11/13/2017    PROT 6.0 11/13/2017    LABALBU 3.0 11/13/2017    CALCIUM 8.9 11/13/2017    BILITOT 1.0 11/13/2017    ALKPHOS 102 11/13/2017    AST 35 11/13/2017    ALT 11 11/13/2017     BMP:    Lab Results   Component Value Date     11/13/2017    K 2.8 11/13/2017     11/13/2017    CO2 25 11/13/2017    BUN 13 11/13/2017    LABALBU 3.0 11/13/2017    CREATININE 0.78 11/13/2017    CALCIUM 8.9 11/13/2017    GFRAA >60.0 11/13/2017    LABGLOM >60.0 11/13/2017    GLUCOSE 158 11/13/2017     Magnesium:    Lab Results   Component Value Date    MG 1.9 11/13/2017     Troponin:    Lab Results   Component Value Date    TROPONINI 0.018 11/13/2017       EKG:  NSR qtc 546    Assessment:    Active Hospital Problems    Diagnosis Date Noted    Seizure [facility]) [R56.9] 11/13/2017     Priority: High    DKA (diabetic ketoacidoses) ([facility] [E13.10] 11/13/2017     Priority: High    Alcoholic cirrhosis ([facility] [Y70.77] 11/13/2017     Priority: High    Elevated troponin [R74.8] 11/13/2017     Priority: High          Plan:  1. ICU monitoring  2. Check 2D Echo for LV function, PA pressures, wall motion abnormalities and any significant valvular disease. 3. Monitor potassium levels and keep greater than 4.0  4. Monitor magnesium levels and keep greater 2.0  5. Daily EKG's  6.    Cardiology to continue along with care         Electronically signed by Brooke Gomez NP on 11/13/2017 at 9:15 AM

## 2017-11-13 NOTE — PLAN OF CARE
Problem: Nutrition  Goal: Optimal nutrition therapy  Nutrition Problem: Inadequate oral intake, in context of social or environmental circumstances, Severe malnutrition  Intervention: Food and/or Nutrient Delivery: Start ONS  Nutritional Goals: Intake Of Meals/ONS'S Will Improve To > 75%. Weight > 93#. BG < 160. NH3- WNL.   Outcome: Ongoing

## 2017-11-14 PROBLEM — E83.42 HYPOMAGNESEMIA: Status: ACTIVE | Noted: 2017-01-01

## 2017-11-14 NOTE — PROGRESS NOTES
sounds normal. No stridor. No respiratory distress. Abdominal: Soft. There is no tenderness. Musculoskeletal: She exhibits no edema. Lymphadenopathy:     She has no cervical adenopathy. Neurological:   Awake and confused   Skin: Skin is warm and dry.        LABS:  CBC:   Lab Results   Component Value Date    WBC 8.8 11/13/2017    RBC 3.90 11/13/2017    HGB 12.4 11/13/2017    HCT 36.6 11/13/2017    MCV 93.8 11/13/2017    MCH 31.9 11/13/2017    MCHC 34.0 11/13/2017    RDW 14.7 11/13/2017    PLT 89 11/13/2017     CBC with Differential:    Lab Results   Component Value Date    WBC 8.8 11/13/2017    RBC 3.90 11/13/2017    HGB 12.4 11/13/2017    HCT 36.6 11/13/2017    PLT 89 11/13/2017    MCV 93.8 11/13/2017    MCH 31.9 11/13/2017    MCHC 34.0 11/13/2017    RDW 14.7 11/13/2017    LYMPHOPCT 22.9 11/13/2017    MONOPCT 10.6 11/13/2017    BASOPCT 0.3 11/13/2017    MONOSABS 0.9 11/13/2017    LYMPHSABS 2.0 11/13/2017    EOSABS 0.1 11/13/2017    BASOSABS 0.0 11/13/2017     CMP:    Lab Results   Component Value Date     11/14/2017    K 5.4 11/14/2017     11/14/2017    CO2 19 11/14/2017    BUN 8 11/14/2017    CREATININE 0.59 11/14/2017    GFRAA >60.0 11/14/2017    LABGLOM >60.0 11/14/2017    GLUCOSE 212 11/14/2017    PROT 6.0 11/13/2017    LABALBU 3.0 11/13/2017    CALCIUM 8.8 11/14/2017    BILITOT 1.0 11/13/2017    ALKPHOS 102 11/13/2017    AST 35 11/13/2017    ALT 11 11/13/2017     BMP:    Lab Results   Component Value Date     11/14/2017    K 5.4 11/14/2017     11/14/2017    CO2 19 11/14/2017    BUN 8 11/14/2017    LABALBU 3.0 11/13/2017    CREATININE 0.59 11/14/2017    CALCIUM 8.8 11/14/2017    GFRAA >60.0 11/14/2017    LABGLOM >60.0 11/14/2017    GLUCOSE 212 11/14/2017     Magnesium:    Lab Results   Component Value Date    MG 1.7 11/14/2017     Troponin:    Lab Results   Component Value Date    TROPONINI <0.010 11/13/2017       EKG:  NSR qtc 459    Assessment:    Active Hospital Problems

## 2017-11-14 NOTE — PROGRESS NOTES
Physical Therapy Missed Treatment   Facility/Department: [facility] MED SURG I916/T362-20    NAME: Bon Rincon    : 1970 (52 y.o.)  MRN: [id number]    Account: [de-identified]  Gender: female      Pt transferred to [facility]. Pt evaluation attempted. Pt mildly lethargic. Pleasantly declining PT evaluation at this time, stating that she \"isn't feeling well. \"      Will follow and attempt PT evaluation again at earliest convenience.         Eboni Hooks, PT, 17 at 1:57 PM

## 2017-11-14 NOTE — PROGRESS NOTES
[]Inconsistent   []Perseveration []Cueing   []Unable    []CNT    Process  Simple Verbal Commands:   [x]WNL     []Incomplete  []Latent   []Inconsistent   []Perseveration  []Cueing      []Unable []CNT    Process Intermediate Verbal Commands:   [x]WNL        []Incomplete[]Latent           []Inconsistent  []Perseveration    []Cueing      []Unable          []CNT    Process Complex Verbal Commands:   [x]WNL        []Incomplete [x]Latent           []Inconsistent  []Perseveration    []Cueing       []Unable          []CNT     Comprehension of Conversation:     [x] WNL        []Incomplete []Latent  []Inconsistent    []Perseveration  []Cueing     []Unable []CNT     EXPRESSIVE LANGUAGE   Automatics: [x]Functional [] Impaired  [] Not Tested    Imitation:  Words:         [x]Functional [] Impaired  []Not Tested    Sentences:  [x]Functional [] Impaired  [] Not Tested      Naming:  Modality used: [x]Verbal        []Written   Confrontation Naming: [x]Functional  [] Impaired  [] Not Tested  Functional Description: [x]Functional [x] Latent   [] Not Tested  Response Naming:       [x]Functional [] Impaired   [] Not Tested    Conversation:     [x]Grammatical form: [x] Intact []Disordered [] Not Tested   []Paraphasias: []Literal errors []Semantic errors [] Neologistic errors  []Running jargon []Anomia        COGNITION   Attention/Orientation  Attention: [x]Sustained attention []Easily distracted [] Not Tested  Orientation:  [x]Person  [x]Place [x]Date [x]Reason for hospitalization    Memory   Biographical:  [x]Birthdate  [x] Age  []Family [] Not Tested  Delayed recall:  []Luci  [x]Sweater  []Toast []Did not recall  [] Not Tested  Comment: 1/3 independently, required category cue or choice of 2 for remaining 2 words    Organization/Problem Solving/Reasoning   Sequencing:   []Verbal [] Functional [x] Impaired  [] Not Tested  Comment: Simplified steps, missing 2      Problem solving:    [x]Verbal task [x] Functional [] Impaired  [] Not

## 2017-11-14 NOTE — CONSULTS
Nicki Krishnamurthy [address]                       [address], [address] [facility]                                   CONSULTATION    PATIENT NAME: Vitaly Patten                      :        1970  MED REC NO:   [id number]                            ROOM:       IC16  ACCOUNT NO:   [de-identified]                           ADMIT DATE: 2017  PROVIDER:     Saman Sanchez MD    CONSULT DATE:  2017    ATTENDING:  Dr. Max Nelson. HISTORY OF PRESENT ILLNESS:  This is a 63-year-old right-handed female,  admitted with a history of shaking episode with suggestion of some focal  tremors. The patient had significant hypertension as well as elevated  blood sugars. She reportedly had some seizures in the past.  She has  history of alcoholic liver cirrhosis. At this time, I am seeing her. She  is not able to give me much history other than the shaking episode. She  denies any headaches, nausea, vomiting, chest pain, palpitations, shortness  of breath, nocturnal bleeding, bruising, choking, drooling, falls,  injuries, or trauma. The rest of the history is very difficult to  ascertain from her. PAST MEDICAL HISTORY:  Remarkable for underlying previous history of  alcohol use, diabetes type 2, and history of seizure disorder. PAST SURGICAL HISTORY:  She has no surgical history described. MEDICATIONS:  Other than aspirin, dextrose, glucagon, she is on lactulose,  magnesium oxide, metoprolol, midazolam, pantoprazole. PHYSICAL EXAMINATION:  GENERAL:  Examination reveals no skin lesions or skin marks. There is no  pedal edema. There is no cyanosis or clubbing. NECK:  Supple. There are no meningeal signs. CARDIOVASCULAR SYSTEM:  S1 and S2 are normal.  No murmurs appreciated. No  carotid bruits. RESPIRATORY SYSTEM:  Clear to auscultation. CNS EXAMINATION:  She is awake and alert and oriented to self, place, and  month. Pupils are equal and reactive.   Eye movements are conjugate. There  is no tremor. There is no nystagmus. EXTREMITIES:  Overall strength is 4/5. She is areflexic in the lower  extremities with 1+ reflexes in the upper extremities. Gait is deferred. She is hooked up to multiple lines. LAB EXAMINATION:  Sodium 142, potassium 5.4, BUN of 8, creatinine 0.59. Her initial lactic acid was 2.5 with a phosphorous of 0.8, magnesium is  1.6. Sodium 142, potassium 4.9, BUN of 8, creatinine 0.60. Image studies are reviewed. CT scan of the head does not show anything  significant. IMPRESSION:  Generalized seizures in the patient with a previous history of  seizure disorder. This is not very well documented. She is on no seizure  medication. The patient has low threshold for seizures in the phase of  hyperosmolar state. The patient best be treated with low dose Keppra at  500 mg twice a day. We will arrange for an EEG as well. Thank you Dr. Donald Arndt for asking us to assist in the care of this patient.         Lianet Lantigua MD    D: 11/14/2017 8:50:52       T: 11/14/2017 9:40:52     [email or web address]  Job#: [id number]     Doc#: [id number]

## 2017-11-14 NOTE — PROGRESS NOTES
Endocrinology Progress Note    Assessment and Plan:   Assessment-  1. DKA  2. Insulin-dependent type 2 diabetes, uncontrolled  3. Alcoholic cirrhosis  4. AKA I        Plan-  1. Start insulin dosing regiment, Lantus 35 units at bedtime, 10 units 3 times a day with meals, medium dose sliding scale  2. May start eating  3. Continue IV hydration until CO2 normalizes    POC Glucose:   Recent Labs      11/13/17   1653  11/13/17   1706  11/13/17   1756  11/13/17   2131  11/14/17   1139   POCGLU  19*  239*  164*  144*  265*     HGBA1C:  Recent Labs      11/12/17   1810   LABA1C  13.2*     CBC:   Recent Labs      11/12/17   1315  11/13/17   0527   WBC  8.1  8.8   HGB  14.2  12.4   PLT  130  89*     CMP:    Recent Labs      11/14/17   0054  11/14/17   0552  11/14/17   1509   NA  138  142  139   K  5.5*  5.4*  4.3   CL  106  112*  108*   CO2  21*  19*  18*   BUN  8  8  7   CREATININE  0.67  0.59  0.62   GLUCOSE  232*  212*  231*   CALCIUM  8.8  8.8  8.5*   LABGLOM  >60.0  >60.0  >60.0         CC:   Chief Complaint   Patient presents with    Seizures     h/o liver failure       Subjective: Interval History: Is a 71-year-old insulin-dependent diabetic woman admitted with high blood sugars and seizures. She has multiple admissions for DKA and is noncompliant with her medication regimen.   Her nausea and vomiting of resolved, appetite is returning, and labs are normalizing    Review of systems: denies polyuria, polydipsia, ABD pain, flank pain, N/V/D, or diaphoresis  Medications:   Scheduled Meds:   magnesium oxide  400 mg Oral BID    levETIRAcetam  500 mg Oral BID    lidocaine  5 mL Intradermal Once    sodium chloride flush  10 mL Intravenous 2 times per day    insulin lispro  0-6 Units Subcutaneous Nightly    aspirin  81 mg Oral Daily    metoprolol tartrate  25 mg Oral BID    pantoprazole  40 mg Oral QAM AC    insulin glargine  35 Units Subcutaneous Nightly    insulin lispro  10 Units Subcutaneous TID WC    insulin lispro  0-12 Units Subcutaneous TID WC    lactulose  20 g Oral TID    enoxaparin  40 mg Subcutaneous BID     Continuous Infusions:   sodium chloride 100 mL/hr at 11/14/17 0334    sodium chloride      dextrose         Objective:   Vitals: /62   Pulse 92   Temp 98.9 °F (37.2 °C) (Temporal)   Resp 16   Ht 5' 3\" (1.6 m)   Wt 93 lb 0.6 oz (42.2 kg)   LMP  (LMP Unknown)   SpO2 98%   BMI 16.48 kg/m²    Wt Readings from Last 3 Encounters:   11/12/17 93 lb 0.6 oz (42.2 kg)        General appearance: appears older than stated age, cooperative, fatigued, no distress and mild distress  Skin: Skin color, texture, turgor normal. No rashes or lesions. Neck: no lymphadenopathy  Lungs: clear to auscultation bilaterally  Heart: regular rate and rhythm, S1, S2 normal, no murmur, click, rub or gallop  Abdomen: soft, non-tender. Bowel sounds normal. No masses,  no organomegaly.   Extremities: extremities normal, atraumatic, no cyanosis or edema    Patient Active Problem List:     Seizure [facility])     Diabetic ketoacidosis without coma associated with type 1 diabetes mellitus ([facility] [address]     Alcoholic cirrhosis ([facility] [address]     Elevated troponin     NSTEMI (non-ST elevated myocardial infarction) ([facility] [address]     Hypomagnesemia            Electronically signed by AMY Chun on 11/14/2017 at 3:56 PM

## 2017-11-14 NOTE — PROGRESS NOTES
11/12/2017      DATA:   ECG: Sinus Rhythm       ASSESSMENT:   1  dka  Resolved   2  Seizure  Episodes  Neurology  Evaluation  In  progress  3  Alcoholic  Cirrhosis  4  Alcoholism  5  Mildly  Elevated  Troponin  Cardiology  Evaluation  noted  PLAN    supportive  care

## 2017-11-14 NOTE — FLOWSHEET NOTE
Pt is lying in bed. Oriented x4 but is slow to respond. Follows commands slowly. Moves x4 but weak. No O2 in use. Monitor is NSR.

## 2017-11-15 NOTE — PROGRESS NOTES
Physical Therapy   Facility/Department: [facility] MED SURG K034/C220-18    NAME: Felix Perez    : 1970 (52 y.o.)  MRN: [id number]    Account: [de-identified]  Gender: female    PT evaluation and treatment orders received. Chart reviewed. PT eval attempted. Social history obtained. And entered into navigator. Patient incontinent of bowel. Call light activated. RN aware. Will attempt PT evaluation again at earliest convenience.         Electronically signed by Crista Jung PT on 11/15/17 at 11:21 AM

## 2017-11-15 NOTE — FLOWSHEET NOTE
Pt crying out in pain stating her knees are hurting. Pt's VS taken, BP low (see flow sheet). Dr. Monica Johnson contacted, 500 ml bolus ordered, 30 mg IV Toradol administered, pt states pain is better. Bolus completed, BP improved, will continue to monitor.

## 2017-11-15 NOTE — PROGRESS NOTES
Endocrinology Progress Note    Assessment and Plan:   Assessment-  1. DKA  2. Insulin-dependent type 2 diabetes, uncontrolled  3. Alcoholic cirrhosis  4. AKA I        Plan-  1. Start insulin dosing regiment, Lantus 40 units at bedtime, 14 units 3 times a day with meals, medium dose sliding scale  2. May start eating  3. Continue IV hydration     POC Glucose:   Recent Labs      11/13/17   2131  11/14/17   1139  11/14/17   1802  11/14/17   2115  11/15/17   0846   POCGLU  144*  265*  256*  247*  229*     HGBA1C:  Recent Labs      11/12/17   1810   LABA1C  13.2*     CBC:   Recent Labs      11/12/17   1315  11/13/17   0527   WBC  8.1  8.8   HGB  14.2  12.4   PLT  130  89*     CMP:    Recent Labs      11/14/17   1509  11/14/17   1845  11/15/17   0613   NA  139  138  138   K  4.3  4.6  4.3   CL  108*  108*  110*   CO2  18*  18*  17*   BUN  7  7  8   CREATININE  0.62  0.63  0.70   GLUCOSE  231*  286*  255*   CALCIUM  8.5*  8.6  8.7   LABGLOM  >60.0  >60.0  >60.0         CC:   Chief Complaint   Patient presents with    Seizures     h/o liver failure       Subjective: Interval History: Is a 49-year-old insulin-dependent diabetic woman admitted with high blood sugars and seizures. She has multiple admissions for DKA and is noncompliant with her medication regimen.   Her nausea and vomiting of resolved, appetite is returning, and labs are normalizing    Review of systems: denies polyuria, polydipsia, ABD pain, flank pain, N/V/D, or diaphoresis  Medications:   Scheduled Meds:   levETIRAcetam  500 mg Oral BID    insulin glargine  40 Units Subcutaneous Nightly    insulin lispro  14 Units Subcutaneous TID WC    lidocaine  5 mL Intradermal Once    sodium chloride flush  10 mL Intravenous 2 times per day    insulin lispro  0-6 Units Subcutaneous Nightly    aspirin  81 mg Oral Daily    metoprolol tartrate  25 mg Oral BID    pantoprazole  40 mg Oral QAM AC    insulin lispro  0-12 Units Subcutaneous TID WC    lactulose 20 g Oral TID    enoxaparin  40 mg Subcutaneous BID     Continuous Infusions:   sodium chloride 100 mL/hr at 11/14/17 2346    sodium chloride      dextrose         Objective:   Vitals: /69   Pulse 86   Temp 98.1 °F (36.7 °C) (Oral)   Resp 16   Ht 5' 3\" (1.6 m)   Wt 93 lb 0.6 oz (42.2 kg)   LMP  (LMP Unknown)   SpO2 98%   BMI 16.48 kg/m²    Wt Readings from Last 3 Encounters:   11/12/17 93 lb 0.6 oz (42.2 kg)        General appearance: appears older than stated age, cooperative, fatigued, no distress and mild distress  Skin: Skin color, texture, turgor normal. No rashes or lesions. Neck: no lymphadenopathy  Lungs: clear to auscultation bilaterally  Heart: regular rate and rhythm, S1, S2 normal, no murmur, click, rub or gallop  Abdomen: soft, non-tender. Bowel sounds normal. No masses,  no organomegaly.   Extremities: extremities normal, atraumatic, no cyanosis or edema    Patient Active Problem List:     Seizure [facility])     Diabetic ketoacidosis without coma associated with type 1 diabetes mellitus ([facility] [address]     Alcoholic cirrhosis ([facility] [address]     Elevated troponin     NSTEMI (non-ST elevated myocardial infarction) ([facility] [address]     Hypomagnesemia            Electronically signed by AMY Can on 11/15/2017 at 12:25 PM

## 2017-11-15 NOTE — PROGRESS NOTES
ASSESSMENT:   1  dka  Resolved   2  Seizure  Episodes  Neurology  Evaluation noted  3  Alcoholic  Cirrhosis  4  Alcoholism  5  Mildly  Elevated  Troponin  Cardiology  Evaluation  noted  PLAN    supportive  care  Discharge  today

## 2017-11-15 NOTE — PLAN OF CARE
Problem: Pain:  Goal: Pain level will decrease  Pain level will decrease  Outcome: Ongoing  Pt instructed to use 0-10 pain scale to rate intensity of pain. Pain assessed every hour, hourly rounding completed. Non pharmacological pain measures utilized and pain medication administered as ordered. Will continue to monitor.

## 2017-11-15 NOTE — CONSULTS
CD note: Client isn't receptive to CD services. Shared knowing that she has an issue with alcohol. Hadn't drank any form of alcohol for 6 months until 11/11/2017 where she consumed 4 to 5 12 ounce beers. Shared before the 6 months of no use would drink any form of alcohol beverage that she could get her hands on. Client shared that she wants to stop drinking and knows what she needs to do, she just needs to do it. Has been in prior CD treatment before. Plans to continue with her AA meetings and working with her sponsor(schedule given). Client isn't receptive to CD treatment. Client given list of CD treatment centers where she can access treatment with client stating understanding. Case discussed with clients nurse Edel Howe and  Calderon Frank. Thank You.

## 2017-11-15 NOTE — PROGRESS NOTES
Hailey Ibrahim,  Seen for evaluation and education on Type 2 uncontrolled    Lab Results   Component Value Date    LABA1C 13.2 (H) 11/12/2017     No results found for: EAG    Discussed with Hailey Ibrahim, her current diabetes self-care routines prior to this admission. medication compliance: probably noncompliant though I cannot elicit that specific history, diabetic diet compliance: probably noncompliant though I cannot elicit that specific history, home glucose monitoring: is performed only one time a day. She was very drowsy throughout the education but answered appropriately. Reviewed insulin administration via pen. Patient verbally walked through using a pen. She does the patency check and injects and holds for the 5-10 seconds properly. She however is not disposing of lancets and pen needles properly. Discussed proper needle disposal.      Briefly discussed role of diet, physical activity, daily use of medications and self-monitoring for good diabetes control, provided diabetes education survival packet. Discussed signs and symptoms of hypo/hyperglycemia, BG guidelines, and Rule of 15  handouts provided. Provided patient with card and contact information for out-patient Diabetes education services and encouraged follow up with a PCP/endocrinologist.    Would recommend follow -up education at outpatient diabetes education at [facility]. An order is needed for this service and can be placed via EHR. Discharge Navigator --- Med Reconciliation -- New orders for discharge tab -- search diabetic ed - choose REF 21  Department Diabetes Education - review and sign       Patient verbalizes understanding  of survival skills education.           Esa Fajardo RN

## 2017-11-15 NOTE — PROGRESS NOTES
Temp 98.1 °F (36.7 °C) (Oral)   Resp 16   Ht 5' 3\" (1.6 m)   Wt 93 lb 0.6 oz (42.2 kg)   LMP  (LMP Unknown)   SpO2 98%   BMI 16.48 kg/m²    Physical Exam   Neck: No JVD present. Cardiovascular: Normal rate, regular rhythm, normal heart sounds and intact distal pulses. Pulmonary/Chest: Effort normal and breath sounds normal. No stridor. No respiratory distress. Abdominal: Soft. There is no tenderness. Musculoskeletal: She exhibits no edema. Lymphadenopathy:     She has no cervical adenopathy. Neurological:   Awake and confused   Skin: Skin is warm and dry.        LABS:  CBC:   Lab Results   Component Value Date    WBC 8.8 11/13/2017    RBC 3.90 11/13/2017    HGB 12.4 11/13/2017    HCT 36.6 11/13/2017    MCV 93.8 11/13/2017    MCH 31.9 11/13/2017    MCHC 34.0 11/13/2017    RDW 14.7 11/13/2017    PLT 89 11/13/2017     CBC with Differential:    Lab Results   Component Value Date    WBC 8.8 11/13/2017    RBC 3.90 11/13/2017    HGB 12.4 11/13/2017    HCT 36.6 11/13/2017    PLT 89 11/13/2017    MCV 93.8 11/13/2017    MCH 31.9 11/13/2017    MCHC 34.0 11/13/2017    RDW 14.7 11/13/2017    LYMPHOPCT 22.9 11/13/2017    MONOPCT 10.6 11/13/2017    BASOPCT 0.3 11/13/2017    MONOSABS 0.9 11/13/2017    LYMPHSABS 2.0 11/13/2017    EOSABS 0.1 11/13/2017    BASOSABS 0.0 11/13/2017     CMP:    Lab Results   Component Value Date     11/15/2017    K 4.3 11/15/2017     11/15/2017    CO2 17 11/15/2017    BUN 8 11/15/2017    CREATININE 0.70 11/15/2017    GFRAA >60.0 11/15/2017    LABGLOM >60.0 11/15/2017    GLUCOSE 255 11/15/2017    PROT 6.0 11/13/2017    LABALBU 3.0 11/13/2017    CALCIUM 8.7 11/15/2017    BILITOT 1.0 11/13/2017    ALKPHOS 102 11/13/2017    AST 35 11/13/2017    ALT 11 11/13/2017     BMP:    Lab Results   Component Value Date     11/15/2017    K 4.3 11/15/2017     11/15/2017    CO2 17 11/15/2017    BUN 8 11/15/2017    LABALBU 3.0 11/13/2017    CREATININE 0.70 11/15/2017    CALCIUM 8.7

## 2017-11-16 PROBLEM — E10.10 DKA, TYPE 1, NOT AT GOAL (HCC): Status: ACTIVE | Noted: 2017-01-01

## 2017-11-16 NOTE — PROGRESS NOTES
Endocrinology Progress Note    Assessment and Plan:   Assessment-  1. DKA  2. Insulin-dependent type 2 diabetes, uncontrolled  3. Alcoholic cirrhosis  4. AKA I  5. Medical noncompliance        Plan-  1. Lantus 40 units at bedtime, 14 units 3 times a day with meals, medium dose sliding scale  2. Patient may be discharged home from an endocrine standpoint    POC Glucose:   Recent Labs      11/14/17   2115  11/15/17   0846  11/15/17   1211  11/15/17   1741  11/16/17   0847   POCGLU  247*  229*  234*  229*  196*     HGBA1C:  No results for input(s): LABA1C in the last 72 hours. CBC:   No results for input(s): WBC, HGB, PLT in the last 72 hours. CMP:    Recent Labs      11/15/17   0613  11/15/17   2008  11/16/17   0708   NA  138  137  138   K  4.3  4.3  4.0   CL  110*  108*  109*   CO2  17*  15*  17*   BUN  8  6  5*   CREATININE  0.70  0.72  0.59   GLUCOSE  255*  309*  192*   CALCIUM  8.7  9.1  8.9   LABGLOM  >60.0  >60.0  >60.0         CC:   Chief Complaint   Patient presents with    Seizures     h/o liver failure       Subjective: Interval History: Is a 77-year-old insulin-dependent diabetic woman admitted with high blood sugars and seizures. She has multiple admissions for DKA and is noncompliant with her medication regimen.   Her nausea and vomiting of resolved, appetite is returning, and labs are normalizing    Review of systems: denies polyuria, polydipsia, ABD pain, flank pain, N/V/D, or diaphoresis  Medications:   Scheduled Meds:   magnesium sulfate  2 g Intravenous Once    levETIRAcetam  500 mg Oral BID    insulin glargine  40 Units Subcutaneous Nightly    insulin lispro  14 Units Subcutaneous TID WC    lidocaine  5 mL Intradermal Once    sodium chloride flush  10 mL Intravenous 2 times per day    insulin lispro  0-6 Units Subcutaneous Nightly    aspirin  81 mg Oral Daily    metoprolol tartrate  25 mg Oral BID    pantoprazole  40 mg Oral QAM AC    insulin lispro  0-12 Units Subcutaneous TID WC

## 2017-11-16 NOTE — PROGRESS NOTES
Progress Note  Patient: Kizzy Mcgee  Unit/Bed: W947/A753-57  YOB: 1970  MRN: [id number]  Acct: [de-identified]   Admitting Diagnosis: DKA, type 2, not at goal [facility]) [E13.10]  Admit Date:  11/12/2017  Hospital Day: 4    Chief Complaint:  Seizure/elevated biomarkers    Subjective  42-year-old female who is a very poor historian her cousin is at the bedside able to give me the majority of the information at this time. The patient has a long-standing history of liver cirrhosis was at a skilled nursing facility in the last week she recently was signed out was living with her boyfriend where she started drinking again when she was there she started complaining of not feeling right she had a full blown out seizure they have found that she was in DKA and admitted her to ICU she had a troponin of 0.046 so the kindly asked cardiology to get involved with her care. Really only answers yes or no she is lethargic but the is arousable. 11/14/17  Patient is awake, confused. Remains in ICU vital signs stable. She states that her stomach does feel much better today. Checking labs regularly every 4 hours currently. Well informed of plan  of care    11/16/17  Patient much more alert today. Denies chest pain or shortness of breath. States she is feeling much better. VSS. Well informed plan of care. Review of Systems:   Review of Systems   Constitutional: Positive for fatigue. Negative for diaphoresis and fever. Respiratory: Negative for cough, shortness of breath, wheezing and stridor. Cardiovascular: Negative for chest pain, palpitations and leg swelling. Gastrointestinal: Negative for abdominal distention and abdominal pain. Physical Examination:    /65   Pulse 106   Temp 98.7 °F (37.1 °C) (Oral)   Resp 18   Ht 5' 3\" (1.6 m)   Wt 92 lb 13 oz (42.1 kg)   LMP  (LMP Unknown)   SpO2 100%   BMI 16.44 kg/m²    Physical Exam   Constitutional: She is oriented to person, place, and time.

## 2017-11-16 NOTE — PROGRESS NOTES
[facility] OCCUPATIONAL THERAPY EVALUATION - ACUTE     Date: 2017  Patient Name: Alisha Dodson        MRN: [id number]  Account: [de-identified]   : 1970  (52 y.o.)  Room: [place] [address]    Chart Review:  Diagnosis:  The primary encounter diagnosis was Seizure [facility]). Diagnoses of Diabetic ketoacidosis without coma associated with type 1 diabetes mellitus ([facility] and Metabolic encephalopathy were also pertinent to this visit. Past Medical History:   Diagnosis Date    Cirrhosis ([facility]     Diabetes mellitus ([facility]     Seizures ([facility]     d/t alcohol     History reviewed. No pertinent surgical history. Precautions:  Fall, IV,  cath       Evaluation and Pt. rights have been reviewed: [x]Yes   [] No   If no why not:   Falls safety interventions in place  [x]Yes   [] No    Comments:     Subjective: Pt seen alone. Prior living arrangement:      Support contact: boyfriend     Pt lives: [] Alone   [] With spouse   [x] Other   Comment:  Boyfriend   Home: [] Single level   []  Two level   [x]  Split level     []  Apartment:     Entrance:  Stairs: 2 Hand rails none,    Inside: Stairs: 5 up and down with both side Hand rails: both sides  Bathroom: [] Bath tub   [x] Tub/Shower combo   []  Shower stall    Location: upper floor     DME: [] W/W   [] Hoa Amend   [] Rollator   []  W/C   [] Delwin Holiday   [] Shower Chair   [] [facility]  [] Dressing  AE  [] Other:      Previous Functional Status:  Pt reported IND with ADL's, was cooking and cleaning, and was not employed PTA. Boyfriend drives pt. Pain:   Start of session: 0/10  Description:   Location:   End of session: 0/10  Action: [x] No Action Necessary    [] Patient reports pain at acceptable level for treatment  [] Nursing notified    [] Other      Objective:  Observation:  Pt lying in bed with head elevated.      Orientation: Oriented to  [x] Person   [x] Place  [x]Time    Vision:   []  WFL   [x] Impaired  Comments: reading      Hearing:  [x] [facility]   [] Impaired  Comments:

## 2017-11-16 NOTE — PROGRESS NOTES
Nutrition Assessment    Type and Reason for Visit: Reassess    Nutrition Recommendations: Continue current diet, Modify current ONS    Malnutrition Assessment:  · Malnutrition Status: Meets the criteria for severe malnutrition  · Context: Chronic illness  · Findings of the 6 clinical characteristics of malnutrition (Minimum of 2 out of 6 clinical characteristics is required to make the diagnosis of moderate or severe Protein Calorie Malnutrition based on AND/ASPEN Guidelines):  1. Energy Intake-Less than or equal to 50%, not able to assess    2. Weight Loss-10% loss or greater, unable to assess  3. Fat Loss-Moderate subcutaneous fat loss, Orbital  4. Muscle Loss-Moderate muscle mass loss, Clavicles (pectoralis and deltoids), Temples (temporalis muscle)  5. Fluid Accumulation-No significant fluid accumulation,    6.  Strength-Not measured    Nutrition Diagnosis:   · Problem: Inadequate oral intake, Severe malnutrition, in context of social or environmental circumstances  · Etiology: related to Insufficient energy/nutrient consumption, history of excessive alcohol intake   Signs and symptoms:  as evidenced by Weight loss, Diet history of poor intake, BMI    Nutrition Assessment:  · Subjective Assessment: Pt states that her appetite is good with no complaints of N/V, yet dietary  state intake <50% at meals. Pt states that she likes the supplement. · Nutrition-Focused Physical Findings: No edema.   · Wound Type: None  · Current Nutrition Therapies:  · Oral Diet Orders: Carb Control 5 Carbs/Meal   · Oral Diet/ ONS intake: 25-50%  · Anthropometric Measures:  · Ht: 5' 3\" (160 cm)   · Current Body Wt: 92 lb (41.7 kg) (11-16)  · Usual Body Wt: 105 lb (47.6 kg) (Per Patient)  · % Weight Change: 11%,  Unavailable  · Ideal Body Wt: 115 lb (52.2 kg), % Ideal Body 81%  · BMI Classification: BMI <18.5 Underweight (16.5)  · Comparative Standards (Estimated Nutrition Needs):  · Estimated Daily Total Kcal: 1575 t0 1680  · Estimated Daily Protein (g): 55 to 63    Estimated Intake vs Estimated Needs: Intake Less Than Needs    Nutrition Risk Level: High    Nutrition Interventions:   Continue current diet, Modify current ONS(Change to Diabetic oral supplement tid)  Continued Inpatient Monitoring, Education declined    Nutrition Evaluation:   · Evaluation: No progress toward goals   · Goals: Intake >75% of meals/supplement. Weight gain. · Monitoring: Meal Intake, Weight, Supplement Intake, Mental Status/Confusion, Pertinent Labs    See Adult Nutrition Doc Flowsheet for more detail.      Electronically signed by Sumaya Sethi RD, LD on 11/16/17 at 4:14 PM

## 2017-11-16 NOTE — PROGRESS NOTES
Physical Therapy Med Surg Initial Assessment  Facility/Department: [facility]  [id number]       NAME: Krystle Villafana  : 1970 (52 y.o.)  MRN: [id number]  CODE STATUS: Full Code    Date of Service: 2017    Patient Diagnosis(es):   Patient Active Problem List    Diagnosis Date Noted    Hypomagnesemia 2017     Priority: High    Seizure ([facility] [facility] 2017     Priority: High    Diabetic ketoacidosis without coma associated with type 1 diabetes mellitus ([facility] [facility] 2017     Priority: High    Alcoholic cirrhosis ([facility]      Priority: High    Elevated troponin 2017     Priority: High    NSTEMI (non-ST elevated myocardial infarction) ([facility] 2017     Priority: High    DKA, type 1, not at goal [facility]) 2017       Past Medical History:   Diagnosis Date    Cirrhosis ([facility]     Diabetes mellitus ([facility]     Seizures ([facility])     d/t alcohol     History reviewed. No pertinent surgical history. Restrictions  Restrictions/Precautions: Fall Risk  Body mass index is 16.44 kg/m².     Vision/Hearing  Vision: Within Functional Limits  Hearing: Within functional limits       Subjective  General  Chart Reviewed: Yes     Pain Screening  Patient Currently in Pain: Denies       Post Treatment Pain Screening:       Prior Level of Function  Social/Functional History  Lives With: Significant other  Type of Home: House  Home Layout: Two level  Home Access: Stairs to enter with rails  Entrance Stairs - Number of Steps: 5  Bathroom Shower/Tub: Tub/Shower unit  Home Equipment:  (no DME)  ADL Assistance: Independent  Homemaking Assistance: Independent  Homemaking Responsibilities: Yes  Ambulation Assistance: Independent  Transfer Assistance: Independent  Additional Comments: sister at bedside    Objective  Orientation Level: Oriented X4    Observation/Palpation  Posture: Fair       ROM/Strength Assessment  Strength RLE  Strength RLE: WFL  Strength LLE  Strength LLE: WFL  Strength RUE  Strength RUE: WFL  Strength LUE  Strength LUE: WFL  AROM RLE (degrees)  RLE AROM: WFL  AROM LLE (degrees)  LLE AROM : WFL  AROM RUE (degrees)  RUE AROM : WFL  AROM LUE (degrees)  LUE AROM : WFL            Bed Mobility  Bridging: Independent    Transfers  Sit to Stand: Minimal Assistance (to balance)  Stand to sit: Stand by assistance    Ambulation  Ambulation?: Yes  Ambulation 1  Surface: level tile  Device: Rolling Walker  Assistance: Contact guard assistance  Quality of Gait: dec rate and step length  Distance: F x 4 ft, retro x 3 ft, lat x 2 ft         Balance  Posture: Fair  Sitting - Static: Good  Sitting - Dynamic: Good  Standing - Static: Poor;+  Standing - Dynamic: Poor;+  Comments: pt w/ slt LOB in standing w/out AD requiring min A to balance, then CGA          Assessment   Treatment Diagnosis: Difficulty w/ amb    Body structures, Functions, Activity limitations: Decreased functional mobility ; Decreased ADL status; Decreased strength;Decreased balance;Decreased high-level IADLs  Decision Making: Low Complexity  History: mod- seizures  Exam: low- strength  Clinical Presentation: mod -evolving          Discharge Recommendations:        REQUIRES PT FOLLOW UP: Yes      Plan   Plan  Times per week: 3-6  Plan weeks: until D/C from hospital or goals met  Current Treatment Recommendations: Strengthening, Balance Training, Functional Mobility Training, Transfer Training, Stair training, Gait Training, Neuromuscular Re-education, Manual Therapy - Soft Tissue Mobilization, Manual Therapy - Joint Manipulation, Home Exercise Program  Plan Comment: Transfer care to [facility], PT or Supervising PT  Safety Devices  Type of devices: Bed alarm in place, Call light within reach, Left in bed    G-Code       Goals  Short term goals  Time Frame for Short term goals: indep with bed mobility   Short term goal 1: indep with functional transfers   Short term goal 2: indep with amb >50ft with/out AD       Therapy Time Individual   Time In [facility] 89   Time Out 1535   Minutes [address] [place], [place], 11/16/17 at 3:53 PM

## 2017-11-16 NOTE — PLAN OF CARE
Problem: Nutrition  Goal: Optimal nutrition therapy  Outcome: Ongoing  Nutrition Problem: Inadequate oral intake, Severe malnutrition, in context of social or environmental circumstances  Intervention: Food and/or Nutrient Delivery: Continue current diet, Modify current ONS  Nutritional Goals: Intake >75% of meals/supplement. Weight gain.

## 2017-11-16 NOTE — PROGRESS NOTES
American >60.0 >60    GFR  >60.0 >60    Calcium 9.1 8.6 - 10.2 mg/dL   Magnesium    Collection Time: 11/15/17  8:08 PM   Result Value Ref Range    Magnesium 2.0 1.7 - 2.3 mg/dL   Phosphorus    Collection Time: 11/15/17  8:08 PM   Result Value Ref Range    Phosphorus 1.9 (L) 2.5 - 4.5 mg/dL   EKG 12 Lead    Collection Time: 11/16/17  5:28 AM   Result Value Ref Range    Ventricular Rate 102 BPM    Atrial Rate 102 BPM    P-R Interval 132 ms    QRS Duration 82 ms    Q-T Interval 354 ms    QTc Calculation (Bazett) 461 ms    P Axis 53 degrees    R Axis 75 degrees    T Axis 29 degrees   Basic metabolic panel    Collection Time: 11/16/17  7:08 AM   Result Value Ref Range    Sodium 138 132 - 144 mEq/L    Potassium 4.0 3.5 - 5.1 mEq/L    Chloride 109 (H) 98 - 107 mEq/L    CO2 17 (L) 22 - 29 mEq/L    Anion Gap 12 7 - 13 mEq/L    Glucose 192 (H) 74 - 109 mg/dL    BUN 5 (L) 6 - 20 mg/dL    CREATININE 0.59 0.50 - 0.90 mg/dL    GFR Non-African American >60.0 >60    GFR  >60.0 >60    Calcium 8.9 8.6 - 10.2 mg/dL   Magnesium    Collection Time: 11/16/17  7:08 AM   Result Value Ref Range    Magnesium 1.7 1.7 - 2.3 mg/dL   Phosphorus    Collection Time: 11/16/17  7:08 AM   Result Value Ref Range    Phosphorus 2.6 2.5 - 4.5 mg/dL   POCT Glucose    Collection Time: 11/16/17  8:47 AM   Result Value Ref Range    POC Glucose 196 (H) 60 - 115 mg/dl    Performed on ACCU-CHEK    POCT Glucose    Collection Time: 11/16/17  1:08 PM   Result Value Ref Range    POC Glucose 210 (H) 60 - 115 mg/dl    Performed on ACCU-CHEK        ASSESSMENT AND PLAN   1.  NSTEMI  2. DKA  3. ANTONIO  4. Seizure  5. EtOH abuse  6. Cirrhosis    EEG has not been read by neurology. She is stable for DC to SNF when cleared by neuro  She continues to demonstrate weakness and ataxia. Will DC to SNF when arrangements complete. Cbc, bmp, Mg in am if pt not discharged today.         SIGNATURE: Ford Browning PA-C PATIENT NAME: Dunia

## 2017-11-16 NOTE — CARE COORDINATION
LSW spoke with pt regarding rehab due to her weakness. At first she refused to consider any options that would keep her from going home but she finally agreed to [facility] rehab as her first choice. LSW called Renea Schaumann to give referral.  Elmira Chavez will be needed prior to transfer.

## 2017-11-16 NOTE — PROGRESS NOTES
Observed patient tissue with blood clots from blowing nose. Clots were approximately 0.5cm in size, scant amount of bright red blood on 3 tissues. Patient stated that \"this has occurred the past few mornings when I first blow my nose and no other time\".     Flora LOCKHART

## 2017-11-17 NOTE — CARE COORDINATION
Patient to be discharged home today. Patient to f/u as an outpatient for pt/ot. Spoke with pa on case regarding patient being too fuctional for acute rehab. Patient does not want to go to a snf. Patient offered hhc and patient does not want that, she requested outpat. Therapy. Patient denies any other needs. C3/lsw to follow.

## 2017-11-17 NOTE — PLAN OF CARE
Problem: Risk for Impaired Skin Integrity  Goal: Tissue integrity - skin and mucous membranes  Structural intactness and normal physiological function of skin and  mucous membranes.    Outcome: Ongoing      Problem: Falls - Risk of  Goal: Absence of falls  Outcome: Ongoing      Problem: Pain:  Goal: Control of acute pain  Control of acute pain   Outcome: Ongoing    Goal: Control of chronic pain  Control of chronic pain   Outcome: Ongoing      Problem: IP BALANCE  Goal: LTG - Patient will maintain balance to allow for safe/functional mobility  Outcome: Ongoing

## 2017-11-17 NOTE — PROGRESS NOTES
Reflexes:             Deep Tendon Reflexes:             Reflexes are 1 +             Plantar response:                Right:  downgoing               Left:  downgoing    Vascular:  Cardiac Exam:  normal         Ct Head Wo Contrast    Result Date: 11/12/2017  EXAMINATION: CT HEAD WO CONTRAST  DATE AND TIME:11/12/2017 1:00 PM CLINICAL HISTORY:  SEIZURE, NEW, 18-39 YO, NO TRAUMA  COMPARISON: None TECHNIQUE:Axial CT from skull base to vertex without  contrast..  All CT scans at this facility use dose modulation, iterative reconstruction, and/or weight based dosing when appropriate to reduce radiation dose to as low as reasonably achievable. FINDINGS:  Ventricles are normal in size and position. Sulci are appropriate for age                      No abnormal parenchymal densities There is no parenchymal mass, or mass effect, There is no acute parenchymal hemorrhage or extra-axial fluid. The bony calvarium and skull base are normal.   The visualized paranasal sinuses and mastoids are clear. NO ACUTE INTRACRANIAL PATHOLOGY. Xr Chest Portable    Result Date: 11/12/2017  EXAMINATION: XR CHEST PORTABLE. DATE AND TIME:11/12/2017 1:00 PM CLINICAL HISTORY: Shortness of breath   cough  COMPARISONS: None FINDINGS:The heart, mediastinum and pulmonary vasculature are within normal limits. Visualized lung fields are clear. Bones unremarkable. NO  ACTIVE LUNG DISEASE.        Recent Labs      11/12/17   1315  11/13/17   0527   WBC  8.1  8.8   HGB  14.2  12.4   PLT  130  89*     Recent Labs      11/14/17   1509  11/14/17   1845  11/15/17   0613   NA  139  138  138   K  4.3  4.6  4.3   CL  108*  108*  110*   CO2  18*  18*  17*   BUN  7  7  8   CREATININE  0.62  0.63  0.70   GLUCOSE  231*  286*  255*     Recent Labs      11/12/17   1315  11/12/17   1811  11/13/17   0527   BILITOT  1.6*  1.1  1.0   ALKPHOS  160*  130  102   AST  43*  36*  35   ALT  17  14  11     No results found for: PROTIME, INR  No results found for: LITHIUM, DILFRTOT, VALPROATE    ASSESSMENT AND PLAN  Gen seizures  Known seizure history  Keppra for now  No further seizures  EEG no seizures  CT no forcal findings  High seizure risk from hyperosmolar state  And low threshold for seizures  Still weak an ataxia  Consider rehab,

## 2017-11-17 NOTE — DISCHARGE SUMMARY
[facility] Hospitalist Physician Discharge Summary     Patient ID:  Johan Carranza  [id number]  8 y.o.   1970  Lennox Friedman MD  [address] [place] [address] [address] [place] [address] [phone]    In 1 week  cbc, cmp results    Leela Barnett MD  [address] [place] [address] A  [address] [place] [address]  [phone]    Schedule an appointment as soon as possible for a visit      Mir Berger MD  [address] [place] [address]  [phone]    In 2 weeks  routine monitoring    MD Christine Arboleda 9  Lisseth Craig  [phone]    Schedule an appointment as soon as possible for a visit  routine monitoring        Admit date: 11/12/2017    Discharge date and time:  11/17/2017  1:50 PM  Lennox Friedman MD  [facility] [phone]    In 1 week  cbc, cmp results    Leela Barnett MD  [address] [place] [address] A  [address] [facility]  [phone]    Schedule an appointment as soon as possible for a visit      Mir Berger MD  [address]   [facility]    In 2 weeks  routine monitoring    MD Christine Arboleda 9  Adrienne Infante [id number] [phone]    Schedule an appointment as soon as possible for a visit  routine monitoring      Activity level: up with assistance  Diet:  diabetic diet and Ensure shakes TID  Labs:repeat cbc, cmp in 1 week, follow up w/PCP for results  Dispo:DC home w/outpt PT    Discharge Diagnoses:   Hepatic encephalopathy 2/2 hepatic cirrhosis  Hyperammonemia  NSTEMI  Seizures  DKA - resolved  Severe protein calorie malnutrition d/t chronic illness  EtOH abuse  Gait instability/ataxia  Medication non-compliance      Consults:  IP CONSULT TO ENDOCRINOLOGY  IP CONSULT TO CRITICAL CARE  IP CONSULT TO NEUROLOGY  IP CONSULT TO SOCIAL WORK  IP CONSULT TO CARDIOLOGY  IP CONSULT TO DIABETES EDUCATOR  IP CONSULT TO CHEMICAL DEPENDENCY  IP CONSULT TO DIETITIAN  IP CONSULT TO REHAB/TCU ADMISSION COORDINATOR    Procedures: none    Hospital Course: Pt admitted from ED in status epilepticus after multiple doses of Ativan. Pt has hx of alcoholic cirrhosis and has been non-compliant w/ meds. She was found to be in DKA w/elevated troponins, hyperammonemia and hyponatremia and admitted to CCU. Pt was started on insulin drip and Keppra. She was evaluated by cardiology, neurology, endocrinology during hospitalization. She was assessed by dietitian, chemical dependency and PT/OT. Pt did not qualify for acute rehab and refused recommendation for SNF for ongoing strengthening. Pt agreed to outpt PT and to follow up w/chem dependency and all consulting physicians. Pt was prescribed all medications that were commenced during her stay along w/supplies for glucose checks. Discharge Exam:  Physical Exam   Constitutional: She is oriented to person, place, and time. She appears well-developed and well-nourished. HENT:   Head: Normocephalic and atraumatic. Eyes: Conjunctivae are normal. No scleral icterus. Neck: Normal range of motion. Neck supple. Cardiovascular: Normal rate and regular rhythm. Pulmonary/Chest: Effort normal and breath sounds normal. She has no wheezes. Abdominal: Bowel sounds are normal. There is no tenderness. Musculoskeletal: Normal range of motion. She exhibits no edema. Neurological: She is alert and oriented to person, place, and time. Skin: Skin is warm and dry. Psychiatric: She has a normal mood and affect. Her behavior is normal. Judgment and thought content normal.   Nursing note and vitals reviewed. I/O last 3 completed shifts: In: 1025 [I.V.:1025]  Out: 2550 [Urine:2550]  No intake/output data recorded.       LABS:  Recent Labs      11/16/17   0708  11/16/17 2017 11/17/17   0636   NA  138  137  138   K  4.0  4.2  4.0   CL  109*  107  109*   CO2  17*  18*  18*   BUN  5*  5*  5*   CREATININE  0.59  0.57  0.46*   GLUCOSE  192*  194*  65*   CALCIUM  8.9  8.8  8.4*       No results for input(s): WBC, RBC, HGB, HCT, MCV, MCH, MCHC, RDW, PLT, MPV in the last 72 hours. No results for input(s): GLUMET in the last 72 hours. CBC:   Lab Results   Component Value Date    WBC 8.8 11/13/2017    RBC 3.90 11/13/2017    HGB 12.4 11/13/2017    HCT 36.6 11/13/2017    MCV 93.8 11/13/2017    MCH 31.9 11/13/2017    MCHC 34.0 11/13/2017    RDW 14.7 11/13/2017    PLT 89 11/13/2017     CMP:    Lab Results   Component Value Date     11/17/2017    K 4.0 11/17/2017     11/17/2017    CO2 18 11/17/2017    BUN 5 11/17/2017    CREATININE 0.46 11/17/2017    GFRAA >60.0 11/17/2017    LABGLOM >60.0 11/17/2017    GLUCOSE 65 11/17/2017    PROT 6.0 11/13/2017    LABALBU 3.0 11/13/2017    CALCIUM 8.4 11/17/2017    BILITOT 1.0 11/13/2017    ALKPHOS 102 11/13/2017    AST 35 11/13/2017    ALT 11 11/13/2017       Imaging:  Ct Head Wo Contrast    Result Date: 11/12/2017  EXAMINATION: CT HEAD WO CONTRAST  DATE AND TIME:11/12/2017 1:00 PM CLINICAL HISTORY:  SEIZURE, NEW, 18-39 YO, NO TRAUMA  COMPARISON: None TECHNIQUE:Axial CT from skull base to vertex without  contrast..  All CT scans at this facility use dose modulation, iterative reconstruction, and/or weight based dosing when appropriate to reduce radiation dose to as low as reasonably achievable. FINDINGS:  Ventricles are normal in size and position. Sulci are appropriate for age                      No abnormal parenchymal densities There is no parenchymal mass, or mass effect, There is no acute parenchymal hemorrhage or extra-axial fluid. The bony calvarium and skull base are normal.   The visualized paranasal sinuses and mastoids are clear. NO ACUTE INTRACRANIAL PATHOLOGY. Xr Chest Portable    Result Date: 11/12/2017  EXAMINATION: XR CHEST PORTABLE. DATE AND TIME:11/12/2017 1:00 PM CLINICAL HISTORY: Shortness of breath   cough  COMPARISONS: None FINDINGS:The heart, mediastinum and pulmonary vasculature are within normal limits.  Visualized lung fields are clear. Bones unremarkable. NO  ACTIVE LUNG DISEASE.        Patient Instructions:   Current Discharge Medication List      START taking these medications    Details   aspirin 81 MG EC tablet Take 1 tablet by mouth daily  Qty: 30 tablet, Refills: 5      levETIRAcetam (KEPPRA) 500 MG tablet Take 1 tablet by mouth 2 times daily  Qty: 60 tablet, Refills: 1      insulin glargine (LANTUS) 100 UNIT/ML injection vial Inject 40 Units into the skin nightly  Qty: 1 vial, Refills: 3      insulin lispro (HUMALOG) 100 UNIT/ML injection vial Inject 14 Units into the skin 3 times daily (with meals)  Qty: 1 vial, Refills: 3      metoprolol tartrate (LOPRESSOR) 25 MG tablet Take 1 tablet by mouth 2 times daily  Qty: 60 tablet, Refills: 3      lactulose (CHRONULAC) 10 GM/15ML solution Take 30 mLs by mouth 3 times daily  Qty: 1892 mL, Refills: 3      pantoprazole (PROTONIX) 40 MG tablet Take 1 tablet by mouth every morning (before breakfast)  Qty: 30 tablet, Refills: 3      Alcohol Swabs (ALCOHOL WIPES) 70 % PADS 1 Container by Does not apply route 4 times daily (before meals and nightly)  Qty: 1 each, Refills: 3      Lancets MISC Before meals and nightly  Qty: 100 each, Refills: 3      Insulin Syringe-Needle U-100 ([employer]IM[employer] INS SYR .3CC/29GX0.5\") 29G X 1/2\" 0.3 ML MISC 1 each by Does not apply route 4 times daily (before meals and nightly)  Qty: 100 each, Refills: 3      Glucose Blood (BLOOD GLUCOSE TEST STRIPS) STRP Before meals and nightly  Qty: 200 strip, Refills: 3      glucose monitoring kit (FREESTYLE) monitoring kit 1 kit by Does not apply route 4 times daily (before meals and nightly)  Qty: 1 kit, Refills: 0               Note that greater than 30 minutes was spent in preparing discharge papers, discussing discharge with patient, medication review, etc.    Signed:  Electronically signed by Sonya Box PA-C on 11/17/2017 at 1:50 PM

## 2017-11-17 NOTE — PROGRESS NOTES
Endocrinology Progress Note    Assessment and Plan:   Assessment-  1. DKA  2. Insulin-dependent type 2 diabetes, uncontrolled  3. Alcoholic cirrhosis  4. AKA I  5. Medical noncompliance        Plan-  1. Lantus 40 units at bedtime, 14 units 3 times a day with meals, medium dose sliding scale  2. Patient may be discharged home from an endocrine standpoint    POC Glucose:   Recent Labs      11/16/17   1308  11/16/17   1701  11/16/17   2051  11/17/17   0813  11/17/17   1227   POCGLU  210*  97  258*  54*  247*     HGBA1C:  No results for input(s): LABA1C in the last 72 hours. CBC:   No results for input(s): WBC, HGB, PLT in the last 72 hours. CMP:    Recent Labs      11/16/17   0708  11/16/17   2017  11/17/17   0636   NA  138  137  138   K  4.0  4.2  4.0   CL  109*  107  109*   CO2  17*  18*  18*   BUN  5*  5*  5*   CREATININE  0.59  0.57  0.46*   GLUCOSE  192*  194*  65*   CALCIUM  8.9  8.8  8.4*   LABGLOM  >60.0  >60.0  >60.0         CC:   Chief Complaint   Patient presents with    Seizures     h/o liver failure       Subjective: Interval History: Is a 40-year-old insulin-dependent diabetic woman admitted with high blood sugars and seizures. She has multiple admissions for DKA and is noncompliant with her medication regimen.   Her nausea and vomiting of resolved, appetite is returning, and labs are normalizing    Review of systems: denies polyuria, polydipsia, ABD pain, flank pain, N/V/D, or diaphoresis  Medications:   Scheduled Meds:   levETIRAcetam  500 mg Oral BID    insulin glargine  40 Units Subcutaneous Nightly    insulin lispro  14 Units Subcutaneous TID WC    lidocaine  5 mL Intradermal Once    sodium chloride flush  10 mL Intravenous 2 times per day    insulin lispro  0-6 Units Subcutaneous Nightly    aspirin  81 mg Oral Daily    metoprolol tartrate  25 mg Oral BID    pantoprazole  40 mg Oral QAM AC    insulin lispro  0-12 Units Subcutaneous TID WC    enoxaparin  40 mg Subcutaneous BID

## 2017-11-18 NOTE — PROGRESS NOTES
Neurology Follow up    SUBJECTIVE:  NO Headache, double vision,blurry vision,difficulty with speech,difficulty with swallowing,weakness,numbness,pain,nausea,vomitting,chocking,neck pain,dizziness,  Slept well  No seizures    PHYSICAL EXAM:    /69   Pulse 86   Temp 98.1 °F (36.7 °C) (Oral)   Resp 16   Ht 5' 3\" (1.6 m)   Wt 93 lb 0.6 oz (42.2 kg)   LMP  (LMP Unknown)   SpO2 98%   BMI 16.48 kg/m²   General Appearance:      Mental Status Exam:             Level of Alertness:   awake            Orientation:   person, place, time              Funduscopic Exam:     Cranial Nerves        Cranial nerve II           Visual acuity:  normal           Visual fields:  normal      Cranial nerve III           Pupils:  equal, round, reactive to light      Cranial nerves III, IV, VI           Extraocular Movements: intact      Cranial nerve V           Facial sensation:  intact      Cranial nerve VII           Facial strength: intact      Cranial nerve VIII           Hearing:  intact      Cranial nerve IX           Palate:  intact      Cranial nerve XI         Shoulder shrug:  intact      Cranial nerve XII          Tongue movement:  normal    Motor:    Drift:  absent  Motor exam is symmetrical 4 out of 5 all extremities bilaterally  Tone:  normal  Abnormal Movements:  absent            Sensory:        Pinprick             Right Upper Extremity:  normal             Left Upper Extremity:  normal             Right Lower Extremity:  normal             Left Lower Extremity:  normal           Vibration                         Touch            Proprioception                 Coordination:           Finger/Nose   Right:  normal              Left:  normal          Heel-Knee-Shin                Right:  normal              Left:  normal          Rapid Alternating Movements              Right:  normal              Left:  normal          Gait:                       Casual:  normal                         Romberg:  normal LITHIUM, DILFRTOT, VALPROATE    ASSESSMENT AND PLAN  Gen seizures  Known seizure history  Keppra for now  No further seizures  EEG no seizures  CT no forcal findings  High seizure risk from hyperosmolar state  And low threshold for seizures  Still weak an ataxia  Consider rehab,

## 2017-11-18 NOTE — PROGRESS NOTES
Johan Carranza is a 52 y.o. female patient.     Current Facility-Administered Medications   Medication Dose Route Frequency Provider Last Rate Last Dose    lactulose (CHRONULAC) 10 GM/15ML solution 20 g  20 g Oral TID Rodrick Rodas Sedar, DO   20 g at 11/18/17 0926    0.9 % sodium chloride infusion   Intravenous Continuous Rodrick Stagekarina Sedar, DO   Stopped at 11/17/17 1718    levETIRAcetam (KEPPRA) tablet 500 mg  500 mg Oral BID Kailee Caro MD   500 mg at 11/18/17 0926    insulin glargine (LANTUS) injection vial 40 Units  40 Units Subcutaneous Nightly Virgil Duncan MD   40 Units at 11/17/17 2232    lidocaine 2 % injection 5 mL  5 mL Intradermal Once Suraj Falk MD        sodium chloride flush 0.9 % injection 10 mL  10 mL Intravenous 2 times per day Suraj Falk MD   10 mL at 11/16/17 2019    sodium chloride flush 0.9 % injection 10 mL  10 mL Intravenous PRN Suraj Falk MD        0.9 % sodium chloride infusion  250 mL Intravenous Once Suraj Falk MD        insulin lispro (HUMALOG) injection vial 0-6 Units  0-6 Units Subcutaneous Nightly Virgil Duncan MD   Stopped at 11/17/17 2237    glucose (GLUTOSE) 40 % oral gel 15 g  15 g Oral PRN Virgil Duncan MD        dextrose 50 % solution 12.5 g  12.5 g Intravenous PRN Virgil Duncan MD   12.5 g at 11/13/17 1659    glucagon (rDNA) injection 1 mg  1 mg Intramuscular PRN Virgil Duncan MD        dextrose 5 % solution  100 mL/hr Intravenous PRN Virgil Duncan MD        aspirin EC tablet 81 mg  81 mg Oral Daily Leela Barnett MD   81 mg at 11/18/17 0926    metoprolol tartrate (LOPRESSOR) tablet 25 mg  25 mg Oral BID Leela Barnett MD   25 mg at 11/18/17 0930    pantoprazole (PROTONIX) tablet 40 mg  40 mg Oral QAM AC Leela Barnett MD   40 mg at 11/18/17 1331    insulin lispro (HUMALOG) injection vial 0-12 Units  0-12 Units Subcutaneous TID WC Virgil Duncan MD   4 Units at 11/17/17 1435    midazolam (VERSED) injection 2 mg  2 mg Intravenous Q2H PRN Dileep Mark [place] enoxaparin (LOVENOX) injection 40 mg  40 mg Subcutaneous BID Blake Campos MD   40 mg at 11/17/17 0948     No Known Allergies  Principal Problem:    Seizure ([facility]  Active Problems:    Diabetic ketoacidosis without coma associated with type 1 diabetes mellitus (HCC)    Alcoholic cirrhosis (HCC)    Elevated troponin    NSTEMI (non-ST elevated myocardial infarction) ([facility]    Hypomagnesemia    DKA, type 1, not at goal [facility])    Blood pressure (!) 105/56, pulse 94, temperature 98.1 °F (36.7 °C), temperature source Oral, resp. rate 18, height 5' 3\" (1.6 m), weight 92 lb 11 oz (42 kg), SpO2 100 %. Subjective:  Symptoms:  No shortness of breath, chest pain, chest pressure or anxiety. Diet:  No nausea or vomiting. Pain:  She reports no pain. Objective:  General Appearance:  Comfortable and in no acute distress. Vital signs: (most recent): Blood pressure (!) 105/56, pulse 94, temperature 98.1 °F (36.7 °C), temperature source Oral, resp. rate 18, height 5' 3\" (1.6 m), weight 92 lb 11 oz (42 kg), SpO2 100 %. Vital signs are normal.    HEENT: Normal HEENT exam.    Lungs:  Normal effort and normal respiratory rate. Breath sounds clear to auscultation. Heart: Normal rate. Regular rhythm. S1 normal and S2 normal.    Abdomen: Abdomen is soft and non-distended. There are no signs of ascites. Extremities: There is dependent edema. ( Lower left leg lateral wound)  Neurological: Patient is alert and oriented to person, place and time. Assessment & Plan   1. Seizure  Continue keprra    2. Liver Cirrhosis  Continue lactulose    3. Left lower leg wound  Wound care consult    4.  DM type 2  Continue insulin    Blake Campos MD  11/18/2017

## 2017-11-19 NOTE — PROGRESS NOTES
Reflexes:             Deep Tendon Reflexes:             Reflexes are 1 +             Plantar response:                Right:  downgoing               Left:  downgoing    Vascular:  Cardiac Exam:  normal         Ct Head Wo Contrast    Result Date: 11/12/2017  EXAMINATION: CT HEAD WO CONTRAST  DATE AND TIME:11/12/2017 1:00 PM CLINICAL HISTORY:  SEIZURE, NEW, 18-39 YO, NO TRAUMA  COMPARISON: None TECHNIQUE:Axial CT from skull base to vertex without  contrast..  All CT scans at this facility use dose modulation, iterative reconstruction, and/or weight based dosing when appropriate to reduce radiation dose to as low as reasonably achievable. FINDINGS:  Ventricles are normal in size and position. Sulci are appropriate for age                      No abnormal parenchymal densities There is no parenchymal mass, or mass effect, There is no acute parenchymal hemorrhage or extra-axial fluid. The bony calvarium and skull base are normal.   The visualized paranasal sinuses and mastoids are clear. NO ACUTE INTRACRANIAL PATHOLOGY. Xr Chest Portable    Result Date: 11/12/2017  EXAMINATION: XR CHEST PORTABLE. DATE AND TIME:11/12/2017 1:00 PM CLINICAL HISTORY: Shortness of breath   cough  COMPARISONS: None FINDINGS:The heart, mediastinum and pulmonary vasculature are within normal limits. Visualized lung fields are clear. Bones unremarkable. NO  ACTIVE LUNG DISEASE.        Recent Labs      11/12/17   1315  11/13/17   0527   WBC  8.1  8.8   HGB  14.2  12.4   PLT  130  89*     Recent Labs      11/14/17   1509  11/14/17   1845  11/15/17   0613   NA  139  138  138   K  4.3  4.6  4.3   CL  108*  108*  110*   CO2  18*  18*  17*   BUN  7  7  8   CREATININE  0.62  0.63  0.70   GLUCOSE  231*  286*  255*     Recent Labs      11/12/17   1315  11/12/17   1811  11/13/17   0527   BILITOT  1.6*  1.1  1.0   ALKPHOS  160*  130  102   AST  43*  36*  35   ALT  17  14  11     No results found for: PROTIME, INR  No results found for: neck injury

## 2017-11-19 NOTE — PROGRESS NOTES
Per pt, she has no questions regarding her BP meds any longer. Will continue to monitor.  Electronically signed by Sarthak Pisano RN on 11/19/2017 at 6:18 PM

## 2017-11-20 PROBLEM — L02.91 ABSCESS: Status: ACTIVE | Noted: 2017-01-01

## 2017-11-20 NOTE — DISCHARGE SUMMARY
daily     BLOOD GLUCOSE TEST STRIPS Strp  Before meals and nightly     furosemide 20 MG tablet  Commonly known as:  LASIX  Take 1 tablet by mouth every other day  Start taking on:  11/21/2017     glucose monitoring kit monitoring kit  1 kit by Does not apply route 4 times daily (before meals and nightly)     insulin glargine 100 UNIT/ML injection pen  Commonly known as:  BASAGLAR KWIKPEN  Inject 40 Units into the skin nightly  Replaces:  BASAGLAR KWIKPEN SC     Insulin Syringe-Needle U-100 29G X 1/2\" 0.3 ML Misc  Commonly known as:  AIMSCO INS SYR .3CC/29GX0.5\"  1 each by Does not apply route 4 times daily (before meals and nightly)     lactulose 10 GM/15ML solution  Commonly known as:  CHRONULAC  Take 30 mLs by mouth 3 times daily     Lancets Misc  Before meals and nightly     levETIRAcetam 500 MG tablet  Commonly known as:  KEPPRA  Take 1 tablet by mouth 2 times daily     pantoprazole 40 MG tablet  Commonly known as:  PROTONIX  Take 1 tablet by mouth every morning (before breakfast)     sulfamethoxazole-trimethoprim 800-160 MG per tablet  Commonly known as:  BACTRIM DS  Take 1 tablet by mouth 2 times daily for 5 days        CHANGE how you take these medications    potassium chloride 10 MEQ extended release tablet  Commonly known as:  KLOR-CON M  Take 1 tablet by mouth daily (with breakfast)  What changed:  · medication strength  · how much to take  · when to take this        CONTINUE taking these medications    folic acid 1 MG tablet  Commonly known as:  FOLVITE     vitamin C 500 MG tablet  Commonly known as:  ASCORBIC ACID        STOP taking these medications    BASAGLAR KWIKPEN SC  Replaced by:  insulin glargine 100 UNIT/ML injection pen     midodrine 5 MG tablet  Commonly known as:  PROAMATINE     propranolol 60 MG extended release capsule  Commonly known as:  INDERAL LA     spironolactone 100 MG tablet  Commonly known as:  ALDACTONE     torsemide 20 MG tablet  Commonly known as:  DEMADEX     traZODone 50 MG

## 2017-11-20 NOTE — PROGRESS NOTES
Wound Ostomy Continence Nurse  Consult Note       NAME:  Kizzy Mcgee  MEDICAL RECORD NUMBER:  [id number]  AGE: 52 y.o. GENDER: female  : 1970  TODAY'S DATE:  2017    Subjective   Reason for [address] Nurse Evaluation and Assessment: LLE wound; wound care recommendations      Kizzy Mcgee is a 52 y.o. female referred by:   [] Physician  [x] Nursing  [] Other:     Wound Identification:  Wound Type: undetermined  Contributing Factors: edema, diabetes, poor glucose control and decreased mobility    Wound History: Patient states she has had this wound to the LLE, but is uncertain of what caused it in the first place. Current Wound Care Treatment:  Patient due for discharge today, recommending follow-up in [facility] wound care center within 1 week of discharge. Recommending evaluation from podiatry resident prior to discharge today - concerning to this [id number] Nurses of how painful the wound are area surrounding the wound is. Patient Goal of Care:  [x] Wound Healing  [] Odor Control  [] Palliative Care  [] Pain Control   [] Other:         PAST MEDICAL HISTORY        Diagnosis Date    Cir[facility]osis ([facility]     Diabetes mellitus ([facility]     Seizures ([facility]     d/t alcohol       PAST SURGICAL HISTORY    History reviewed. No pertinent surgical history. FAMILY HISTORY    History reviewed. No pertinent family history. SOCIAL HISTORY    Social History   Substance Use Topics    Smoking status: Never Smoker    Smokeless tobacco: Never Used    Alcohol use Yes       ALLERGIES    No Known Allergies    MEDICATIONS    No current facility-administered medications on file prior to encounter. No current outpatient prescriptions on file prior to encounter.        Objective    BP (!) 100/57   Pulse 91   Temp 97.9 °F (36.6 °C) (Oral)   Resp 16   Ht 5' 3\" (1.6 m)   Wt 94 lb 12.8 oz (43 kg)   LMP  (LMP Unknown)   SpO2 100%   BMI 16.79 kg/m²     LABS:  WBC:    Lab Results   Component Value Date    WBC 4.7 2017 H/H:    Lab Results   Component Value Date    HGB 10.4 11/19/2017    HCT 31.5 11/19/2017     PTT:  No results found for: APTT, PTT[APTT}  PT/INR:  No results found for: PROTIME, INR  HgBA1c:    Lab Results   Component Value Date    LABA1C 13.2 11/12/2017       Assessment   Jose Risk Score: Jose Scale Score: 20    Patient Active Problem List   Diagnosis    Seizure ([facility]    Diabetic ketoacidosis without coma associated with type 1 diabetes mellitus ([facility]    Alcoholic cirrhosis ([facility]    Elevated troponin    NSTEMI (non-ST elevated myocardial infarction) ([facility]    Hypomagnesemia    DKA, type 1, not at goal [facility])    Abscess       Measurements:  Wound 11/20/17 Other (Comment) Leg Left;Lateral (Active)   Wound Type Wound 11/20/2017  4:02 PM   Dressing Change Due 11/20/17 11/20/2017  4:02 PM   Wound Length (cm) 4.5 cm 11/20/2017  4:02 PM   Wound Width (cm) 2.5 cm 11/20/2017  4:02 PM   Calculated Wound Size (cm^2) (l*w) 11.25 cm^2 11/20/2017  4:02 PM   Wound Assessment Red;Black;Dry;Painful 11/20/2017  4:02 PM   Drainage Amount None 11/20/2017  4:02 PM   Odor None 11/20/2017  4:02 PM   Eva-wound Assessment Painful;Edema; Red 11/20/2017  4:02 PM   [place]%Wound Bed 20 11/20/2017  4:02 PM   Red%Wound Bed 40 11/20/2017  4:02 PM   Black%Wound Bed 40 11/20/2017  4:02 PM   Number of days: 0     Assessment:    Patient sitting in bed at this time. Tearful that wound was not addressed until today -This [facility] Nurse was consulted approximately 15 minutes ago today to evaluate wound before discharge. Wound to the LLE - lateral aspect, dry red base with intact eschar present as well. Eva wound skin is red, warm, edematous and painful to even light touch. Attending prescribed bacitracin ointment and antibiotics for home use for the LE wound. Due to the pain, edema and redness, this [address] [place] [address] wound Recommend a podiatry resident assess wound prior to admission and follow-up in . [facility] care center in 1 week.  Holli Escobar, unit

## 2017-11-20 NOTE — PROGRESS NOTES
MD   40 mg at 11/20/17 0637    insulin lispro (HUMALOG) injection vial 0-12 Units  0-12 Units Subcutaneous TID WC Virgil Duncan MD   6 Units at 11/20/17 1253    midazolam (VERSED) injection 2 mg  2 mg Intravenous Q2H PRN AMY Klein        enoxaparin (LOVENOX) injection 40 mg  40 mg Subcutaneous BID Tanika Aponte MD   40 mg at 11/17/17 0948     No Known Allergies  Principal Problem:    Seizure ([facility]  Active Problems:    Diabetic ketoacidosis without coma associated with type 1 diabetes mellitus (HCC)    Alcoholic cirrhosis (HCC)    Elevated troponin    NSTEMI (non-ST elevated myocardial infarction) ([facility]    Hypomagnesemia    DKA, type 1, not at goal [facility])    Abscess    Blood pressure (!) 100/57, pulse 91, temperature 97.9 °F (36.6 °C), temperature source Oral, resp. rate 16, height 5' 3\" (1.6 m), weight 94 lb 12.8 oz (43 kg), SpO2 100 %. Subjective:  Symptoms:  Stable. Diet:  Adequate intake. Activity level: Returning to normal.    Pain:  She reports no pain. Objective:  General Appearance:  Comfortable. Vital signs: (most recent): Blood pressure (!) 100/57, pulse 91, temperature 97.9 °F (36.6 °C), temperature source Oral, resp. rate 16, height 5' 3\" (1.6 m), weight 94 lb 12.8 oz (43 kg), SpO2 100 %. Vital signs are normal.    HEENT: Normal HEENT exam.    Lungs:  Normal effort and normal respiratory rate. Breath sounds clear to auscultation. Heart: Normal rate. Regular rhythm. S1 normal.    Abdomen: Bowel sounds are normal.     Extremities: Normal range of motion. Neurological: Patient is alert. Results for Honey Day (MRN [id number]) as of 11/20/2017 17:24   Ref.  Range 11/19/2017 20:06 11/20/2017 05:28 11/20/2017 05:28 11/20/2017 08:08 11/20/2017 12:06   POC Glucose Latest Ref Range: 60 - 115 mg/dl 138 (H)   160 (H) 263 (H)     Lab Results   Component Value Date     11/19/2017    K 3.7 11/19/2017     11/19/2017    CO2 17 (L) 11/19/2017    BUN 6

## 2017-11-20 NOTE — PROGRESS NOTES
Physical Therapy Missed Treatment   Facility/Department: [facility] MED SURG [id number]    NAME: Elmer Wright    : 1970 (52 y.o.)  MRN: [id number]    Account: [de-identified]  Gender: female     [x] Patient Refused: Pt declined tx this am secondary to inc swelling in B feet. Pt reports has been up and to the restroom this am with inc pain in feet post amb. Will attempt PT treatment again at earliest convenience.       Electronically signed by Carmen Reynolds PTA on 17 at 9:31 AM

## 2017-11-22 NOTE — PROGRESS NOTES
Physical Therapy  Facility/Department: [facility] MED SURG [id number]/[id number]  Physical Therapy Discharge      NAME: Елена Arellano    : 1970 (52 y.o.)  MRN: [id number]    Account: [de-identified]  Gender: female      Patient has been discharged from acute care hospital. DC patient from current PT program.      Electronically signed by Adelaide García PT on 17 at 4:28 PM

## 2017-11-27 PROBLEM — I50.32 CHRONIC DIASTOLIC HEART FAILURE (HCC): Status: ACTIVE | Noted: 2017-01-01

## 2017-11-27 PROBLEM — I21.4 NSTEMI (NON-ST ELEVATED MYOCARDIAL INFARCTION) (HCC): Status: ACTIVE | Noted: 2017-01-01

## 2017-11-27 NOTE — PROGRESS NOTES
ages 25 and older using the  MDRD formula (not corrected for weight), is valid for stable  renal function.  GFR  11/24/2017 >60.0  >60 Final    Comment: >60 mL/min/1.73m2 EGFR, calc. for ages 25 and older using the  MDRD formula (not corrected for weight), is valid for stable  renal function.  Calcium 11/24/2017 9.1  8.6 - 10.2 mg/dL Final    Total Protein 11/24/2017 6.5  6.4 - 8.1 g/dL Final    Alb 11/24/2017 3.5* 3.9 - 4.9 g/dL Final    Total Bilirubin 11/24/2017 1.1  0.0 - 1.2 mg/dL Final    Alkaline Phosphatase 11/24/2017 197* 40 - 130 U/L Final    ALT 11/24/2017 13  0 - 33 U/L Final    AST 11/24/2017 49* 0 - 35 U/L Final    Globulin 11/24/2017 3.0  2.3 - 3.5 g/dL Final   Orders Only on 11/24/2017   Component Date Value Ref Range Status    WBC 11/24/2017 4.5* 4.8 - 10.8 K/uL Final    RBC 11/24/2017 3.60* 4.20 - 5.40 M/uL Final    Hemoglobin 11/24/2017 11.5* 12.0 - 16.0 g/dL Final    Hematocrit 11/24/2017 35.6* 37.0 - 47.0 % Final    MCV 11/24/2017 98.8  82.0 - 100.0 fL Final    MCH 11/24/2017 31.9* 27.0 - 31.3 pg Final    MCHC 11/24/2017 32.3* 33.0 - 37.0 % Final    RDW 11/24/2017 18.6* 11.5 - 14.5 % Final    Platelets 68/22/1203 102* 130 - 400 K/uL Final   Orders Only on 10/28/2017   Component Date Value Ref Range Status    Sodium 10/28/2017 131* 132 - 144 mEq/L Final    Potassium 10/28/2017 4.5  3.5 - 5.1 mEq/L Final    Chloride 10/28/2017 91* 98 - 107 mEq/L Final    CO2 10/28/2017 22  22 - 29 mEq/L Final    Anion Gap 10/28/2017 18* 7 - 13 mEq/L Final    Glucose 10/28/2017 393* 74 - 109 mg/dL Final    BUN 10/28/2017 15  6 - 20 mg/dL Final    CREATININE 10/28/2017 1.00* 0.50 - 0.90 mg/dL Final    GFR Non- 10/28/2017 59.3* >60 Final    Comment: >60 mL/min/1.73m2 EGFR, calc. for ages 25 and older using the  MDRD formula (not corrected for weight), is valid for stable  renal function.       GFR  10/28/2017 >60.0  >60 Final 249* 60 - 115 mg/dl Final    Performed on 10/19/2017 ACCU-CHEK   Final   Office Visit on 10/02/2017   Component Date Value Ref Range Status    Glucose, UA POC 10/02/2017 neg   Final    Bilirubin, UA 10/02/2017 neg   Final    Ketones, UA 10/02/2017 neg   Final    Spec Grav, UA 10/02/2017 1.015   Final    Blood, UA POC 10/02/2017 neg   Final    pH, UA 10/02/2017 6.0   Final    Protein, UA POC 10/02/2017 +   Final    Urobilinogen, UA 10/02/2017 neg   Final    Leukocytes, UA 10/02/2017 neg   Final    Nitrite, UA 10/02/2017 neg   Final   Orders Only on 09/19/2017   Component Date Value Ref Range Status    AFP-Tumor Marker 09/21/2017 6  0 - 9 ng/mL Final    Comment: INTERPRETIVE INFORMATION: Alpha Fetoprotein Tumor Marker  The Alexandra Marquita Access DxI AFP method is used. Results obtained  with  different assay methods or kits cannot be used interchangeably. AFP is  a  valuable aid in the management of nonseminomatous testicular cancer  patients  when used in conjunction with information available from the clinical  evaluation and other diagnostic procedures. Increased AFP  concentrations have  also been observed in ataxia telangiectasia, hereditary tyrosinemia,  primary  hepatocellular carcinoma, teratocarcinoma, gastrointestinal tract  cancers  with and without liver metastases, and in benign hepatic conditions  such as  acute viral hepatitis, chronic active hepatitis, and cirrhosis. The  result  cannot be interpreted as absolute evidence of the presence or absence  of  malignant disease. The result is not interpretable as a tumor marker in  pregnant females. Access complete set of age- and/or gender-specific reference intervals  for  this loan                           t in the [employer] ([email or web address]). Performed by Norma Franz , [id number] [facility] [phone]  www. William Beckham MD - Lab.  Director     Orders Only on 09/19/2017   Component Date Value Ref Range Status    Sodium 09/19/2017 133  132 - 144 mEq/L Final    Potassium 09/19/2017 3.8  3.5 - 5.1 mEq/L Final    Chloride 09/19/2017 93* 98 - 107 mEq/L Final    CO2 09/19/2017 22  22 - 29 mEq/L Final    Anion Gap 09/19/2017 18* 7 - 13 mEq/L Final    Glucose 09/19/2017 116* 74 - 109 mg/dL Final    BUN 09/19/2017 26* 6 - 20 mg/dL Final    CREATININE 09/19/2017 1.41* 0.50 - 0.90 mg/dL Final    GFR Non- 09/19/2017 39.9* >60 Final    Comment: >60 mL/min/1.73m2 EGFR, calc. for ages 25 and older using the  MDRD formula (not corrected for weight), is valid for stable  renal function.  GFR  09/19/2017 48.3* >60 Final    Comment: >60 mL/min/1.73m2 EGFR, calc. for ages 25 and older using the  MDRD formula (not corrected for weight), is valid for stable  renal function.       Calcium 09/19/2017 9.8  8.6 - 10.2 mg/dL Final   Orders Only on 09/19/2017   Component Date Value Ref Range Status    Total Protein 09/19/2017 8.2* 6.4 - 8.1 g/dL Final    Alb 09/19/2017 4.1  3.9 - 4.9 g/dL Final    Alkaline Phosphatase 09/19/2017 116  40 - 130 U/L Final    ALT 09/19/2017 15  0 - 33 U/L Final    AST 09/19/2017 66* 0 - 35 U/L Final    Total Bilirubin 09/19/2017 1.7* 0.0 - 1.2 mg/dL Final    Bilirubin, Direct 09/19/2017 0.9* 0.0 - 0.3 mg/dL Final    Bilirubin, Indirect 09/19/2017 0.8* 0.0 - 0.6 mg/dL Final   Orders Only on 09/19/2017   Component Date Value Ref Range Status    Protime 09/19/2017 11.0  8.1 - 13.7 sec Final    INR 09/19/2017 1.1   Final    Comment: Recommended INR therapeutic ranges for oral anticoagulant  therapy    Prophylaxis/treatment of:                       INR     Venous Thrombosis, Pulmonary Embolism       2.0-3  Prevention of Systemic Embolism from:     Atrial Fibrillation                         2.0-3.0     Myocardial Infarction                       2.0-3.0     Mechanical Prosthetics Heart Valves         2.5-3.5     Recurrent Systemic Embolism 2.5-3.5  Guidelines for patients with coagulopathy, e.g. liver disease:  Use the Protime resulted in seconds. Mild        12.9-17.0 sec    Moderate    17.1-22.6 sec    Severe      G.T. 22.6 sec     There may be more visits with results that are not included. Health Maintenance   Topic Date Due    Diabetic retinal exam  05/25/1980    Cervical cancer screen  05/25/1991    HIV screen  09/07/2018 (Originally 5/25/1985)    DTaP/Tdap/Td vaccine (1 - Tdap) 10/02/2018 (Originally 5/25/1989)    Flu vaccine (1) 10/02/2018 (Originally 9/1/2017)    Pneumococcal highest risk (1 of 3 - PCV13) 10/18/2018 (Originally 5/25/1989)    Diabetic hemoglobin A1C test  01/25/2018    Diabetic foot exam  09/07/2018    Diabetic microalbuminuria test  09/07/2018    Lipid screen  09/07/2018       No results found for this visit on 11/27/17. Objective    Vitals:    11/27/17 1559   BP: 96/64   Pulse: 76   Resp: 14   Temp: 98 °F (36.7 °C)   TempSrc: Temporal   SpO2: 98%   Weight: 110 lb (49.9 kg)   Height: 5' 3\" (1.6 m)       PHYSICAL EXAMINATION:        GENERAL:    The patient appears well nourished and well-developed,     Normal affect. Not appearing significantly anxious or depressed. No acute respiratory distress. Alert and oriented times 3. Skin:     No skin rashes. No concerning moles observed. Gait:    Normal gait. No ataxia. HEENT:  Normocephalic, atraumatic. Throat:  Pharynx is clear, no erythema/ edema or exudates   Ears:    TMs normal bilaterally. Canals and ears normal   Eyes:  Extraocular eye motions intact and pain free. Pupils reactive/equal    Sclerae and conjunctivae clear    NECK: No masses or adenopathy palpable. No carotid bruits heard. No asymmetry visible. No thyromegaly. RESPIRATORY:   Clear/ Equal breath sounds /No acute respiratory distress. No wheezes,rales, or rhonchi.   No percussive abnormalities    HEART: Regular rhythm without murmur, rub or

## 2017-11-27 NOTE — PROGRESS NOTES
reactive to light. Neck: Normal range of motion and thyroid normal. Neck supple. No JVD present. No neck adenopathy. No thyromegaly present. Cardiovascular: Normal rate, regular rhythm, normal heart sounds, intact distal pulses and normal pulses. Pulmonary/Chest: Effort normal and breath sounds normal. She has no wheezes. She has no rales. She exhibits no tenderness. Abdominal: Soft. Bowel sounds are normal. There is no tenderness. Musculoskeletal: Normal range of motion. She exhibits edema. She exhibits no tenderness (3+). Neurological: She is alert and oriented to person, place, and time. Skin: Skin is warm. No cyanosis. Nails show no clubbing.        LABS:  CBC:   Lab Results   Component Value Date    WBC 4.5 11/24/2017    RBC 3.60 11/24/2017    HGB 11.5 11/24/2017    HCT 35.6 11/24/2017    MCV 98.8 11/24/2017    MCH 31.9 11/24/2017    MCHC 32.3 11/24/2017    RDW 18.6 11/24/2017     11/24/2017    MPV 10.2 04/04/2015     Lipids:  Lab Results   Component Value Date    CHOL 217 (H) 09/07/2017     Lab Results   Component Value Date    TRIG 65 09/07/2017     Lab Results   Component Value Date    HDL 59 09/07/2017     Lab Results   Component Value Date    LDLCALC 145 (H) 09/07/2017     No results found for: LABVLDL, VLDL  No results found for: CHOLHDLRATIO  CMP:    Lab Results   Component Value Date     11/24/2017    K 4.3 11/24/2017     11/24/2017    CO2 19 11/24/2017    BUN 6 11/24/2017    CREATININE 0.65 11/24/2017    GFRAA >60.0 11/24/2017    LABGLOM >60.0 11/24/2017    GLUCOSE 134 11/24/2017    PROT 6.5 11/24/2017    LABALBU 3.5 11/24/2017    CALCIUM 9.1 11/24/2017    BILITOT 1.1 11/24/2017    ALKPHOS 197 11/24/2017    AST 49 11/24/2017    ALT 13 11/24/2017     BMP:    Lab Results   Component Value Date     11/24/2017    K 4.3 11/24/2017     11/24/2017    CO2 19 11/24/2017    BUN 6 11/24/2017    LABALBU 3.5 11/24/2017    CREATININE 0.65 11/24/2017    CALCIUM 9.1 11/24/2017    GFRAA >60.0 11/24/2017    LABGLOM >60.0 11/24/2017    GLUCOSE 134 11/24/2017     Magnesium:    Lab Results   Component Value Date    MG 2.7 07/30/2017     TSH:  Lab Results   Component Value Date    TSH 3.500 06/09/2016       Patient Active Problem List   Diagnosis    Alcohol abuse    Anemia    Basal cell carcinoma of leg    Alcoholic cirrhosis of liver (HCC)    Steatohepatitis, alcoholic    Severe sepsis without septic shock (CODE) (HCC)    E. coli UTI (urinary tract infection)    Cholelithiasis    Jaundice, hepatocellular    Acute blood loss anemia    Coagulopathy (HCC)    Lactic acid acidosis    Hyperammonemia (HCC)    [facility]IO [facility]) [facility]    Hyperglycemia due to type 2 diabetes mellitus (HCC)    Acute deep vein thrombosis (DVT) of left lower extremity (HCC)    Edema of left lower extremity    Chronic diastolic heart failure (HCC)       Assessment/Plan:    1. Chronic diastolic heart failure (HCC)  Advance Furosemide 40 QD  - Basic Metabolic Panel; Future    2. Alcohol abuse  Complete abstinence. counseled    3. Alcoholic cirrhosis of liver without ascites ([facility]       4. Leg edema  ACE wrap daily and off at HS       Counseling:  Heart Healthy Lifestyle, Stop Smoking, Low Salt Diet, Volume Restriction 1500cc per day, Increase Nutritional Intake and Walk Daily    Return in about 3 days (around 11/30/2017) for Lab 1-2 d before visit.       Electronically signed by Aleta Houston MD on 11/27/2017 at 12:38 PM

## 2017-12-05 NOTE — PROGRESS NOTES
Subjective:      Patient ID: Chiara Salas is a 52 y.o. female. Diabetes   She presents for her follow-up diabetic visit. She has type 2 diabetes mellitus. Her disease course has been fluctuating. Hypoglycemia symptoms include confusion and dizziness. Associated symptoms include fatigue, foot paresthesias and weight loss. There are no hypoglycemic complications. Diabetic complications include peripheral neuropathy. Risk factors for coronary artery disease include sedentary lifestyle and diabetes mellitus. Current diabetic treatment includes insulin injections. She is compliant with treatment most of the time. She is following a generally healthy diet. She monitors blood glucose at home 1-2 x per day. Blood glucose monitoring compliance is poor. Her highest blood glucose is >200 mg/dl. Her overall blood glucose range is 180-200 mg/dl. (Lab Results       Component                Value               Date                       LABA1C                   9.4 (H)             10/25/2017             recent admission at [facility] with DKA   )       Patient Active Problem List   Diagnosis    Alcohol abuse    Anemia    Basal cell carcinoma of leg    Alcoholic cirrhosis of liver ([facility]    Steatohepatitis, alcoholic    Severe sepsis without septic shock (CODE) (HCC)    E. coli UTI (urinary tract infection)    Cholelithiasis    Jaundice, hepatocellular    Acute blood loss anemia    Coagulopathy (HCC)    Lactic acid acidosis    Hyperammonemia (HCC)    ANTONIO (acute kidney injury) ([address]    Hyperglycemia due to type 2 diabetes mellitus (HCC)    Acute deep vein thrombosis (DVT) of left lower extremity (HCC)    Edema of left lower extremity    Chronic diastolic heart failure (HCC)    NSTEMI (non-ST elevated myocardial infarction) ([facility]     Social History     Social History    Marital status: Single     Spouse name: N/A    Number of children: N/A    Years of education: N/A     Occupational History    Not on file.

## 2018-01-01 ENCOUNTER — APPOINTMENT (OUTPATIENT)
Dept: MRI IMAGING | Age: 48
DRG: 280 | End: 2018-01-01
Payer: MEDICAID

## 2018-01-01 ENCOUNTER — APPOINTMENT (OUTPATIENT)
Dept: GENERAL RADIOLOGY | Age: 48
DRG: 280 | End: 2018-01-01
Payer: MEDICAID

## 2018-01-01 ENCOUNTER — APPOINTMENT (OUTPATIENT)
Dept: ULTRASOUND IMAGING | Age: 48
End: 2018-01-01
Attending: INTERNAL MEDICINE
Payer: MEDICAID

## 2018-01-01 ENCOUNTER — HOSPITAL ENCOUNTER (INPATIENT)
Age: 48
LOS: 6 days | Discharge: OTHER INSTITUTION | DRG: 058 | End: 2018-10-09
Attending: PHYSICAL MEDICINE & REHABILITATION | Admitting: PHYSICAL MEDICINE & REHABILITATION
Payer: MEDICAID

## 2018-01-01 ENCOUNTER — OFFICE VISIT (OUTPATIENT)
Dept: FAMILY MEDICINE CLINIC | Age: 48
End: 2018-01-01
Payer: MEDICAID

## 2018-01-01 ENCOUNTER — APPOINTMENT (OUTPATIENT)
Dept: ULTRASOUND IMAGING | Age: 48
DRG: 058 | End: 2018-01-01
Attending: PHYSICAL MEDICINE & REHABILITATION
Payer: MEDICAID

## 2018-01-01 ENCOUNTER — APPOINTMENT (OUTPATIENT)
Dept: CT IMAGING | Age: 48
DRG: 280 | End: 2018-01-01
Payer: MEDICAID

## 2018-01-01 ENCOUNTER — CARE COORDINATION (OUTPATIENT)
Dept: CASE MANAGEMENT | Age: 48
End: 2018-01-01

## 2018-01-01 ENCOUNTER — HOSPITAL ENCOUNTER (EMERGENCY)
Age: 48
Discharge: HOME | End: 2018-03-18
Payer: MEDICAID

## 2018-01-01 ENCOUNTER — HOSPITAL ENCOUNTER (OUTPATIENT)
Age: 48
Setting detail: OBSERVATION
Discharge: NURSING, REHAB OR LONG-TERM CARE | End: 2018-10-12
Attending: INTERNAL MEDICINE | Admitting: INTERNAL MEDICINE
Payer: MEDICAID

## 2018-01-01 ENCOUNTER — APPOINTMENT (OUTPATIENT)
Dept: GENERAL RADIOLOGY | Age: 48
DRG: 058 | End: 2018-01-01
Attending: PHYSICAL MEDICINE & REHABILITATION
Payer: MEDICAID

## 2018-01-01 ENCOUNTER — HOSPITAL ENCOUNTER (INPATIENT)
Age: 48
LOS: 1 days | Discharge: HOSPICE | DRG: 280 | End: 2018-10-30
Attending: EMERGENCY MEDICINE | Admitting: INTERNAL MEDICINE
Payer: MEDICAID

## 2018-01-01 ENCOUNTER — TELEPHONE (OUTPATIENT)
Dept: FAMILY MEDICINE CLINIC | Age: 48
End: 2018-01-01

## 2018-01-01 ENCOUNTER — OFFICE VISIT (OUTPATIENT)
Dept: GERIATRIC MEDICINE | Age: 48
End: 2018-01-01
Payer: MEDICAID

## 2018-01-01 ENCOUNTER — HOSPITAL ENCOUNTER (EMERGENCY)
Age: 48
Discharge: HOME | End: 2018-06-13
Attending: EMERGENCY MEDICINE
Payer: MEDICAID

## 2018-01-01 ENCOUNTER — APPOINTMENT (OUTPATIENT)
Dept: ULTRASOUND IMAGING | Age: 48
DRG: 280 | End: 2018-01-01
Payer: MEDICAID

## 2018-01-01 ENCOUNTER — TELEPHONE (OUTPATIENT)
Dept: ENDOCRINOLOGY | Age: 48
End: 2018-01-01

## 2018-01-01 ENCOUNTER — HOSPITAL ENCOUNTER (INPATIENT)
Age: 48
LOS: 4 days | Discharge: NURSING, REHAB OR LONG-TERM CARE | DRG: 280 | End: 2018-10-03
Attending: STUDENT IN AN ORGANIZED HEALTH CARE EDUCATION/TRAINING PROGRAM | Admitting: INTERNAL MEDICINE
Payer: MEDICAID

## 2018-01-01 ENCOUNTER — HOSPITAL ENCOUNTER (INPATIENT)
Age: 48
LOS: 4 days | Discharge: HOME HEALTH | DRG: 280 | End: 2018-06-07
Attending: INTERNAL MEDICINE | Admitting: INTERNAL MEDICINE
Payer: MEDICAID

## 2018-01-01 VITALS
OXYGEN SATURATION: 100 % | DIASTOLIC BLOOD PRESSURE: 68 MMHG | RESPIRATION RATE: 18 BRPM | BODY MASS INDEX: 16.83 KG/M2 | SYSTOLIC BLOOD PRESSURE: 103 MMHG | WEIGHT: 95 LBS | TEMPERATURE: 97.6 F | HEART RATE: 70 BPM

## 2018-01-01 VITALS
HEART RATE: 69 BPM | BODY MASS INDEX: 18.83 KG/M2 | SYSTOLIC BLOOD PRESSURE: 103 MMHG | DIASTOLIC BLOOD PRESSURE: 65 MMHG | TEMPERATURE: 98 F | RESPIRATION RATE: 16 BRPM | WEIGHT: 106.26 LBS | HEIGHT: 63 IN | OXYGEN SATURATION: 99 %

## 2018-01-01 VITALS
BODY MASS INDEX: 16.64 KG/M2 | WEIGHT: 93.9 LBS | TEMPERATURE: 98.8 F | RESPIRATION RATE: 20 BRPM | HEART RATE: 78 BPM | SYSTOLIC BLOOD PRESSURE: 136 MMHG | OXYGEN SATURATION: 100 % | HEIGHT: 63 IN | DIASTOLIC BLOOD PRESSURE: 70 MMHG

## 2018-01-01 VITALS
BODY MASS INDEX: 17.72 KG/M2 | HEART RATE: 73 BPM | HEIGHT: 63 IN | RESPIRATION RATE: 18 BRPM | WEIGHT: 100 LBS | TEMPERATURE: 98.6 F | DIASTOLIC BLOOD PRESSURE: 81 MMHG | OXYGEN SATURATION: 99 % | SYSTOLIC BLOOD PRESSURE: 131 MMHG

## 2018-01-01 VITALS
BODY MASS INDEX: 15.38 KG/M2 | DIASTOLIC BLOOD PRESSURE: 66 MMHG | OXYGEN SATURATION: 98 % | HEIGHT: 63 IN | RESPIRATION RATE: 13 BRPM | SYSTOLIC BLOOD PRESSURE: 102 MMHG | HEART RATE: 97 BPM | TEMPERATURE: 97.7 F | WEIGHT: 86.8 LBS

## 2018-01-01 VITALS
DIASTOLIC BLOOD PRESSURE: 56 MMHG | BODY MASS INDEX: 15.59 KG/M2 | RESPIRATION RATE: 16 BRPM | SYSTOLIC BLOOD PRESSURE: 104 MMHG | HEIGHT: 63 IN | WEIGHT: 88 LBS | HEART RATE: 76 BPM | TEMPERATURE: 97.9 F

## 2018-01-01 VITALS
HEART RATE: 75 BPM | BODY MASS INDEX: 17.97 KG/M2 | RESPIRATION RATE: 16 BRPM | OXYGEN SATURATION: 100 % | SYSTOLIC BLOOD PRESSURE: 113 MMHG | HEIGHT: 63 IN | DIASTOLIC BLOOD PRESSURE: 66 MMHG | TEMPERATURE: 98.2 F | WEIGHT: 101.41 LBS

## 2018-01-01 VITALS
WEIGHT: 83.78 LBS | SYSTOLIC BLOOD PRESSURE: 109 MMHG | DIASTOLIC BLOOD PRESSURE: 58 MMHG | BODY MASS INDEX: 14.84 KG/M2 | OXYGEN SATURATION: 99 % | HEIGHT: 63 IN | HEART RATE: 94 BPM | TEMPERATURE: 98.4 F | RESPIRATION RATE: 18 BRPM

## 2018-01-01 VITALS
OXYGEN SATURATION: 98 % | HEART RATE: 77 BPM | HEIGHT: 63 IN | DIASTOLIC BLOOD PRESSURE: 46 MMHG | WEIGHT: 108.25 LBS | TEMPERATURE: 97.2 F | BODY MASS INDEX: 19.18 KG/M2 | SYSTOLIC BLOOD PRESSURE: 92 MMHG | RESPIRATION RATE: 8 BRPM

## 2018-01-01 VITALS
TEMPERATURE: 98.4 F | DIASTOLIC BLOOD PRESSURE: 60 MMHG | SYSTOLIC BLOOD PRESSURE: 102 MMHG | HEART RATE: 62 BPM | OXYGEN SATURATION: 99 % | RESPIRATION RATE: 18 BRPM

## 2018-01-01 DIAGNOSIS — K70.40 ALCOHOLIC LIVER FAILURE (HCC): Primary | ICD-10-CM

## 2018-01-01 DIAGNOSIS — R73.9 HYPERGLYCEMIA: ICD-10-CM

## 2018-01-01 DIAGNOSIS — R94.31 ABNORMAL EKG: ICD-10-CM

## 2018-01-01 DIAGNOSIS — E11.65 TYPE 2 DIABETES MELLITUS WITH HYPERGLYCEMIA, WITHOUT LONG-TERM CURRENT USE OF INSULIN (HCC): Chronic | ICD-10-CM

## 2018-01-01 DIAGNOSIS — I82.402 ACUTE DEEP VEIN THROMBOSIS (DVT) OF LEFT LOWER EXTREMITY, UNSPECIFIED VEIN (HCC): ICD-10-CM

## 2018-01-01 DIAGNOSIS — R18.8 CIRRHOSIS OF LIVER WITH ASCITES, UNSPECIFIED HEPATIC CIRRHOSIS TYPE (HCC): Primary | ICD-10-CM

## 2018-01-01 DIAGNOSIS — F10.920 ACUTE ALCOHOLIC INTOXICATION WITHOUT COMPLICATION (HCC): ICD-10-CM

## 2018-01-01 DIAGNOSIS — N30.00 ACUTE CYSTITIS WITHOUT HEMATURIA: ICD-10-CM

## 2018-01-01 DIAGNOSIS — K76.9 LIVER DISEASE: ICD-10-CM

## 2018-01-01 DIAGNOSIS — K70.30 ALCOHOLIC CIRRHOSIS OF LIVER WITHOUT ASCITES (HCC): Primary | ICD-10-CM

## 2018-01-01 DIAGNOSIS — K70.31 ALCOHOLIC CIRRHOSIS OF LIVER WITH ASCITES (HCC): ICD-10-CM

## 2018-01-01 DIAGNOSIS — K70.30 ALCOHOLIC CIRRHOSIS OF LIVER WITHOUT ASCITES (HCC): ICD-10-CM

## 2018-01-01 DIAGNOSIS — E87.6 HYPOKALEMIA: ICD-10-CM

## 2018-01-01 DIAGNOSIS — D68.9 COAGULOPATHY (HCC): ICD-10-CM

## 2018-01-01 DIAGNOSIS — R63.0 ANOREXIA: ICD-10-CM

## 2018-01-01 DIAGNOSIS — Z91.199 MEDICALLY NONCOMPLIANT: ICD-10-CM

## 2018-01-01 DIAGNOSIS — R79.89 INCREASED AMMONIA LEVEL: ICD-10-CM

## 2018-01-01 DIAGNOSIS — E87.5 HYPERKALEMIA: ICD-10-CM

## 2018-01-01 DIAGNOSIS — F10.20 ALCOHOLISM (HCC): Primary | ICD-10-CM

## 2018-01-01 DIAGNOSIS — I21.4 NSTEMI (NON-ST ELEVATED MYOCARDIAL INFARCTION) (HCC): ICD-10-CM

## 2018-01-01 DIAGNOSIS — R79.89 INCREASED AMMONIA LEVEL: Primary | ICD-10-CM

## 2018-01-01 DIAGNOSIS — R53.1 WEAKNESS: ICD-10-CM

## 2018-01-01 DIAGNOSIS — R94.31 PROLONGED Q-T INTERVAL ON ECG: ICD-10-CM

## 2018-01-01 DIAGNOSIS — I10 ESSENTIAL HYPERTENSION: ICD-10-CM

## 2018-01-01 DIAGNOSIS — F10.29 ALCOHOL DEPENDENCE WITH UNSPECIFIED ALCOHOL-INDUCED DISORDER (HCC): Primary | ICD-10-CM

## 2018-01-01 DIAGNOSIS — F51.01 PRIMARY INSOMNIA: Primary | ICD-10-CM

## 2018-01-01 DIAGNOSIS — K74.60 CIRRHOSIS OF LIVER WITH ASCITES, UNSPECIFIED HEPATIC CIRRHOSIS TYPE (HCC): Primary | ICD-10-CM

## 2018-01-01 DIAGNOSIS — E87.1 HYPONATREMIA: ICD-10-CM

## 2018-01-01 DIAGNOSIS — M15.9 OSTEOARTHRITIS OF MULTIPLE JOINTS, UNSPECIFIED OSTEOARTHRITIS TYPE: ICD-10-CM

## 2018-01-01 DIAGNOSIS — K76.82 HEPATIC ENCEPHALOPATHY: Primary | ICD-10-CM

## 2018-01-01 DIAGNOSIS — R11.0 NAUSEA: Primary | ICD-10-CM

## 2018-01-01 DIAGNOSIS — F10.920 ACUTE ALCOHOLIC INTOXICATION WITHOUT COMPLICATION (HCC): Primary | ICD-10-CM

## 2018-01-01 DIAGNOSIS — R40.0 SOMNOLENCE: Primary | ICD-10-CM

## 2018-01-01 DIAGNOSIS — E87.20 LACTIC ACID INCREASED: ICD-10-CM

## 2018-01-01 LAB
ABO/RH: NORMAL
ACETAMINOPHEN LEVEL: <5 UG/ML (ref 10–30)
ALBUMIN FLUID: <0.2 G/DL
ALBUMIN SERPL-MCNC: 2 G/DL (ref 3.9–4.9)
ALBUMIN SERPL-MCNC: 2.2 G/DL (ref 3.9–4.9)
ALBUMIN SERPL-MCNC: 2.2 G/DL (ref 3.9–4.9)
ALBUMIN SERPL-MCNC: 2.3 G/DL (ref 3.9–4.9)
ALBUMIN SERPL-MCNC: 2.4 G/DL (ref 3.9–4.9)
ALBUMIN SERPL-MCNC: 2.5 G/DL (ref 3.9–4.9)
ALBUMIN SERPL-MCNC: 2.7 G/DL (ref 3.9–4.9)
ALBUMIN SERPL-MCNC: 3 G/DL (ref 3.9–4.9)
ALBUMIN SERPL-MCNC: 3.1 G/DL (ref 3.9–4.9)
ALBUMIN SERPL-MCNC: 3.3 G/DL (ref 3.9–4.9)
ALBUMIN SERPL-MCNC: 3.3 G/DL (ref 3.9–4.9)
ALBUMIN SERPL-MCNC: 3.4 G/DL (ref 3.9–4.9)
ALBUMIN SERPL-MCNC: 3.5 G/DL (ref 3.9–4.9)
ALBUMIN SERPL-MCNC: 3.5 G/DL (ref 3.9–4.9)
ALBUMIN SERPL-MCNC: 3.7 G/DL (ref 3.9–4.9)
ALBUMIN SERPL-MCNC: 3.8 G/DL (ref 3.9–4.9)
ALBUMIN SERPL-MCNC: 3.8 G/DL (ref 3.9–4.9)
ALP BLD-CCNC: 131 U/L (ref 40–130)
ALP BLD-CCNC: 153 U/L (ref 40–130)
ALP BLD-CCNC: 155 U/L (ref 40–130)
ALP BLD-CCNC: 161 U/L (ref 40–130)
ALP BLD-CCNC: 164 U/L (ref 40–130)
ALP BLD-CCNC: 166 U/L (ref 40–130)
ALP BLD-CCNC: 166 U/L (ref 40–130)
ALP BLD-CCNC: 176 U/L (ref 40–130)
ALP BLD-CCNC: 181 U/L (ref 40–130)
ALP BLD-CCNC: 182 U/L (ref 40–130)
ALP BLD-CCNC: 196 U/L (ref 40–130)
ALP BLD-CCNC: 204 U/L (ref 40–130)
ALP BLD-CCNC: 211 U/L (ref 40–130)
ALP BLD-CCNC: 215 U/L (ref 40–130)
ALP BLD-CCNC: 216 U/L (ref 40–130)
ALP BLD-CCNC: 284 U/L (ref 40–130)
ALP BLD-CCNC: 299 U/L (ref 40–130)
ALP BLD-CCNC: 342 U/L (ref 40–130)
ALP BLD-CCNC: 356 U/L (ref 40–130)
ALP BLD-CCNC: 362 U/L (ref 40–130)
ALP BLD-CCNC: 410 U/L (ref 40–130)
ALT SERPL-CCNC: 15 U/L (ref 0–33)
ALT SERPL-CCNC: 15 U/L (ref 0–33)
ALT SERPL-CCNC: 16 U/L (ref 0–33)
ALT SERPL-CCNC: 16 U/L (ref 0–33)
ALT SERPL-CCNC: 17 U/L (ref 0–33)
ALT SERPL-CCNC: 17 U/L (ref 0–33)
ALT SERPL-CCNC: 18 U/L (ref 0–33)
ALT SERPL-CCNC: 19 U/L (ref 0–33)
ALT SERPL-CCNC: 19 U/L (ref 0–33)
ALT SERPL-CCNC: 20 U/L (ref 0–33)
ALT SERPL-CCNC: 20 U/L (ref 0–33)
ALT SERPL-CCNC: 23 U/L (ref 0–33)
ALT SERPL-CCNC: 23 U/L (ref 0–33)
ALT SERPL-CCNC: 24 U/L (ref 0–33)
ALT SERPL-CCNC: 25 U/L (ref 0–33)
ALT SERPL-CCNC: 26 U/L (ref 0–33)
ALT SERPL-CCNC: 31 U/L (ref 0–33)
ALT SERPL-CCNC: 32 U/L (ref 0–33)
ALT SERPL-CCNC: 36 U/L (ref 0–33)
ALT SERPL-CCNC: 37 U/L (ref 0–33)
ALT SERPL-CCNC: 6 U/L (ref 0–33)
AMMONIA: 136 UMOL/L (ref 11–51)
AMMONIA: 148 UMOL/L (ref 11–51)
AMMONIA: 60 UMOL/L (ref 11–51)
AMMONIA: 67 UMOL/L (ref 11–51)
AMMONIA: 68 UMOL/L (ref 11–51)
AMMONIA: 72 UMOL/L (ref 11–51)
AMMONIA: 88 UMOL/L (ref 11–51)
AMMONIA: ABNORMAL UMOL/L (ref 11–51)
AMPHETAMINE SCREEN, URINE: NORMAL
ANION GAP SERPL CALCULATED.3IONS-SCNC: 11 MEQ/L (ref 7–13)
ANION GAP SERPL CALCULATED.3IONS-SCNC: 12 MEQ/L (ref 7–13)
ANION GAP SERPL CALCULATED.3IONS-SCNC: 12 MEQ/L (ref 7–13)
ANION GAP SERPL CALCULATED.3IONS-SCNC: 13 MEQ/L (ref 7–13)
ANION GAP SERPL CALCULATED.3IONS-SCNC: 14 MEQ/L (ref 7–13)
ANION GAP SERPL CALCULATED.3IONS-SCNC: 15 MEQ/L (ref 7–13)
ANION GAP SERPL CALCULATED.3IONS-SCNC: 16 MEQ/L (ref 7–13)
ANION GAP SERPL CALCULATED.3IONS-SCNC: 17 MEQ/L (ref 7–13)
ANION GAP SERPL CALCULATED.3IONS-SCNC: 18 MEQ/L (ref 7–13)
ANION GAP SERPL CALCULATED.3IONS-SCNC: 19 MEQ/L (ref 7–13)
ANION GAP SERPL CALCULATED.3IONS-SCNC: 19 MEQ/L (ref 7–13)
ANION GAP SERPL CALCULATED.3IONS-SCNC: 20 MEQ/L (ref 7–13)
ANION GAP SERPL CALCULATED.3IONS-SCNC: 21 MEQ/L (ref 7–13)
ANION GAP SERPL CALCULATED.3IONS-SCNC: 21 MEQ/L (ref 7–13)
ANION GAP SERPL CALCULATED.3IONS-SCNC: 22 MEQ/L (ref 7–13)
ANISOCYTOSIS: ABNORMAL
ANTIBODY SCREEN: NORMAL
APPEARANCE FLUID: CLEAR
APTT: 30.5 SEC (ref 21.6–35.4)
APTT: 35.3 SEC (ref 21.6–35.4)
APTT: 39.2 SEC (ref 21.6–35.4)
APTT: 71.8 SEC (ref 21.6–35.4)
AST SERPL-CCNC: 115 U/L (ref 0–35)
AST SERPL-CCNC: 120 U/L (ref 0–35)
AST SERPL-CCNC: 123 U/L (ref 0–35)
AST SERPL-CCNC: 126 U/L (ref 0–35)
AST SERPL-CCNC: 141 U/L (ref 0–35)
AST SERPL-CCNC: 143 U/L (ref 0–35)
AST SERPL-CCNC: 157 U/L (ref 0–35)
AST SERPL-CCNC: 192 U/L (ref 0–35)
AST SERPL-CCNC: 223 U/L (ref 0–35)
AST SERPL-CCNC: 235 U/L (ref 0–35)
AST SERPL-CCNC: 262 U/L (ref 0–35)
AST SERPL-CCNC: 36 U/L (ref 0–35)
AST SERPL-CCNC: 59 U/L (ref 0–35)
AST SERPL-CCNC: 69 U/L (ref 0–35)
AST SERPL-CCNC: 80 U/L (ref 0–35)
AST SERPL-CCNC: 87 U/L (ref 0–35)
AST SERPL-CCNC: 87 U/L (ref 0–35)
AST SERPL-CCNC: 93 U/L (ref 0–35)
AST SERPL-CCNC: 95 U/L (ref 0–35)
AST SERPL-CCNC: 95 U/L (ref 0–35)
AST SERPL-CCNC: 97 U/L (ref 0–35)
BACTERIA: ABNORMAL /HPF
BACTERIA: NORMAL /HPF
BACTERIA: NORMAL /HPF
BANDED NEUTROPHILS RELATIVE PERCENT: 1 % (ref 5–11)
BANDED NEUTROPHILS RELATIVE PERCENT: 2 % (ref 5–11)
BANDED NEUTROPHILS RELATIVE PERCENT: 3 % (ref 5–11)
BANDED NEUTROPHILS RELATIVE PERCENT: 6 % (ref 5–11)
BARBITURATE SCREEN URINE: NORMAL
BASE EXCESS ARTERIAL: -21 (ref -3–3)
BASE EXCESS ARTERIAL: -22 (ref -3–3)
BASE EXCESS ARTERIAL: 0 (ref -3–3)
BASOPHILS ABSOLUTE: 0 K/UL (ref 0–0.2)
BASOPHILS ABSOLUTE: 0.1 K/UL (ref 0–0.2)
BASOPHILS RELATIVE PERCENT: 0.1 %
BASOPHILS RELATIVE PERCENT: 0.1 %
BASOPHILS RELATIVE PERCENT: 0.2 %
BASOPHILS RELATIVE PERCENT: 0.2 %
BASOPHILS RELATIVE PERCENT: 0.3 %
BASOPHILS RELATIVE PERCENT: 0.4 %
BASOPHILS RELATIVE PERCENT: 0.5 %
BASOPHILS RELATIVE PERCENT: 0.7 %
BASOPHILS RELATIVE PERCENT: 0.9 %
BASOPHILS RELATIVE PERCENT: 1 %
BASOPHILS RELATIVE PERCENT: 1 %
BASOPHILS RELATIVE PERCENT: 1.1 %
BASOPHILS RELATIVE PERCENT: 1.1 %
BASOPHILS RELATIVE PERCENT: 1.4 %
BASOPHILS RELATIVE PERCENT: 1.5 %
BASOPHILS RELATIVE PERCENT: 1.8 %
BASOPHILS RELATIVE PERCENT: 2 %
BENZODIAZEPINE SCREEN, URINE: NORMAL
BETA-HYDROXYBUTYRATE: 4 MG/DL (ref 0.2–2.8)
BILIRUB SERPL-MCNC: 0.4 MG/DL (ref 0–1.2)
BILIRUB SERPL-MCNC: 16 MG/DL (ref 0–1.2)
BILIRUB SERPL-MCNC: 21.9 MG/DL (ref 0–1.2)
BILIRUB SERPL-MCNC: 22.1 MG/DL (ref 0–1.2)
BILIRUB SERPL-MCNC: 22.5 MG/DL (ref 0–1.2)
BILIRUB SERPL-MCNC: 22.7 MG/DL (ref 0–1.2)
BILIRUB SERPL-MCNC: 24.2 MG/DL (ref 0–1.2)
BILIRUB SERPL-MCNC: 24.6 MG/DL (ref 0–1.2)
BILIRUB SERPL-MCNC: 24.6 MG/DL (ref 0–1.2)
BILIRUB SERPL-MCNC: 27.5 MG/DL (ref 0–1.2)
BILIRUB SERPL-MCNC: 27.6 MG/DL (ref 0–1.2)
BILIRUB SERPL-MCNC: 28.5 MG/DL (ref 0–1.2)
BILIRUB SERPL-MCNC: 28.7 MG/DL (ref 0–1.2)
BILIRUB SERPL-MCNC: 29 MG/DL (ref 0–1.2)
BILIRUB SERPL-MCNC: 3.4 MG/DL (ref 0–1.2)
BILIRUB SERPL-MCNC: 30.3 MG/DL (ref 0–1.2)
BILIRUB SERPL-MCNC: 5 MG/DL (ref 0–1.2)
BILIRUB SERPL-MCNC: 5.1 MG/DL (ref 0–1.2)
BILIRUB SERPL-MCNC: 6.9 MG/DL (ref 0–1.2)
BILIRUB SERPL-MCNC: 7.3 MG/DL (ref 0–1.2)
BILIRUB SERPL-MCNC: 7.9 MG/DL (ref 0–1.2)
BILIRUBIN DIRECT: 16.8 MG/DL (ref 0–0.3)
BILIRUBIN DIRECT: 16.8 MG/DL (ref 0–0.3)
BILIRUBIN DIRECT: 3.6 MG/DL (ref 0–0.3)
BILIRUBIN DIRECT: >20 MG/DL (ref 0–0.3)
BILIRUBIN URINE: ABNORMAL
BILIRUBIN URINE: NEGATIVE
BILIRUBIN, INDIRECT: 1.4 MG/DL (ref 0–0.6)
BILIRUBIN, INDIRECT: 5.7 MG/DL (ref 0–0.6)
BILIRUBIN, INDIRECT: 5.9 MG/DL (ref 0–0.6)
BILIRUBIN, INDIRECT: ABNORMAL MG/DL (ref 0–0.6)
BLOOD CULTURE, ROUTINE: NORMAL
BLOOD, URINE: NEGATIVE
BODY FLUID CULTURE, STERILE: NORMAL
BUN BLDV-MCNC: 10 MG/DL (ref 6–20)
BUN BLDV-MCNC: 12 MG/DL (ref 6–20)
BUN BLDV-MCNC: 14 MG/DL (ref 6–20)
BUN BLDV-MCNC: 14 MG/DL (ref 6–20)
BUN BLDV-MCNC: 3 MG/DL (ref 6–20)
BUN BLDV-MCNC: 4 MG/DL (ref 6–20)
BUN BLDV-MCNC: 4 MG/DL (ref 6–20)
BUN BLDV-MCNC: 41 MG/DL (ref 6–20)
BUN BLDV-MCNC: 5 MG/DL (ref 6–20)
BUN BLDV-MCNC: 6 MG/DL (ref 6–20)
BUN BLDV-MCNC: 76 MG/DL (ref 6–20)
BUN BLDV-MCNC: 77 MG/DL (ref 6–20)
BUN BLDV-MCNC: 77 MG/DL (ref 6–20)
BUN BLDV-MCNC: 78 MG/DL (ref 6–20)
BUN BLDV-MCNC: 81 MG/DL (ref 6–20)
BUN BLDV-MCNC: 9 MG/DL (ref 6–20)
CALCIUM IONIZED, CALC AT PH 7.4: 1.11 MMOL/L (ref 1.11–1.3)
CALCIUM IONIZED, CALC AT PH 7.4: 1.12 MMOL/L (ref 1.11–1.3)
CALCIUM IONIZED, CALC AT PH 7.4: 1.13 MMOL/L (ref 1.11–1.3)
CALCIUM IONIZED, CALC AT PH 7.4: 1.2 MMOL/L (ref 1.11–1.3)
CALCIUM IONIZED: 1.09 MMOL/L (ref 1.11–1.3)
CALCIUM IONIZED: 1.09 MMOL/L (ref 1.11–1.3)
CALCIUM IONIZED: 1.09 MMOL/L (ref 1.12–1.32)
CALCIUM IONIZED: 1.09 MMOL/L (ref 1.12–1.32)
CALCIUM IONIZED: 1.1 MMOL/L (ref 1.11–1.3)
CALCIUM IONIZED: 1.21 MMOL/L (ref 1.11–1.3)
CALCIUM SERPL-MCNC: 7.1 MG/DL (ref 8.6–10.2)
CALCIUM SERPL-MCNC: 7.2 MG/DL (ref 8.6–10.2)
CALCIUM SERPL-MCNC: 7.3 MG/DL (ref 8.6–10.2)
CALCIUM SERPL-MCNC: 7.4 MG/DL (ref 8.6–10.2)
CALCIUM SERPL-MCNC: 7.6 MG/DL (ref 8.6–10.2)
CALCIUM SERPL-MCNC: 7.6 MG/DL (ref 8.6–10.2)
CALCIUM SERPL-MCNC: 7.7 MG/DL (ref 8.6–10.2)
CALCIUM SERPL-MCNC: 7.9 MG/DL (ref 8.6–10.2)
CALCIUM SERPL-MCNC: 7.9 MG/DL (ref 8.6–10.2)
CALCIUM SERPL-MCNC: 8 MG/DL (ref 8.6–10.2)
CALCIUM SERPL-MCNC: 8.1 MG/DL (ref 8.6–10.2)
CALCIUM SERPL-MCNC: 8.2 MG/DL (ref 8.6–10.2)
CALCIUM SERPL-MCNC: 8.4 MG/DL (ref 8.6–10.2)
CALCIUM SERPL-MCNC: 8.5 MG/DL (ref 8.6–10.2)
CALCIUM SERPL-MCNC: 8.5 MG/DL (ref 8.6–10.2)
CALCIUM SERPL-MCNC: 8.6 MG/DL (ref 8.6–10.2)
CALCIUM SERPL-MCNC: 8.6 MG/DL (ref 8.6–10.2)
CALCIUM SERPL-MCNC: 8.7 MG/DL (ref 8.6–10.2)
CALCIUM SERPL-MCNC: 8.8 MG/DL (ref 8.6–10.2)
CALCIUM SERPL-MCNC: 8.8 MG/DL (ref 8.6–10.2)
CALCIUM SERPL-MCNC: 8.9 MG/DL (ref 8.6–10.2)
CALCIUM SERPL-MCNC: 9.1 MG/DL (ref 8.6–10.2)
CANNABINOID SCREEN URINE: NORMAL
CELL COUNT FLUID TYPE: NORMAL
CHLORIDE BLD-SCNC: 100 MEQ/L (ref 98–107)
CHLORIDE BLD-SCNC: 101 MEQ/L (ref 98–107)
CHLORIDE BLD-SCNC: 104 MEQ/L (ref 98–107)
CHLORIDE BLD-SCNC: 105 MEQ/L (ref 98–107)
CHLORIDE BLD-SCNC: 106 MEQ/L (ref 98–107)
CHLORIDE BLD-SCNC: 107 MEQ/L (ref 98–107)
CHLORIDE BLD-SCNC: 108 MEQ/L (ref 98–107)
CHLORIDE BLD-SCNC: 92 MEQ/L (ref 98–107)
CHLORIDE BLD-SCNC: 93 MEQ/L (ref 98–107)
CHLORIDE BLD-SCNC: 94 MEQ/L (ref 98–107)
CHLORIDE BLD-SCNC: 95 MEQ/L (ref 98–107)
CHLORIDE BLD-SCNC: 95 MEQ/L (ref 98–107)
CHLORIDE BLD-SCNC: 98 MEQ/L (ref 98–107)
CHLORIDE BLD-SCNC: 98 MEQ/L (ref 98–107)
CHLORIDE BLD-SCNC: 99 MEQ/L (ref 98–107)
CHP ED QC CHECK: YES
CLARITY: ABNORMAL
CLARITY: CLEAR
CLOT EVALUATION: NORMAL
CO2: 10 MEQ/L (ref 22–29)
CO2: 11 MEQ/L (ref 22–29)
CO2: 14 MEQ/L (ref 22–29)
CO2: 14 MEQ/L (ref 22–29)
CO2: 15 MEQ/L (ref 22–29)
CO2: 15 MEQ/L (ref 22–29)
CO2: 16 MEQ/L (ref 22–29)
CO2: 17 MEQ/L (ref 22–29)
CO2: 18 MEQ/L (ref 22–29)
CO2: 19 MEQ/L (ref 22–29)
CO2: 22 MEQ/L (ref 22–29)
CO2: 23 MEQ/L (ref 22–29)
CO2: 24 MEQ/L (ref 22–29)
CO2: 24 MEQ/L (ref 22–29)
CO2: 7 MEQ/L (ref 22–29)
CO2: 9 MEQ/L (ref 22–29)
CO2: 9 MEQ/L (ref 22–29)
COCAINE METABOLITE SCREEN URINE: NORMAL
COLOR FLUID: YELLOW
COLOR: ABNORMAL
COLOR: YELLOW
CREAT SERPL-MCNC: 0.18 MG/DL (ref 0.5–0.9)
CREAT SERPL-MCNC: 0.2 MG/DL (ref 0.5–0.9)
CREAT SERPL-MCNC: 0.21 MG/DL (ref 0.5–0.9)
CREAT SERPL-MCNC: 0.28 MG/DL (ref 0.5–0.9)
CREAT SERPL-MCNC: 0.29 MG/DL (ref 0.5–0.9)
CREAT SERPL-MCNC: 0.35 MG/DL (ref 0.5–0.9)
CREAT SERPL-MCNC: 0.46 MG/DL (ref 0.5–0.9)
CREAT SERPL-MCNC: 0.47 MG/DL (ref 0.5–0.9)
CREAT SERPL-MCNC: 0.62 MG/DL (ref 0.5–0.9)
CREAT SERPL-MCNC: 1.97 MG/DL (ref 0.5–0.9)
CREAT SERPL-MCNC: 2.13 MG/DL (ref 0.5–0.9)
CREAT SERPL-MCNC: 2.87 MG/DL (ref 0.5–0.9)
CREAT SERPL-MCNC: 2.95 MG/DL (ref 0.5–0.9)
CREAT SERPL-MCNC: 3 MG/DL (ref 0.5–0.9)
CREAT SERPL-MCNC: 3.14 MG/DL (ref 0.5–0.9)
CREATININE URINE: 66.7 MG/DL
CULTURE, BLOOD 2: NORMAL
EKG ATRIAL RATE: 61 BPM
EKG ATRIAL RATE: 68 BPM
EKG ATRIAL RATE: 76 BPM
EKG ATRIAL RATE: 85 BPM
EKG ATRIAL RATE: 91 BPM
EKG ATRIAL RATE: 91 BPM
EKG ATRIAL RATE: 93 BPM
EKG ATRIAL RATE: 93 BPM
EKG ATRIAL RATE: 94 BPM
EKG P AXIS: 36 DEGREES
EKG P AXIS: 40 DEGREES
EKG P AXIS: 41 DEGREES
EKG P AXIS: 42 DEGREES
EKG P AXIS: 51 DEGREES
EKG P AXIS: 51 DEGREES
EKG P AXIS: 60 DEGREES
EKG P AXIS: 67 DEGREES
EKG P-R INTERVAL: 126 MS
EKG P-R INTERVAL: 142 MS
EKG P-R INTERVAL: 144 MS
EKG P-R INTERVAL: 144 MS
EKG P-R INTERVAL: 154 MS
EKG P-R INTERVAL: 158 MS
EKG P-R INTERVAL: 164 MS
EKG P-R INTERVAL: 196 MS
EKG Q-T INTERVAL: 388 MS
EKG Q-T INTERVAL: 388 MS
EKG Q-T INTERVAL: 392 MS
EKG Q-T INTERVAL: 404 MS
EKG Q-T INTERVAL: 408 MS
EKG Q-T INTERVAL: 410 MS
EKG Q-T INTERVAL: 464 MS
EKG Q-T INTERVAL: 504 MS
EKG Q-T INTERVAL: 510 MS
EKG QRS DURATION: 100 MS
EKG QRS DURATION: 72 MS
EKG QRS DURATION: 74 MS
EKG QRS DURATION: 76 MS
EKG QRS DURATION: 82 MS
EKG QRS DURATION: 86 MS
EKG QRS DURATION: 90 MS
EKG QTC CALCULATION (BAZETT): 477 MS
EKG QTC CALCULATION (BAZETT): 480 MS
EKG QTC CALCULATION (BAZETT): 485 MS
EKG QTC CALCULATION (BAZETT): 487 MS
EKG QTC CALCULATION (BAZETT): 493 MS
EKG QTC CALCULATION (BAZETT): 501 MS
EKG QTC CALCULATION (BAZETT): 513 MS
EKG QTC CALCULATION (BAZETT): 523 MS
EKG QTC CALCULATION (BAZETT): 567 MS
EKG R AXIS: 63 DEGREES
EKG R AXIS: 69 DEGREES
EKG R AXIS: 75 DEGREES
EKG R AXIS: 75 DEGREES
EKG R AXIS: 77 DEGREES
EKG R AXIS: 79 DEGREES
EKG R AXIS: 84 DEGREES
EKG R AXIS: 93 DEGREES
EKG R AXIS: 95 DEGREES
EKG T AXIS: -15 DEGREES
EKG T AXIS: -8 DEGREES
EKG T AXIS: 20 DEGREES
EKG T AXIS: 30 DEGREES
EKG T AXIS: 46 DEGREES
EKG T AXIS: 49 DEGREES
EKG T AXIS: 5 DEGREES
EKG T AXIS: 6 DEGREES
EKG T AXIS: 74 DEGREES
EKG VENTRICULAR RATE: 61 BPM
EKG VENTRICULAR RATE: 68 BPM
EKG VENTRICULAR RATE: 76 BPM
EKG VENTRICULAR RATE: 85 BPM
EKG VENTRICULAR RATE: 91 BPM
EKG VENTRICULAR RATE: 91 BPM
EKG VENTRICULAR RATE: 93 BPM
EKG VENTRICULAR RATE: 94 BPM
EKG VENTRICULAR RATE: 98 BPM
EOSINOPHILS ABSOLUTE: 0 K/UL (ref 0–0.7)
EOSINOPHILS ABSOLUTE: 0.1 K/UL (ref 0–0.7)
EOSINOPHILS RELATIVE PERCENT: 0 %
EOSINOPHILS RELATIVE PERCENT: 0.1 %
EOSINOPHILS RELATIVE PERCENT: 0.2 %
EOSINOPHILS RELATIVE PERCENT: 0.3 %
EOSINOPHILS RELATIVE PERCENT: 0.3 %
EOSINOPHILS RELATIVE PERCENT: 0.4 %
EOSINOPHILS RELATIVE PERCENT: 0.5 %
EOSINOPHILS RELATIVE PERCENT: 0.5 %
EOSINOPHILS RELATIVE PERCENT: 0.7 %
EOSINOPHILS RELATIVE PERCENT: 0.7 %
EOSINOPHILS RELATIVE PERCENT: 0.8 %
EOSINOPHILS RELATIVE PERCENT: 1 %
EPITHELIAL CELLS, UA: ABNORMAL /HPF
EPITHELIAL CELLS, UA: NORMAL /HPF
EPITHELIAL CELLS, UA: NORMAL /HPF
ETHANOL PERCENT: 0.25 G/DL
ETHANOL PERCENT: 0.38 G/DL
ETHANOL PERCENT: NORMAL G/DL
ETHANOL PERCENT: NORMAL G/DL
ETHANOL: 284 MG/DL (ref 0–0.08)
ETHANOL: 434 MG/DL (ref 0–0.08)
ETHANOL: <10 MG/DL (ref 0–0.08)
ETHANOL: <10 MG/DL (ref 0–0.08)
FERRITIN: 487.5 NG/ML (ref 13–150)
FLUID PATH CONSULT: YES
FLUID TYPE: NORMAL
FOLATE: >20 NG/ML (ref 7.3–26.1)
GAMMA GLUTAMYL TRANSFERASE: 1689 U/L (ref 0–40)
GFR AFRICAN AMERICAN: 13
GFR AFRICAN AMERICAN: 19.1
GFR AFRICAN AMERICAN: 20.1
GFR AFRICAN AMERICAN: 20.5
GFR AFRICAN AMERICAN: 21.2
GFR AFRICAN AMERICAN: 29.9
GFR AFRICAN AMERICAN: 32.7
GFR AFRICAN AMERICAN: >60
GFR NON-AFRICAN AMERICAN: 11
GFR NON-AFRICAN AMERICAN: 15.8
GFR NON-AFRICAN AMERICAN: 16.6
GFR NON-AFRICAN AMERICAN: 16.9
GFR NON-AFRICAN AMERICAN: 17.5
GFR NON-AFRICAN AMERICAN: 24.7
GFR NON-AFRICAN AMERICAN: 27
GFR NON-AFRICAN AMERICAN: >60
GLOBULIN: 2.5 G/DL (ref 2.3–3.5)
GLOBULIN: 2.7 G/DL (ref 2.3–3.5)
GLOBULIN: 2.8 G/DL (ref 2.3–3.5)
GLOBULIN: 2.9 G/DL (ref 2.3–3.5)
GLOBULIN: 3.2 G/DL (ref 2.3–3.5)
GLOBULIN: 3.4 G/DL (ref 2.3–3.5)
GLOBULIN: 3.5 G/DL (ref 2.3–3.5)
GLOBULIN: 3.5 G/DL (ref 2.3–3.5)
GLOBULIN: 3.7 G/DL (ref 2.3–3.5)
GLOBULIN: 3.7 G/DL (ref 2.3–3.5)
GLOBULIN: 4.1 G/DL (ref 2.3–3.5)
GLOBULIN: 4.4 G/DL (ref 2.3–3.5)
GLUCOSE BLD-MCNC: 100 MG/DL (ref 60–115)
GLUCOSE BLD-MCNC: 102 MG/DL (ref 60–115)
GLUCOSE BLD-MCNC: 102 MG/DL (ref 60–115)
GLUCOSE BLD-MCNC: 105 MG/DL (ref 74–109)
GLUCOSE BLD-MCNC: 106 MG/DL (ref 74–109)
GLUCOSE BLD-MCNC: 107 MG/DL (ref 74–109)
GLUCOSE BLD-MCNC: 108 MG/DL (ref 60–115)
GLUCOSE BLD-MCNC: 108 MG/DL (ref 74–109)
GLUCOSE BLD-MCNC: 112 MG/DL (ref 74–109)
GLUCOSE BLD-MCNC: 113 MG/DL (ref 60–115)
GLUCOSE BLD-MCNC: 114 MG/DL (ref 60–115)
GLUCOSE BLD-MCNC: 115 MG/DL (ref 60–115)
GLUCOSE BLD-MCNC: 115 MG/DL (ref 60–115)
GLUCOSE BLD-MCNC: 117 MG/DL (ref 60–115)
GLUCOSE BLD-MCNC: 118 MG/DL (ref 60–115)
GLUCOSE BLD-MCNC: 119 MG/DL (ref 60–115)
GLUCOSE BLD-MCNC: 120 MG/DL (ref 60–115)
GLUCOSE BLD-MCNC: 120 MG/DL (ref 74–109)
GLUCOSE BLD-MCNC: 121 MG/DL (ref 60–115)
GLUCOSE BLD-MCNC: 123 MG/DL (ref 60–115)
GLUCOSE BLD-MCNC: 125 MG/DL (ref 74–109)
GLUCOSE BLD-MCNC: 125 MG/DL (ref 74–109)
GLUCOSE BLD-MCNC: 127 MG/DL (ref 74–109)
GLUCOSE BLD-MCNC: 129 MG/DL (ref 60–115)
GLUCOSE BLD-MCNC: 130 MG/DL (ref 60–115)
GLUCOSE BLD-MCNC: 133 MG/DL (ref 60–115)
GLUCOSE BLD-MCNC: 133 MG/DL (ref 74–109)
GLUCOSE BLD-MCNC: 134 MG/DL (ref 60–115)
GLUCOSE BLD-MCNC: 134 MG/DL (ref 74–109)
GLUCOSE BLD-MCNC: 135 MG/DL (ref 60–115)
GLUCOSE BLD-MCNC: 139 MG/DL (ref 60–115)
GLUCOSE BLD-MCNC: 141 MG/DL (ref 60–115)
GLUCOSE BLD-MCNC: 143 MG/DL (ref 60–115)
GLUCOSE BLD-MCNC: 146 MG/DL (ref 60–115)
GLUCOSE BLD-MCNC: 154 MG/DL (ref 60–115)
GLUCOSE BLD-MCNC: 154 MG/DL (ref 60–115)
GLUCOSE BLD-MCNC: 157 MG/DL (ref 60–115)
GLUCOSE BLD-MCNC: 158 MG/DL (ref 60–115)
GLUCOSE BLD-MCNC: 162 MG/DL (ref 74–109)
GLUCOSE BLD-MCNC: 164 MG/DL (ref 60–115)
GLUCOSE BLD-MCNC: 165 MG/DL (ref 60–115)
GLUCOSE BLD-MCNC: 166 MG/DL (ref 60–115)
GLUCOSE BLD-MCNC: 170 MG/DL (ref 74–109)
GLUCOSE BLD-MCNC: 173 MG/DL (ref 60–115)
GLUCOSE BLD-MCNC: 173 MG/DL (ref 74–109)
GLUCOSE BLD-MCNC: 176 MG/DL (ref 60–115)
GLUCOSE BLD-MCNC: 177 MG/DL (ref 74–109)
GLUCOSE BLD-MCNC: 178 MG/DL (ref 60–115)
GLUCOSE BLD-MCNC: 181 MG/DL (ref 60–115)
GLUCOSE BLD-MCNC: 183 MG/DL (ref 60–115)
GLUCOSE BLD-MCNC: 183 MG/DL (ref 60–115)
GLUCOSE BLD-MCNC: 188 MG/DL (ref 60–115)
GLUCOSE BLD-MCNC: 189 MG/DL (ref 74–109)
GLUCOSE BLD-MCNC: 192 MG/DL (ref 60–115)
GLUCOSE BLD-MCNC: 195 MG/DL (ref 60–115)
GLUCOSE BLD-MCNC: 202 MG/DL (ref 60–115)
GLUCOSE BLD-MCNC: 203 MG/DL (ref 60–115)
GLUCOSE BLD-MCNC: 205 MG/DL (ref 60–115)
GLUCOSE BLD-MCNC: 206 MG/DL (ref 60–115)
GLUCOSE BLD-MCNC: 206 MG/DL (ref 74–109)
GLUCOSE BLD-MCNC: 208 MG/DL (ref 60–115)
GLUCOSE BLD-MCNC: 212 MG/DL (ref 60–115)
GLUCOSE BLD-MCNC: 216 MG/DL (ref 60–115)
GLUCOSE BLD-MCNC: 216 MG/DL (ref 60–115)
GLUCOSE BLD-MCNC: 217 MG/DL (ref 60–115)
GLUCOSE BLD-MCNC: 217 MG/DL (ref 60–115)
GLUCOSE BLD-MCNC: 219 MG/DL (ref 60–115)
GLUCOSE BLD-MCNC: 222 MG/DL (ref 60–115)
GLUCOSE BLD-MCNC: 222 MG/DL (ref 60–115)
GLUCOSE BLD-MCNC: 230 MG/DL (ref 60–115)
GLUCOSE BLD-MCNC: 231 MG/DL (ref 60–115)
GLUCOSE BLD-MCNC: 233 MG/DL (ref 60–115)
GLUCOSE BLD-MCNC: 237 MG/DL (ref 60–115)
GLUCOSE BLD-MCNC: 245 MG/DL (ref 60–115)
GLUCOSE BLD-MCNC: 248 MG/DL
GLUCOSE BLD-MCNC: 248 MG/DL (ref 60–115)
GLUCOSE BLD-MCNC: 250 MG/DL (ref 60–115)
GLUCOSE BLD-MCNC: 252 MG/DL (ref 60–115)
GLUCOSE BLD-MCNC: 267 MG/DL (ref 74–109)
GLUCOSE BLD-MCNC: 268 MG/DL (ref 60–115)
GLUCOSE BLD-MCNC: 268 MG/DL (ref 60–115)
GLUCOSE BLD-MCNC: 272 MG/DL (ref 60–115)
GLUCOSE BLD-MCNC: 275 MG/DL (ref 74–109)
GLUCOSE BLD-MCNC: 278 MG/DL
GLUCOSE BLD-MCNC: 278 MG/DL (ref 60–115)
GLUCOSE BLD-MCNC: 298 MG/DL (ref 60–115)
GLUCOSE BLD-MCNC: 298 MG/DL (ref 74–109)
GLUCOSE BLD-MCNC: 301 MG/DL (ref 74–109)
GLUCOSE BLD-MCNC: 310 MG/DL (ref 60–115)
GLUCOSE BLD-MCNC: 316 MG/DL (ref 60–115)
GLUCOSE BLD-MCNC: 318 MG/DL (ref 74–109)
GLUCOSE BLD-MCNC: 332 MG/DL (ref 60–115)
GLUCOSE BLD-MCNC: 336 MG/DL (ref 60–115)
GLUCOSE BLD-MCNC: 341 MG/DL (ref 60–115)
GLUCOSE BLD-MCNC: 354 MG/DL (ref 60–115)
GLUCOSE BLD-MCNC: 82 MG/DL (ref 74–109)
GLUCOSE BLD-MCNC: 84 MG/DL (ref 74–109)
GLUCOSE BLD-MCNC: 85 MG/DL (ref 60–115)
GLUCOSE BLD-MCNC: 85 MG/DL (ref 74–109)
GLUCOSE BLD-MCNC: 87 MG/DL (ref 60–115)
GLUCOSE BLD-MCNC: 97 MG/DL (ref 60–115)
GLUCOSE BLD-MCNC: 97 MG/DL (ref 74–109)
GLUCOSE BLD-MCNC: 97 MG/DL (ref 74–109)
GLUCOSE BLD-MCNC: 99 MG/DL (ref 60–115)
GLUCOSE BLD-MCNC: 99 MG/DL (ref 74–109)
GLUCOSE URINE: 100 MG/DL
GLUCOSE URINE: NEGATIVE MG/DL
GRAM STAIN RESULT: NORMAL
HAV IGM SER IA-ACNC: NORMAL
HBA1C MFR BLD: 4.4 % (ref 4.8–5.9)
HBA1C MFR BLD: 4.5 % (ref 4.8–5.9)
HBV SURFACE AB TITR SER: REACTIVE MIU/ML
HCO3 ARTERIAL: 22.4 MMOL/L (ref 21–29)
HCO3 ARTERIAL: 6.8 MMOL/L (ref 21–29)
HCO3 ARTERIAL: 7.1 MMOL/L (ref 21–29)
HCT VFR BLD CALC: 27.3 % (ref 37–47)
HCT VFR BLD CALC: 28.8 % (ref 37–47)
HCT VFR BLD CALC: 29 % (ref 37–47)
HCT VFR BLD CALC: 29.2 % (ref 37–47)
HCT VFR BLD CALC: 29.3 % (ref 37–47)
HCT VFR BLD CALC: 29.4 % (ref 37–47)
HCT VFR BLD CALC: 30.1 % (ref 37–47)
HCT VFR BLD CALC: 30.2 % (ref 37–47)
HCT VFR BLD CALC: 30.5 % (ref 37–47)
HCT VFR BLD CALC: 30.6 % (ref 37–47)
HCT VFR BLD CALC: 30.7 % (ref 37–47)
HCT VFR BLD CALC: 31.4 % (ref 37–47)
HCT VFR BLD CALC: 31.5 % (ref 37–47)
HCT VFR BLD CALC: 31.8 % (ref 37–47)
HCT VFR BLD CALC: 32.9 % (ref 37–47)
HCT VFR BLD CALC: 33 % (ref 37–47)
HCT VFR BLD CALC: 33.4 % (ref 37–47)
HCT VFR BLD CALC: 34 % (ref 37–47)
HCT VFR BLD CALC: 34.2 % (ref 37–47)
HCT VFR BLD CALC: 34.3 % (ref 37–47)
HCT VFR BLD CALC: 34.3 % (ref 37–47)
HCT VFR BLD CALC: 35.2 % (ref 37–47)
HEMOGLOBIN: 10 G/DL (ref 12–16)
HEMOGLOBIN: 10.2 G/DL (ref 12–16)
HEMOGLOBIN: 10.3 G/DL (ref 12–16)
HEMOGLOBIN: 10.4 G/DL (ref 12–16)
HEMOGLOBIN: 10.5 G/DL (ref 12–16)
HEMOGLOBIN: 10.5 G/DL (ref 12–16)
HEMOGLOBIN: 10.6 G/DL (ref 12–16)
HEMOGLOBIN: 11 G/DL (ref 12–16)
HEMOGLOBIN: 11.1 G/DL (ref 12–16)
HEMOGLOBIN: 11.2 G/DL (ref 12–16)
HEMOGLOBIN: 11.2 G/DL (ref 12–16)
HEMOGLOBIN: 11.3 G/DL (ref 12–16)
HEMOGLOBIN: 11.3 G/DL (ref 12–16)
HEMOGLOBIN: 11.4 G/DL (ref 12–16)
HEMOGLOBIN: 11.5 G/DL (ref 12–16)
HEMOGLOBIN: 11.9 G/DL (ref 12–16)
HEMOGLOBIN: 8.9 GM/DL (ref 12–16)
HEMOGLOBIN: 9.5 G/DL (ref 12–16)
HEMOGLOBIN: 9.8 GM/DL (ref 12–16)
HEMOGLOBIN: 9.9 G/DL (ref 12–16)
HEPATITIS B SURFACE ANTIGEN INTERPRETATION: NORMAL
HEPATITIS C ANTIBODY INTERPRETATION: NORMAL
HYPOCHROMIA: 0
HYPOCHROMIA: 0
HYPOCHROMIA: ABNORMAL
INR BLD: 1.2
INR BLD: 1.3
INR BLD: 2.1
INR BLD: 2.5
KETONES, URINE: ABNORMAL MG/DL
KETONES, URINE: NEGATIVE MG/DL
LACTATE: 2.33 MMOL/L (ref 0.4–2)
LACTATE: 2.86 MMOL/L (ref 0.4–2)
LACTATE: 2.9 MMOL/L (ref 0.4–2)
LACTIC ACID: 1.1 MMOL/L (ref 0.5–2.2)
LACTIC ACID: 1.2 MMOL/L (ref 0.5–2.2)
LACTIC ACID: 1.4 MMOL/L (ref 0.5–2.2)
LACTIC ACID: 1.5 MMOL/L (ref 0.5–2.2)
LACTIC ACID: 1.6 MMOL/L (ref 0.5–2.2)
LACTIC ACID: 2.6 MMOL/L (ref 0.5–2.2)
LACTIC ACID: 2.8 MMOL/L (ref 0.5–2.2)
LACTIC ACID: 2.9 MMOL/L (ref 0.5–2.2)
LACTIC ACID: 3.1 MMOL/L (ref 0.5–2.2)
LACTIC ACID: 3.4 MMOL/L (ref 0.5–2.2)
LACTIC ACID: 4.1 MMOL/L (ref 0.5–2.2)
LEUKOCYTE ESTERASE, URINE: ABNORMAL
LEUKOCYTE ESTERASE, URINE: NEGATIVE
LIPASE: 12 U/L (ref 13–60)
LV EF: 70 %
LVEF MODALITY: NORMAL
LYMPHOCYTES ABSOLUTE: 0.3 K/UL (ref 1–4.8)
LYMPHOCYTES ABSOLUTE: 0.3 K/UL (ref 1–4.8)
LYMPHOCYTES ABSOLUTE: 0.4 K/UL (ref 1–4.8)
LYMPHOCYTES ABSOLUTE: 0.6 K/UL (ref 1–4.8)
LYMPHOCYTES ABSOLUTE: 0.7 K/UL (ref 1–4.8)
LYMPHOCYTES ABSOLUTE: 0.9 K/UL (ref 1–4.8)
LYMPHOCYTES ABSOLUTE: 1 K/UL (ref 1–4.8)
LYMPHOCYTES ABSOLUTE: 1.1 K/UL (ref 1–4.8)
LYMPHOCYTES ABSOLUTE: 1.2 K/UL (ref 1–4.8)
LYMPHOCYTES ABSOLUTE: 1.3 K/UL (ref 1–4.8)
LYMPHOCYTES ABSOLUTE: 1.9 K/UL (ref 1–4.8)
LYMPHOCYTES ABSOLUTE: 1.9 K/UL (ref 1–4.8)
LYMPHOCYTES ABSOLUTE: 2.1 K/UL (ref 1–4.8)
LYMPHOCYTES ABSOLUTE: 2.1 K/UL (ref 1–4.8)
LYMPHOCYTES ABSOLUTE: 3.7 K/UL (ref 1–4.8)
LYMPHOCYTES RELATIVE PERCENT: 14.8 %
LYMPHOCYTES RELATIVE PERCENT: 16.7 %
LYMPHOCYTES RELATIVE PERCENT: 17 %
LYMPHOCYTES RELATIVE PERCENT: 17 %
LYMPHOCYTES RELATIVE PERCENT: 2 %
LYMPHOCYTES RELATIVE PERCENT: 2.3 %
LYMPHOCYTES RELATIVE PERCENT: 21.6 %
LYMPHOCYTES RELATIVE PERCENT: 22.8 %
LYMPHOCYTES RELATIVE PERCENT: 24 %
LYMPHOCYTES RELATIVE PERCENT: 3 %
LYMPHOCYTES RELATIVE PERCENT: 35.5 %
LYMPHOCYTES RELATIVE PERCENT: 44.9 %
LYMPHOCYTES RELATIVE PERCENT: 5 %
LYMPHOCYTES RELATIVE PERCENT: 53 %
LYMPHOCYTES RELATIVE PERCENT: 6.3 %
LYMPHOCYTES RELATIVE PERCENT: 7.9 %
LYMPHOCYTES RELATIVE PERCENT: 8.6 %
LYMPHOCYTES, BODY FLUID: 13 %
Lab: NORMAL
MACROCYTES: ABNORMAL
MAGNESIUM: 1.3 MG/DL (ref 1.7–2.3)
MAGNESIUM: 1.4 MG/DL (ref 1.7–2.3)
MAGNESIUM: 1.7 MG/DL (ref 1.7–2.3)
MAGNESIUM: 1.7 MG/DL (ref 1.7–2.3)
MAGNESIUM: 1.8 MG/DL (ref 1.7–2.3)
MAGNESIUM: 2.3 MG/DL (ref 1.7–2.3)
MAGNESIUM: 3.4 MG/DL (ref 1.7–2.3)
MCH RBC QN AUTO: 31.5 PG (ref 27–31.3)
MCH RBC QN AUTO: 32.7 PG (ref 27–31.3)
MCH RBC QN AUTO: 33.5 PG (ref 27–31.3)
MCH RBC QN AUTO: 33.9 PG (ref 27–31.3)
MCH RBC QN AUTO: 33.9 PG (ref 27–31.3)
MCH RBC QN AUTO: 34.7 PG (ref 27–31.3)
MCH RBC QN AUTO: 38 PG (ref 27–31.3)
MCH RBC QN AUTO: 38.7 PG (ref 27–31.3)
MCH RBC QN AUTO: 38.9 PG (ref 27–31.3)
MCH RBC QN AUTO: 39.1 PG (ref 27–31.3)
MCH RBC QN AUTO: 39.3 PG (ref 27–31.3)
MCH RBC QN AUTO: 39.3 PG (ref 27–31.3)
MCH RBC QN AUTO: 39.5 PG (ref 27–31.3)
MCH RBC QN AUTO: 39.6 PG (ref 27–31.3)
MCH RBC QN AUTO: 39.6 PG (ref 27–31.3)
MCH RBC QN AUTO: 39.7 PG (ref 27–31.3)
MCH RBC QN AUTO: 39.8 PG (ref 27–31.3)
MCH RBC QN AUTO: 40 PG (ref 27–31.3)
MCH RBC QN AUTO: 40.1 PG (ref 27–31.3)
MCH RBC QN AUTO: 40.3 PG (ref 27–31.3)
MCHC RBC AUTO-ENTMCNC: 32.6 % (ref 33–37)
MCHC RBC AUTO-ENTMCNC: 33.2 % (ref 33–37)
MCHC RBC AUTO-ENTMCNC: 33.3 % (ref 33–37)
MCHC RBC AUTO-ENTMCNC: 33.4 % (ref 33–37)
MCHC RBC AUTO-ENTMCNC: 33.5 % (ref 33–37)
MCHC RBC AUTO-ENTMCNC: 33.6 % (ref 33–37)
MCHC RBC AUTO-ENTMCNC: 33.8 % (ref 33–37)
MCHC RBC AUTO-ENTMCNC: 33.9 % (ref 33–37)
MCHC RBC AUTO-ENTMCNC: 34.2 % (ref 33–37)
MCHC RBC AUTO-ENTMCNC: 34.2 % (ref 33–37)
MCHC RBC AUTO-ENTMCNC: 34.4 % (ref 33–37)
MCHC RBC AUTO-ENTMCNC: 34.4 % (ref 33–37)
MCHC RBC AUTO-ENTMCNC: 34.6 % (ref 33–37)
MCHC RBC AUTO-ENTMCNC: 34.7 % (ref 33–37)
MCHC RBC AUTO-ENTMCNC: 34.8 % (ref 33–37)
MCHC RBC AUTO-ENTMCNC: 34.8 % (ref 33–37)
MCHC RBC AUTO-ENTMCNC: 35.1 % (ref 33–37)
MCHC RBC AUTO-ENTMCNC: 35.1 % (ref 33–37)
MCV RBC AUTO: 100.4 FL (ref 82–100)
MCV RBC AUTO: 100.9 FL (ref 82–100)
MCV RBC AUTO: 101.9 FL (ref 82–100)
MCV RBC AUTO: 102 FL (ref 82–100)
MCV RBC AUTO: 103.2 FL (ref 82–100)
MCV RBC AUTO: 109.7 FL (ref 82–100)
MCV RBC AUTO: 110.9 FL (ref 82–100)
MCV RBC AUTO: 113.3 FL (ref 82–100)
MCV RBC AUTO: 114.3 FL (ref 82–100)
MCV RBC AUTO: 114.8 FL (ref 82–100)
MCV RBC AUTO: 114.8 FL (ref 82–100)
MCV RBC AUTO: 115 FL (ref 82–100)
MCV RBC AUTO: 115.3 FL (ref 82–100)
MCV RBC AUTO: 115.3 FL (ref 82–100)
MCV RBC AUTO: 115.6 FL (ref 82–100)
MCV RBC AUTO: 116.3 FL (ref 82–100)
MCV RBC AUTO: 116.9 FL (ref 82–100)
MCV RBC AUTO: 117.8 FL (ref 82–100)
MCV RBC AUTO: 118 FL (ref 82–100)
MCV RBC AUTO: 120.7 FL (ref 82–100)
MCV RBC AUTO: 121 FL (ref 82–100)
MCV RBC AUTO: 94.5 FL (ref 82–100)
MESOTHELIAL FLUID: 37 %
METAMYELOCYTES RELATIVE PERCENT: 1 %
MICROCYTES: 0
MICROCYTES: 0
MISCELLANEOUS LAB TEST ORDER: NORMAL
MONOCYTE, FLUID: 46 %
MONOCYTES ABSOLUTE: 0 K/UL (ref 0.2–0.8)
MONOCYTES ABSOLUTE: 0.1 K/UL (ref 0.2–0.8)
MONOCYTES ABSOLUTE: 0.1 K/UL (ref 0.2–0.8)
MONOCYTES ABSOLUTE: 0.3 K/UL (ref 0.2–0.8)
MONOCYTES ABSOLUTE: 0.4 K/UL (ref 0.2–0.8)
MONOCYTES ABSOLUTE: 0.4 K/UL (ref 0.2–0.8)
MONOCYTES ABSOLUTE: 0.5 K/UL (ref 0.2–0.8)
MONOCYTES ABSOLUTE: 0.7 K/UL (ref 0.2–0.8)
MONOCYTES ABSOLUTE: 0.8 K/UL (ref 0.2–0.8)
MONOCYTES ABSOLUTE: 0.9 K/UL (ref 0.2–0.8)
MONOCYTES ABSOLUTE: 1 K/UL (ref 0.2–0.8)
MONOCYTES ABSOLUTE: 1 K/UL (ref 0.2–0.8)
MONOCYTES ABSOLUTE: 1.1 K/UL (ref 0.2–0.8)
MONOCYTES ABSOLUTE: 1.1 K/UL (ref 0.2–0.8)
MONOCYTES ABSOLUTE: 1.4 K/UL (ref 0.2–0.8)
MONOCYTES RELATIVE PERCENT: 1.9 %
MONOCYTES RELATIVE PERCENT: 11.4 %
MONOCYTES RELATIVE PERCENT: 11.4 %
MONOCYTES RELATIVE PERCENT: 11.8 %
MONOCYTES RELATIVE PERCENT: 12.2 %
MONOCYTES RELATIVE PERCENT: 3.8 %
MONOCYTES RELATIVE PERCENT: 3.8 %
MONOCYTES RELATIVE PERCENT: 4.5 %
MONOCYTES RELATIVE PERCENT: 5 %
MONOCYTES RELATIVE PERCENT: 5.1 %
MONOCYTES RELATIVE PERCENT: 5.2 %
MONOCYTES RELATIVE PERCENT: 6.5 %
MONOCYTES RELATIVE PERCENT: 6.6 %
MONOCYTES RELATIVE PERCENT: 6.7 %
MONOCYTES RELATIVE PERCENT: 6.9 %
MONOCYTES RELATIVE PERCENT: 7.6 %
MONOCYTES RELATIVE PERCENT: 9 %
NEUTROPHIL, FLUID: 4 %
NEUTROPHILS ABSOLUTE: 1.7 K/UL (ref 1.4–6.5)
NEUTROPHILS ABSOLUTE: 1.9 K/UL (ref 1.4–6.5)
NEUTROPHILS ABSOLUTE: 11.9 K/UL (ref 1.4–6.5)
NEUTROPHILS ABSOLUTE: 13 K/UL (ref 1.4–6.5)
NEUTROPHILS ABSOLUTE: 15.4 K/UL (ref 1.4–6.5)
NEUTROPHILS ABSOLUTE: 15.6 K/UL (ref 1.4–6.5)
NEUTROPHILS ABSOLUTE: 19 K/UL (ref 1.4–6.5)
NEUTROPHILS ABSOLUTE: 2 K/UL (ref 1.4–6.5)
NEUTROPHILS ABSOLUTE: 2.7 K/UL (ref 1.4–6.5)
NEUTROPHILS ABSOLUTE: 2.7 K/UL (ref 1.4–6.5)
NEUTROPHILS ABSOLUTE: 2.8 K/UL (ref 1.4–6.5)
NEUTROPHILS ABSOLUTE: 21.2 K/UL (ref 1.4–6.5)
NEUTROPHILS ABSOLUTE: 5.2 K/UL (ref 1.4–6.5)
NEUTROPHILS ABSOLUTE: 6.1 K/UL (ref 1.4–6.5)
NEUTROPHILS ABSOLUTE: 6.2 K/UL (ref 1.4–6.5)
NEUTROPHILS ABSOLUTE: 9.2 K/UL (ref 1.4–6.5)
NEUTROPHILS ABSOLUTE: 9.7 K/UL (ref 1.4–6.5)
NEUTROPHILS RELATIVE PERCENT: 41 %
NEUTROPHILS RELATIVE PERCENT: 43.5 %
NEUTROPHILS RELATIVE PERCENT: 58.9 %
NEUTROPHILS RELATIVE PERCENT: 62 %
NEUTROPHILS RELATIVE PERCENT: 63.1 %
NEUTROPHILS RELATIVE PERCENT: 64.8 %
NEUTROPHILS RELATIVE PERCENT: 72.7 %
NEUTROPHILS RELATIVE PERCENT: 75 %
NEUTROPHILS RELATIVE PERCENT: 75.6 %
NEUTROPHILS RELATIVE PERCENT: 78 %
NEUTROPHILS RELATIVE PERCENT: 78.5 %
NEUTROPHILS RELATIVE PERCENT: 85 %
NEUTROPHILS RELATIVE PERCENT: 87.5 %
NEUTROPHILS RELATIVE PERCENT: 88.5 %
NEUTROPHILS RELATIVE PERCENT: 90.4 %
NEUTROPHILS RELATIVE PERCENT: 92 %
NEUTROPHILS RELATIVE PERCENT: 95 %
NITRITE, URINE: NEGATIVE
NITRITE, URINE: POSITIVE
NUCLEATED CELLS FLUID: 23 /CUMM
NUMBER OF CELLS COUNTED FLUID: 100
O2 SAT, ARTERIAL: 95 % (ref 93–100)
O2 SAT, ARTERIAL: 96 % (ref 93–100)
O2 SAT, ARTERIAL: 99 % (ref 93–100)
OPIATE SCREEN URINE: NORMAL
ORGANISM: ABNORMAL
ORGANISM: ABNORMAL
PATH CONSULT FLUID: NORMAL
PCO2 ARTERIAL: 18 MM HG (ref 35–45)
PCO2 ARTERIAL: 22 MM HG (ref 35–45)
PCO2 ARTERIAL: 25 MM HG (ref 35–45)
PDW BLD-RTO: 15.3 % (ref 11.5–14.5)
PDW BLD-RTO: 15.4 % (ref 11.5–14.5)
PDW BLD-RTO: 15.5 % (ref 11.5–14.5)
PDW BLD-RTO: 16.3 % (ref 11.5–14.5)
PDW BLD-RTO: 17.3 % (ref 11.5–14.5)
PDW BLD-RTO: 17.6 % (ref 11.5–14.5)
PDW BLD-RTO: 17.6 % (ref 11.5–14.5)
PDW BLD-RTO: 17.7 % (ref 11.5–14.5)
PDW BLD-RTO: 17.8 % (ref 11.5–14.5)
PDW BLD-RTO: 17.9 % (ref 11.5–14.5)
PDW BLD-RTO: 18.2 % (ref 11.5–14.5)
PDW BLD-RTO: 18.3 % (ref 11.5–14.5)
PDW BLD-RTO: 18.5 % (ref 11.5–14.5)
PDW BLD-RTO: 18.6 % (ref 11.5–14.5)
PDW BLD-RTO: 18.8 % (ref 11.5–14.5)
PDW BLD-RTO: 19.1 % (ref 11.5–14.5)
PDW BLD-RTO: 20.4 % (ref 11.5–14.5)
PDW BLD-RTO: 20.5 % (ref 11.5–14.5)
PDW BLD-RTO: 20.9 % (ref 11.5–14.5)
PDW BLD-RTO: 21.4 % (ref 11.5–14.5)
PERFORMED ON: ABNORMAL
PERFORMED ON: NORMAL
PH ARTERIAL: 7.12 (ref 7.35–7.45)
PH ARTERIAL: 7.19 (ref 7.35–7.45)
PH ARTERIAL: 7.55 (ref 7.35–7.45)
PH UA: 5 (ref 5–9)
PH UA: 6 (ref 5–9)
PH UA: 6 (ref 5–9)
PH UA: 6.5 (ref 5–9)
PH UA: 7 (ref 5–9)
PH UA: 7 (ref 5–9)
PHENCYCLIDINE SCREEN URINE: NORMAL
PHOSPHORUS: 0.5 MG/DL (ref 2.5–4.5)
PHOSPHORUS: 0.8 MG/DL (ref 2.5–4.5)
PHOSPHORUS: 1 MG/DL (ref 2.5–4.5)
PHOSPHORUS: 1.4 MG/DL (ref 2.5–4.5)
PLATELET # BLD: 101 K/UL (ref 130–400)
PLATELET # BLD: 109 K/UL (ref 130–400)
PLATELET # BLD: 117 K/UL (ref 130–400)
PLATELET # BLD: 140 K/UL (ref 130–400)
PLATELET # BLD: 25 K/UL (ref 130–400)
PLATELET # BLD: 25 K/UL (ref 130–400)
PLATELET # BLD: 29 K/UL (ref 130–400)
PLATELET # BLD: 30 K/UL (ref 130–400)
PLATELET # BLD: 39 K/UL (ref 130–400)
PLATELET # BLD: 44 K/UL (ref 130–400)
PLATELET # BLD: 61 K/UL (ref 130–400)
PLATELET # BLD: 63 K/UL (ref 130–400)
PLATELET # BLD: 67 K/UL (ref 130–400)
PLATELET # BLD: 70 K/UL (ref 130–400)
PLATELET # BLD: 74 K/UL (ref 130–400)
PLATELET # BLD: 74 K/UL (ref 130–400)
PLATELET # BLD: 77 K/UL (ref 130–400)
PLATELET # BLD: 83 K/UL (ref 130–400)
PLATELET # BLD: 95 K/UL (ref 130–400)
PLATELET # BLD: 97 K/UL (ref 130–400)
PLATELET # BLD: 97 K/UL (ref 130–400)
PLATELET # BLD: ABNORMAL K/UL (ref 130–400)
PLATELET SLIDE REVIEW: ABNORMAL
PO2 ARTERIAL: 100 MM HG (ref 75–108)
PO2 ARTERIAL: 124 MM HG (ref 75–108)
PO2 ARTERIAL: 99 MM HG (ref 75–108)
POC CHLORIDE: 100 MEQ/L (ref 99–110)
POC CHLORIDE: 108 MEQ/L (ref 99–110)
POC CREATININE WHOLE BLOOD: 0.6
POC CREATININE: 0.5 MG/DL (ref 0.6–1.1)
POC CREATININE: 0.6 MG/DL (ref 0.6–1.1)
POC CREATININE: 4.3 MG/DL (ref 0.6–1.1)
POC FIO2: 21
POC HEMATOCRIT: 26 % (ref 36–48)
POC HEMATOCRIT: 29 % (ref 36–48)
POC POTASSIUM: 2.2 MEQ/L (ref 3.5–5.1)
POC POTASSIUM: 5.4 MEQ/L (ref 3.5–5.1)
POC SAMPLE TYPE: ABNORMAL
POC SAMPLE TYPE: NORMAL
POC SODIUM: 129 MEQ/L (ref 136–145)
POC SODIUM: 135 MEQ/L (ref 136–145)
POIKILOCYTES: ABNORMAL
POLYCHROMASIA: 0
POLYCHROMASIA: ABNORMAL
POTASSIUM REFLEX MAGNESIUM: 3.2 MEQ/L (ref 3.5–5.1)
POTASSIUM REFLEX MAGNESIUM: 3.5 MEQ/L (ref 3.5–5.1)
POTASSIUM REFLEX MAGNESIUM: 3.6 MEQ/L (ref 3.5–5.1)
POTASSIUM REFLEX MAGNESIUM: 4.8 MEQ/L (ref 3.5–5.1)
POTASSIUM REFLEX MAGNESIUM: 5 MEQ/L (ref 3.5–5.1)
POTASSIUM REFLEX MAGNESIUM: 5 MEQ/L (ref 3.5–5.1)
POTASSIUM REFLEX MAGNESIUM: 5.3 MEQ/L (ref 3.5–5.1)
POTASSIUM SERPL-SCNC: 2.4 MEQ/L (ref 3.5–5.1)
POTASSIUM SERPL-SCNC: 2.8 MEQ/L (ref 3.5–5.1)
POTASSIUM SERPL-SCNC: 2.9 MEQ/L (ref 3.5–5.1)
POTASSIUM SERPL-SCNC: 2.9 MEQ/L (ref 3.5–5.1)
POTASSIUM SERPL-SCNC: 3 MEQ/L (ref 3.5–5.1)
POTASSIUM SERPL-SCNC: 3.1 MEQ/L (ref 3.5–5.1)
POTASSIUM SERPL-SCNC: 3.4 MEQ/L (ref 3.5–5.1)
POTASSIUM SERPL-SCNC: 3.7 MEQ/L (ref 3.5–5.1)
POTASSIUM SERPL-SCNC: 3.7 MEQ/L (ref 3.5–5.1)
POTASSIUM SERPL-SCNC: 3.9 MEQ/L (ref 3.5–5.1)
POTASSIUM SERPL-SCNC: 4 MEQ/L (ref 3.5–5.1)
POTASSIUM SERPL-SCNC: 4.1 MEQ/L (ref 3.5–5.1)
POTASSIUM SERPL-SCNC: 4.1 MEQ/L (ref 3.5–5.1)
POTASSIUM SERPL-SCNC: 4.2 MEQ/L (ref 3.5–5.1)
POTASSIUM SERPL-SCNC: 4.2 MEQ/L (ref 3.5–5.1)
POTASSIUM SERPL-SCNC: 4.4 MEQ/L (ref 3.5–5.1)
POTASSIUM SERPL-SCNC: 4.5 MEQ/L (ref 3.5–5.1)
POTASSIUM SERPL-SCNC: 5.2 MEQ/L (ref 3.5–5.1)
POTASSIUM SERPL-SCNC: 6.1 MEQ/L (ref 3.5–5.1)
POTASSIUM SERPL-SCNC: 6.2 MEQ/L (ref 3.5–5.1)
POTASSIUM SERPL-SCNC: 7.1 MEQ/L (ref 3.5–5.1)
PREGNANCY TEST URINE, POC: NEGATIVE
PROTEIN FLUID: 0.4 G/DL
PROTEIN UA: ABNORMAL MG/DL
PROTEIN UA: NEGATIVE MG/DL
PROTHROMBIN TIME: 12.6 SEC (ref 9.6–12.3)
PROTHROMBIN TIME: 14 SEC (ref 9.6–12.3)
PROTHROMBIN TIME: 22.1 SEC (ref 9.6–12.3)
PROTHROMBIN TIME: 26.8 SEC (ref 9.6–12.3)
RBC # BLD: 2.39 M/UL (ref 4.2–5.4)
RBC # BLD: 2.49 M/UL (ref 4.2–5.4)
RBC # BLD: 2.49 M/UL (ref 4.2–5.4)
RBC # BLD: 2.55 M/UL (ref 4.2–5.4)
RBC # BLD: 2.58 M/UL (ref 4.2–5.4)
RBC # BLD: 2.59 M/UL (ref 4.2–5.4)
RBC # BLD: 2.62 M/UL (ref 4.2–5.4)
RBC # BLD: 2.64 M/UL (ref 4.2–5.4)
RBC # BLD: 2.65 M/UL (ref 4.2–5.4)
RBC # BLD: 2.74 M/UL (ref 4.2–5.4)
RBC # BLD: 2.8 M/UL (ref 4.2–5.4)
RBC # BLD: 2.83 M/UL (ref 4.2–5.4)
RBC # BLD: 2.85 M/UL (ref 4.2–5.4)
RBC # BLD: 2.86 M/UL (ref 4.2–5.4)
RBC # BLD: 2.86 M/UL (ref 4.2–5.4)
RBC # BLD: 2.96 M/UL (ref 4.2–5.4)
RBC # BLD: 3.04 M/UL (ref 4.2–5.4)
RBC # BLD: 3.04 M/UL (ref 4.2–5.4)
RBC # BLD: 3.12 M/UL (ref 4.2–5.4)
RBC # BLD: 3.12 M/UL (ref 4.2–5.4)
RBC # BLD: 3.51 M/UL (ref 4.2–5.4)
RBC # BLD: 3.6 M/UL (ref 4.2–5.4)
RBC FLUID: 92 /CUMM
RBC UA: ABNORMAL /HPF (ref 0–2)
RBC UA: NORMAL /HPF (ref 0–2)
RBC UA: NORMAL /HPF (ref 0–2)
REJECTED TEST: NORMAL
REJECTED TEST: NORMAL
ROULEAUX: ABNORMAL
SALICYLATE, SERUM: <0.3 MG/DL (ref 15–30)
SCHISTOCYTES: ABNORMAL
SLIDE REVIEW: ABNORMAL
SMUDGE CELLS: 1
SMUDGE CELLS: 1
SMUDGE CELLS: 2.9
SODIUM BLD-SCNC: 121 MEQ/L (ref 132–144)
SODIUM BLD-SCNC: 123 MEQ/L (ref 132–144)
SODIUM BLD-SCNC: 123 MEQ/L (ref 132–144)
SODIUM BLD-SCNC: 124 MEQ/L (ref 132–144)
SODIUM BLD-SCNC: 126 MEQ/L (ref 132–144)
SODIUM BLD-SCNC: 130 MEQ/L (ref 132–144)
SODIUM BLD-SCNC: 131 MEQ/L (ref 132–144)
SODIUM BLD-SCNC: 131 MEQ/L (ref 132–144)
SODIUM BLD-SCNC: 132 MEQ/L (ref 132–144)
SODIUM BLD-SCNC: 133 MEQ/L (ref 132–144)
SODIUM BLD-SCNC: 134 MEQ/L (ref 132–144)
SODIUM BLD-SCNC: 135 MEQ/L (ref 132–144)
SODIUM BLD-SCNC: 136 MEQ/L (ref 132–144)
SODIUM BLD-SCNC: 136 MEQ/L (ref 132–144)
SODIUM BLD-SCNC: 137 MEQ/L (ref 132–144)
SODIUM BLD-SCNC: 138 MEQ/L (ref 132–144)
SODIUM BLD-SCNC: 139 MEQ/L (ref 132–144)
SODIUM BLD-SCNC: 140 MEQ/L (ref 132–144)
SODIUM BLD-SCNC: 141 MEQ/L (ref 132–144)
SODIUM BLD-SCNC: 146 MEQ/L (ref 132–144)
SODIUM URINE: 27 MEQ/L
SPECIFIC GRAVITY UA: 1 (ref 1–1.03)
SPECIFIC GRAVITY UA: 1.01 (ref 1–1.03)
SPECIFIC GRAVITY UA: 1.02 (ref 1–1.03)
STOMATOCYTES: ABNORMAL
TARGET CELLS: ABNORMAL
TCO2 ARTERIAL: 23 (ref 22–29)
TCO2 ARTERIAL: 7 (ref 22–29)
TCO2 ARTERIAL: 8 (ref 22–29)
TOTAL CK: 25 U/L (ref 0–170)
TOTAL CK: 95 U/L (ref 0–170)
TOTAL PROTEIN: 4.8 G/DL (ref 6.4–8.1)
TOTAL PROTEIN: 4.9 G/DL (ref 6.4–8.1)
TOTAL PROTEIN: 5 G/DL (ref 6.4–8.1)
TOTAL PROTEIN: 5 G/DL (ref 6.4–8.1)
TOTAL PROTEIN: 5.1 G/DL (ref 6.4–8.1)
TOTAL PROTEIN: 5.2 G/DL (ref 6.4–8.1)
TOTAL PROTEIN: 5.7 G/DL (ref 6.4–8.1)
TOTAL PROTEIN: 5.7 G/DL (ref 6.4–8.1)
TOTAL PROTEIN: 5.9 G/DL (ref 6.4–8.1)
TOTAL PROTEIN: 6.3 G/DL (ref 6.4–8.1)
TOTAL PROTEIN: 6.5 G/DL (ref 6.4–8.1)
TOTAL PROTEIN: 6.8 G/DL (ref 6.4–8.1)
TOTAL PROTEIN: 7 G/DL (ref 6.4–8.1)
TOTAL PROTEIN: 7.1 G/DL (ref 6.4–8.1)
TOTAL PROTEIN: 7.2 G/DL (ref 6.4–8.1)
TOTAL PROTEIN: 7.2 G/DL (ref 6.4–8.1)
TOTAL PROTEIN: 7.8 G/DL (ref 6.4–8.1)
TOTAL PROTEIN: 8.2 G/DL (ref 6.4–8.1)
TROPONIN: <0.01 NG/ML (ref 0–0.01)
TROPONIN: <0.01 NG/ML (ref 0–0.01)
TSH SERPL DL<=0.05 MIU/L-ACNC: 2.01 UIU/ML (ref 0.27–4.2)
TSH SERPL DL<=0.05 MIU/L-ACNC: 2.25 UIU/ML (ref 0.27–4.2)
TSH SERPL DL<=0.05 MIU/L-ACNC: 3.73 UIU/ML (ref 0.27–4.2)
UREA NITROGEN, UR: 317 MG/DL
URINE CULTURE, ROUTINE: ABNORMAL
URINE CULTURE, ROUTINE: NORMAL
URINE CULTURE, ROUTINE: NORMAL
URINE REFLEX TO CULTURE: ABNORMAL
URINE REFLEX TO CULTURE: ABNORMAL
URINE REFLEX TO CULTURE: YES
UROBILINOGEN, URINE: 0.2 E.U./DL
UROBILINOGEN, URINE: 1 E.U./DL
UROBILINOGEN, URINE: 1 E.U./DL
UROBILINOGEN, URINE: 4 E.U./DL
VITAMIN B-12: 1351 PG/ML (ref 232–1245)
VITAMIN B-12: >2000 PG/ML (ref 232–1245)
VITAMIN B1, PLASMA: <2 NMOL/L (ref 4–15)
WBC # BLD: 10.2 K/UL (ref 4.8–10.8)
WBC # BLD: 10.4 K/UL (ref 4.8–10.8)
WBC # BLD: 10.8 K/UL (ref 4.8–10.8)
WBC # BLD: 11.7 K/UL (ref 4.8–10.8)
WBC # BLD: 12.2 K/UL (ref 4.8–10.8)
WBC # BLD: 12.8 K/UL (ref 4.8–10.8)
WBC # BLD: 13.4 K/UL (ref 4.8–10.8)
WBC # BLD: 14.4 K/UL (ref 4.8–10.8)
WBC # BLD: 16.6 K/UL (ref 4.8–10.8)
WBC # BLD: 17.4 K/UL (ref 4.8–10.8)
WBC # BLD: 20 K/UL (ref 4.8–10.8)
WBC # BLD: 24.3 K/UL (ref 4.8–10.8)
WBC # BLD: 3.2 K/UL (ref 4.8–10.8)
WBC # BLD: 3.4 K/UL (ref 4.8–10.8)
WBC # BLD: 4 K/UL (ref 4.8–10.8)
WBC # BLD: 4 K/UL (ref 4.8–10.8)
WBC # BLD: 4.3 K/UL (ref 4.8–10.8)
WBC # BLD: 4.3 K/UL (ref 4.8–10.8)
WBC # BLD: 6.9 K/UL (ref 4.8–10.8)
WBC # BLD: 7.5 K/UL (ref 4.8–10.8)
WBC # BLD: 8.5 K/UL (ref 4.8–10.8)
WBC # BLD: 9.2 K/UL (ref 4.8–10.8)
WBC UA: ABNORMAL /HPF (ref 0–5)
WBC UA: NORMAL /HPF (ref 0–5)
WBC UA: NORMAL /HPF (ref 0–5)
WHOPPER PROMPT: NORMAL

## 2018-01-01 PROCEDURE — 1210000000 HC MED SURG R&B

## 2018-01-01 PROCEDURE — 6360000002 HC RX W HCPCS: Performed by: NURSE PRACTITIONER

## 2018-01-01 PROCEDURE — 85027 COMPLETE CBC AUTOMATED: CPT

## 2018-01-01 PROCEDURE — 92526 ORAL FUNCTION THERAPY: CPT

## 2018-01-01 PROCEDURE — G0378 HOSPITAL OBSERVATION PER HR: HCPCS

## 2018-01-01 PROCEDURE — 83605 ASSAY OF LACTIC ACID: CPT

## 2018-01-01 PROCEDURE — 82140 ASSAY OF AMMONIA: CPT

## 2018-01-01 PROCEDURE — 2580000003 HC RX 258: Performed by: PHYSICIAN ASSISTANT

## 2018-01-01 PROCEDURE — 6370000000 HC RX 637 (ALT 250 FOR IP): Performed by: INTERNAL MEDICINE

## 2018-01-01 PROCEDURE — 2500000003 HC RX 250 WO HCPCS: Performed by: INTERNAL MEDICINE

## 2018-01-01 PROCEDURE — 85025 COMPLETE CBC W/AUTO DIFF WBC: CPT

## 2018-01-01 PROCEDURE — 82435 ASSAY OF BLOOD CHLORIDE: CPT

## 2018-01-01 PROCEDURE — 84132 ASSAY OF SERUM POTASSIUM: CPT

## 2018-01-01 PROCEDURE — 36415 COLL VENOUS BLD VENIPUNCTURE: CPT

## 2018-01-01 PROCEDURE — 85730 THROMBOPLASTIN TIME PARTIAL: CPT

## 2018-01-01 PROCEDURE — 97127 HC SP THER IVNTJ W/FOCUS COG FUNCJ: CPT

## 2018-01-01 PROCEDURE — 6370000000 HC RX 637 (ALT 250 FOR IP): Performed by: PHYSICIAN ASSISTANT

## 2018-01-01 PROCEDURE — G8598 ASA/ANTIPLAT THER USED: HCPCS | Performed by: FAMILY MEDICINE

## 2018-01-01 PROCEDURE — 2580000003 HC RX 258: Performed by: EMERGENCY MEDICINE

## 2018-01-01 PROCEDURE — 99309 SBSQ NF CARE MODERATE MDM 30: CPT | Performed by: NURSE PRACTITIONER

## 2018-01-01 PROCEDURE — 99285 EMERGENCY DEPT VISIT HI MDM: CPT

## 2018-01-01 PROCEDURE — 80048 BASIC METABOLIC PNL TOTAL CA: CPT

## 2018-01-01 PROCEDURE — 97127 HC OT THER IVNTJ W/FOCUS COG FUNCJ: CPT

## 2018-01-01 PROCEDURE — 2580000003 HC RX 258: Performed by: INTERNAL MEDICINE

## 2018-01-01 PROCEDURE — 71045 X-RAY EXAM CHEST 1 VIEW: CPT

## 2018-01-01 PROCEDURE — 6360000002 HC RX W HCPCS: Performed by: STUDENT IN AN ORGANIZED HEALTH CARE EDUCATION/TRAINING PROGRAM

## 2018-01-01 PROCEDURE — 1180000000 HC REHAB R&B

## 2018-01-01 PROCEDURE — C9113 INJ PANTOPRAZOLE SODIUM, VIA: HCPCS | Performed by: HOSPITALIST

## 2018-01-01 PROCEDURE — 6360000002 HC RX W HCPCS: Performed by: PHYSICIAN ASSISTANT

## 2018-01-01 PROCEDURE — 87205 SMEAR GRAM STAIN: CPT

## 2018-01-01 PROCEDURE — G8419 CALC BMI OUT NRM PARAM NOF/U: HCPCS | Performed by: FAMILY MEDICINE

## 2018-01-01 PROCEDURE — 87340 HEPATITIS B SURFACE AG IA: CPT

## 2018-01-01 PROCEDURE — 80053 COMPREHEN METABOLIC PANEL: CPT

## 2018-01-01 PROCEDURE — 99232 SBSQ HOSP IP/OBS MODERATE 35: CPT | Performed by: INTERNAL MEDICINE

## 2018-01-01 PROCEDURE — 2500000003 HC RX 250 WO HCPCS: Performed by: HOSPITALIST

## 2018-01-01 PROCEDURE — 82565 ASSAY OF CREATININE: CPT

## 2018-01-01 PROCEDURE — 85014 HEMATOCRIT: CPT

## 2018-01-01 PROCEDURE — 93010 ELECTROCARDIOGRAM REPORT: CPT | Performed by: INTERNAL MEDICINE

## 2018-01-01 PROCEDURE — 51798 US URINE CAPACITY MEASURE: CPT

## 2018-01-01 PROCEDURE — 96375 TX/PRO/DX INJ NEW DRUG ADDON: CPT

## 2018-01-01 PROCEDURE — 2580000003 HC RX 258: Performed by: STUDENT IN AN ORGANIZED HEALTH CARE EDUCATION/TRAINING PROGRAM

## 2018-01-01 PROCEDURE — 97535 SELF CARE MNGMENT TRAINING: CPT

## 2018-01-01 PROCEDURE — 86850 RBC ANTIBODY SCREEN: CPT

## 2018-01-01 PROCEDURE — 97167 OT EVAL HIGH COMPLEX 60 MIN: CPT

## 2018-01-01 PROCEDURE — 86901 BLOOD TYPING SEROLOGIC RH(D): CPT

## 2018-01-01 PROCEDURE — 93005 ELECTROCARDIOGRAM TRACING: CPT

## 2018-01-01 PROCEDURE — 6360000002 HC RX W HCPCS: Performed by: INTERNAL MEDICINE

## 2018-01-01 PROCEDURE — 36600 WITHDRAWAL OF ARTERIAL BLOOD: CPT

## 2018-01-01 PROCEDURE — 82042 OTHER SOURCE ALBUMIN QUAN EA: CPT

## 2018-01-01 PROCEDURE — 74022 RADEX COMPL AQT ABD SERIES: CPT

## 2018-01-01 PROCEDURE — 97162 PT EVAL MOD COMPLEX 30 MIN: CPT

## 2018-01-01 PROCEDURE — G8427 DOCREV CUR MEDS BY ELIG CLIN: HCPCS | Performed by: FAMILY MEDICINE

## 2018-01-01 PROCEDURE — 6370000000 HC RX 637 (ALT 250 FOR IP): Performed by: PHYSICAL MEDICINE & REHABILITATION

## 2018-01-01 PROCEDURE — G0480 DRUG TEST DEF 1-7 CLASSES: HCPCS

## 2018-01-01 PROCEDURE — 99222 1ST HOSP IP/OBS MODERATE 55: CPT | Performed by: INTERNAL MEDICINE

## 2018-01-01 PROCEDURE — 99223 1ST HOSP IP/OBS HIGH 75: CPT | Performed by: INTERNAL MEDICINE

## 2018-01-01 PROCEDURE — 97116 GAIT TRAINING THERAPY: CPT

## 2018-01-01 PROCEDURE — 84443 ASSAY THYROID STIM HORMONE: CPT

## 2018-01-01 PROCEDURE — 2022F DILAT RTA XM EVC RTNOPTHY: CPT | Performed by: FAMILY MEDICINE

## 2018-01-01 PROCEDURE — 82570 ASSAY OF URINE CREATININE: CPT

## 2018-01-01 PROCEDURE — 85610 PROTHROMBIN TIME: CPT

## 2018-01-01 PROCEDURE — 2580000003 HC RX 258: Performed by: NURSE PRACTITIONER

## 2018-01-01 PROCEDURE — 2000000000 HC ICU R&B

## 2018-01-01 PROCEDURE — 81001 URINALYSIS AUTO W/SCOPE: CPT

## 2018-01-01 PROCEDURE — 87086 URINE CULTURE/COLONY COUNT: CPT

## 2018-01-01 PROCEDURE — 74181 MRI ABDOMEN W/O CONTRAST: CPT

## 2018-01-01 PROCEDURE — 05HM33Z INSERTION OF INFUSION DEVICE INTO RIGHT INTERNAL JUGULAR VEIN, PERCUTANEOUS APPROACH: ICD-10-PCS | Performed by: INTERNAL MEDICINE

## 2018-01-01 PROCEDURE — 96374 THER/PROPH/DIAG INJ IV PUSH: CPT

## 2018-01-01 PROCEDURE — 97530 THERAPEUTIC ACTIVITIES: CPT

## 2018-01-01 PROCEDURE — 99223 1ST HOSP IP/OBS HIGH 75: CPT | Performed by: PHYSICAL MEDICINE & REHABILITATION

## 2018-01-01 PROCEDURE — 96125 COGNITIVE TEST BY HC PRO: CPT

## 2018-01-01 PROCEDURE — 99306 1ST NF CARE HIGH MDM 50: CPT | Performed by: INTERNAL MEDICINE

## 2018-01-01 PROCEDURE — 82040 ASSAY OF SERUM ALBUMIN: CPT

## 2018-01-01 PROCEDURE — 76705 ECHO EXAM OF ABDOMEN: CPT

## 2018-01-01 PROCEDURE — 80076 HEPATIC FUNCTION PANEL: CPT

## 2018-01-01 PROCEDURE — 6370000000 HC RX 637 (ALT 250 FOR IP): Performed by: SPECIALIST

## 2018-01-01 PROCEDURE — 2500000003 HC RX 250 WO HCPCS: Performed by: EMERGENCY MEDICINE

## 2018-01-01 PROCEDURE — 99232 SBSQ HOSP IP/OBS MODERATE 35: CPT | Performed by: CLINICAL NURSE SPECIALIST

## 2018-01-01 PROCEDURE — G8978 MOBILITY CURRENT STATUS: HCPCS

## 2018-01-01 PROCEDURE — 83735 ASSAY OF MAGNESIUM: CPT

## 2018-01-01 PROCEDURE — G8979 MOBILITY GOAL STATUS: HCPCS

## 2018-01-01 PROCEDURE — 6360000002 HC RX W HCPCS

## 2018-01-01 PROCEDURE — 86900 BLOOD TYPING SEROLOGIC ABO: CPT

## 2018-01-01 PROCEDURE — 86706 HEP B SURFACE ANTIBODY: CPT

## 2018-01-01 PROCEDURE — 81003 URINALYSIS AUTO W/O SCOPE: CPT

## 2018-01-01 PROCEDURE — 99253 IP/OBS CNSLTJ NEW/EST LOW 45: CPT | Performed by: PSYCHIATRY & NEUROLOGY

## 2018-01-01 PROCEDURE — 83036 HEMOGLOBIN GLYCOSYLATED A1C: CPT

## 2018-01-01 PROCEDURE — 82330 ASSAY OF CALCIUM: CPT

## 2018-01-01 PROCEDURE — 2500000003 HC RX 250 WO HCPCS: Performed by: PERSONAL EMERGENCY RESPONSE ATTENDANT

## 2018-01-01 PROCEDURE — 99284 EMERGENCY DEPT VISIT MOD MDM: CPT

## 2018-01-01 PROCEDURE — 92610 EVALUATE SWALLOWING FUNCTION: CPT

## 2018-01-01 PROCEDURE — 71046 X-RAY EXAM CHEST 2 VIEWS: CPT

## 2018-01-01 PROCEDURE — 94150 VITAL CAPACITY TEST: CPT

## 2018-01-01 PROCEDURE — 82550 ASSAY OF CK (CPK): CPT

## 2018-01-01 PROCEDURE — 3044F HG A1C LEVEL LT 7.0%: CPT | Performed by: FAMILY MEDICINE

## 2018-01-01 PROCEDURE — G8988 SELF CARE GOAL STATUS: HCPCS

## 2018-01-01 PROCEDURE — 97110 THERAPEUTIC EXERCISES: CPT

## 2018-01-01 PROCEDURE — G8987 SELF CARE CURRENT STATUS: HCPCS

## 2018-01-01 PROCEDURE — 87040 BLOOD CULTURE FOR BACTERIA: CPT

## 2018-01-01 PROCEDURE — 1111F DSCHRG MED/CURRENT MED MERGE: CPT | Performed by: FAMILY MEDICINE

## 2018-01-01 PROCEDURE — APPNB180 APP NON BILLABLE TIME > 60 MINS: Performed by: NURSE PRACTITIONER

## 2018-01-01 PROCEDURE — 97163 PT EVAL HIGH COMPLEX 45 MIN: CPT

## 2018-01-01 PROCEDURE — 97165 OT EVAL LOW COMPLEX 30 MIN: CPT

## 2018-01-01 PROCEDURE — 87077 CULTURE AEROBIC IDENTIFY: CPT

## 2018-01-01 PROCEDURE — 99213 OFFICE O/P EST LOW 20 MIN: CPT | Performed by: FAMILY MEDICINE

## 2018-01-01 PROCEDURE — 84157 ASSAY OF PROTEIN OTHER: CPT

## 2018-01-01 PROCEDURE — 84100 ASSAY OF PHOSPHORUS: CPT

## 2018-01-01 PROCEDURE — 94761 N-INVAS EAR/PLS OXIMETRY MLT: CPT

## 2018-01-01 PROCEDURE — 89051 BODY FLUID CELL COUNT: CPT

## 2018-01-01 PROCEDURE — 2500000003 HC RX 250 WO HCPCS: Performed by: STUDENT IN AN ORGANIZED HEALTH CARE EDUCATION/TRAINING PROGRAM

## 2018-01-01 PROCEDURE — 6370000000 HC RX 637 (ALT 250 FOR IP): Performed by: NURSE PRACTITIONER

## 2018-01-01 PROCEDURE — 84295 ASSAY OF SERUM SODIUM: CPT

## 2018-01-01 PROCEDURE — 97112 NEUROMUSCULAR REEDUCATION: CPT

## 2018-01-01 PROCEDURE — 82607 VITAMIN B-12: CPT

## 2018-01-01 PROCEDURE — 80307 DRUG TEST PRSMV CHEM ANLYZR: CPT

## 2018-01-01 PROCEDURE — 92523 SPEECH SOUND LANG COMPREHEN: CPT

## 2018-01-01 PROCEDURE — 99308 SBSQ NF CARE LOW MDM 20: CPT | Performed by: NURSE PRACTITIONER

## 2018-01-01 PROCEDURE — 84300 ASSAY OF URINE SODIUM: CPT

## 2018-01-01 PROCEDURE — 87186 SC STD MICRODIL/AGAR DIL: CPT

## 2018-01-01 PROCEDURE — 70450 CT HEAD/BRAIN W/O DYE: CPT

## 2018-01-01 PROCEDURE — 99221 1ST HOSP IP/OBS SF/LOW 40: CPT | Performed by: PHYSICAL MEDICINE & REHABILITATION

## 2018-01-01 PROCEDURE — 2580000003 HC RX 258: Performed by: PERSONAL EMERGENCY RESPONSE ATTENDANT

## 2018-01-01 PROCEDURE — 97150 GROUP THERAPEUTIC PROCEDURES: CPT

## 2018-01-01 PROCEDURE — G8484 FLU IMMUNIZE NO ADMIN: HCPCS | Performed by: NURSE PRACTITIONER

## 2018-01-01 PROCEDURE — 1036F TOBACCO NON-USER: CPT | Performed by: FAMILY MEDICINE

## 2018-01-01 PROCEDURE — 93971 EXTREMITY STUDY: CPT

## 2018-01-01 PROCEDURE — 83690 ASSAY OF LIPASE: CPT

## 2018-01-01 PROCEDURE — 6360000002 HC RX W HCPCS: Performed by: EMERGENCY MEDICINE

## 2018-01-01 PROCEDURE — 93306 TTE W/DOPPLER COMPLETE: CPT

## 2018-01-01 PROCEDURE — 2580000003 HC RX 258: Performed by: HOSPITALIST

## 2018-01-01 PROCEDURE — 82010 KETONE BODYS QUAN: CPT

## 2018-01-01 PROCEDURE — 6370000000 HC RX 637 (ALT 250 FOR IP): Performed by: HOSPITALIST

## 2018-01-01 PROCEDURE — 82803 BLOOD GASES ANY COMBINATION: CPT

## 2018-01-01 PROCEDURE — 84425 ASSAY OF VITAMIN B-1: CPT

## 2018-01-01 PROCEDURE — 2500000003 HC RX 250 WO HCPCS: Performed by: PHYSICIAN ASSISTANT

## 2018-01-01 PROCEDURE — 36592 COLLECT BLOOD FROM PICC: CPT

## 2018-01-01 PROCEDURE — 82746 ASSAY OF FOLIC ACID SERUM: CPT

## 2018-01-01 PROCEDURE — 6360000002 HC RX W HCPCS: Performed by: HOSPITALIST

## 2018-01-01 PROCEDURE — 84484 ASSAY OF TROPONIN QUANT: CPT

## 2018-01-01 PROCEDURE — 99214 OFFICE O/P EST MOD 30 MIN: CPT | Performed by: FAMILY MEDICINE

## 2018-01-01 PROCEDURE — 6360000002 HC RX W HCPCS: Performed by: PERSONAL EMERGENCY RESPONSE ATTENDANT

## 2018-01-01 PROCEDURE — 87070 CULTURE OTHR SPECIMN AEROBIC: CPT

## 2018-01-01 PROCEDURE — 3E033XZ INTRODUCTION OF VASOPRESSOR INTO PERIPHERAL VEIN, PERCUTANEOUS APPROACH: ICD-10-PCS | Performed by: INTERNAL MEDICINE

## 2018-01-01 PROCEDURE — 99253 IP/OBS CNSLTJ NEW/EST LOW 45: CPT | Performed by: CLINICAL NURSE SPECIALIST

## 2018-01-01 PROCEDURE — 84540 ASSAY OF URINE/UREA-N: CPT

## 2018-01-01 PROCEDURE — G8484 FLU IMMUNIZE NO ADMIN: HCPCS | Performed by: INTERNAL MEDICINE

## 2018-01-01 PROCEDURE — 86803 HEPATITIS C AB TEST: CPT

## 2018-01-01 PROCEDURE — 6370000000 HC RX 637 (ALT 250 FOR IP): Performed by: PERSONAL EMERGENCY RESPONSE ATTENDANT

## 2018-01-01 PROCEDURE — 86709 HEPATITIS A IGM ANTIBODY: CPT

## 2018-01-01 RX ORDER — DEXTROSE MONOHYDRATE 50 MG/ML
100 INJECTION, SOLUTION INTRAVENOUS PRN
Status: CANCELLED | OUTPATIENT
Start: 2018-01-01

## 2018-01-01 RX ORDER — LACTULOSE 10 G/15ML
20 SOLUTION ORAL 3 TIMES DAILY
Status: CANCELLED | OUTPATIENT
Start: 2018-01-01

## 2018-01-01 RX ORDER — SODIUM CHLORIDE 9 MG/ML
INJECTION, SOLUTION INTRAVENOUS CONTINUOUS
Status: CANCELLED | OUTPATIENT
Start: 2018-01-01

## 2018-01-01 RX ORDER — FUROSEMIDE 20 MG/1
20 TABLET ORAL DAILY
Status: DISCONTINUED | OUTPATIENT
Start: 2018-01-01 | End: 2018-01-01 | Stop reason: HOSPADM

## 2018-01-01 RX ORDER — SODIUM CHLORIDE 0.9 % (FLUSH) 0.9 %
10 SYRINGE (ML) INJECTION EVERY 12 HOURS SCHEDULED
Status: DISCONTINUED | OUTPATIENT
Start: 2018-01-01 | End: 2018-01-01 | Stop reason: HOSPADM

## 2018-01-01 RX ORDER — POTASSIUM CHLORIDE 750 MG/1
10 CAPSULE, EXTENDED RELEASE ORAL
Status: DISCONTINUED | OUTPATIENT
Start: 2018-01-01 | End: 2018-01-01 | Stop reason: HOSPADM

## 2018-01-01 RX ORDER — LORAZEPAM 1 MG/1
3 TABLET ORAL
Status: CANCELLED | OUTPATIENT
Start: 2018-01-01

## 2018-01-01 RX ORDER — NALOXONE HYDROCHLORIDE 1 MG/ML
INJECTION INTRAMUSCULAR; INTRAVENOUS; SUBCUTANEOUS
Status: COMPLETED
Start: 2018-01-01 | End: 2018-01-01

## 2018-01-01 RX ORDER — ASCORBIC ACID 500 MG
500 TABLET ORAL 2 TIMES DAILY
Status: DISCONTINUED | OUTPATIENT
Start: 2018-01-01 | End: 2018-01-01 | Stop reason: HOSPADM

## 2018-01-01 RX ORDER — SODIUM CHLORIDE 0.9 % (FLUSH) 0.9 %
10 SYRINGE (ML) INJECTION PRN
Status: DISCONTINUED | OUTPATIENT
Start: 2018-01-01 | End: 2018-01-01 | Stop reason: HOSPADM

## 2018-01-01 RX ORDER — SODIUM CHLORIDE 0.9 % (FLUSH) 0.9 %
10 SYRINGE (ML) INJECTION EVERY 12 HOURS SCHEDULED
Status: CANCELLED | OUTPATIENT
Start: 2018-01-01

## 2018-01-01 RX ORDER — M-VIT,TX,IRON,MINS/CALC/FOLIC 27MG-0.4MG
1 TABLET ORAL EVERY MORNING
Status: DISCONTINUED | OUTPATIENT
Start: 2018-01-01 | End: 2018-01-01 | Stop reason: HOSPADM

## 2018-01-01 RX ORDER — DEXTROSE MONOHYDRATE 50 MG/ML
100 INJECTION, SOLUTION INTRAVENOUS PRN
Status: DISCONTINUED | OUTPATIENT
Start: 2018-01-01 | End: 2018-01-01 | Stop reason: HOSPADM

## 2018-01-01 RX ORDER — PREDNISOLONE SODIUM PHOSPHATE 15 MG/5ML
20 SOLUTION ORAL DAILY
Status: CANCELLED | OUTPATIENT
Start: 2018-01-01 | End: 2018-01-01

## 2018-01-01 RX ORDER — NICOTINE POLACRILEX 4 MG
15 LOZENGE BUCCAL PRN
Status: CANCELLED | OUTPATIENT
Start: 2018-01-01

## 2018-01-01 RX ORDER — POTASSIUM CHLORIDE 7.45 MG/ML
10 INJECTION INTRAVENOUS PRN
Status: DISCONTINUED | OUTPATIENT
Start: 2018-01-01 | End: 2018-01-01 | Stop reason: HOSPADM

## 2018-01-01 RX ORDER — LORAZEPAM 1 MG/1
4 TABLET ORAL
Status: DISCONTINUED | OUTPATIENT
Start: 2018-01-01 | End: 2018-01-01

## 2018-01-01 RX ORDER — ACETAMINOPHEN 325 MG/1
650 TABLET ORAL EVERY 8 HOURS PRN
Status: DISCONTINUED | OUTPATIENT
Start: 2018-01-01 | End: 2018-01-01 | Stop reason: HOSPADM

## 2018-01-01 RX ORDER — FOLIC ACID 1 MG/1
1 TABLET ORAL DAILY
Status: DISCONTINUED | OUTPATIENT
Start: 2018-01-01 | End: 2018-01-01 | Stop reason: HOSPADM

## 2018-01-01 RX ORDER — LORAZEPAM 2 MG/ML
4 INJECTION INTRAMUSCULAR
Status: DISCONTINUED | OUTPATIENT
Start: 2018-01-01 | End: 2018-01-01 | Stop reason: HOSPADM

## 2018-01-01 RX ORDER — DEXTROSE MONOHYDRATE 25 G/50ML
12.5 INJECTION, SOLUTION INTRAVENOUS PRN
Status: DISCONTINUED | OUTPATIENT
Start: 2018-01-01 | End: 2018-01-01

## 2018-01-01 RX ORDER — LORAZEPAM 2 MG/ML
2 INJECTION INTRAMUSCULAR
Status: CANCELLED | OUTPATIENT
Start: 2018-01-01

## 2018-01-01 RX ORDER — PANTOPRAZOLE SODIUM 40 MG/10ML
40 INJECTION, POWDER, LYOPHILIZED, FOR SOLUTION INTRAVENOUS DAILY
Status: CANCELLED | OUTPATIENT
Start: 2018-01-01

## 2018-01-01 RX ORDER — LORAZEPAM 1 MG/1
4 TABLET ORAL
Status: DISCONTINUED | OUTPATIENT
Start: 2018-01-01 | End: 2018-01-01 | Stop reason: HOSPADM

## 2018-01-01 RX ORDER — DEXTROSE MONOHYDRATE 25 G/50ML
12.5 INJECTION, SOLUTION INTRAVENOUS PRN
Status: CANCELLED | OUTPATIENT
Start: 2018-01-01

## 2018-01-01 RX ORDER — LORAZEPAM 2 MG/ML
3 INJECTION INTRAMUSCULAR
Status: CANCELLED | OUTPATIENT
Start: 2018-01-01

## 2018-01-01 RX ORDER — LORAZEPAM 1 MG/1
4 TABLET ORAL
Status: CANCELLED | OUTPATIENT
Start: 2018-01-01

## 2018-01-01 RX ORDER — FOLIC ACID 1 MG/1
1 TABLET ORAL DAILY
Status: CANCELLED | OUTPATIENT
Start: 2018-01-01

## 2018-01-01 RX ORDER — LORAZEPAM 2 MG/ML
2 INJECTION INTRAMUSCULAR
Status: DISCONTINUED | OUTPATIENT
Start: 2018-01-01 | End: 2018-01-01 | Stop reason: HOSPADM

## 2018-01-01 RX ORDER — PREDNISOLONE SODIUM PHOSPHATE 15 MG/5ML
30 SOLUTION ORAL DAILY
Status: CANCELLED | OUTPATIENT
Start: 2018-01-01 | End: 2018-01-01

## 2018-01-01 RX ORDER — ONDANSETRON 2 MG/ML
4 INJECTION INTRAMUSCULAR; INTRAVENOUS EVERY 6 HOURS PRN
Status: DISCONTINUED | OUTPATIENT
Start: 2018-01-01 | End: 2018-01-01 | Stop reason: HOSPADM

## 2018-01-01 RX ORDER — NICOTINE POLACRILEX 4 MG
15 LOZENGE BUCCAL PRN
Status: DISCONTINUED | OUTPATIENT
Start: 2018-01-01 | End: 2018-01-01 | Stop reason: HOSPADM

## 2018-01-01 RX ORDER — LORAZEPAM 2 MG/ML
1 INJECTION INTRAMUSCULAR
Status: DISCONTINUED | OUTPATIENT
Start: 2018-01-01 | End: 2018-01-01 | Stop reason: HOSPADM

## 2018-01-01 RX ORDER — MULTIVITAMIN WITH FOLIC ACID 400 MCG
1 TABLET ORAL DAILY
Status: DISCONTINUED | OUTPATIENT
Start: 2018-01-01 | End: 2018-01-01 | Stop reason: SDUPTHER

## 2018-01-01 RX ORDER — PREDNISOLONE SODIUM PHOSPHATE 15 MG/5ML
10 SOLUTION ORAL DAILY
Qty: 33 ML | Refills: 0 | Status: SHIPPED | OUTPATIENT
Start: 2018-01-01 | End: 2018-01-01

## 2018-01-01 RX ORDER — DEXTROSE MONOHYDRATE 25 G/50ML
12.5 INJECTION, SOLUTION INTRAVENOUS PRN
Status: DISCONTINUED | OUTPATIENT
Start: 2018-01-01 | End: 2018-01-01 | Stop reason: HOSPADM

## 2018-01-01 RX ORDER — 0.9 % SODIUM CHLORIDE 0.9 %
500 INTRAVENOUS SOLUTION INTRAVENOUS ONCE
Status: DISCONTINUED | OUTPATIENT
Start: 2018-01-01 | End: 2018-01-01 | Stop reason: HOSPADM

## 2018-01-01 RX ORDER — PREDNISOLONE SODIUM PHOSPHATE 15 MG/5ML
30 SOLUTION ORAL DAILY
Status: DISCONTINUED | OUTPATIENT
Start: 2018-01-01 | End: 2018-01-01

## 2018-01-01 RX ORDER — 0.9 % SODIUM CHLORIDE 0.9 %
10 VIAL (ML) INJECTION DAILY
Status: DISCONTINUED | OUTPATIENT
Start: 2018-01-01 | End: 2018-01-01 | Stop reason: HOSPADM

## 2018-01-01 RX ORDER — POTASSIUM CHLORIDE 750 MG/1
10 CAPSULE, EXTENDED RELEASE ORAL
Status: CANCELLED | OUTPATIENT
Start: 2018-01-01

## 2018-01-01 RX ORDER — DEXTROSE MONOHYDRATE 50 MG/ML
100 INJECTION, SOLUTION INTRAVENOUS PRN
Status: DISCONTINUED | OUTPATIENT
Start: 2018-01-01 | End: 2018-01-01

## 2018-01-01 RX ORDER — SPIRONOLACTONE 50 MG/1
50 TABLET, FILM COATED ORAL DAILY
Status: DISCONTINUED | OUTPATIENT
Start: 2018-01-01 | End: 2018-01-01 | Stop reason: HOSPADM

## 2018-01-01 RX ORDER — ONDANSETRON 2 MG/ML
4 INJECTION INTRAMUSCULAR; INTRAVENOUS EVERY 6 HOURS PRN
Status: CANCELLED | OUTPATIENT
Start: 2018-01-01

## 2018-01-01 RX ORDER — PROPRANOLOL HYDROCHLORIDE 10 MG/1
10 TABLET ORAL 2 TIMES DAILY
Qty: 90 TABLET | Refills: 3 | Status: SHIPPED | OUTPATIENT
Start: 2018-01-01

## 2018-01-01 RX ORDER — THIAMINE MONONITRATE (VIT B1) 100 MG
100 TABLET ORAL DAILY
Status: DISCONTINUED | OUTPATIENT
Start: 2018-01-01 | End: 2018-01-01 | Stop reason: HOSPADM

## 2018-01-01 RX ORDER — LORAZEPAM 1 MG/1
2 TABLET ORAL
Status: CANCELLED | OUTPATIENT
Start: 2018-01-01

## 2018-01-01 RX ORDER — ACETAMINOPHEN 325 MG/1
650 TABLET ORAL EVERY 8 HOURS PRN
Status: CANCELLED | OUTPATIENT
Start: 2018-01-01

## 2018-01-01 RX ORDER — LORAZEPAM 1 MG/1
1 TABLET ORAL
Status: DISCONTINUED | OUTPATIENT
Start: 2018-01-01 | End: 2018-01-01 | Stop reason: HOSPADM

## 2018-01-01 RX ORDER — PREDNISOLONE SODIUM PHOSPHATE 15 MG/5ML
10 SOLUTION ORAL DAILY
Status: CANCELLED | OUTPATIENT
Start: 2018-01-01 | End: 2018-01-01

## 2018-01-01 RX ORDER — LIDOCAINE HYDROCHLORIDE 20 MG/ML
5 INJECTION, SOLUTION INFILTRATION; PERINEURAL ONCE
Status: COMPLETED | OUTPATIENT
Start: 2018-01-01 | End: 2018-01-01

## 2018-01-01 RX ORDER — SODIUM PHOSPHATE, DIBASIC AND SODIUM PHOSPHATE, MONOBASIC 7; 19 G/133ML; G/133ML
1 ENEMA RECTAL
Status: DISPENSED | OUTPATIENT
Start: 2018-01-01 | End: 2018-01-01

## 2018-01-01 RX ORDER — FUROSEMIDE 20 MG/1
20 TABLET ORAL EVERY OTHER DAY
Qty: 30 TABLET | Refills: 3 | Status: SHIPPED | OUTPATIENT
Start: 2018-01-01 | End: 2018-01-01 | Stop reason: CLARIF

## 2018-01-01 RX ORDER — LORAZEPAM 2 MG/ML
1 INJECTION INTRAMUSCULAR
Status: DISCONTINUED | OUTPATIENT
Start: 2018-01-01 | End: 2018-01-01

## 2018-01-01 RX ORDER — POTASSIUM CHLORIDE 20MEQ/15ML
40 LIQUID (ML) ORAL DAILY
Status: DISCONTINUED | OUTPATIENT
Start: 2018-01-01 | End: 2018-01-01

## 2018-01-01 RX ORDER — PROPRANOLOL HYDROCHLORIDE 10 MG/1
10 TABLET ORAL 2 TIMES DAILY
Status: CANCELLED | OUTPATIENT
Start: 2018-01-01

## 2018-01-01 RX ORDER — LEVETIRACETAM 500 MG/1
500 TABLET ORAL 2 TIMES DAILY
Status: CANCELLED | OUTPATIENT
Start: 2018-01-01

## 2018-01-01 RX ORDER — PREDNISOLONE SODIUM PHOSPHATE 15 MG/5ML
10 SOLUTION ORAL DAILY
Status: DISCONTINUED | OUTPATIENT
Start: 2018-01-01 | End: 2018-01-01 | Stop reason: HOSPADM

## 2018-01-01 RX ORDER — LORAZEPAM 2 MG/ML
2 INJECTION INTRAMUSCULAR
Status: DISCONTINUED | OUTPATIENT
Start: 2018-01-01 | End: 2018-01-01

## 2018-01-01 RX ORDER — POTASSIUM CHLORIDE 7.45 MG/ML
10 INJECTION INTRAVENOUS PRN
Status: DISCONTINUED | OUTPATIENT
Start: 2018-01-01 | End: 2018-01-01

## 2018-01-01 RX ORDER — LACTULOSE 10 G/15ML
20 SOLUTION ORAL 3 TIMES DAILY
Status: DISCONTINUED | OUTPATIENT
Start: 2018-01-01 | End: 2018-01-01 | Stop reason: HOSPADM

## 2018-01-01 RX ORDER — MAGNESIUM SULFATE 1 G/100ML
1 INJECTION INTRAVENOUS PRN
Status: CANCELLED | OUTPATIENT
Start: 2018-01-01

## 2018-01-01 RX ORDER — LORAZEPAM 2 MG/ML
4 INJECTION INTRAMUSCULAR
Status: CANCELLED | OUTPATIENT
Start: 2018-01-01

## 2018-01-01 RX ORDER — LORAZEPAM 1 MG/1
2 TABLET ORAL
Status: DISCONTINUED | OUTPATIENT
Start: 2018-01-01 | End: 2018-01-01 | Stop reason: HOSPADM

## 2018-01-01 RX ORDER — ALBUMIN (HUMAN) 12.5 G/50ML
25 SOLUTION INTRAVENOUS ONCE
Status: DISCONTINUED | OUTPATIENT
Start: 2018-01-01 | End: 2018-01-01 | Stop reason: HOSPADM

## 2018-01-01 RX ORDER — LORAZEPAM 1 MG/1
3 TABLET ORAL
Status: DISCONTINUED | OUTPATIENT
Start: 2018-01-01 | End: 2018-01-01 | Stop reason: HOSPADM

## 2018-01-01 RX ORDER — LORAZEPAM 2 MG/ML
3 INJECTION INTRAMUSCULAR
Status: DISCONTINUED | OUTPATIENT
Start: 2018-01-01 | End: 2018-01-01 | Stop reason: HOSPADM

## 2018-01-01 RX ORDER — HALOPERIDOL 5 MG/ML
5 INJECTION INTRAMUSCULAR EVERY 6 HOURS PRN
Status: DISCONTINUED | OUTPATIENT
Start: 2018-01-01 | End: 2018-01-01 | Stop reason: HOSPADM

## 2018-01-01 RX ORDER — LORAZEPAM 1 MG/1
2 TABLET ORAL
Status: DISCONTINUED | OUTPATIENT
Start: 2018-01-01 | End: 2018-01-01

## 2018-01-01 RX ORDER — ONDANSETRON 2 MG/ML
4 INJECTION INTRAMUSCULAR; INTRAVENOUS EVERY 6 HOURS PRN
Status: DISCONTINUED | OUTPATIENT
Start: 2018-01-01 | End: 2018-01-01

## 2018-01-01 RX ORDER — LORAZEPAM 1 MG/1
1 TABLET ORAL
Status: CANCELLED | OUTPATIENT
Start: 2018-01-01

## 2018-01-01 RX ORDER — BISACODYL 10 MG
10 SUPPOSITORY, RECTAL RECTAL DAILY PRN
Status: CANCELLED | OUTPATIENT
Start: 2018-01-01

## 2018-01-01 RX ORDER — SODIUM CHLORIDE 0.9 % (FLUSH) 0.9 %
10 SYRINGE (ML) INJECTION PRN
Status: CANCELLED | OUTPATIENT
Start: 2018-01-01

## 2018-01-01 RX ORDER — HALOPERIDOL 5 MG/ML
2 INJECTION INTRAMUSCULAR EVERY 6 HOURS PRN
Status: DISCONTINUED | OUTPATIENT
Start: 2018-01-01 | End: 2018-01-01

## 2018-01-01 RX ORDER — PREDNISOLONE SODIUM PHOSPHATE 15 MG/5ML
20 SOLUTION ORAL DAILY
Qty: 60.3 ML | Refills: 0 | Status: SHIPPED | OUTPATIENT
Start: 2018-01-01 | End: 2018-01-01

## 2018-01-01 RX ORDER — MULTIVITAMIN WITH FOLIC ACID 400 MCG
1 TABLET ORAL DAILY
Status: DISCONTINUED | OUTPATIENT
Start: 2018-01-01 | End: 2018-01-01 | Stop reason: HOSPADM

## 2018-01-01 RX ORDER — SODIUM CHLORIDE 9 MG/ML
INJECTION, SOLUTION INTRAVENOUS CONTINUOUS
Status: DISCONTINUED | OUTPATIENT
Start: 2018-01-01 | End: 2018-01-01

## 2018-01-01 RX ORDER — LACTULOSE 10 G/15ML
30 SOLUTION ORAL 3 TIMES DAILY
Status: DISCONTINUED | OUTPATIENT
Start: 2018-01-01 | End: 2018-01-01 | Stop reason: HOSPADM

## 2018-01-01 RX ORDER — MAGNESIUM SULFATE IN WATER 40 MG/ML
4 INJECTION, SOLUTION INTRAVENOUS ONCE
Status: COMPLETED | OUTPATIENT
Start: 2018-01-01 | End: 2018-01-01

## 2018-01-01 RX ORDER — BISACODYL 10 MG
10 SUPPOSITORY, RECTAL RECTAL DAILY PRN
Status: DISCONTINUED | OUTPATIENT
Start: 2018-01-01 | End: 2018-01-01 | Stop reason: HOSPADM

## 2018-01-01 RX ORDER — MORPHINE SULFATE 4 MG/ML
4 INJECTION, SOLUTION INTRAMUSCULAR; INTRAVENOUS ONCE
Status: COMPLETED | OUTPATIENT
Start: 2018-01-01 | End: 2018-01-01

## 2018-01-01 RX ORDER — FAMOTIDINE 20 MG/1
20 TABLET, FILM COATED ORAL 2 TIMES DAILY
Status: DISCONTINUED | OUTPATIENT
Start: 2018-01-01 | End: 2018-01-01 | Stop reason: HOSPADM

## 2018-01-01 RX ORDER — MORPHINE SULFATE 2 MG/ML
2 INJECTION, SOLUTION INTRAMUSCULAR; INTRAVENOUS
Status: DISCONTINUED | OUTPATIENT
Start: 2018-01-01 | End: 2018-01-01 | Stop reason: HOSPADM

## 2018-01-01 RX ORDER — MAGNESIUM SULFATE 1 G/100ML
1 INJECTION INTRAVENOUS PRN
Status: DISCONTINUED | OUTPATIENT
Start: 2018-01-01 | End: 2018-01-01 | Stop reason: HOSPADM

## 2018-01-01 RX ORDER — NALOXONE HYDROCHLORIDE 1 MG/ML
2 INJECTION INTRAMUSCULAR; INTRAVENOUS; SUBCUTANEOUS ONCE
Status: COMPLETED | OUTPATIENT
Start: 2018-01-01 | End: 2018-01-01

## 2018-01-01 RX ORDER — LORAZEPAM 2 MG/ML
1 INJECTION INTRAMUSCULAR
Status: CANCELLED | OUTPATIENT
Start: 2018-01-01

## 2018-01-01 RX ORDER — ACETAMINOPHEN 325 MG/1
650 TABLET ORAL EVERY 4 HOURS PRN
Status: DISCONTINUED | OUTPATIENT
Start: 2018-01-01 | End: 2018-01-01

## 2018-01-01 RX ORDER — POTASSIUM CHLORIDE 20MEQ/15ML
40 LIQUID (ML) ORAL EVERY 4 HOURS
Status: COMPLETED | OUTPATIENT
Start: 2018-01-01 | End: 2018-01-01

## 2018-01-01 RX ORDER — POTASSIUM CHLORIDE 20 MEQ/1
40 TABLET, EXTENDED RELEASE ORAL ONCE
Status: COMPLETED | OUTPATIENT
Start: 2018-01-01 | End: 2018-01-01

## 2018-01-01 RX ORDER — ACETAMINOPHEN 325 MG/1
325 TABLET ORAL EVERY 4 HOURS PRN
Status: CANCELLED | OUTPATIENT
Start: 2018-01-01

## 2018-01-01 RX ORDER — PREDNISOLONE SODIUM PHOSPHATE 15 MG/5ML
30 SOLUTION ORAL DAILY
Status: DISCONTINUED | OUTPATIENT
Start: 2018-01-01 | End: 2018-01-01 | Stop reason: HOSPADM

## 2018-01-01 RX ORDER — THIAMINE MONONITRATE (VIT B1) 100 MG
100 TABLET ORAL DAILY
Status: DISCONTINUED | OUTPATIENT
Start: 2018-01-01 | End: 2018-01-01

## 2018-01-01 RX ORDER — SODIUM CHLORIDE 9 MG/ML
INJECTION, SOLUTION INTRAVENOUS CONTINUOUS
Status: DISPENSED | OUTPATIENT
Start: 2018-01-01 | End: 2018-01-01

## 2018-01-01 RX ORDER — NICOTINE POLACRILEX 4 MG
15 LOZENGE BUCCAL PRN
Status: DISCONTINUED | OUTPATIENT
Start: 2018-01-01 | End: 2018-01-01 | Stop reason: SDUPTHER

## 2018-01-01 RX ORDER — ONDANSETRON 2 MG/ML
4 INJECTION INTRAMUSCULAR; INTRAVENOUS ONCE
Status: COMPLETED | OUTPATIENT
Start: 2018-01-01 | End: 2018-01-01

## 2018-01-01 RX ORDER — POTASSIUM CHLORIDE 7.45 MG/ML
10 INJECTION INTRAVENOUS PRN
Status: CANCELLED | OUTPATIENT
Start: 2018-01-01

## 2018-01-01 RX ORDER — SPIRONOLACTONE 50 MG/1
50 TABLET, FILM COATED ORAL DAILY
Qty: 30 TABLET | Refills: 3 | Status: SHIPPED | OUTPATIENT
Start: 2018-01-01 | End: 2018-01-01 | Stop reason: SDUPTHER

## 2018-01-01 RX ORDER — SPIRONOLACTONE 25 MG/1
50 TABLET ORAL DAILY
Status: CANCELLED | OUTPATIENT
Start: 2018-01-01

## 2018-01-01 RX ORDER — FOLIC ACID 1 MG/1
1 TABLET ORAL DAILY
Status: DISCONTINUED | OUTPATIENT
Start: 2018-01-01 | End: 2018-01-01

## 2018-01-01 RX ORDER — ACETAMINOPHEN 325 MG/1
650 TABLET ORAL EVERY 4 HOURS PRN
Status: CANCELLED | OUTPATIENT
Start: 2018-01-01

## 2018-01-01 RX ORDER — LORAZEPAM 2 MG/ML
4 INJECTION INTRAMUSCULAR
Status: DISCONTINUED | OUTPATIENT
Start: 2018-01-01 | End: 2018-01-01

## 2018-01-01 RX ORDER — ASCORBIC ACID 500 MG
500 TABLET ORAL 2 TIMES DAILY
Status: CANCELLED | OUTPATIENT
Start: 2018-01-01

## 2018-01-01 RX ORDER — POTASSIUM CHLORIDE 7.45 MG/ML
10 INJECTION INTRAVENOUS
Status: DISPENSED | OUTPATIENT
Start: 2018-01-01 | End: 2018-01-01

## 2018-01-01 RX ORDER — SPIRONOLACTONE 50 MG/1
50 TABLET, FILM COATED ORAL ONCE
Status: COMPLETED | OUTPATIENT
Start: 2018-01-01 | End: 2018-01-01

## 2018-01-01 RX ORDER — MAGNESIUM SULFATE 1 G/100ML
1 INJECTION INTRAVENOUS PRN
Status: DISCONTINUED | OUTPATIENT
Start: 2018-01-01 | End: 2018-01-01

## 2018-01-01 RX ORDER — LACTULOSE 10 G/15ML
30 SOLUTION ORAL ONCE
Status: COMPLETED | OUTPATIENT
Start: 2018-01-01 | End: 2018-01-01

## 2018-01-01 RX ORDER — FUROSEMIDE 20 MG/1
20 TABLET ORAL DAILY
Qty: 60 TABLET | Refills: 3 | Status: SHIPPED | OUTPATIENT
Start: 2018-01-01

## 2018-01-01 RX ORDER — INSULIN GLARGINE 100 [IU]/ML
8 INJECTION, SOLUTION SUBCUTANEOUS NIGHTLY
COMMUNITY

## 2018-01-01 RX ORDER — PANTOPRAZOLE SODIUM 40 MG/1
40 TABLET, DELAYED RELEASE ORAL
Status: CANCELLED | OUTPATIENT
Start: 2018-01-01

## 2018-01-01 RX ORDER — POTASSIUM BICARBONATE 25 MEQ/1
25 TABLET, EFFERVESCENT ORAL ONCE
Status: COMPLETED | OUTPATIENT
Start: 2018-01-01 | End: 2018-01-01

## 2018-01-01 RX ORDER — PANTOPRAZOLE SODIUM 40 MG/1
40 TABLET, DELAYED RELEASE ORAL
Status: DISCONTINUED | OUTPATIENT
Start: 2018-01-01 | End: 2018-01-01 | Stop reason: HOSPADM

## 2018-01-01 RX ORDER — SODIUM PHOSPHATE, DIBASIC AND SODIUM PHOSPHATE, MONOBASIC 7; 19 G/133ML; G/133ML
1 ENEMA RECTAL
Status: CANCELLED | OUTPATIENT
Start: 2018-01-01 | End: 2018-01-01

## 2018-01-01 RX ORDER — SPIRONOLACTONE 50 MG/1
50 TABLET, FILM COATED ORAL DAILY
Qty: 30 TABLET | Refills: 3 | Status: SHIPPED | OUTPATIENT
Start: 2018-01-01

## 2018-01-01 RX ORDER — 0.9 % SODIUM CHLORIDE 0.9 %
10 VIAL (ML) INJECTION DAILY
Status: CANCELLED | OUTPATIENT
Start: 2018-01-01

## 2018-01-01 RX ORDER — MORPHINE SULFATE 4 MG/ML
4 INJECTION, SOLUTION INTRAMUSCULAR; INTRAVENOUS
Status: DISCONTINUED | OUTPATIENT
Start: 2018-01-01 | End: 2018-01-01 | Stop reason: HOSPADM

## 2018-01-01 RX ORDER — POTASSIUM BICARBONATE 25 MEQ/1
25 TABLET, EFFERVESCENT ORAL ONCE
Status: DISCONTINUED | OUTPATIENT
Start: 2018-01-01 | End: 2018-01-01 | Stop reason: HOSPADM

## 2018-01-01 RX ORDER — 0.9 % SODIUM CHLORIDE 0.9 %
500 INTRAVENOUS SOLUTION INTRAVENOUS ONCE
Status: COMPLETED | OUTPATIENT
Start: 2018-01-01 | End: 2018-01-01

## 2018-01-01 RX ORDER — M-VIT,TX,IRON,MINS/CALC/FOLIC 27MG-0.4MG
1 TABLET ORAL EVERY MORNING
Status: CANCELLED | OUTPATIENT
Start: 2018-01-01

## 2018-01-01 RX ORDER — LACTULOSE 10 G/15ML
20 SOLUTION ORAL 3 TIMES DAILY
Qty: 1892 ML | Refills: 3 | Status: SHIPPED | OUTPATIENT
Start: 2018-01-01 | End: 2018-01-01 | Stop reason: SDUPTHER

## 2018-01-01 RX ORDER — 0.9 % SODIUM CHLORIDE 0.9 %
1000 INTRAVENOUS SOLUTION INTRAVENOUS ONCE
Status: COMPLETED | OUTPATIENT
Start: 2018-01-01 | End: 2018-01-01

## 2018-01-01 RX ORDER — PREDNISOLONE SODIUM PHOSPHATE 15 MG/5ML
20 SOLUTION ORAL DAILY
Status: DISCONTINUED | OUTPATIENT
Start: 2018-01-01 | End: 2018-01-01 | Stop reason: HOSPADM

## 2018-01-01 RX ORDER — LACTULOSE 10 G/15ML
30 SOLUTION ORAL
Status: DISPENSED | OUTPATIENT
Start: 2018-01-01 | End: 2018-01-01

## 2018-01-01 RX ORDER — LORAZEPAM 1 MG/1
1 TABLET ORAL
Status: DISCONTINUED | OUTPATIENT
Start: 2018-01-01 | End: 2018-01-01

## 2018-01-01 RX ORDER — FUROSEMIDE 20 MG/1
20 TABLET ORAL DAILY
Status: CANCELLED | OUTPATIENT
Start: 2018-01-01

## 2018-01-01 RX ORDER — PROPRANOLOL HYDROCHLORIDE 10 MG/1
10 TABLET ORAL 2 TIMES DAILY
Status: DISCONTINUED | OUTPATIENT
Start: 2018-01-01 | End: 2018-01-01 | Stop reason: HOSPADM

## 2018-01-01 RX ORDER — LORAZEPAM 2 MG/ML
3 INJECTION INTRAMUSCULAR
Status: DISCONTINUED | OUTPATIENT
Start: 2018-01-01 | End: 2018-01-01

## 2018-01-01 RX ORDER — HALOPERIDOL 5 MG/ML
INJECTION INTRAMUSCULAR
Status: COMPLETED
Start: 2018-01-01 | End: 2018-01-01

## 2018-01-01 RX ORDER — THIAMINE MONONITRATE (VIT B1) 100 MG
100 TABLET ORAL DAILY
Status: CANCELLED | OUTPATIENT
Start: 2018-01-01

## 2018-01-01 RX ORDER — MULTIVITAMIN WITH FOLIC ACID 400 MCG
1 TABLET ORAL DAILY
Status: CANCELLED | OUTPATIENT
Start: 2018-01-01

## 2018-01-01 RX ORDER — ACETAMINOPHEN 325 MG/1
325 TABLET ORAL EVERY 4 HOURS PRN
Status: DISCONTINUED | OUTPATIENT
Start: 2018-01-01 | End: 2018-01-01 | Stop reason: HOSPADM

## 2018-01-01 RX ORDER — ONDANSETRON 2 MG/ML
INJECTION INTRAMUSCULAR; INTRAVENOUS
Status: COMPLETED
Start: 2018-01-01 | End: 2018-01-01

## 2018-01-01 RX ORDER — SODIUM CHLORIDE 0.9 % (FLUSH) 0.9 %
10 SYRINGE (ML) INJECTION EVERY 12 HOURS SCHEDULED
Status: DISCONTINUED | OUTPATIENT
Start: 2018-01-01 | End: 2018-01-01

## 2018-01-01 RX ORDER — POTASSIUM CHLORIDE 20MEQ/15ML
40 LIQUID (ML) ORAL DAILY
Status: CANCELLED | OUTPATIENT
Start: 2018-01-01

## 2018-01-01 RX ORDER — LACTULOSE 10 G/15ML
20 SOLUTION ORAL 3 TIMES DAILY
Status: DISCONTINUED | OUTPATIENT
Start: 2018-01-01 | End: 2018-01-01

## 2018-01-01 RX ORDER — LANOLIN ALCOHOL/MO/W.PET/CERES
5 CREAM (GRAM) TOPICAL NIGHTLY PRN
COMMUNITY

## 2018-01-01 RX ORDER — NALOXONE HYDROCHLORIDE 0.4 MG/ML
0.2 INJECTION, SOLUTION INTRAMUSCULAR; INTRAVENOUS; SUBCUTANEOUS ONCE
Status: DISCONTINUED | OUTPATIENT
Start: 2018-01-01 | End: 2018-01-01

## 2018-01-01 RX ORDER — DEXTROSE MONOHYDRATE 25 G/50ML
25 INJECTION, SOLUTION INTRAVENOUS ONCE
Status: COMPLETED | OUTPATIENT
Start: 2018-01-01 | End: 2018-01-01

## 2018-01-01 RX ORDER — POTASSIUM CHLORIDE 20MEQ/15ML
60 LIQUID (ML) ORAL DAILY
Status: DISCONTINUED | OUTPATIENT
Start: 2018-01-01 | End: 2018-01-01

## 2018-01-01 RX ORDER — SODIUM CHLORIDE 9 MG/ML
INJECTION, SOLUTION INTRAVENOUS CONTINUOUS
Status: DISCONTINUED | OUTPATIENT
Start: 2018-01-01 | End: 2018-01-01 | Stop reason: HOSPADM

## 2018-01-01 RX ORDER — PANTOPRAZOLE SODIUM 40 MG/10ML
40 INJECTION, POWDER, LYOPHILIZED, FOR SOLUTION INTRAVENOUS DAILY
Status: DISCONTINUED | OUTPATIENT
Start: 2018-01-01 | End: 2018-01-01 | Stop reason: HOSPADM

## 2018-01-01 RX ORDER — NITROFURANTOIN 25; 75 MG/1; MG/1
100 CAPSULE ORAL EVERY 12 HOURS SCHEDULED
Status: CANCELLED | OUTPATIENT
Start: 2018-01-01 | End: 2018-01-01

## 2018-01-01 RX ORDER — LABETALOL HYDROCHLORIDE 5 MG/ML
20 INJECTION, SOLUTION INTRAVENOUS ONCE
Status: COMPLETED | OUTPATIENT
Start: 2018-01-01 | End: 2018-01-01

## 2018-01-01 RX ORDER — POTASSIUM CHLORIDE 20 MEQ/1
40 TABLET, EXTENDED RELEASE ORAL 2 TIMES DAILY WITH MEALS
Status: DISCONTINUED | OUTPATIENT
Start: 2018-01-01 | End: 2018-01-01 | Stop reason: HOSPADM

## 2018-01-01 RX ORDER — MORPHINE SULFATE 4 MG/ML
4 INJECTION, SOLUTION INTRAMUSCULAR; INTRAVENOUS
Status: CANCELLED | OUTPATIENT
Start: 2018-01-01

## 2018-01-01 RX ORDER — LORAZEPAM 1 MG/1
3 TABLET ORAL
Status: DISCONTINUED | OUTPATIENT
Start: 2018-01-01 | End: 2018-01-01

## 2018-01-01 RX ORDER — NITROFURANTOIN 25; 75 MG/1; MG/1
100 CAPSULE ORAL EVERY 12 HOURS SCHEDULED
Status: COMPLETED | OUTPATIENT
Start: 2018-01-01 | End: 2018-01-01

## 2018-01-01 RX ORDER — LEVETIRACETAM 500 MG/1
500 TABLET ORAL 2 TIMES DAILY
Status: DISCONTINUED | OUTPATIENT
Start: 2018-01-01 | End: 2018-01-01 | Stop reason: HOSPADM

## 2018-01-01 RX ORDER — MORPHINE SULFATE 2 MG/ML
2 INJECTION, SOLUTION INTRAMUSCULAR; INTRAVENOUS
Status: CANCELLED | OUTPATIENT
Start: 2018-01-01

## 2018-01-01 RX ORDER — LACTULOSE 10 G/15ML
20 SOLUTION ORAL 3 TIMES DAILY
Qty: 1892 ML | Refills: 3 | Status: SHIPPED | OUTPATIENT
Start: 2018-01-01

## 2018-01-01 RX ORDER — M-VIT,TX,IRON,MINS/CALC/FOLIC 27MG-0.4MG
1 TABLET ORAL DAILY
COMMUNITY

## 2018-01-01 RX ADMIN — OXYCODONE HYDROCHLORIDE AND ACETAMINOPHEN 500 MG: 500 TABLET ORAL at 12:22

## 2018-01-01 RX ADMIN — SODIUM CHLORIDE 500 ML: 9 INJECTION, SOLUTION INTRAVENOUS at 23:08

## 2018-01-01 RX ADMIN — SODIUM CHLORIDE: 9 INJECTION, SOLUTION INTRAVENOUS at 04:04

## 2018-01-01 RX ADMIN — PANTOPRAZOLE SODIUM 40 MG: 40 TABLET, DELAYED RELEASE ORAL at 05:34

## 2018-01-01 RX ADMIN — RIFAXIMIN 550 MG: 550 TABLET ORAL at 21:15

## 2018-01-01 RX ADMIN — LACTULOSE 30 G: 20 SOLUTION ORAL at 13:12

## 2018-01-01 RX ADMIN — OXYCODONE HYDROCHLORIDE AND ACETAMINOPHEN 500 MG: 500 TABLET ORAL at 23:00

## 2018-01-01 RX ADMIN — SODIUM CHLORIDE: 9 INJECTION, SOLUTION INTRAVENOUS at 21:18

## 2018-01-01 RX ADMIN — PROPRANOLOL HYDROCHLORIDE 10 MG: 10 TABLET ORAL at 08:40

## 2018-01-01 RX ADMIN — Medication 30 MG: at 14:33

## 2018-01-01 RX ADMIN — Medication 800 MG: at 10:59

## 2018-01-01 RX ADMIN — Medication 10 ML: at 08:21

## 2018-01-01 RX ADMIN — NITROFURANTOIN (MONOHYDRATE/MACROCRYSTALS) 100 MG: 75; 25 CAPSULE ORAL at 08:21

## 2018-01-01 RX ADMIN — POTASSIUM CHLORIDE 10 MEQ: 7.46 INJECTION, SOLUTION INTRAVENOUS at 17:23

## 2018-01-01 RX ADMIN — FOLIC ACID 1 MG: 1 TABLET ORAL at 15:56

## 2018-01-01 RX ADMIN — LEVETIRACETAM 500 MG: 500 TABLET, FILM COATED ORAL at 08:21

## 2018-01-01 RX ADMIN — SODIUM CHLORIDE: 9 INJECTION, SOLUTION INTRAVENOUS at 17:07

## 2018-01-01 RX ADMIN — NALOXONE HYDROCHLORIDE 2 MG: 1 INJECTION INTRAMUSCULAR; INTRAVENOUS; SUBCUTANEOUS at 23:11

## 2018-01-01 RX ADMIN — VASOPRESSIN 0.04 UNITS/MIN: 20 INJECTION INTRAVENOUS at 02:05

## 2018-01-01 RX ADMIN — POTASSIUM CHLORIDE 10 MEQ: 7.46 INJECTION, SOLUTION INTRAVENOUS at 12:56

## 2018-01-01 RX ADMIN — MULTIPLE VITAMINS W/ MINERALS TAB 1 TABLET: TAB at 10:48

## 2018-01-01 RX ADMIN — PROPRANOLOL HYDROCHLORIDE 10 MG: 10 TABLET ORAL at 20:41

## 2018-01-01 RX ADMIN — LORAZEPAM 2 MG: 2 INJECTION INTRAMUSCULAR; INTRAVENOUS at 21:32

## 2018-01-01 RX ADMIN — NITROFURANTOIN (MONOHYDRATE/MACROCRYSTALS) 100 MG: 75; 25 CAPSULE ORAL at 22:02

## 2018-01-01 RX ADMIN — Medication 2 MG: at 14:05

## 2018-01-01 RX ADMIN — SPIRONOLACTONE 50 MG: 25 TABLET ORAL at 10:44

## 2018-01-01 RX ADMIN — FUROSEMIDE 20 MG: 20 TABLET ORAL at 09:36

## 2018-01-01 RX ADMIN — PIPERACILLIN SODIUM AND TAZOBACTAM SODIUM 3.38 G: 3; .375 INJECTION, POWDER, LYOPHILIZED, FOR SOLUTION INTRAVENOUS at 00:36

## 2018-01-01 RX ADMIN — OXYCODONE HYDROCHLORIDE AND ACETAMINOPHEN 500 MG: 500 TABLET ORAL at 23:12

## 2018-01-01 RX ADMIN — THERA TABS 1 TABLET: TAB at 17:11

## 2018-01-01 RX ADMIN — POTASSIUM CHLORIDE 40 MEQ: 20 TABLET, EXTENDED RELEASE ORAL at 17:26

## 2018-01-01 RX ADMIN — LACTULOSE 20 G: 20 SOLUTION ORAL at 21:16

## 2018-01-01 RX ADMIN — SPIRONOLACTONE 50 MG: 25 TABLET ORAL at 10:48

## 2018-01-01 RX ADMIN — RIFAXIMIN 550 MG: 550 TABLET ORAL at 11:23

## 2018-01-01 RX ADMIN — PIPERACILLIN SODIUM AND TAZOBACTAM SODIUM 3.38 G: 3; .375 INJECTION, POWDER, LYOPHILIZED, FOR SOLUTION INTRAVENOUS at 16:53

## 2018-01-01 RX ADMIN — LACTULOSE 20 G: 20 SOLUTION ORAL at 14:41

## 2018-01-01 RX ADMIN — THERA TABS 1 TABLET: TAB at 18:28

## 2018-01-01 RX ADMIN — SPIRONOLACTONE 50 MG: 25 TABLET ORAL at 18:33

## 2018-01-01 RX ADMIN — THERA TABS 1 TABLET: TAB at 17:28

## 2018-01-01 RX ADMIN — Medication 30 MG: at 10:51

## 2018-01-01 RX ADMIN — SPIRONOLACTONE 50 MG: 50 TABLET, FILM COATED ORAL at 09:43

## 2018-01-01 RX ADMIN — NALOXONE HYDROCHLORIDE 2 MG: 1 INJECTION INTRAMUSCULAR; INTRAVENOUS; SUBCUTANEOUS at 23:39

## 2018-01-01 RX ADMIN — RIFAXIMIN 550 MG: 550 TABLET ORAL at 23:00

## 2018-01-01 RX ADMIN — LACTULOSE 20 G: 20 SOLUTION ORAL at 17:07

## 2018-01-01 RX ADMIN — ONDANSETRON 4 MG: 2 INJECTION INTRAMUSCULAR; INTRAVENOUS at 00:36

## 2018-01-01 RX ADMIN — POTASSIUM CHLORIDE: 2 INJECTION, SOLUTION, CONCENTRATE INTRAVENOUS at 11:39

## 2018-01-01 RX ADMIN — THERA TABS 1 TABLET: TAB at 13:12

## 2018-01-01 RX ADMIN — Medication 10 ML: at 21:23

## 2018-01-01 RX ADMIN — INSULIN LISPRO 4 UNITS: 100 INJECTION, SOLUTION INTRAVENOUS; SUBCUTANEOUS at 17:50

## 2018-01-01 RX ADMIN — LEVETIRACETAM 500 MG: 500 TABLET, FILM COATED ORAL at 22:31

## 2018-01-01 RX ADMIN — OXYCODONE HYDROCHLORIDE AND ACETAMINOPHEN 500 MG: 500 TABLET ORAL at 20:41

## 2018-01-01 RX ADMIN — RIFAXIMIN 550 MG: 550 TABLET ORAL at 22:02

## 2018-01-01 RX ADMIN — FUROSEMIDE 20 MG: 20 TABLET ORAL at 09:19

## 2018-01-01 RX ADMIN — INSULIN LISPRO 4 UNITS: 100 INJECTION, SOLUTION INTRAVENOUS; SUBCUTANEOUS at 13:26

## 2018-01-01 RX ADMIN — SODIUM CHLORIDE: 9 INJECTION, SOLUTION INTRAVENOUS at 02:08

## 2018-01-01 RX ADMIN — POTASSIUM CHLORIDE 10 MEQ: 7.46 INJECTION, SOLUTION INTRAVENOUS at 13:52

## 2018-01-01 RX ADMIN — LACTULOSE 30 G: 20 SOLUTION ORAL at 15:58

## 2018-01-01 RX ADMIN — LORAZEPAM 2 MG: 2 INJECTION INTRAMUSCULAR; INTRAVENOUS at 14:05

## 2018-01-01 RX ADMIN — LEVETIRACETAM 500 MG: 500 TABLET, FILM COATED ORAL at 20:41

## 2018-01-01 RX ADMIN — LEVETIRACETAM 500 MG: 500 TABLET, FILM COATED ORAL at 10:48

## 2018-01-01 RX ADMIN — LACTULOSE 20 G: 20 SOLUTION ORAL at 08:28

## 2018-01-01 RX ADMIN — Medication 10 ML: at 13:13

## 2018-01-01 RX ADMIN — RIFAXIMIN 550 MG: 550 TABLET ORAL at 10:49

## 2018-01-01 RX ADMIN — POTASSIUM CHLORIDE 40 MEQ: 20 TABLET, EXTENDED RELEASE ORAL at 13:14

## 2018-01-01 RX ADMIN — LEVETIRACETAM 500 MG: 500 TABLET, FILM COATED ORAL at 08:20

## 2018-01-01 RX ADMIN — NITROFURANTOIN (MONOHYDRATE/MACROCRYSTALS) 100 MG: 75; 25 CAPSULE ORAL at 11:12

## 2018-01-01 RX ADMIN — Medication 800 MG: at 12:11

## 2018-01-01 RX ADMIN — CEFTRIAXONE SODIUM 1 G: 1 INJECTION, POWDER, FOR SOLUTION INTRAMUSCULAR; INTRAVENOUS at 15:29

## 2018-01-01 RX ADMIN — PANTOPRAZOLE SODIUM 40 MG: 40 TABLET, DELAYED RELEASE ORAL at 06:39

## 2018-01-01 RX ADMIN — RIFAXIMIN 550 MG: 550 TABLET ORAL at 10:47

## 2018-01-01 RX ADMIN — MAGNESIUM SULFATE HEPTAHYDRATE 1 G: 1 INJECTION, SOLUTION INTRAVENOUS at 11:48

## 2018-01-01 RX ADMIN — FAMOTIDINE 20 MG: 20 TABLET ORAL at 08:03

## 2018-01-01 RX ADMIN — DEXTROSE MONOHYDRATE 25 G: 25 INJECTION, SOLUTION INTRAVENOUS at 03:09

## 2018-01-01 RX ADMIN — RIFAXIMIN 550 MG: 550 TABLET ORAL at 09:37

## 2018-01-01 RX ADMIN — LACTULOSE 20 G: 20 SOLUTION ORAL at 08:40

## 2018-01-01 RX ADMIN — LEVETIRACETAM 500 MG: 500 TABLET, FILM COATED ORAL at 23:00

## 2018-01-01 RX ADMIN — Medication 10 ML: at 20:54

## 2018-01-01 RX ADMIN — POTASSIUM CHLORIDE 40 MEQ: 20 SOLUTION ORAL at 08:22

## 2018-01-01 RX ADMIN — RIFAXIMIN 550 MG: 550 TABLET ORAL at 23:22

## 2018-01-01 RX ADMIN — ONDANSETRON 4 MG: 2 INJECTION INTRAMUSCULAR; INTRAVENOUS at 19:46

## 2018-01-01 RX ADMIN — RIFAXIMIN 550 MG: 550 TABLET ORAL at 09:19

## 2018-01-01 RX ADMIN — MULTIPLE VITAMINS W/ MINERALS TAB 1 TABLET: TAB at 08:22

## 2018-01-01 RX ADMIN — Medication 800 MG: at 11:11

## 2018-01-01 RX ADMIN — OXYCODONE HYDROCHLORIDE AND ACETAMINOPHEN 500 MG: 500 TABLET ORAL at 10:44

## 2018-01-01 RX ADMIN — Medication 400 MG: at 09:46

## 2018-01-01 RX ADMIN — POTASSIUM CHLORIDE 10 MEQ: 750 CAPSULE, EXTENDED RELEASE ORAL at 08:27

## 2018-01-01 RX ADMIN — Medication 20 MG: at 08:40

## 2018-01-01 RX ADMIN — MULTIPLE VITAMINS W/ MINERALS TAB 1 TABLET: TAB at 09:38

## 2018-01-01 RX ADMIN — POTASSIUM CHLORIDE 10 MEQ: 750 CAPSULE, EXTENDED RELEASE ORAL at 09:37

## 2018-01-01 RX ADMIN — POTASSIUM CHLORIDE 10 MEQ: 7.46 INJECTION, SOLUTION INTRAVENOUS at 02:52

## 2018-01-01 RX ADMIN — LACTULOSE 30 G: 20 SOLUTION ORAL at 16:24

## 2018-01-01 RX ADMIN — ONDANSETRON 4 MG: 2 INJECTION INTRAMUSCULAR; INTRAVENOUS at 21:56

## 2018-01-01 RX ADMIN — LORAZEPAM 1 MG: 2 INJECTION INTRAMUSCULAR; INTRAVENOUS at 08:21

## 2018-01-01 RX ADMIN — Medication 30 MG: at 11:26

## 2018-01-01 RX ADMIN — MAGNESIUM SULFATE HEPTAHYDRATE 4 G: 40 INJECTION, SOLUTION INTRAVENOUS at 16:24

## 2018-01-01 RX ADMIN — CEFTRIAXONE SODIUM 1 G: 1 INJECTION, POWDER, FOR SOLUTION INTRAMUSCULAR; INTRAVENOUS at 14:02

## 2018-01-01 RX ADMIN — LORAZEPAM 1 MG: 1 TABLET ORAL at 12:30

## 2018-01-01 RX ADMIN — LABETALOL HYDROCHLORIDE 20 MG: 5 INJECTION INTRAVENOUS at 21:06

## 2018-01-01 RX ADMIN — POTASSIUM CHLORIDE 40 MEQ: 20 TABLET, EXTENDED RELEASE ORAL at 14:05

## 2018-01-01 RX ADMIN — HALOPERIDOL 2 MG: 5 INJECTION INTRAMUSCULAR at 15:30

## 2018-01-01 RX ADMIN — MULTIPLE VITAMINS W/ MINERALS TAB 1 TABLET: TAB at 14:10

## 2018-01-01 RX ADMIN — OXYCODONE HYDROCHLORIDE AND ACETAMINOPHEN 500 MG: 500 TABLET ORAL at 08:22

## 2018-01-01 RX ADMIN — POTASSIUM CHLORIDE 40 MEQ: 20 TABLET, EXTENDED RELEASE ORAL at 21:12

## 2018-01-01 RX ADMIN — NITROFURANTOIN (MONOHYDRATE/MACROCRYSTALS) 100 MG: 75; 25 CAPSULE ORAL at 23:00

## 2018-01-01 RX ADMIN — SODIUM CHLORIDE: 9 INJECTION, SOLUTION INTRAVENOUS at 14:33

## 2018-01-01 RX ADMIN — RIFAXIMIN 550 MG: 550 TABLET ORAL at 08:21

## 2018-01-01 RX ADMIN — LACTULOSE 30 G: 20 SOLUTION ORAL at 14:41

## 2018-01-01 RX ADMIN — NALOXONE HYDROCHLORIDE 2 MG: 1 INJECTION PARENTERAL at 23:39

## 2018-01-01 RX ADMIN — LEVETIRACETAM 500 MG: 500 TABLET, FILM COATED ORAL at 23:12

## 2018-01-01 RX ADMIN — Medication 100 MG: at 17:24

## 2018-01-01 RX ADMIN — Medication 20 MG: at 14:16

## 2018-01-01 RX ADMIN — POTASSIUM CHLORIDE 10 MEQ: 7.46 INJECTION, SOLUTION INTRAVENOUS at 16:00

## 2018-01-01 RX ADMIN — BISACODYL 10 MG: 10 SUPPOSITORY RECTAL at 12:21

## 2018-01-01 RX ADMIN — PIPERACILLIN AND TAZOBACTAM 2.25 G: 2; .25 INJECTION, POWDER, FOR SOLUTION INTRAVENOUS at 09:29

## 2018-01-01 RX ADMIN — ACETAMINOPHEN 325 MG: 325 TABLET ORAL at 14:15

## 2018-01-01 RX ADMIN — MORPHINE SULFATE 4 MG: 4 INJECTION, SOLUTION INTRAMUSCULAR; INTRAVENOUS at 21:56

## 2018-01-01 RX ADMIN — Medication 10 ML: at 00:34

## 2018-01-01 RX ADMIN — LACTULOSE 30 G: 20 SOLUTION ORAL at 09:37

## 2018-01-01 RX ADMIN — CEFTRIAXONE SODIUM 1 G: 1 INJECTION, POWDER, FOR SOLUTION INTRAMUSCULAR; INTRAVENOUS at 14:32

## 2018-01-01 RX ADMIN — THERA TABS 1 TABLET: TAB at 10:49

## 2018-01-01 RX ADMIN — FOLIC ACID 1 MG: 1 TABLET ORAL at 16:49

## 2018-01-01 RX ADMIN — SODIUM CHLORIDE: 9 INJECTION, SOLUTION INTRAVENOUS at 06:05

## 2018-01-01 RX ADMIN — CEFTRIAXONE SODIUM 1 G: 1 INJECTION, POWDER, FOR SOLUTION INTRAMUSCULAR; INTRAVENOUS at 15:36

## 2018-01-01 RX ADMIN — MULTIPLE VITAMINS W/ MINERALS TAB 1 TABLET: TAB at 16:04

## 2018-01-01 RX ADMIN — Medication 10 ML: at 09:30

## 2018-01-01 RX ADMIN — OXYCODONE HYDROCHLORIDE AND ACETAMINOPHEN 500 MG: 500 TABLET ORAL at 10:48

## 2018-01-01 RX ADMIN — Medication 100 MG: at 09:41

## 2018-01-01 RX ADMIN — LACTULOSE 20 G: 20 SOLUTION ORAL at 21:41

## 2018-01-01 RX ADMIN — LACTULOSE 20 G: 20 SOLUTION ORAL at 08:03

## 2018-01-01 RX ADMIN — SODIUM CHLORIDE: 9 INJECTION, SOLUTION INTRAVENOUS at 13:14

## 2018-01-01 RX ADMIN — CEFTRIAXONE SODIUM 1 G: 1 INJECTION, POWDER, FOR SOLUTION INTRAMUSCULAR; INTRAVENOUS at 17:09

## 2018-01-01 RX ADMIN — LACTULOSE 20 G: 20 SOLUTION ORAL at 23:01

## 2018-01-01 RX ADMIN — LORAZEPAM 2 MG: 2 INJECTION INTRAMUSCULAR; INTRAVENOUS at 09:19

## 2018-01-01 RX ADMIN — OXYCODONE HYDROCHLORIDE AND ACETAMINOPHEN 500 MG: 500 TABLET ORAL at 20:46

## 2018-01-01 RX ADMIN — LEVETIRACETAM 500 MG: 500 TABLET, FILM COATED ORAL at 20:46

## 2018-01-01 RX ADMIN — LACTULOSE 20 G: 20 SOLUTION ORAL at 08:25

## 2018-01-01 RX ADMIN — POTASSIUM CHLORIDE 10 MEQ: 7.46 INJECTION, SOLUTION INTRAVENOUS at 12:58

## 2018-01-01 RX ADMIN — INSULIN LISPRO 6 UNITS: 100 INJECTION, SOLUTION INTRAVENOUS; SUBCUTANEOUS at 17:32

## 2018-01-01 RX ADMIN — FOLIC ACID 1 MG: 1 TABLET ORAL at 18:28

## 2018-01-01 RX ADMIN — OXYCODONE HYDROCHLORIDE AND ACETAMINOPHEN 500 MG: 500 TABLET ORAL at 22:16

## 2018-01-01 RX ADMIN — LORAZEPAM 1 MG: 1 TABLET ORAL at 09:57

## 2018-01-01 RX ADMIN — PIPERACILLIN SODIUM AND TAZOBACTAM SODIUM 3.38 G: 3; .375 INJECTION, POWDER, LYOPHILIZED, FOR SOLUTION INTRAVENOUS at 08:21

## 2018-01-01 RX ADMIN — INSULIN LISPRO 2 UNITS: 100 INJECTION, SOLUTION INTRAVENOUS; SUBCUTANEOUS at 13:13

## 2018-01-01 RX ADMIN — Medication 30 MG: at 10:44

## 2018-01-01 RX ADMIN — FAMOTIDINE 20 MG: 20 TABLET ORAL at 21:16

## 2018-01-01 RX ADMIN — FOLIC ACID 1 MG: 1 TABLET ORAL at 11:24

## 2018-01-01 RX ADMIN — LEVETIRACETAM 500 MG: 500 TABLET, FILM COATED ORAL at 22:15

## 2018-01-01 RX ADMIN — Medication 100 MG: at 08:21

## 2018-01-01 RX ADMIN — POTASSIUM CHLORIDE 40 MEQ: 20 SOLUTION ORAL at 13:13

## 2018-01-01 RX ADMIN — SPIRONOLACTONE 50 MG: 25 TABLET ORAL at 16:04

## 2018-01-01 RX ADMIN — Medication 30 MG: at 09:43

## 2018-01-01 RX ADMIN — RIFAXIMIN 550 MG: 550 TABLET ORAL at 08:39

## 2018-01-01 RX ADMIN — PANTOPRAZOLE SODIUM 40 MG: 40 TABLET, DELAYED RELEASE ORAL at 05:46

## 2018-01-01 RX ADMIN — OXYCODONE HYDROCHLORIDE AND ACETAMINOPHEN 500 MG: 500 TABLET ORAL at 22:31

## 2018-01-01 RX ADMIN — POTASSIUM CHLORIDE 40 MEQ: 20 SOLUTION ORAL at 12:35

## 2018-01-01 RX ADMIN — SODIUM CHLORIDE: 9 INJECTION, SOLUTION INTRAVENOUS at 00:34

## 2018-01-01 RX ADMIN — Medication 100 MG: at 10:49

## 2018-01-01 RX ADMIN — LACTULOSE 20 G: 20 SOLUTION ORAL at 21:26

## 2018-01-01 RX ADMIN — SODIUM CHLORIDE: 9 INJECTION, SOLUTION INTRAVENOUS at 13:13

## 2018-01-01 RX ADMIN — LACTULOSE 20 G: 20 SOLUTION ORAL at 22:32

## 2018-01-01 RX ADMIN — RIFAXIMIN 550 MG: 550 TABLET ORAL at 15:35

## 2018-01-01 RX ADMIN — Medication 100 MG: at 11:12

## 2018-01-01 RX ADMIN — LORAZEPAM 1 MG: 1 TABLET ORAL at 21:27

## 2018-01-01 RX ADMIN — VASOPRESSIN 0.04 UNITS/MIN: 20 INJECTION INTRAVENOUS at 09:28

## 2018-01-01 RX ADMIN — Medication 100 MG: at 09:34

## 2018-01-01 RX ADMIN — THERA TABS 1 TABLET: TAB at 09:41

## 2018-01-01 RX ADMIN — OXYCODONE HYDROCHLORIDE AND ACETAMINOPHEN 500 MG: 500 TABLET ORAL at 12:11

## 2018-01-01 RX ADMIN — POTASSIUM CHLORIDE 10 MEQ: 7.46 INJECTION, SOLUTION INTRAVENOUS at 01:29

## 2018-01-01 RX ADMIN — THERA TABS 1 TABLET: TAB at 11:24

## 2018-01-01 RX ADMIN — LEVETIRACETAM 500 MG: 500 TABLET, FILM COATED ORAL at 23:22

## 2018-01-01 RX ADMIN — LORAZEPAM 1 MG: 1 TABLET ORAL at 23:51

## 2018-01-01 RX ADMIN — MAGNESIUM SULFATE HEPTAHYDRATE 4 G: 40 INJECTION, SOLUTION INTRAVENOUS at 17:15

## 2018-01-01 RX ADMIN — FOLIC ACID 1 MG: 1 TABLET ORAL at 17:28

## 2018-01-01 RX ADMIN — Medication 20 MG: at 09:22

## 2018-01-01 RX ADMIN — Medication 30 MG: at 09:34

## 2018-01-01 RX ADMIN — SPIRONOLACTONE 50 MG: 50 TABLET, FILM COATED ORAL at 12:13

## 2018-01-01 RX ADMIN — POTASSIUM CHLORIDE 10 MEQ: 7.46 INJECTION, SOLUTION INTRAVENOUS at 14:41

## 2018-01-01 RX ADMIN — NITROFURANTOIN (MONOHYDRATE/MACROCRYSTALS) 100 MG: 75; 25 CAPSULE ORAL at 10:47

## 2018-01-01 RX ADMIN — NITROFURANTOIN (MONOHYDRATE/MACROCRYSTALS) 100 MG: 75; 25 CAPSULE ORAL at 20:45

## 2018-01-01 RX ADMIN — Medication 10 ML: at 23:01

## 2018-01-01 RX ADMIN — Medication 400 MG: at 09:33

## 2018-01-01 RX ADMIN — THIAMINE HYDROCHLORIDE: 100 INJECTION, SOLUTION INTRAMUSCULAR; INTRAVENOUS at 19:47

## 2018-01-01 RX ADMIN — Medication 30 MG: at 16:05

## 2018-01-01 RX ADMIN — SODIUM CHLORIDE: 9 INJECTION, SOLUTION INTRAVENOUS at 00:10

## 2018-01-01 RX ADMIN — FOLIC ACID 1 MG: 1 TABLET ORAL at 17:24

## 2018-01-01 RX ADMIN — RIFAXIMIN 550 MG: 550 TABLET ORAL at 20:53

## 2018-01-01 RX ADMIN — FUROSEMIDE 20 MG: 20 TABLET ORAL at 08:40

## 2018-01-01 RX ADMIN — LACTULOSE 30 G: 20 SOLUTION ORAL at 14:12

## 2018-01-01 RX ADMIN — Medication 2 MG: at 16:23

## 2018-01-01 RX ADMIN — PANTOPRAZOLE SODIUM 40 MG: 40 TABLET, DELAYED RELEASE ORAL at 06:13

## 2018-01-01 RX ADMIN — FAMOTIDINE 20 MG: 20 TABLET ORAL at 21:41

## 2018-01-01 RX ADMIN — LORAZEPAM 1 MG: 2 INJECTION INTRAMUSCULAR; INTRAVENOUS at 03:09

## 2018-01-01 RX ADMIN — RIFAXIMIN 550 MG: 550 TABLET ORAL at 23:12

## 2018-01-01 RX ADMIN — POTASSIUM CHLORIDE 10 MEQ: 750 CAPSULE, EXTENDED RELEASE ORAL at 09:19

## 2018-01-01 RX ADMIN — FOLIC ACID 1 MG: 1 TABLET ORAL at 11:13

## 2018-01-01 RX ADMIN — POTASSIUM CHLORIDE 10 MEQ: 7.46 INJECTION, SOLUTION INTRAVENOUS at 07:12

## 2018-01-01 RX ADMIN — LACTULOSE 20 G: 20 SOLUTION ORAL at 22:13

## 2018-01-01 RX ADMIN — Medication 10 ML: at 09:36

## 2018-01-01 RX ADMIN — FAMOTIDINE 20 MG: 20 TABLET ORAL at 08:28

## 2018-01-01 RX ADMIN — Medication 400 MG: at 09:35

## 2018-01-01 RX ADMIN — NALOXONE HYDROCHLORIDE 2 MG: 1 INJECTION PARENTERAL at 23:46

## 2018-01-01 RX ADMIN — LEVETIRACETAM 500 MG: 500 TABLET, FILM COATED ORAL at 22:01

## 2018-01-01 RX ADMIN — Medication 800 MG: at 11:24

## 2018-01-01 RX ADMIN — INSULIN LISPRO 8 UNITS: 100 INJECTION, SOLUTION INTRAVENOUS; SUBCUTANEOUS at 12:20

## 2018-01-01 RX ADMIN — MAGNESIUM SULFATE 1 G: 1 INJECTION INTRAVENOUS at 13:38

## 2018-01-01 RX ADMIN — SODIUM CHLORIDE: 9 INJECTION, SOLUTION INTRAVENOUS at 20:45

## 2018-01-01 RX ADMIN — LACTULOSE 30 G: 20 SOLUTION ORAL at 21:16

## 2018-01-01 RX ADMIN — LACTULOSE 20 G: 20 SOLUTION ORAL at 14:32

## 2018-01-01 RX ADMIN — LEVETIRACETAM 500 MG: 500 TABLET, FILM COATED ORAL at 09:37

## 2018-01-01 RX ADMIN — PROPRANOLOL HYDROCHLORIDE 10 MG: 10 TABLET ORAL at 23:22

## 2018-01-01 RX ADMIN — Medication 800 MG: at 10:44

## 2018-01-01 RX ADMIN — RIFAXIMIN 550 MG: 550 TABLET ORAL at 11:00

## 2018-01-01 RX ADMIN — Medication 800 MG: at 12:22

## 2018-01-01 RX ADMIN — Medication 100 MG: at 11:24

## 2018-01-01 RX ADMIN — ONDANSETRON 4 MG: 2 INJECTION INTRAMUSCULAR; INTRAVENOUS at 23:21

## 2018-01-01 RX ADMIN — SODIUM BICARBONATE: 84 INJECTION, SOLUTION INTRAVENOUS at 09:42

## 2018-01-01 RX ADMIN — PANTOPRAZOLE SODIUM 40 MG: 40 INJECTION, POWDER, FOR SOLUTION INTRAVENOUS at 09:28

## 2018-01-01 RX ADMIN — LACTULOSE 30 G: 20 SOLUTION ORAL at 13:53

## 2018-01-01 RX ADMIN — RIFAXIMIN 550 MG: 550 TABLET ORAL at 10:44

## 2018-01-01 RX ADMIN — LORAZEPAM 2 MG: 2 INJECTION INTRAMUSCULAR; INTRAVENOUS at 16:45

## 2018-01-01 RX ADMIN — OXYCODONE HYDROCHLORIDE AND ACETAMINOPHEN 500 MG: 500 TABLET ORAL at 12:13

## 2018-01-01 RX ADMIN — FAMOTIDINE 20 MG: 20 TABLET ORAL at 21:26

## 2018-01-01 RX ADMIN — SODIUM CHLORIDE 125 ML/HR: 9 INJECTION, SOLUTION INTRAVENOUS at 19:44

## 2018-01-01 RX ADMIN — PROPRANOLOL HYDROCHLORIDE 10 MG: 10 TABLET ORAL at 11:00

## 2018-01-01 RX ADMIN — SODIUM CHLORIDE 1000 ML: 9 INJECTION, SOLUTION INTRAVENOUS at 23:39

## 2018-01-01 RX ADMIN — PANTOPRAZOLE SODIUM 40 MG: 40 TABLET, DELAYED RELEASE ORAL at 05:37

## 2018-01-01 RX ADMIN — Medication 800 MG: at 10:48

## 2018-01-01 RX ADMIN — PANTOPRAZOLE SODIUM 40 MG: 40 TABLET, DELAYED RELEASE ORAL at 06:22

## 2018-01-01 RX ADMIN — POTASSIUM CHLORIDE 10 MEQ: 750 CAPSULE, EXTENDED RELEASE ORAL at 08:21

## 2018-01-01 RX ADMIN — LACTULOSE 20 G: 20 SOLUTION ORAL at 13:09

## 2018-01-01 RX ADMIN — Medication 800 MG: at 08:39

## 2018-01-01 RX ADMIN — Medication 30 MG: at 08:21

## 2018-01-01 RX ADMIN — FOLIC ACID 1 MG: 1 TABLET ORAL at 09:34

## 2018-01-01 RX ADMIN — FOLIC ACID 1 MG: 1 TABLET ORAL at 13:12

## 2018-01-01 RX ADMIN — Medication 2 MG: at 12:04

## 2018-01-01 RX ADMIN — ONDANSETRON 4 MG: 2 INJECTION INTRAMUSCULAR; INTRAVENOUS at 07:16

## 2018-01-01 RX ADMIN — MULTIPLE VITAMINS W/ MINERALS TAB 1 TABLET: TAB at 08:39

## 2018-01-01 RX ADMIN — POTASSIUM CHLORIDE 10 MEQ: 750 CAPSULE, EXTENDED RELEASE ORAL at 11:01

## 2018-01-01 RX ADMIN — LORAZEPAM 2 MG: 2 INJECTION INTRAMUSCULAR; INTRAVENOUS at 17:07

## 2018-01-01 RX ADMIN — NOREPINEPHRINE BITARTRATE 30 MCG/MIN: 1 INJECTION INTRAVENOUS at 09:36

## 2018-01-01 RX ADMIN — SODIUM BICARBONATE 50 MEQ: 84 INJECTION, SOLUTION INTRAVENOUS at 03:43

## 2018-01-01 RX ADMIN — LACTULOSE 20 G: 20 SOLUTION ORAL at 10:45

## 2018-01-01 RX ADMIN — Medication 10 ML: at 09:28

## 2018-01-01 RX ADMIN — RIFAXIMIN 550 MG: 550 TABLET ORAL at 11:11

## 2018-01-01 RX ADMIN — Medication 400 MG: at 10:49

## 2018-01-01 RX ADMIN — POTASSIUM CHLORIDE 10 MEQ: 7.46 INJECTION, SOLUTION INTRAVENOUS at 05:51

## 2018-01-01 RX ADMIN — POTASSIUM CHLORIDE 40 MEQ: 20 TABLET, EXTENDED RELEASE ORAL at 09:33

## 2018-01-01 RX ADMIN — INSULIN LISPRO 4 UNITS: 100 INJECTION, SOLUTION INTRAVENOUS; SUBCUTANEOUS at 17:11

## 2018-01-01 RX ADMIN — PANTOPRAZOLE SODIUM 40 MG: 40 TABLET, DELAYED RELEASE ORAL at 09:37

## 2018-01-01 RX ADMIN — ONDANSETRON 4 MG: 2 INJECTION INTRAMUSCULAR; INTRAVENOUS at 13:12

## 2018-01-01 RX ADMIN — POTASSIUM CHLORIDE 10 MEQ: 7.46 INJECTION, SOLUTION INTRAVENOUS at 23:53

## 2018-01-01 RX ADMIN — SODIUM BICARBONATE: 84 INJECTION, SOLUTION INTRAVENOUS at 01:59

## 2018-01-01 RX ADMIN — LEVETIRACETAM 500 MG: 500 TABLET, FILM COATED ORAL at 16:04

## 2018-01-01 RX ADMIN — SODIUM CHLORIDE: 9 INJECTION, SOLUTION INTRAVENOUS at 02:52

## 2018-01-01 RX ADMIN — LACTULOSE 30 G: 20 SOLUTION ORAL at 15:35

## 2018-01-01 RX ADMIN — Medication 100 MG: at 16:49

## 2018-01-01 RX ADMIN — LACTULOSE 20 G: 20 SOLUTION ORAL at 15:01

## 2018-01-01 RX ADMIN — Medication 800 MG: at 08:21

## 2018-01-01 RX ADMIN — THERA TABS 1 TABLET: TAB at 15:56

## 2018-01-01 RX ADMIN — POTASSIUM CHLORIDE 10 MEQ: 750 CAPSULE, EXTENDED RELEASE ORAL at 10:45

## 2018-01-01 RX ADMIN — LACTULOSE 20 G: 20 SOLUTION ORAL at 08:21

## 2018-01-01 RX ADMIN — NALOXONE HYDROCHLORIDE 2 MG: 1 INJECTION PARENTERAL at 23:11

## 2018-01-01 RX ADMIN — POTASSIUM CHLORIDE 10 MEQ: 750 CAPSULE, EXTENDED RELEASE ORAL at 10:48

## 2018-01-01 RX ADMIN — MULTIPLE VITAMINS W/ MINERALS TAB 1 TABLET: TAB at 11:33

## 2018-01-01 RX ADMIN — RIFAXIMIN 550 MG: 550 TABLET ORAL at 21:44

## 2018-01-01 RX ADMIN — FUROSEMIDE 20 MG: 20 TABLET ORAL at 14:11

## 2018-01-01 RX ADMIN — LORAZEPAM 1 MG: 2 INJECTION INTRAMUSCULAR; INTRAVENOUS at 01:17

## 2018-01-01 RX ADMIN — SODIUM CHLORIDE 500 ML: 9 INJECTION, SOLUTION INTRAVENOUS at 22:26

## 2018-01-01 RX ADMIN — POTASSIUM CHLORIDE 10 MEQ: 750 CAPSULE, EXTENDED RELEASE ORAL at 08:39

## 2018-01-01 RX ADMIN — Medication 100 MG: at 08:22

## 2018-01-01 RX ADMIN — RIFAXIMIN 550 MG: 550 TABLET ORAL at 20:46

## 2018-01-01 RX ADMIN — FOLIC ACID: 5 INJECTION, SOLUTION INTRAMUSCULAR; INTRAVENOUS; SUBCUTANEOUS at 11:03

## 2018-01-01 RX ADMIN — OXYCODONE HYDROCHLORIDE AND ACETAMINOPHEN 500 MG: 500 TABLET ORAL at 08:21

## 2018-01-01 RX ADMIN — OXYCODONE HYDROCHLORIDE AND ACETAMINOPHEN 500 MG: 500 TABLET ORAL at 22:04

## 2018-01-01 RX ADMIN — RIFAXIMIN 550 MG: 550 TABLET ORAL at 09:40

## 2018-01-01 RX ADMIN — POTASSIUM CHLORIDE 40 MEQ: 20 TABLET, EXTENDED RELEASE ORAL at 08:04

## 2018-01-01 RX ADMIN — PROPRANOLOL HYDROCHLORIDE 10 MG: 10 TABLET ORAL at 22:16

## 2018-01-01 RX ADMIN — Medication 100 MG: at 13:12

## 2018-01-01 RX ADMIN — LACTULOSE 30 G: 20 SOLUTION ORAL at 09:23

## 2018-01-01 RX ADMIN — Medication 30 MG: at 15:35

## 2018-01-01 RX ADMIN — THERA TABS 1 TABLET: TAB at 09:34

## 2018-01-01 RX ADMIN — INSULIN LISPRO 8 UNITS: 100 INJECTION, SOLUTION INTRAVENOUS; SUBCUTANEOUS at 13:20

## 2018-01-01 RX ADMIN — POTASSIUM CHLORIDE: 2 INJECTION, SOLUTION, CONCENTRATE INTRAVENOUS at 11:28

## 2018-01-01 RX ADMIN — Medication 10 ML: at 21:27

## 2018-01-01 RX ADMIN — LACTULOSE 20 G: 20 SOLUTION ORAL at 10:58

## 2018-01-01 RX ADMIN — POTASSIUM CHLORIDE 10 MEQ: 7.46 INJECTION, SOLUTION INTRAVENOUS at 18:34

## 2018-01-01 RX ADMIN — LACTULOSE 30 G: 20 SOLUTION ORAL at 15:34

## 2018-01-01 RX ADMIN — FOLIC ACID 1 MG: 1 TABLET ORAL at 10:49

## 2018-01-01 RX ADMIN — RIFAXIMIN 550 MG: 550 TABLET ORAL at 23:17

## 2018-01-01 RX ADMIN — LIDOCAINE HYDROCHLORIDE 5 ML: 20 INJECTION, SOLUTION INFILTRATION; PERINEURAL at 17:16

## 2018-01-01 RX ADMIN — SPIRONOLACTONE 50 MG: 25 TABLET ORAL at 08:21

## 2018-01-01 RX ADMIN — PROPRANOLOL HYDROCHLORIDE 10 MG: 10 TABLET ORAL at 09:34

## 2018-01-01 RX ADMIN — THERA TABS 1 TABLET: TAB at 11:12

## 2018-01-01 RX ADMIN — RIFAXIMIN 550 MG: 550 TABLET ORAL at 20:57

## 2018-01-01 RX ADMIN — LACTULOSE 20 G: 20 SOLUTION ORAL at 22:16

## 2018-01-01 RX ADMIN — LACTULOSE 30 G: 20 SOLUTION ORAL at 13:39

## 2018-01-01 RX ADMIN — ACETAMINOPHEN 325 MG: 325 TABLET ORAL at 15:18

## 2018-01-01 RX ADMIN — Medication 400 MG: at 17:21

## 2018-01-01 RX ADMIN — POTASSIUM CHLORIDE 10 MEQ: 7.46 INJECTION, SOLUTION INTRAVENOUS at 04:19

## 2018-01-01 RX ADMIN — RIFAXIMIN 550 MG: 550 TABLET ORAL at 20:41

## 2018-01-01 RX ADMIN — INSULIN LISPRO 2 UNITS: 100 INJECTION, SOLUTION INTRAVENOUS; SUBCUTANEOUS at 16:48

## 2018-01-01 RX ADMIN — POTASSIUM CHLORIDE 60 MEQ: 20 SOLUTION ORAL at 09:41

## 2018-01-01 RX ADMIN — LACTULOSE 20 G: 20 SOLUTION ORAL at 20:46

## 2018-01-01 RX ADMIN — FOLIC ACID 1 MG: 1 TABLET ORAL at 09:41

## 2018-01-01 RX ADMIN — LACTULOSE 20 G: 20 SOLUTION ORAL at 10:48

## 2018-01-01 RX ADMIN — MULTIPLE VITAMINS W/ MINERALS TAB 1 TABLET: TAB at 10:45

## 2018-01-01 RX ADMIN — RIFAXIMIN 550 MG: 550 TABLET ORAL at 22:16

## 2018-01-01 RX ADMIN — LEVETIRACETAM 500 MG: 500 TABLET, FILM COATED ORAL at 10:45

## 2018-01-01 RX ADMIN — Medication 20 MG: at 09:33

## 2018-01-01 RX ADMIN — LEVETIRACETAM 500 MG: 500 TABLET, FILM COATED ORAL at 08:39

## 2018-01-01 RX ADMIN — Medication 100 MG: at 18:28

## 2018-01-01 RX ADMIN — SPIRONOLACTONE 50 MG: 25 TABLET ORAL at 14:36

## 2018-01-01 RX ADMIN — LACTULOSE 30 G: 20 SOLUTION ORAL at 23:21

## 2018-01-01 RX ADMIN — LORAZEPAM 1 MG: 1 TABLET ORAL at 03:49

## 2018-01-01 RX ADMIN — POTASSIUM BICARBONATE 25 MEQ: 25 TABLET, EFFERVESCENT ORAL at 22:41

## 2018-01-01 RX ADMIN — INSULIN HUMAN 10 UNITS: 100 INJECTION, SOLUTION PARENTERAL at 03:09

## 2018-01-01 RX ADMIN — Medication 30 MG: at 10:48

## 2018-01-01 RX ADMIN — OXYCODONE HYDROCHLORIDE AND ACETAMINOPHEN 500 MG: 500 TABLET ORAL at 14:10

## 2018-01-01 RX ADMIN — Medication 100 MG: at 10:47

## 2018-01-01 RX ADMIN — MAGNESIUM SULFATE 1 G: 1 INJECTION INTRAVENOUS at 14:41

## 2018-01-01 RX ADMIN — LACTULOSE 20 G: 20 SOLUTION ORAL at 14:05

## 2018-01-01 RX ADMIN — LEVETIRACETAM 500 MG: 500 TABLET, FILM COATED ORAL at 10:59

## 2018-01-01 RX ADMIN — PIPERACILLIN SODIUM,TAZOBACTAM SODIUM 3.38 G: 3; .375 INJECTION, POWDER, FOR SOLUTION INTRAVENOUS at 02:00

## 2018-01-01 RX ADMIN — CALCIUM GLUCONATE 1 G: 98 INJECTION, SOLUTION INTRAVENOUS at 02:24

## 2018-01-01 RX ADMIN — POTASSIUM PHOSPHATE, MONOBASIC AND POTASSIUM PHOSPHATE, DIBASIC 30 MMOL: 224; 236 INJECTION, SOLUTION INTRAVENOUS at 16:04

## 2018-01-01 RX ADMIN — RIFAXIMIN 550 MG: 550 TABLET ORAL at 09:34

## 2018-01-01 RX ADMIN — RIFAXIMIN 550 MG: 550 TABLET ORAL at 22:31

## 2018-01-01 RX ADMIN — LEVETIRACETAM 500 MG: 500 TABLET, FILM COATED ORAL at 09:21

## 2018-01-01 RX ADMIN — Medication 800 MG: at 08:23

## 2018-01-01 RX ADMIN — FOLIC ACID: 5 INJECTION, SOLUTION INTRAMUSCULAR; INTRAVENOUS; SUBCUTANEOUS at 21:30

## 2018-01-01 RX ADMIN — FOLIC ACID 1 MG: 1 TABLET ORAL at 17:11

## 2018-01-01 RX ADMIN — MULTIPLE VITAMINS W/ MINERALS TAB 1 TABLET: TAB at 08:21

## 2018-01-01 RX ADMIN — Medication 100 MG: at 10:44

## 2018-01-01 ASSESSMENT — ENCOUNTER SYMPTOMS
ABDOMINAL PAIN: 0
BACK PAIN: 0
BACK PAIN: 0
RESPIRATORY NEGATIVE: 1
APNEA: 0
VOMITING: 0
COUGH: 0
NAUSEA: 0
NAUSEA: 0
DIARRHEA: 0
ANAL BLEEDING: 0
SORE THROAT: 0
COUGH: 0
CHEST TIGHTNESS: 0
EYE PAIN: 0
STRIDOR: 0
ABDOMINAL PAIN: 0
RESPIRATORY NEGATIVE: 1
BLOOD IN STOOL: 0
BACK PAIN: 0
CHEST TIGHTNESS: 0
COUGH: 0
PHOTOPHOBIA: 0
WHEEZING: 0
STRIDOR: 0
CHEST TIGHTNESS: 0
WHEEZING: 0
BLOOD IN STOOL: 0
SHORTNESS OF BREATH: 0
CHEST TIGHTNESS: 0
DIARRHEA: 0
STRIDOR: 0
ANAL BLEEDING: 0
COUGH: 0
WHEEZING: 0
SHORTNESS OF BREATH: 0
SHORTNESS OF BREATH: 0
CHOKING: 0
TROUBLE SWALLOWING: 0
NAUSEA: 0
EYES NEGATIVE: 1
SHORTNESS OF BREATH: 1
SHORTNESS OF BREATH: 0
CHEST TIGHTNESS: 0
RESPIRATORY NEGATIVE: 1
VISUAL CHANGE: 0
EYE DISCHARGE: 0
SHORTNESS OF BREATH: 0
EYES NEGATIVE: 1
WHEEZING: 0
NAUSEA: 0
ABDOMINAL PAIN: 0
VOMITING: 0
COUGH: 0
NAUSEA: 0
CHEST TIGHTNESS: 0
ABDOMINAL DISTENTION: 0
VOICE CHANGE: 0
RESPIRATORY NEGATIVE: 1
ABDOMINAL DISTENTION: 0
GASTROINTESTINAL NEGATIVE: 1
WHEEZING: 0
CHEST TIGHTNESS: 0
ABDOMINAL DISTENTION: 0
NAUSEA: 0
GASTROINTESTINAL NEGATIVE: 1
PHOTOPHOBIA: 0
COUGH: 0
COUGH: 0
PHOTOPHOBIA: 0
GASTROINTESTINAL NEGATIVE: 1
WHEEZING: 0
EYES NEGATIVE: 1
COUGH: 0
SHORTNESS OF BREATH: 0
SORE THROAT: 0
BLOOD IN STOOL: 0
VOMITING: 0
COLOR CHANGE: 0
BLOOD IN STOOL: 0
ABDOMINAL PAIN: 0
EYES NEGATIVE: 1
SHORTNESS OF BREATH: 0
GASTROINTESTINAL NEGATIVE: 1
VOMITING: 1
CHEST TIGHTNESS: 0
NAUSEA: 0
BOWEL INCONTINENCE: 0
EYE DISCHARGE: 0
SINUS PRESSURE: 0
BACK PAIN: 0
ABDOMINAL PAIN: 0
WHEEZING: 0
COLOR CHANGE: 0
FACIAL SWELLING: 0
RHINORRHEA: 0
TROUBLE SWALLOWING: 0
COUGH: 0
PHOTOPHOBIA: 0
BLOOD IN STOOL: 0
NAUSEA: 1
SORE THROAT: 0
VOMITING: 0
BLOOD IN STOOL: 0
EYE DISCHARGE: 0
SHORTNESS OF BREATH: 0
TROUBLE SWALLOWING: 0
VISUAL CHANGE: 0
CONSTIPATION: 0
ABDOMINAL DISTENTION: 0
EYE REDNESS: 0
COUGH: 0
DIARRHEA: 0
SHORTNESS OF BREATH: 0
STRIDOR: 0
VOMITING: 0
VOMITING: 1

## 2018-01-01 ASSESSMENT — PAIN SCALES - GENERAL
PAINLEVEL_OUTOF10: 8
PAINLEVEL_OUTOF10: 0
PAINLEVEL_OUTOF10: 5
PAINLEVEL_OUTOF10: 0
PAINLEVEL_OUTOF10: 5
PAINLEVEL_OUTOF10: 0
PAINLEVEL_OUTOF10: 9
PAINLEVEL_OUTOF10: 7
PAINLEVEL_OUTOF10: 0
PAINLEVEL_OUTOF10: 9
PAINLEVEL_OUTOF10: 0
PAINLEVEL_OUTOF10: 7
PAINLEVEL_OUTOF10: 0
PAINLEVEL_OUTOF10: 9
PAINLEVEL_OUTOF10: 0
PAINLEVEL_OUTOF10: 4
PAINLEVEL_OUTOF10: 9
PAINLEVEL_OUTOF10: 4
PAINLEVEL_OUTOF10: 0
PAINLEVEL_OUTOF10: 9
PAINLEVEL_OUTOF10: 0
PAINLEVEL_OUTOF10: 10
PAINLEVEL_OUTOF10: 0

## 2018-01-01 ASSESSMENT — PAIN DESCRIPTION - FREQUENCY
FREQUENCY: CONTINUOUS
FREQUENCY: CONTINUOUS
FREQUENCY: INTERMITTENT

## 2018-01-01 ASSESSMENT — PAIN DESCRIPTION - PROGRESSION
CLINICAL_PROGRESSION: GRADUALLY WORSENING

## 2018-01-01 ASSESSMENT — PAIN DESCRIPTION - DESCRIPTORS
DESCRIPTORS: BURNING
DESCRIPTORS: CRAMPING
DESCRIPTORS: SHARP
DESCRIPTORS: CRAMPING
DESCRIPTORS: SHARP
DESCRIPTORS: SHARP
DESCRIPTORS: CRAMPING
DESCRIPTORS: CRAMPING

## 2018-01-01 ASSESSMENT — PAIN DESCRIPTION - LOCATION
LOCATION: ABDOMEN
LOCATION: ABDOMEN;BACK
LOCATION: ABDOMEN
LOCATION: LEG

## 2018-01-01 ASSESSMENT — PAIN DESCRIPTION - PAIN TYPE
TYPE: ACUTE PAIN

## 2018-01-01 ASSESSMENT — PAIN DESCRIPTION - ORIENTATION
ORIENTATION: LOWER

## 2018-01-01 ASSESSMENT — PATIENT HEALTH QUESTIONNAIRE - PHQ9
SUM OF ALL RESPONSES TO PHQ9 QUESTIONS 1 & 2: 2
SUM OF ALL RESPONSES TO PHQ QUESTIONS 1-9: 2
SUM OF ALL RESPONSES TO PHQ QUESTIONS 1-9: 2
1. LITTLE INTEREST OR PLEASURE IN DOING THINGS: 1
2. FEELING DOWN, DEPRESSED OR HOPELESS: 1

## 2018-03-17 NOTE — ED NOTES
Copy of EKG faxed to [facility] as requested. Client viewing TV with no complaints voiced.      Marla Bhardwaj RN  03/17/18 6630
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
## 2018-03-17 NOTE — ED NOTES
Lab draw  Completed and well tolerated at approximately 0800. Given diet gingerale per request. Nonda Bence served at this time. Mate[facility]  03/17/18 0004

## 2018-03-17 NOTE — ED NOTES
Urine obtained and sent to lab for drug screen. Lunch served.      Anthony Garvey LPN  94/58/82 [id number]

## 2018-03-18 NOTE — ED NOTES
Per Dr. Janet Fletcher, patient can be discharged with referrals to chemical dependency treatment. Patient states her Mom will pick her up. Patient provided with list of chemical dependency facilities as well as number for [facility] to assist with referrals.       Jarad Grijalva RN  03/18/18 0496

## 2018-04-12 PROBLEM — R77.8 ELEVATED TROPONIN: Status: RESOLVED | Noted: 2017-01-01 | Resolved: 2018-01-01

## 2018-06-03 PROBLEM — K70.40 ALCOHOLIC LIVER FAILURE (HCC): Status: ACTIVE | Noted: 2018-01-01

## 2018-09-12 NOTE — PROGRESS NOTES
Subjective  Sridevi Bobby, 50 y.o. female presents today with:  Chief Complaint   Patient presents with    Other     skin discoloration     VERY NONCOMLPLIANT With recommnedations  Here only w friend for friend's visit she is CLEARLY losing wt AND jaundice so I insisted on appt      Past Medical History:   Diagnosis Date    ANTONIO (acute kidney injury) ([address] 8/2/2017    Alcohol abuse     Alcoholic cirrhosis of liver ([address] 5/9/2017    Basal cell carcinoma of leg 10/3/2016    left leg    Blood clot in vein 10/03/2017    Being treated presently with Lovanox    Cirrhosis ([address]     Diabetes mellitus ([address]     Hx of blood clots     Seizure ([address]     Seizures ([address]     d/t alcohol    Type 2 diabetes mellitus without complication ([address]     Withdrawal seizures ([address]      Past Surgical History:   Procedure Laterality Date    DE ESOPHAGOGASTRODUODENOSCOPY TRANSORAL DIAGNOSTIC N/A 10/3/2017    EGD ESOPHAGOGASTRODUODENOSCOPY [address] Axel Messer MD [id number]     leg   [address]  7/10/2017          Social History     Social History    Marital status: Single     Spouse name: N/A    Number of children: N/A    Years of education: N/A     Occupational History    Not on file. Social History Main Topics    Smoking status: Never Smoker    Smokeless tobacco: Never Used    Alcohol use 1.2 - 6.0 oz/week     2 - 10 Cans of beer per week      Comment: daily     Drug use: No    Sexual activity: Not Currently     Other Topics Concern    Not on file     Social History Narrative    ** Merged History Encounter **          No family history on file.   Allergies   Allergen Reactions    Seasonal      Current Outpatient Prescriptions   Medication Sig Dispense Refill    furosemide (LASIX) 20 MG tablet Take 1 tablet by mouth every other day 30 tablet 3    lactulose (CHRONULAC) 10 GM/15ML solution Take 30 mLs by mouth 3 times daily 1892 mL 3 hematuria. Last labs  No visits with results within 3 Month(s) from this visit. Latest known visit with results is:   Admission on 06/03/2018, Discharged on 06/07/2018   No results displayed because visit has over 200 results. Health Maintenance   Topic Date Due    Diabetic retinal exam  05/25/1980    HIV screen  05/25/1985    Pneumococcal med risk (1 of 1 - PPSV23) 05/25/1989    Cervical cancer screen  05/25/1991    Diabetic foot exam  09/07/2018    Diabetic microalbuminuria test  09/07/2018    Lipid screen  09/07/2018    DTaP/Tdap/Td vaccine (1 - Tdap) 10/02/2018 (Originally 5/25/1989)    Flu vaccine (1) 10/02/2018 (Originally 9/1/2018)    A1C test (Diabetic or Prediabetic)  06/04/2019    Potassium monitoring  06/07/2019    Creatinine monitoring  06/07/2019       No results found for this visit on 09/12/18. Objective      Wt Readings from Last 3 Encounters:   09/12/18 88 lb (39.9 kg)   06/12/18 95 lb (43.1 kg)   06/04/18 93 lb 14.4 oz (42.6 kg)     Temp Readings from Last 3 Encounters:   09/12/18 97.9 °F (36.6 °C) (Oral)   06/12/18 97.6 °F (36.4 °C) (Oral)   06/07/18 98.8 °F (37.1 °C)     BP Readings from Last 3 Encounters:   09/12/18 (!) 104/56   06/13/18 103/68   06/07/18 136/70     Pulse Readings from Last 3 Encounters:   09/12/18 76   06/13/18 70   06/07/18 78       PHYSICAL EXAMINATION:        GENERAL:    Jaundiced  Weak  Thin and undernourished appearance    HEENT:  Normocephalic, atraumatic. Throat:  Pharynx is clear, no erythema/ edema or exudates    Eyes:  Extraocular eye motions intact and pain free. Pupils reactive/equal    Sclerae and conjunctivae icteric    NECK: No masses or adenopathy palpable. No carotid bruits heard. No asymmetry visible. No thyromegaly. RESPIRATORY:   Clear/ Equal breath sounds /No acute respiratory distress. No wheezes,rales, or rhonchi.   No percussive abnormalities    HEART: Regular rhythm without murmur, rub or

## 2018-09-26 NOTE — PATIENT INSTRUCTIONS
Patient Education        Learning About Alcohol Withdrawal  What is alcohol withdrawal?    If you drink alcohol regularly (more than a few drinks on most days) and then suddenly stop or cut down, you may go through some physical and emotional problems while the alcohol clears out of your system. This is called withdrawal. Clearing the alcohol from your body is called detoxification, or detox. What are the symptoms? Symptoms of alcohol withdrawal may start as soon as 4 to 12 hours after you stop drinking. Or they may not start until several days after the last drink. Mild symptoms include:  · Nausea. · Sweating. · Shakiness. · Diarrhea. · Intense worry. · Disturbed sleep. · Headache. More severe symptoms include:  · Vomiting or belly pain. · Being confused, upset, and irritable. · Changed sensations. You might feel things on your body that aren't really there. Or you may see or hear things that aren't there. · Trembling. · Being short of breath or having pain in your chest.  · Having seizures. Symptoms may peak within a few days. Mild symptoms can last for a few weeks. If your symptoms are severe, you'll need to see a doctor. What is the treatment for alcohol withdrawal?  Most people may be able to cut down or stop drinking with only mild withdrawal. They can stay safe by simply resting, drinking lots of fluids, and eating healthy foods. But people who drink large amounts of alcohol or are at risk for severe withdrawal symptoms should not try to detox at home unless they work closely with a doctor to manage it. A person can die of severe alcohol withdrawal.  Before you stop drinking, talk to your doctor about how you plan to stop. Be completely honest about how much you've been drinking. Your doctor will figure out if you need to detox in a medical center. You may get medicine to treat the symptoms whether you are at home or in a medical center. Medicine that treats seizures can also help.  Your doctor will explain what types of medicine might help you. You may start with a high dose and then take smaller amounts over several days. There's also medicine that can help you avoid alcohol while you recover. How can you manage your withdrawal and recovery? Here are a few tips that can help you to not start drinking again. · Make sure there's no alcohol in the house. This includes drinks as well as liquid medicines, rubbing alcohol, and certain flavorings like vanilla extract. · Try not to hang out with people you used to drink with. · Don't go it alone. Spend time with people who support the changes you are making in your life. This includes asking for advice and help from people who have stopped drinking. You might also try mutual support groups such as Alcoholics Anonymous. · Drink lots of fluids. · Eat snacks such as fruit, cheese and crackers, and pretzels. High-carbohydrate foods may help reduce the craving for alcohol. What happens after withdrawal?  It can be hard to stop drinking. But after you clear the alcohol from your system, you can start the next, healthier part of your life. After detox, you will focus on staying alcohol-free. You can learn skills that you can use to stay abstinent (or sober) as you recover. Finding new ways to deal with life's challenges, without drinking, takes time and effort. Recovery is a long-term process. It's not something you can achieve in a few weeks. Most people get some type of therapy, such as group counseling. You also may need medicine to help you stay sober. Treatment doesn't focus on alcohol use alone. It may address other parts of your life, like your relationships, work, medical problems, and home life. Treatment, support, patience, and commitment will help you make the changes you need to live a kendrick life without alcohol.  You may find, over time, that the process gets easier, life becomes more joyous, and your connections to others becomes more rewarding. Where can you find help? Behavioral Health Treatment Services . This service from the [facility] and [facility]  can help you find local alcohol treatment services. Search online at [employer]. [employer].gov or call 3-866-439-IIJQ (1), or [employer] 7-702.169.6567. Where can you learn more? Go to https://Handpressions.[employer]. org and sign in to your [employer] account. Enter I240 in the [facility] box to learn more about \"Learning About Alcohol Withdrawal.\"     If you do not have an account, please click on the \"Sign Up Now\" link. Current as of: October 9, 2017  Content Version: 11.7  © 8864-6778 [employer]. Care instructions adapted under license by [employer] ([facility]). If you have questions about a medical condition or this instruction, always ask your healthcare professional. [address] any warranty or liability for your use of this information. Patient Education            Current as of: October 9, 2017  Content Version: 11.7  © 0058-1540 [employer]. Care instructions adapted under license by [employer] ([facility]). If you have questions about a medical condition or this instruction, always ask your healthcare professional. [address] any warranty or liability for your use of this information. Patient Education        Cirrhosis: Care Instructions  Your Care Instructions    Cirrhosis occurs when healthy tissue in your liver gets scarred. This keeps the liver from working well. It usually happens after a liver has been inflamed for years. Cirrhosis is most often caused by alcohol abuse or hepatitis infection. But there are other causes too. These include medicines and too much fat in the liver. Conditions passed down in families and other disorders can also cause it. In some cases, no cause can be found. Treatment can't completely fix liver damage.  But you may be

## 2018-09-29 PROBLEM — G93.40 ACUTE ENCEPHALOPATHY: Status: ACTIVE | Noted: 2018-01-01

## 2018-09-29 NOTE — ED TRIAGE NOTES
Pt to ED via [address] with c/o change in mental status. Pt A&Ox2. Pt weak and slow to respond. Pt very jaundice. Pt states still drinking but not over the last few days.

## 2018-09-29 NOTE — CONSULTS
daily     Drug use: No    Sexual activity: Not Currently     Other Topics Concern    Not on file     Social History Narrative    ** Merged History Encounter **           [] Unable to obtain due to ventilated and/ or neurologic status      Home Medications:      Prescriptions Prior to Admission: lactulose (CHRONULAC) 10 GM/15ML solution, Take 30 mLs by mouth 3 times daily  spironolactone (ALDACTONE) 50 MG tablet, Take 1 tablet by mouth daily  insulin lispro (HUMALOG KWIKPEN) 100 UNIT/ML pen, Inject 4 Units into the skin 4 times daily as needed for High Blood Sugar  levETIRAcetam (KEPPRA) 500 MG tablet, Take 1 tablet by mouth 2 times daily  Insulin Pen Needle (NOVOFINE) 32G X 6 MM MISC, Qid  potassium chloride (KLOR-CON M) 10 MEQ extended release tablet, Take 1 tablet by mouth daily (with breakfast)  pantoprazole (PROTONIX) 40 MG tablet, Take 1 tablet by mouth every morning (before breakfast)  Alcohol Swabs (ALCOHOL WIPES) 70 % PADS, 1 Container by Does not apply route 4 times daily (before meals and nightly)  Lancets MISC, Before meals and nightly  Insulin Syringe-Needle U-100 (AIMSCO INS SYR .3CC/29GX0.5\") 29G X 1/2\" 0.3 ML MISC, 1 each by Does not apply route 4 times daily (before meals and nightly)  Glucose Blood (BLOOD GLUCOSE TEST STRIPS) STRP, Before meals and nightly  glucose monitoring kit (FREESTYLE) monitoring kit, 1 kit by Does not apply route 4 times daily (before meals and nightly)  vitamin C (ASCORBIC ACID) 500 MG tablet, Take 1 tablet by mouth 2 times daily  folic acid (FOLVITE) 1 MG tablet, Take 1 tablet by mouth daily  ONETOUCH VERIO strip, TEST TID  ONETOUCH DELICA LANCETS FINE MISC, TEST TID UTD  BD PEN NEEDLE KAIN U/F 32G X 4 MM MISC, TEST TID  Multiple Vitamins-Minerals (THERAGRAN-M) TABS, Take 1 tablet by mouth every morning   thiamine 100 MG tablet, Take 100 mg by mouth daily    Current Hospital Medications:     Scheduled Meds:   potassium bicarbonate  25 mEq Oral Once    sodium chloride or tenderness. Neurological: She exhibits altered mental status and a cognitive deficit. Skin: Skin is warm. No cyanosis. There is jaundice. Nails show no clubbing. Results/ Medications reviewed 9/29/2018, 1:51 PM     Laboratory, Microbiology, Pathology, Radiology, Cardiology, Medications and Transcriptions reviewed  Scheduled Meds:   potassium bicarbonate  25 mEq Oral Once    sodium chloride flush  10 mL Intravenous 2 times per day    lactulose  30 g Oral Q30 Min    insulin lispro  0-12 Units Subcutaneous TID WC    insulin lispro  0-6 Units Subcutaneous Nightly    thiamine  100 mg Oral Daily    folic acid  1 mg Oral Daily    multivitamin  1 tablet Oral Daily    cefTRIAXone (ROCEPHIN) IV  1 g Intravenous Q24H    magnesium sulfate  4 g Intravenous Once     Continuous Infusions:   IV infusion builder      sodium chloride 125 mL/hr at 09/29/18 1314    dextrose         Recent Labs      09/29/18   1023  09/29/18   1121   WBC   --   8.5   HGB  9.8*  10.3*   HCT   --   29.4*   MCV   --   110.9*   PLT   --   61*     Recent Labs      09/29/18   1000  09/29/18   1023  09/29/18   1121   NA   --    --   133   K   --    --   2.4*   CL   --    --   94*   CO2   --    --   22   BUN   --    --   6   CREATININE  0.6  0.5*  0.46*     Recent Labs      09/29/18   1121   AST  115*   ALT  19   BILITOT  30.3*   ALKPHOS  196*     No results for input(s): LIPASE, AMYLASE in the last 72 hours. Recent Labs      09/29/18   0945  09/29/18   1121   PROT   --   6.3*   INR  2.1   --      Xr Chest Standard (2 Vw)    Result Date: 9/29/2018  EXAMINATION: Chest x-ray, 2 view HISTORY: Confusion. Pneumonia. TECHNIQUE: AP and lateral views of the chest COMPARISON: Chest radiograph from November 12, 2017 FINDINGS: Cardiomediastinal silhouette is within normal limits. No pneumothorax, pleural effusion, or focal consolidation. Osseous structures of the thorax are within normal limits. No acute intrathoracic process.       Ct

## 2018-09-29 NOTE — H&P
Donita Ro PA-C   glucose monitoring kit (FREESTYLE) monitoring kit 1 kit by Does not apply route 4 times daily (before meals and nightly) 11/17/17   Hussain Santos PA-C   vitamin C (ASCORBIC ACID) 500 MG tablet Take 1 tablet by mouth 2 times daily 11/13/17   Иван Rodrigues MD   folic acid (FOLVITE) 1 MG tablet Take 1 tablet by mouth daily 11/13/17   Иван Rodrigues MD   [facility] VERIO strip TEST TID 10/9/17   Иван Rodrigues MD   [facility] DELICA LANCETS FINE MISC TEST TID UTD 10/9/17   Иван Rodrigues MD   BD PEN NEEDLE KAIN U/F 32G X 4 MM MISC TEST TID 9/2/17   Historical Provider, MD   Multiple Vitamins-Minerals (THERAGRAN-M) TABS Take 1 tablet by mouth every morning     Historical Provider, MD   thiamine 100 MG tablet Take 100 mg by mouth daily    Historical Provider, MD       Allergies:  Seasonal    Social History:      The patient currently lives with dad    TOBACCO:   reports that she has never smoked. She has never used smokeless tobacco.  ETOH:   reports that she drinks about 1.2 - 6.0 oz of alcohol per week . Family History:       Reviewed in detail and negative for DM, CAD, Cancer, CVA. History reviewed. No pertinent family history. REVIEW OF SYSTEMS:   Pertinent positives as noted in the HPI. All other systems reviewed and negative. PHYSICAL EXAM:    /66   Pulse 87   Temp 98.4 °F (36.9 °C) (Oral)   Resp 18   Ht 5' 8\" (1.727 m)   Wt 85 lb (38.6 kg)   LMP  (LMP Unknown)   SpO2 99%   BMI 12.92 kg/m²     General appearance:  Cachetic, ill appearing   HEENT:  Normal cephalic, atraumatic without obvious deformity. Pupils equal, round, and reactive to light. Extra ocular muscles intact. Yellowed sclera   Neck: Supple, with full range of motion. No jugular venous distention. Trachea midline. Respiratory:  Normal respiratory effort.  Clear to auscultation, bilaterally   Cardiovascular:  Regular rate and rhythm with normal S1/S2   Abdomen: hepatomegaly with + fluid shift, hypoactive bowel sound, non tender   Musculoskeletal:  No clubbing, cyanosis or edema bilaterally. Full range of motion without deformity. Skin: extreme jaundice from head descending to abdomen   Neurologic:  Neurovascularly intact without any focal sensory/motor deficits. Cranial nerves: II-XII intact, grossly non-focal. Moves all extremities spontaneously  Psychiatric:  Alert and oriented x1-2 (self and place) delayed response, agitated  Capillary Refill: Brisk,< 3 seconds   Peripheral Pulses: +2 palpable, equal bilaterally       Labs:     Recent Labs      09/29/18   1023  09/29/18   1121   WBC   --   8.5   HGB  9.8*  10.3*   HCT   --   29.4*   PLT   --   61*     Recent Labs      09/29/18   1000  09/29/18   1023  09/29/18   1121   NA   --    --   133   K   --    --   2.4*   CL   --    --   94*   CO2   --    --   22   BUN   --    --   6   CREATININE  0.6  0.5*  0.46*   CALCIUM   --    --   8.8     Recent Labs      09/29/18   1121   AST  115*   ALT  19   BILITOT  30.3*   ALKPHOS  196*     Recent Labs      09/29/18   0945   INR  2.1     Recent Labs      09/29/18   1121   CKTOTAL  25       Urinalysis:      Lab Results   Component Value Date    NITRU Negative 06/03/2018    WBCUA 10-20 03/16/2018    BACTERIA Moderate 03/16/2018    RBCUA 0-2 03/16/2018    BLOODU Negative 06/03/2018    SPECGRAV 1.005 06/03/2018    GLUCOSEU Negative 06/03/2018       Radiology:     CXR: I have reviewed the CXR with the following interpretation: pending  EKG:  I have reviewed the EKG with the following interpretation: pending    XR CHEST STANDARD (2 VW)   Final Result       No acute intrathoracic process. CT HEAD WO CONTRAST   Final Result   Impression:      Negative CT brain         All CT scans at this facility use dose modulation, iterative reconstruction, and/or weight based dosing when appropriate to reduce radiation dose to as low as reasonably achievable.           ASSESSMENT:    Active Hospital Problems    Diagnosis Date Noted    Acute encephalopathy [G93.40] 09/29/2018       PLAN:    1. Acute hepatic encephalopathy 2/2 alcoholic cirrhosis- admit to ICU, GI consulted, lactulose every 30 minutes until first bowel movement, patient refused NG tube at this time may need to give lactulose as enema, monitor ammonia and LFTs, neuro checks, telemetry  2. Hypokalemia with prolonged QT- cardiology consulted, will replace with both oral and IV potassium, repeat labs as needed, telemetry, EKGs  3. Alcohol abuse with withdrawal seizures- seizure and fall precautions, restart antiseizure medications monitor for s/s of withdrawal, medicate as needed per [facility] protocol, telemetry, daily vitamins, folate/B-12 levels pending  4. DM2- ac/hs and as needed poct glucose with ssi coverage  5. Coagulopathy 2/2 liver cirrhosis- hold anticoagulants and use SCDs, monitor INR and labs daily, monitor for s/s of bleeding    DVT Prophylaxis: SCDs  Diet:  clear liquids   Code Status: full    PT/OT Eval and Treat    Dispo - inpatient-ICU       VINEET Gustafson - CNP    Thank you Brendon Briscoe MD for the opportunity to be involved in this patient's care. If you have any questions or concerns please feel free to contact me.

## 2018-09-29 NOTE — ED PROVIDER NOTES
[facility] ED  eMERGENCY dEPARTMENT eNCOUnter      Pt Name: Xena Mcmahon  MRN: [id number]  [place] 1970  Date of evaluation: 9/29/2018  Provider: Maurilio Villa DO    CHIEF COMPLAINT       Chief Complaint   Patient presents with    Altered Mental Status     Family called [facility] with c/o pt change in mental status. Per family pt stated the year was 26. Pt has history of alcholic cirrhosis. HISTORY OF PRESENT ILLNESS   (Location/Symptom, Timing/Onset, Context/Setting, Quality, Duration, Modifying Factors, Severity)  Note limiting factors. Xena Mcmahon is a 50 y.o. female who presents to the emergency department with complaint of increase in confusion x 1 week. Patient AOx 2 (hospital/city, name). Patient denies fever or chills but is an unreliable historian. Patient is taking spirnolactone per family. HPI    Nursing Notes were reviewed. REVIEW OF SYSTEMS    (2-9 systems for level 4, 10 or more for level 5)     Review of Systems   Constitutional: Positive for activity change and appetite change. Negative for chills, fever and unexpected weight change. HENT: Negative for drooling, ear pain, nosebleeds, sinus pressure and trouble swallowing. Respiratory: Negative for cough, chest tightness and shortness of breath. Cardiovascular: Negative for chest pain and leg swelling. Gastrointestinal: Negative for abdominal pain, diarrhea and vomiting. Endocrine: Negative for polydipsia and polyphagia. Genitourinary: Negative for dysuria, flank pain and frequency. Musculoskeletal: Negative for back pain and myalgias. Skin: Negative for pallor and rash. Neurological: Negative for syncope, weakness and headaches. Hematological: Does not bruise/bleed easily. Psychiatric/Behavioral: Positive for confusion. All other systems reviewed and are negative. Except as noted above the remainder of the review of systems was reviewed and negative.        PAST MEDICAL

## 2018-09-30 NOTE — CARE COORDINATION
ATTEMPTED TO COMPLETE CARE ROUNDS FOR PATIENT, SHE IS CURRENTLY SLEEPING DUE TO MEDICATED WITH ATIVAN. PATIENT FROM HOME , NON COMPLIANT WITH MEDS, CURRENT WITH ETOH CONSUMPTION. COUSIN AT BEDSIDE, NO POA NOTED FOR PATIENT, COUSIN STATES NEXT OF KIN WOULD BE HER SON WHO IS TRANSIENT AND UNABLE TO LOCATE, SHE ALSO HAS A FATHER WHO IS \"DIEING\" OF CANCER WHO IS UNABLE TO HELP WITH DC PLANNING. LSW/C3 TO FOLLOW WHEN PATIENT IS MORE ALERT AND ORIENTED.

## 2018-09-30 NOTE — PROGRESS NOTES
Negative. Negative for cough, chest tightness, shortness of breath, wheezing and stridor. Cardiovascular: Negative. Negative for chest pain, palpitations and leg swelling. Gastrointestinal: Negative. Negative for blood in stool and nausea. Genitourinary: Negative. Musculoskeletal: Negative. Skin: Negative. Neurological: Positive for weakness. Negative for dizziness, syncope and light-headedness. Hematological: Negative. Psychiatric/Behavioral: Negative. Physical Examination:    BP (!) 141/52   Pulse 77   Temp 97.9 °F (36.6 °C) (Oral)   Resp 16   Ht 5' 3\" (1.6 m)   Wt 83 lb 12.4 oz (38 kg)   LMP  (LMP Unknown)   SpO2 100%   BMI 14.84 kg/m²    Physical Exam   Constitutional: She appears cachectic. No distress. She appears chronically ill and acutely ill. HENT:   Normal cephalic and Atraumatic   Eyes: Pupils are equal, round, and reactive to light. Neck: Normal range of motion and thyroid normal. Neck supple. No JVD present. No neck adenopathy. No thyromegaly present. Cardiovascular: Normal rate, regular rhythm, intact distal pulses and normal pulses. Murmur heard. Pulmonary/Chest: Effort normal and breath sounds normal. She has no wheezes. She has no rales. She exhibits no tenderness. Abdominal: Soft. Bowel sounds are normal. There is no tenderness. Musculoskeletal: Normal range of motion. She exhibits no edema or tenderness. Neurological: She is alert and oriented to person, place, and time. Skin: Skin is warm. No cyanosis. There is jaundice. Nails show no clubbing.        LABS:  CBC:   Lab Results   Component Value Date    WBC 10.2 09/30/2018    RBC 2.58 09/30/2018    HGB 10.0 09/30/2018    HCT 29.2 09/30/2018    .3 09/30/2018    MCH 38.7 09/30/2018    MCHC 34.2 09/30/2018    RDW 16.3 09/30/2018    PLT 63 09/30/2018    MPV 10.2 04/04/2015     CBC with Differential:    Lab Results   Component Value Date    WBC 10.2 09/30/2018    RBC 2.58 09/30/2018 HGB 10.0 09/30/2018    HCT 29.2 09/30/2018    PLT 63 09/30/2018    .3 09/30/2018    MCH 38.7 09/30/2018    MCHC 34.2 09/30/2018    RDW 16.3 09/30/2018    BANDSPCT 6 06/07/2018    LYMPHOPCT 14.8 09/29/2018    MONOPCT 11.4 09/29/2018    BASOPCT 0.4 09/29/2018    MONOSABS 1.0 09/29/2018    LYMPHSABS 1.3 09/29/2018    EOSABS 0.1 09/29/2018    BASOSABS 0.0 09/29/2018     CMP:    Lab Results   Component Value Date     09/30/2018    K 4.1 09/30/2018    K 3.2 06/04/2018     09/30/2018    CO2 16 09/30/2018    BUN 3 09/30/2018    CREATININE 0.47 09/30/2018    GFRAA >60.0 09/30/2018    LABGLOM >60.0 09/30/2018    GLUCOSE 173 09/30/2018    PROT 5.9 09/30/2018    LABALBU 3.0 09/30/2018    CALCIUM 8.2 09/30/2018    BILITOT 29.0 09/30/2018    ALKPHOS 181 09/30/2018    AST 93 09/30/2018    ALT 16 09/30/2018     BMP:    Lab Results   Component Value Date     09/30/2018    K 4.1 09/30/2018    K 3.2 06/04/2018     09/30/2018    CO2 16 09/30/2018    BUN 3 09/30/2018    LABALBU 3.0 09/30/2018    CREATININE 0.47 09/30/2018    CALCIUM 8.2 09/30/2018    GFRAA >60.0 09/30/2018    LABGLOM >60.0 09/30/2018    GLUCOSE 173 09/30/2018     Magnesium:    Lab Results   Component Value Date    MG 3.4 09/29/2018     Troponin:    Lab Results   Component Value Date    TROPONINI <0.010 09/29/2018        Active Hospital Problems    Diagnosis Date Noted    Acute encephalopathy [G93.40] 09/29/2018     Priority: Low        Assessment/Plan:  1. Hepatic Encephalopathy- dramatic improvement  2. Prolonged QTc- follow ECGs. stble now  3. Profound HypoK- aggressive replacement. Stable. Follow clsoe labs  4. Profound HypoMag- Aggressiv erpelcament. Stable now. Follow labs  5. ETOH- CIWA  6. Coagulopathy related to Cirrhosis  7. Follow Labs  8.  Review Echo       Electronically signed by Melissa Gomez MD on 9/30/2018 at 11:17 AM

## 2018-10-01 NOTE — PROGRESS NOTES
Physical Therapy Med Surg Initial Assessment  Facility/Department: [facility]  [facility]       NAME: Kameron Ying  : 1970 (50 y.o.)  MRN: [id number]  CODE STATUS: Full Code    Date of Service: 10/1/2018    Patient Diagnosis(es): Acute encephalopathy [G93.40]  Acute encephalopathy [G93.40]   Chief Complaint   Patient presents with    Altered Mental Status     Family called [facility] with c/o pt change in mental status. Per family pt stated the year was . Pt has history of alcholic cirrhosis.      Patient Active Problem List    Diagnosis Date Noted    Hypomagnesemia 2017     Priority: High    Seizure ([facility] 2017     Priority: High    Diabetic ketoacidosis without coma associated with type 1 diabetes mellitus ([address] 2017     Priority: High    Alcoholic cirrhosis ([address]      Priority: High    NSTEMI (non-ST elevated myocardial infarction) ([address] 2017     Priority: High    Acute encephalopathy 2018    Increased ammonia level     Alcoholism /alcohol abuse ([address]     Delirium due to multiple etiologies     Alcoholic liver failure ([address] 2018    Chronic diastolic heart failure ([address] 2017    NSTEMI (non-ST elevated myocardial infarction) ([address] 2017    Abscess 2017    DKA, type 1, not at goal [facility]) 2017    Acute deep vein thrombosis (DVT) of left lower extremity ([address] 2017    Edema of left lower extremity 2017    ANTONIO (acute kidney injury) ([address] 2017    Hyperglycemia due to type 2 diabetes mellitus ([address]     Alcoholic cirrhosis of liver ([address] 2017    Steatohepatitis, alcoholic     Severe sepsis without septic shock (CODE) ([facility] 2017    E. coli UTI (urinary tract infection) 2017    Cholelithiasis 2017    Jaundice, hepatocellular 2017    Acute blood loss anemia 2017    Coagulopathy ([facility] 2017    Lactic acid acidosis 2017

## 2018-10-01 NOTE — PROGRESS NOTES
for cough, chest tightness, shortness of breath, wheezing and stridor. Cardiovascular: Negative. Negative for chest pain, palpitations and leg swelling. Gastrointestinal: Negative. Negative for blood in stool and nausea. Genitourinary: Negative. Musculoskeletal: Negative. Skin: Negative. Neurological: Positive for weakness. Negative for dizziness, syncope and light-headedness. Hematological: Negative. Psychiatric/Behavioral: Negative. Physical Examination:    BP 96/76   Pulse 107   Temp 99.1 °F (37.3 °C) (Oral)   Resp 15   Ht 5' 3\" (1.6 m)   Wt 83 lb 12.4 oz (38 kg)   LMP  (LMP Unknown)   SpO2 100%   BMI 14.84 kg/m²    Physical Exam   Constitutional: She appears cachectic. No distress. She appears chronically ill and acutely ill. HENT:   Normal cephalic and Atraumatic   Eyes: Pupils are equal, round, and reactive to light. Neck: Normal range of motion and thyroid normal. Neck supple. No JVD present. No neck adenopathy. No thyromegaly present. Cardiovascular: Normal rate, regular rhythm, normal heart sounds, intact distal pulses and normal pulses. Pulmonary/Chest: Effort normal and breath sounds normal. She has no wheezes. She has no rales. She exhibits no tenderness. Abdominal: Soft. Bowel sounds are normal. There is no tenderness. Musculoskeletal: Normal range of motion. She exhibits no edema or tenderness. Neurological: She is alert and oriented to person, place, and time. Skin: Skin is warm. No cyanosis. There is jaundice. Nails show no clubbing.        LABS:  CBC:   Lab Results   Component Value Date    WBC 12.2 10/01/2018    RBC 2.62 10/01/2018    HGB 10.4 10/01/2018    HCT 30.2 10/01/2018    .3 10/01/2018    MCH 39.7 10/01/2018    MCHC 34.4 10/01/2018    RDW 17.3 10/01/2018    PLT 74 10/01/2018    MPV 10.2 04/04/2015     CBC with Differential:    Lab Results   Component Value Date    WBC 12.2 10/01/2018    RBC 2.62 10/01/2018    HGB 10.4 10/01/2018

## 2018-10-02 PROBLEM — E43 SEVERE MALNUTRITION (HCC): Status: ACTIVE | Noted: 2018-01-01

## 2018-10-02 NOTE — CARE COORDINATION
Spoke to Shakir. No medicaid pending number found in system. Per Shakir, they may want Pending medicaid number to accept patient and she will call Jm Madrigal to check. Patient under review. Dr. Ledy Burden aware patient requesting [facility].

## 2018-10-02 NOTE — PROGRESS NOTES
Nutrition Assessment    Type and Reason for Visit: Initial (BMI)    Nutrition Recommendations:     MODIFY DIET TO Cho Controlled ( Cho 4 Serv./Meal ),  With Diabetic - ONS BID. Malnutrition Assessment:  · Malnutrition Status: Meets the criteria for severe malnutrition  · Context: Chronic illness  · Findings of the 6 clinical characteristics of malnutrition (Minimum of 2 out of 6 clinical characteristics is required to make the diagnosis of moderate or severe Protein Calorie Malnutrition based on AND/ASPEN Guidelines):  1. Energy Intake-Less than or equal to 50%, greater than or equal to 3 months    2. Weight Loss-10% loss or greater, in 3 months  3. Fat Loss-Severe subcutaneous fat loss, Triceps  4. Muscle Loss-Moderate muscle mass loss, Temples (temporalis muscle), Clavicles (pectoralis and deltoids)  5. Fluid Accumulation-No significant fluid accumulation,    6.  Strength-Not measured    Nutrition Diagnosis:   · Problem: Inadequate oral intake, in context of social or environmental circumstances, Severe malnutrition  · Etiology: related to Insufficient energy/nutrient consumption (Etoh Abuse)     Signs and symptoms:  as evidenced by BMI, Weight loss, Moderate muscle loss, Severe loss of subcutaneous fat    Nutrition Assessment:  · Subjective Assessment: Patient with h/o Etoh Abuse. Admitted with increase in confusion ~ 1 week pta. Currently appetite improved > 75% with meals. · Nutrition-Focused Physical Findings: No Edema. + Jaundice. Cachectic Appearance. Ivf: .9 NS- 125 ml/hr. Large soft stool- 10/2- Last lactulose dose - 9/29. NH3- 205- 9/29. Phosphorous- .8- 10/2.   · Wound Type:  (Bruising Bilateral UE's)  · Current Nutrition Therapies:  · Oral Diet Orders:  (Cho Controlled)   · Oral Diet intake: %  · Oral Nutrition Supplement (ONS) Orders: None  · Anthropometric Measures:  · Ht: 5' 3\" (160 cm)   · Current Body Wt:  (Not Available)  · Admission Body Wt: 83 lb (37.6 kg) (Bedscale)  · Usual

## 2018-10-02 NOTE — PROGRESS NOTES
Physical Therapy Med Surg Daily Treatment Note  [facility]/Department[facility]  [facility]       NAME: Sara Santizo  : 1970 (50 y.o.)  MRN: [id number]  CODE STATUS: Full Code    Date of Service: 10/2/2018    Patient Diagnosis(es): Acute encephalopathy [G93.40]  Acute encephalopathy [G93.40]   Chief Complaint   Patient presents with    Altered Mental Status     Family called [facility] with c/o pt change in mental status. Per family pt stated the year was . Pt has history of alcholic cirrhosis.      Patient Active Problem List    Diagnosis Date Noted    Hypomagnesemia 2017     Priority: High    Seizure ([facility] 2017     Priority: High    Diabetic ketoacidosis without coma associated with type 1 diabetes mellitus ([address] 2017     Priority: High    Alcoholic cirrhosis ([address]      Priority: High    NSTEMI (non-ST elevated myocardial infarction) ([address] 2017     Priority: High    Acute encephalopathy 2018    Increased ammonia level     Alcoholism /alcohol abuse ([address]     Delirium due to multiple etiologies     Alcoholic liver failure ([address] 2018    Chronic diastolic heart failure ([address] 2017    NSTEMI (non-ST elevated myocardial infarction) ([address] 2017    Abscess 2017    DKA, type 1, not at goal [facility]) 2017    Acute deep vein thrombosis (DVT) of left lower extremity ([address] 2017    Edema of left lower extremity 2017    ANTONIO (acute kidney injury) ([address] 2017    Hyperglycemia due to type 2 diabetes mellitus ([address]     Alcoholic cirrhosis of liver ([address] 2017    Steatohepatitis, alcoholic     Severe sepsis without septic shock (CODE) ([facility] 2017    E. coli UTI (urinary tract infection) 2017    Cholelithiasis 2017    Jaundice, hepatocellular 2017    Acute blood loss anemia 2017    Coagulopathy ([facility] 2017    Lactic acid acidosis 2017

## 2018-10-03 PROBLEM — R26.9 ABNORMALITY OF GAIT AND MOBILITY: Status: ACTIVE | Noted: 2018-01-01

## 2018-10-03 PROBLEM — I82.409 DVT (DEEP VENOUS THROMBOSIS) (HCC): Status: ACTIVE | Noted: 2018-01-01

## 2018-10-03 PROBLEM — N30.00 ACUTE CYSTITIS WITHOUT HEMATURIA: Status: ACTIVE | Noted: 2018-01-01

## 2018-10-03 PROBLEM — R26.9 GAIT ABNORMALITY: Status: ACTIVE | Noted: 2018-01-01

## 2018-10-03 PROBLEM — Z86.718 HX OF BLOOD CLOTS: Status: ACTIVE | Noted: 2018-01-01

## 2018-10-03 PROBLEM — N17.9 ACUTE RENAL FAILURE (ARF) (HCC): Status: ACTIVE | Noted: 2018-01-01

## 2018-10-03 PROBLEM — K74.60 CIRRHOSIS (HCC): Status: ACTIVE | Noted: 2018-01-01

## 2018-10-03 PROBLEM — Z86.19 HX OF SEPSIS: Status: ACTIVE | Noted: 2018-01-01

## 2018-10-03 NOTE — CONSULTS
staff    Lab Results   Component Value Date    POCGLU 117 10/03/2018    POCGLU 233 10/02/2018    POCGLU 222 10/02/2018    POCGLU 222 10/02/2018    POCGLU 123 10/02/2018     Lab Results   Component Value Date     10/02/2018    K 5.0 10/02/2018     10/02/2018    CO2 17 10/02/2018    BUN 6 10/02/2018    CREATININE 0.47 10/02/2018    CALCIUM 7.3 10/02/2018    LABALBU 2.5 10/03/2018    BILITOT 24.6 10/03/2018    ALKPHOS 166 10/03/2018    AST 87 10/03/2018    ALT 18 10/03/2018     Lab Results   Component Value Date    WBC 7.5 10/03/2018    RBC 2.55 10/03/2018    HGB 10.2 10/03/2018    HCT 29.3 10/03/2018    .0 10/03/2018    MCH 40.0 10/03/2018    MCHC 34.8 10/03/2018    RDW 17.6 10/03/2018    PLT 70 10/03/2018    MPV 10.2 04/04/2015     Lab Results   Component Value Date    VITD25 20.2 11/12/2017     Lab Results   Component Value Date    COLORU ORANGE 09/29/2018    NITRU Negative 09/29/2018    GLUCOSEU Negative 09/29/2018    KETUA TRACE 09/29/2018    UROBILINOGEN 4.0 09/29/2018    BILIRUBINUR LARGE 09/29/2018    BILIRUBINUR neg 10/02/2017     Lab Results   Component Value Date    PROTIME 22.1 09/29/2018     Lab Results   Component Value Date    INR 2.1 09/29/2018         I discussed results with patient.     Current Rehabilitation Assessments:    Rehabilitation:  Physical therapy: FIMS:  Bed Mobility:      Transfers: Sit to Stand: Stand by assistance, Contact guard assistance  Stand to sit: Stand by assistance, Contact guard assistance  Bed to Chair: Contact guard assistance, Ambulation 1  Surface: level tile  Device: Rolling Walker  Assistance: Contact guard assistance  Quality of Gait: decreased foot clearance; decreased avoidance of object on the R (ran straight into wall, cart and door way) vc's for scanning environment for safety  Distance: 30'x2,      FIMS:  ,  , Assessment: slow to process, doesn't avoid objects in sam; increased time to complete      Occupational therapy: FIMS:   ,  , lispro (HUMALOG) injection vial 0-6 Units, 0-6 Units, Subcutaneous, Nightly  glucose (GLUTOSE) 40 % oral gel 15 g, 15 g, Oral, PRN  dextrose 50 % solution 12.5 g, 12.5 g, Intravenous, PRN  glucagon (rDNA) injection 1 mg, 1 mg, Intramuscular, PRN  dextrose 5 % solution, 100 mL/hr, Intravenous, PRN  vitamin B-1 (THIAMINE) tablet 100 mg, 100 mg, Oral, Daily  folic acid (FOLVITE) tablet 1 mg, 1 mg, Oral, Daily  multivitamin 1 tablet, 1 tablet, Oral, Daily  LORazepam (ATIVAN) tablet 1 mg, 1 mg, Oral, Q1H PRN **OR** LORazepam (ATIVAN) injection 1 mg, 1 mg, Intravenous, Q1H PRN **OR** LORazepam (ATIVAN) tablet 2 mg, 2 mg, Oral, Q1H PRN **OR** LORazepam (ATIVAN) injection 2 mg, 2 mg, Intravenous, Q1H PRN **OR** LORazepam (ATIVAN) tablet 3 mg, 3 mg, Oral, Q1H PRN **OR** LORazepam (ATIVAN) injection 3 mg, 3 mg, Intravenous, Q1H PRN **OR** LORazepam (ATIVAN) tablet 4 mg, 4 mg, Oral, Q1H PRN **OR** LORazepam (ATIVAN) injection 4 mg, 4 mg, Intravenous, Q1H PRN  cefTRIAXone (ROCEPHIN) 1 g IVPB in 50 mL D5W minibag, 1 g, Intravenous, Q24H  prednisoLONE (ORAPRED) 15 MG/5ML solution 30 mg, 30 mg, Oral, Daily  [START ON 10/9/2018] prednisoLONE (ORAPRED) 15 MG/5ML solution 20 mg, 20 mg, Oral, Daily  [START ON 10/19/2018] prednisoLONE (ORAPRED) 15 MG/5ML solution 10 mg, 10 mg, Oral, Daily  rifaximin (XIFAXAN) tablet 550 mg, 550 mg, Oral, BID      Social History:    Social History     Social History    Marital status: Single     Spouse name: N/A    Number of children: N/A    Years of education: N/A     Occupational History    Not on file.      Social History Main Topics    Smoking status: Never Smoker    Smokeless tobacco: Never Used    Alcohol use 1.2 - 6.0 oz/week     2 - 10 Cans of beer per week      Comment: daily     Drug use: No    Sexual activity: Not Currently     Other Topics Concern    Not on file     Social History Narrative         Lives With: Friend(s)    Type of Home: House    Home Layout: One level    Home

## 2018-10-03 NOTE — PLAN OF CARE
Problem: Restraint Use - Nonviolent/Non-Self-Destructive Behavior:  Goal: Absence of restraint indications  Absence of restraint indications   Outcome: Completed Date Met: 10/03/18    Goal: Absence of restraint-related injury  Absence of restraint-related injury   Outcome: Completed Date Met: 10/03/18      Problem: Risk for Impaired Skin Integrity  Goal: Tissue integrity - skin and mucous membranes  Structural intactness and normal physiological function of skin and  mucous membranes. Outcome: Completed Date Met: 10/03/18      Problem: Falls - Risk of:  Goal: Absence of physical injury  Absence of physical injury   Outcome: Completed Date Met: 10/03/18      Problem: Nutrition  Goal: Optimal nutrition therapy  Nutrition Problem: Inadequate oral intake, in context of social or environmental circumstances, Severe malnutrition  Intervention: Food and/or Nutrient Delivery: Modify current diet, Start ONS  Nutritional Goals: Intake of Meals/Ons's Will Be Maintained at > 75%.  Weight > 83#   Outcome: Completed Date Met: 10/03/18

## 2018-10-03 NOTE — PROGRESS NOTES
Negative for cough, chest tightness, shortness of breath, wheezing and stridor. Cardiovascular: Negative. Negative for chest pain, palpitations and leg swelling. Gastrointestinal: Negative. Negative for blood in stool and nausea. Genitourinary: Negative. Musculoskeletal: Negative. Skin: Negative. Neurological: Negative. Negative for dizziness, syncope, weakness and light-headedness. Hematological: Negative. Psychiatric/Behavioral: Positive for behavioral problems and confusion. Physical Examination:    BP (!) 109/58   Pulse 108   Temp 99 °F (37.2 °C) (Oral)   Resp 18   Ht 5' 3\" (1.6 m)   Wt 83 lb 12.4 oz (38 kg)   LMP  (LMP Unknown)   SpO2 99%   BMI 14.84 kg/m²    Physical Exam   Constitutional: She appears cachectic. No distress. She appears chronically ill and acutely ill. HENT:   Normal cephalic and Atraumatic   Eyes: Pupils are equal, round, and reactive to light. Neck: Normal range of motion and thyroid normal. Neck supple. No JVD present. No neck adenopathy. No thyromegaly present. Cardiovascular: Normal rate, regular rhythm, normal heart sounds, intact distal pulses and normal pulses. Pulmonary/Chest: Effort normal and breath sounds normal. She has no wheezes. She has no rales. She exhibits no tenderness. Abdominal: Soft. Bowel sounds are normal. There is no tenderness. Musculoskeletal: Normal range of motion. She exhibits no edema or tenderness. Neurological: She is alert and oriented to person, place, and time. Skin: Skin is warm. No cyanosis. Nails show no clubbing.        LABS:  CBC:   Lab Results   Component Value Date    WBC 7.5 10/03/2018    RBC 2.55 10/03/2018    HGB 10.2 10/03/2018    HCT 29.3 10/03/2018    .0 10/03/2018    MCH 40.0 10/03/2018    MCHC 34.8 10/03/2018    RDW 17.6 10/03/2018    PLT 70 10/03/2018    MPV 10.2 04/04/2015     CBC with Differential:    Lab Results   Component Value Date    WBC 7.5 10/03/2018    RBC 2.55 10/03/2018    HGB 10.2 10/03/2018    HCT 29.3 10/03/2018    PLT 70 10/03/2018    .0 10/03/2018    MCH 40.0 10/03/2018    MCHC 34.8 10/03/2018    RDW 17.6 10/03/2018    BANDSPCT 6 06/07/2018    LYMPHOPCT 14.8 09/29/2018    MONOPCT 11.4 09/29/2018    BASOPCT 0.4 09/29/2018    MONOSABS 1.0 09/29/2018    LYMPHSABS 1.3 09/29/2018    EOSABS 0.1 09/29/2018    BASOSABS 0.0 09/29/2018     CMP:    Lab Results   Component Value Date     10/02/2018    K 5.0 10/02/2018     10/02/2018    CO2 17 10/02/2018    BUN 6 10/02/2018    CREATININE 0.47 10/02/2018    GFRAA >60.0 10/02/2018    LABGLOM >60.0 10/02/2018    GLUCOSE 206 10/02/2018    PROT 5.1 10/03/2018    LABALBU 2.5 10/03/2018    CALCIUM 7.3 10/02/2018    BILITOT 24.6 10/03/2018    ALKPHOS 166 10/03/2018    AST 87 10/03/2018    ALT 18 10/03/2018     BMP:    Lab Results   Component Value Date     10/02/2018    K 5.0 10/02/2018     10/02/2018    CO2 17 10/02/2018    BUN 6 10/02/2018    LABALBU 2.5 10/03/2018    CREATININE 0.47 10/02/2018    CALCIUM 7.3 10/02/2018    GFRAA >60.0 10/02/2018    LABGLOM >60.0 10/02/2018    GLUCOSE 206 10/02/2018     Magnesium:    Lab Results   Component Value Date    MG 2.3 10/03/2018     Troponin:    Lab Results   Component Value Date    TROPONINI <0.010 09/29/2018        Active Hospital Problems    Diagnosis Date Noted    Severe malnutrition ([facility] [E43] 10/02/2018     Priority: Low    Acute encephalopathy [G93.40] 09/29/2018     Priority: Low        Assessment/Plan:  1. Hepatic Encephalopathy-with fluctuationsProlonged QTc- follow ECGs. Today it is 501ms  2. K stable  3. Mag stable. Continue PO MAgAnemia- no active bleed noted. 4. ETOH- CIWA  5. Coagulopathy related to Cirrhosis  6. Follow Labs  7. Echo EF 70%  8.  Will need SNF       Electronically signed by Kevin Guajardo MD on 10/3/2018 at 10:23 AM

## 2018-10-04 NOTE — PROGRESS NOTES
Occupational Therapy   Occupational Therapy Initial Assessment  Date: 10/4/2018   Patient Name: Micah Barba  MRN: [id number]     : 1970    Date of Service: 10/4/2018    Discharge Recommendations:  Continue to assess pending progress         Patient Diagnosis(es): There were no encounter diagnoses. has a past medical history of Acute deep vein thrombosis (DVT) of left lower extremity ([address] ANTONIO (acute kidney injury) ([address] Alcohol abuse; Alcoholic cirrhosis of liver ([address] Basal cell carcinoma of leg; Blood clot in vein; Cirrhosis ([address] Diabetes mellitus ([address] DVT (deep venous thrombosis) ([address] Hx of blood clots; Seizure ([address] Seizures ([address] Type 2 diabetes mellitus without complication ([facility] and Withdrawal seizures ([facility] has a past surgical history that includes Skin cancer excision (Left); US Paracentesis Subsequent (7/10/2017); skin biopsy; and pr esophagogastroduodenoscopy transoral diagnostic (N/A, 10/3/2017). Restrictions  Restrictions/Precautions  Restrictions/Precautions: Fall Risk, Seizure    Subjective Noted that patient's catheter tubing was not draining and was backed up all the way to the diaper area. Nursing called and assessed it. LPN Veronique emptied the catheter bag and it did start to drain.    General  Family / Caregiver Present:  ([facility] was present for eval)  Referring Practitioner: Dr Thierry Gallegos  Diagnosis: Hepatic Encephalopathy with generalized weakness  Pain Assessment  Patient Currently in Pain: No  Pain Assessment:  (denies)  Pain Level: 0     Social/Functional History  Social/Functional History  Lives With:  (Patient lives with an 80year old man) This gentleman took her in when patient did not have a home  Type of Home: House  Home Layout: Two level, Able to Live on Main level with bedroom/bathroom, Laundry in basement  Home Access: Stairs to enter with rails  Entrance Stairs - Number of Steps: 5 - per cousin's report  Bathroom Shower/Tub: Tub/Shower unit (Patient was unable to state if she had curtain or doors)  Bathroom Equipment: Grab bars in shower  Home Equipment: Rolling walker  ADL Assistance: [address]:  (Patient reports that she cooks and performs cleaning. Patient does laundry )  Ambulation Assistance: Independent  Transfer Assistance: Independent  Active : No  Type of occupation: Patient does not remember her last job. patient does not get disability  Leisure & Hobbies: Patient drinks excessively. Patient stated \"I guess I like movies\" when asked if she liked to go to the movies or watch them at home. Patient was unable to report any leisure activities. Additional Comments: Pt very poor historian. Clarification required from cousin on PLOF. Patient reported that she did not have a pet when asked 3 different ways and then the cousin reported patient has a small dog named Nicole Honeycutt. Objective Patient was difficult to engage in conversation but did speak intelligibly when she did speak. Vision- no glasses were noted in the room. Patient had decreased eye contact. Unable to formally assess. Functional Mobility  Functional - Mobility Device: Wheelchair  Activity: To/from bathroom  Assist Level: Dependent/Total  Functional Mobility Comments: unsafe at this time to ambulate to the bathroom  Toilet Transfers  Toilet Transfer: Unable to assess  Toilet Transfers Comments: Patient did not need to go  Shower Transfers  Shower Transfers: Not tested  Shower Transfers Comments: unsafe at this time  ADL    Patient would not wash her face even with hand over hand assist to initiate the task  Feeding:  Moderate assistance;Verbal cueing;Bringing food to mouth assist;Increased time to complete (Patient was able to feed herself Jello during the eval but then was unable to feed herself crackers)  Grooming: Dependent/Total  UE Bathing: Dependent/Total  LE Bathing: None (did not assess due to time constraints)  UE Dressing: Dependent/Total  LE

## 2018-10-04 NOTE — CONSULTS
Shukri-he is 80years old and apparently drinking body of hers. diasbled dt alcohol--did not follow through on disability. Estranged from son Fely Chen    Mother lives in [facility] nearby    They do not get along    She has cousins in the area however they are not close because of her alcoholism    Type of Home: House    Home Layout: One level    Home Access: Stairs to enter with rails    Entrance Stairs - Number of Steps: 4    Bathroom Shower/Tub: Tub/Shower unit    Bathroom Equipment: Grab bars in [address] [place]: Cane    ADL Assistance: [facility]: Needs assistance    Ambulation Assistance: Independent (\"was trying to use the cane\" more often recently)    Transfer Assistance: Independent                 History reviewed. No pertinent family history. Review Of Systems:   Review of systems not obtained due to patient factors - pt refusal  ROS as noted in HPI, 12 point ROS reviewed and otherwise negative.   Physical Exam:  Vitals:    10/04/18 0404 10/04/18 0547 10/04/18 0726 10/04/18 1434   BP: 121/70  130/66 124/74   Pulse: 97  97 79   Resp: 17  18    Temp: 98 °F (36.7 °C)      TempSrc: Oral      SpO2: 98%  99%    Weight:  83 lb (37.6 kg)     Height:           CONSTITUTIONAL:  somnolent and no apparent distress  EYES:  Unable to perform  ENT:  normocepalic, without obvious abnormality, atraumatic  NECK:  skin normal  LUNGS:  no increased work of breathing  CARDIOVASCULAR:  Unable to assess  ABDOMEN:  distended, unable to assess further  MUSCULOSKELETAL:  Pt remains lying in fetal position and will not allow contact, nor will she reposition herself  NEUROLOGIC:  Unable to assess  SKIN:  jaundice noted    Labs:  Recent Results (from the past 72 hour(s))   POCT Glucose    Collection Time: 10/01/18  4:40 PM   Result Value Ref Range    POC Glucose 268 (H) 60 - 115 mg/dl    Performed on ACCU-CHEK    POCT Glucose    Collection Time: 10/01/18  9:32 PM   Result Value Ref Range    POC Glucose 0.5 - 2.2 mmol/L   Phosphorus    Collection Time: 10/03/18  6:18 AM   Result Value Ref Range    Phosphorus 1.0 (L) 2.5 - 4.5 mg/dL   CBC    Collection Time: 10/03/18  6:18 AM   Result Value Ref Range    WBC 7.5 4.8 - 10.8 K/uL    RBC 2.55 (L) 4.20 - 5.40 M/uL    Hemoglobin 10.2 (L) 12.0 - 16.0 g/dL    Hematocrit 29.3 (L) 37.0 - 47.0 %    .0 (H) 82.0 - 100.0 fL    MCH 40.0 (H) 27.0 - 31.3 pg    MCHC 34.8 33.0 - 37.0 %    RDW 17.6 (H) 11.5 - 14.5 %    Platelets 70 (L) 325 - 400 K/uL   Magnesium    Collection Time: 10/03/18  6:18 AM   Result Value Ref Range    Magnesium 2.3 1.7 - 2.3 mg/dL   POCT Glucose    Collection Time: 10/03/18  7:44 AM   Result Value Ref Range    POC Glucose 117 (H) 60 - 115 mg/dl    Performed on ACCU-CHEK    POCT Glucose    Collection Time: 10/03/18 11:55 AM   Result Value Ref Range    POC Glucose 141 (H) 60 - 115 mg/dl    Performed on ACCU-CHEK    POCT Glucose    Collection Time: 10/03/18  4:24 PM   Result Value Ref Range    POC Glucose 212 (H) 60 - 115 mg/dl    Performed on ACCU-CHEK    POCT Glucose    Collection Time: 10/03/18  8:50 PM   Result Value Ref Range    POC Glucose 183 (H) 60 - 115 mg/dl    Performed on ACCU-CHEK    URINE RT REFLEX TO CULTURE    Collection Time: 10/04/18  4:31 AM   Result Value Ref Range    Color, UA ORANGE (A) Straw/Yellow    Clarity, UA Clear Clear    Glucose, Ur Negative Negative mg/dL    Bilirubin Urine LARGE (A) Negative    Ketones, Urine Negative Negative mg/dL    Specific Gravity, UA 1.010 1.005 - 1.030    Blood, Urine Negative Negative    pH, UA 7.0 5.0 - 9.0    Protein, UA Negative Negative mg/dL    Urobilinogen, Urine 0.2 <2.0 E.U./dL    Nitrite, Urine Negative Negative    Leukocyte Esterase, Urine Negative Negative    Urine Reflex to Culture Not Indicated    CBC    Collection Time: 10/04/18  5:29 AM   Result Value Ref Range    WBC 10.8 4.8 - 10.8 K/uL    RBC 2.64 (L) 4.20 - 5.40 M/uL    Hemoglobin 10.5 (L) 12.0 - 16.0 g/dL    Hematocrit 30.5 (L) 37.0 - 47.0 %    .6 (H) 82.0 - 100.0 fL    MCH 39.8 (H) 27.0 - 31.3 pg    MCHC 34.4 33.0 - 37.0 %    RDW 17.7 (H) 11.5 - 14.5 %    Platelets 83 (L) 786 - 400 K/uL    PLATELET SLIDE REVIEW Decreased     SLIDE REVIEW see below    POCT Glucose    Collection Time: 10/04/18  6:12 AM   Result Value Ref Range    POC Glucose 119 (H) 60 - 115 mg/dl    Performed on ACCU-CHEK    POCT Glucose    Collection Time: 10/04/18 11:38 AM   Result Value Ref Range    POC Glucose 217 (H) 60 - 115 mg/dl    Performed on ACCU-CHEK        Data:    No results found. Assessment/Plan:  1. Hepatic encephalopathy  2. Alcoholic cirrhosis  3. Macrocytic anemia   4. Seizures 2/2 withdrawal  5. DMII  6.  Hx DVT      PT/OT per rehab attending  Continue glucose monitoring w/SSI  Resume meds per [facility]  Cbc, bmp, Mg in am      Electronically signed by Mariia Salazar PA-C on 10/4/18 at 3:31 PM    Dr. Mony Hodge MD - supervising physician

## 2018-10-04 NOTE — H&P
50 % solution 12.5 g  12.5 g Intravenous PRN Niki Zamudio RN        glucagon (rDNA) injection 1 mg  1 mg Intramuscular PRN Niki Zamudio RN        dextrose 5 % solution  100 mL/hr Intravenous PRN Santa Acosta RN           Allergies   Allergen Reactions    Seasonal        Social History     Social History    Marital status: Single     Spouse name: N/A    Number of children: 1    Years of education: N/A     Occupational History    [facility]      Social History Main Topics    Smoking status: Never Smoker    Smokeless tobacco: Never Used    Alcohol use 1.2 - 6.0 oz/week     2 - 10 Cans of beer per week      Comment: daily     Drug use: No    Sexual activity: Not Currently     Other Topics Concern    Not on file     Social History Narrative         Lives With: Benjy Babb is 80years old and apparently drinking body of hers. diasbled dt alcohol--did not follow through on disability. Estranged from son Pete White    Mother lives in [facility] nearby    They do not get along    She has cousins in the area however they are not close because of her alcoholism    Type of Home: House    Home Layout: One level    Home Access: Stairs to enter with rails    Entrance Stairs - Number of Steps: 4    Bathroom Shower/Tub: Tub/Shower unit    Bathroom Equipment: Grab bars in [address]: Cane    ADL Assistance: Independent    Homemaking Assistance: Needs assistance    Ambulation Assistance: Independent (\"was trying to use the cane\" more often recently)    Transfer Assistance: Independent                    FAMILY HISTORY:  Does not pertain to chief complaint. Review of Systems   Constitutional: Positive for activity change and fatigue. Negative for appetite change, chills, diaphoresis, fever and unexpected weight change. HENT: Negative for congestion, ear discharge, ear pain, facial swelling, hearing loss and trouble swallowing.     Eyes: Negative for photophobia, pain and redness. Respiratory: Positive for shortness of breath. Negative for cough, choking, chest tightness and wheezing. Cardiovascular: Positive for near-syncope. Negative for chest pain, palpitations and leg swelling. Gastrointestinal: Negative for abdominal distention, abdominal pain, anal bleeding, blood in stool, bowel incontinence, constipation, nausea and vomiting. Genitourinary: Positive for bladder incontinence. Negative for difficulty urinating, dysuria, flank pain, frequency and urgency. Musculoskeletal: Positive for arthralgias, gait problem and myalgias. Negative for back pain, neck pain and neck stiffness. Skin: Negative for color change, pallor, rash and wound. Allergic/Immunologic: Positive for immunocompromised state. Neurological: Positive for dizziness, vertigo, focal weakness, weakness, light-headedness and loss of balance. Negative for tremors, syncope, facial asymmetry, speech difficulty, numbness and headaches. Hematological: Negative for adenopathy. Bruises/bleeds easily. Psychiatric/Behavioral: Positive for confusion, dysphoric mood, memory loss and sleep disturbance. Negative for agitation, behavioral problems, decreased concentration, hallucinations, self-injury and suicidal ideas. The patient is nervous/anxious. The patient is not hyperactive. All other systems reviewed and are negative. Objective  /66   Pulse 97   Temp 98 °F (36.7 °C) (Oral)   Resp 18   Ht 5' 3\" (1.6 m)   Wt 83 lb (37.6 kg)   LMP  (LMP Unknown)   SpO2 99%   BMI 14.70 kg/m² *    Physical Exam   Constitutional: Vital signs are normal. She appears listless. She appears cachectic. She is uncooperative. Non-toxic appearance. She does not have a sickly appearance. She appears ill. No distress. HENT:   Head: Normocephalic and atraumatic.    Right Ear: Hearing normal.   Left Ear: Hearing normal.   Nose: Nose normal.   Mouth/Throat: Oropharynx is clear and moist and mucous membranes are normal. No oral lesions. Normal dentition. No oropharyngeal exudate. No lesions   Eyes: Pupils are equal, round, and reactive to light. Conjunctivae and EOM are normal. Right eye exhibits no chemosis, no discharge and no exudate. Left eye exhibits no chemosis, no discharge and no exudate. No scleral icterus. Neck: Normal range of motion. Neck supple. No JVD present. No neck rigidity. No tracheal deviation and no edema present. No thyromegaly present. Cardiovascular: Intact distal pulses. Exam reveals no decreased pulses. Pulmonary/Chest: Effort normal. No accessory muscle usage. No apnea, no tachypnea and no bradypnea. No respiratory distress. She has decreased breath sounds. She has no wheezes. She has no rales. She exhibits no tenderness. Abdominal: Soft. Bowel sounds are normal. She exhibits no distension and no mass. There is no tenderness. There is no rebound and no guarding. Musculoskeletal: She exhibits tenderness. She exhibits no edema. Right shoulder: Normal.        Left shoulder: Normal.        Right elbow: Normal.       Left elbow: Normal.        Right wrist: Normal.        Left wrist: Normal.        Right hip: Normal.        Left hip: Normal.        Right knee: Normal.        Left knee: Normal.        Right ankle: Normal. Achilles tendon normal.        Left ankle: Normal. Achilles tendon normal.        Cervical back: Normal.        Thoracic back: Normal.        Lumbar back: She exhibits decreased range of motion, tenderness, bony tenderness and pain. She exhibits no swelling, no edema, no deformity, no laceration and normal pulse.         Right upper arm: Normal.        Left upper arm: Normal.        Right forearm: Normal.        Left forearm: Normal.        Right hand: Normal.        Left hand: Normal.        Right upper leg: Normal.        Left upper leg: Normal.        Right lower leg: Normal.        Left lower leg: Normal.        Right foot: Normal. nursing facility or in an outpatient setting. I further feel that the patient has the potential to improve functional abilities in an acute intensive rehabilitation program.    Old records were reviewed and summarized. 2.  Other diagnoses which complicate rehabilitation stay include:     Principal Problem:    Abnormality of gait and mobility due to Hepatic Encephalopathy with generalizd weakness. [facility] admit 10/03/18. Active Problems:  1. Seizure-free dose Keppra consult neurology  2. Steatohepatitis, alcoholic,   Increased ammonia level with acute encephalopathy  Cirrhosis,   Jaundice, hepatocellular-limit toxic medications, and decrease dose of Tylenol, add scheduled lactulose and toilet frequently-rifaximin  3.   E. coli UTI (urinary tract infection)  Severe sepsis   Lactic acid acidosis without septic [id number] hospitalists, treat UTI add Macrobid  4. Coagulopathy   Hx of blood clots-cannot use strong blood thinners because of liver failure currently monitor closely for blood clots  5. Hyperglycemia due to type 2 diabetes mellitus -add low carb diet and sliding scale insulin coverage and finger stick blood sugar checks  6. NSTEMI (non-ST elevated myocardial infarction) -vital signs checks every shift monitor for blood pressure change and tachycardia  7. Alcoholism /alcohol abuse-add chemical dependency counseling add Ativan titration add vitamin B12 vitamins B6 and thiamine  8. Severe malnutrition-add protein supplementation high-calorie diet  9. GERD-Protonix         I am especially concerned about lack of social supports in the home as well as her poor insight and history of severe acute on chronic alcoholism and continued use despite significant harm. She is apparently noncompliant with her medications and only drinks her calories at home. The patient's high risk medication use includes  Tylenol given her liver failure.     The patient is high risk for urinary tract infection, an admission urinalysis has been ordered. I will have the nurses check post void residual bladder volumes and place a catheter if excessive urine is retained in the bladder after voiding. The patient is risk for deep venous thromosis,complex deep venous thrombosis protocol prophylaxis has been ordered  no current thinners because of her liver failure . The patient is high risk for orthostasis and a hydration program and orthostatic blood pressure screening have been ordered. I will attempt to get old records from the patient's previous hospital stay. Care everywhere on [facility] was utilized. 3.  Current and previous medications were reviewed and summarized and compared to old medication lists from home and from the acute floor. 4.  Complex labs and x-rays were reviewed. I will review patient's old EKG and place it on the chart. 5.  Will provide emotional support for this patient regarding adjustment to their disability. Cognition and mood will be screened daily and addressed by rehabilitation psychology and/or speech therapy as appropriate. I have encouraged the patient to attend the Rehab Adjustment to Disability Support Group and recreational therapy. 6.  Estimated length of stay is 2 weeks. Discharge to home with help from family and home health   PT, OT, RN, aide and . Patient should be independent at discharge. 7.  The patient's medical and rehab prognosis are good. [address] regarding the patient's back up to general medical needs. A welcome letter was presented with an explanation of my services, my specialty and what to expect during the rehabilitation process. As well as introducing myself, I also wrote my name on their bedside marker board with their name as well as the names of the other physicians with an explanation of our individual roles in their care, as well as the rehab process.             Digna Miller D.O., KALIE JIMÉNEZ&KLAUDIA

## 2018-10-04 NOTE — CONSULTS
mouth 3 times daily 1892 mL 3    spironolactone (ALDACTONE) 50 MG tablet Take 1 tablet by mouth daily 30 tablet 3    insulin lispro (HUMALOG KWIKPEN) 100 UNIT/ML pen Inject 4 Units into the skin 4 times daily as needed for High Blood Sugar 5 pen 3    levETIRAcetam (KEPPRA) 500 MG tablet Take 1 tablet by mouth 2 times daily 60 tablet 1    Insulin Pen Needle (NOVOFINE) 32G X 6 MM MISC Qid 300 each 3    potassium chloride (KLOR-CON M) 10 MEQ extended release tablet Take 1 tablet by mouth daily (with breakfast) 60 tablet 3    pantoprazole (PROTONIX) 40 MG tablet Take 1 tablet by mouth every morning (before breakfast) 30 tablet 3    Alcohol Swabs (ALCOHOL WIPES) 70 % PADS 1 Container by Does not apply route 4 times daily (before meals and nightly) 1 each 3    Lancets MISC Before meals and nightly 100 each 3    Insulin Syringe-Needle U-100 (MinusCO INS SYR .3CC/29GX0.5\") 29G X 1/2\" 0.3 ML MISC 1 each by Does not apply route 4 times daily (before meals and nightly) 100 each 3    Glucose Blood (BLOOD GLUCOSE TEST STRIPS) STRP Before meals and nightly 200 strip 3    glucose monitoring kit (FREESTYLE) monitoring kit 1 kit by Does not apply route 4 times daily (before meals and nightly) 1 kit 0    vitamin C (ASCORBIC ACID) 500 MG tablet Take 1 tablet by mouth 2 times daily 30 tablet 5    folic acid (FOLVITE) 1 MG tablet Take 1 tablet by mouth daily 30 tablet 11    ONETOUCH VERIO strip TEST  each 3    ONETOUCH DELICA LANCETS FINE MISC TEST TID  each 3    BD PEN NEEDLE KAIN U/F 32G X 4 MM MISC TEST TID  0    Multiple Vitamins-Minerals (THERAGRAN-M) TABS Take 1 tablet by mouth every morning       thiamine 100 MG tablet Take 100 mg by mouth daily         Allergies   Allergen Reactions    Seasonal        History reviewed. No pertinent family history.     Social History     Social History    Marital status: Single     Spouse name: N/A    Number of children: 1    Years of education: N/A B/L.  Nails 1 bilateral are thick, elongated, with subungual debris  Nails 2 bilateral are thick, elongated, with subungual debris  Nails 3 bilateral are thick, elongated, with subungual debris  Nails 4 bilateral are thick, elongated, with subungual debris  Nails 5 bilateral are thick, elongated, with subungual debris     LABS:   Lab Results   Component Value Date    WBC 10.8 10/04/2018    HGB 10.5 (L) 10/04/2018    HCT 30.5 (L) 10/04/2018    .6 (H) 10/04/2018    PLT 70 (L) 10/03/2018     Lab Results   Component Value Date     10/02/2018    K 5.0 10/02/2018     10/02/2018    CO2 17 10/02/2018    BUN 6 10/02/2018    CREATININE 0.47 10/02/2018    GLUCOSE 206 10/02/2018    CALCIUM 7.3 10/02/2018      Lab Results   Component Value Date    LABALBU 2.5 (L) 10/03/2018     No results found for: SEDRATE  No results found for: CRP  Lab Results   Component Value Date    LABA1C 4.4 (L) 09/12/2018     No results found for: EAG    MICROBIOLOGY:   Type   Date  Results  Blood Culture:  9/2918  NGTD      Patient's case will be discussed with staff, who will provide final recommendations. Thank you for the consult.     Johnna Conley, PGY3  Please first page [facility], [phone]  October 4, 2018  6:36 AM

## 2018-10-04 NOTE — PROGRESS NOTES
Facility/Department: [facility] Initial Assessment: Physical Therapy  Room: R242/R242-01    NAME: Maricruz Mckeon  : 1970  MRN: [id number]    Date of Service: 10/4/2018    Rehab Diagnosis(es): Hepatic Encephalopathy with Generalized Weakness  Patient Active Problem List    Diagnosis Date Noted    Hypomagnesemia 2017     Priority: High    Seizure ([address] 2017     Priority: High    Diabetic ketoacidosis without coma associated with type 1 diabetes mellitus ([address] 2017     Priority: High    Alcoholic cirrhosis ([address]      Priority: High    NSTEMI (non-ST elevated myocardial infarction) ([address] 2017     Priority: High    Gait abnormality 10/03/2018    Acute cystitis without hematuria 10/03/2018    DVT (deep venous thrombosis) ([address] 10/03/2018    Hx of blood clots 10/03/2018    Hx of sepsis 10/03/2018    Acute renal failure (ARF) ([address] 10/03/2018    Cirrhosis ([address] 10/03/2018    Body mass index (BMI) less than 16.5 10/03/2018    Abnormality of gait and mobility due to Hepatic Encephalopathy with generalizd weakness.   [facility] admit 10/03/18. 10/03/2018    Hepatic encephalopathy ([address]     Severe malnutrition ([address] 10/02/2018    Acute encephalopathy 2018    Increased ammonia level     Alcoholism /alcohol abuse ([address]     Delirium due to multiple etiologies     Alcoholic liver failure (HCC) 2018    Chronic diastolic heart failure ([address] 2017    NSTEMI (non-ST elevated myocardial infarction) ([address] 2017    Abscess 2017    DKA, type 1, not at goal [facility]) 2017    Acute deep vein thrombosis (DVT) of left lower extremity ([address] 2017    Edema of left lower extremity 2017    ANTONIO (acute kidney injury) ([address] 2017    Hyperglycemia due to type 2 diabetes mellitus ([address]     Alcoholic cirrhosis of liver ([facility] 2017    Steatohepatitis, alcoholic     Severe sepsis without septic shock from task. Increased time to complete. Assist to lift LEs and trunk from surface    Transfers  Sit to Stand: Minimal Assistance;Contact guard assistance  Stand to sit: Contact guard assistance;Minimal Assistance  Bed to Chair: Minimal assistance; Moderate assistance  Comment: Commode transfer with ModA. Pt with poor approach and management of [address]. Noted difficulty with spatial organization and requires added cues for sequencing correctly. Distracted. Ambulation 1  Device: Rolling Walker  Assistance: Moderate assistance  Quality of Gait: Pt with poor continuity of gait pattern requiring step by step cues to advance each LE. Pt unable to manage [address] without significant manual assist. Jeanette to provide manual weight shifting. Distance: 15ft X 2  Stairs/Curb  Stairs?: No (safety cues)    Activity Tolerance  Activity Tolerance: Patient limited by cognitive status  Activity Tolerance: Pt. agitated and confused. Car transfers: Not tested  Walk 10 ft: Moderate Assist  Walk 50 ft with 2 turns: Not tested  Walk 150 ft in corridor: Not tested  Walking 10 ft on unlevel surface: Not tested  Picking up objects: Not tested  Wheelchair Mobility: No     ASSESSMENT:  Body structures, Functions, Activity limitations: Decreased functional mobility ; Decreased strength;Decreased endurance;Decreased balance;Decreased safe awareness;Decreased coordination;Decreased cognition;Decreased ADL status  Decision Making: High Complexity  History: High  Exam: High  Clinical Presentation: High    Prognosis: Fair  Patient Education: PT POC; DC recs; role of PT in the hosp  Barriers to Learning: cognition    CLINICAL IMPRESSION: Continued PT indicated to progress/instruct in safe functional mobility and improve balance/activity tolerance to ensure DC at highest level of indep and safety. Goals guarded at this time d/t significant cognitive delay.  At this time, it is recommended that pt have supervision for all mobility upon DC for safety precautions.      PLAN OF CARE:  Frequency: 1-2 treatment sessions per day, 5-7 days per week     Current Treatment Recommendations: Strengthening, Balance Training, Functional Mobility Training, Stair training, Safety Education & Training, Gait Training, Home Exercise Program, Modalities, Transfer Training, Neuromuscular Re-education, Endurance Training, Patient/Caregiver Education & Training, Equipment Evaluation, Education, & procurement, Cognitive/Perceptual Training, ADL/Self-care Training    Patient's Goal:  Get out of here    GOALS:  Short term goals  Short term goal 1: Pt to complete HEP with verbal cues  Long term goals  Long term goal 1: Pt to complete bed mobility with indep  Long term goal 2: Pt to complete transfers with supervision  Long term goal 3: Pt to ambulate 50ft with supervision  Long term goal 4: Pt to manage 5 steps with CGA    ELOS:   Plan weeks: 2    Therapy Time:    Individual   Time In 0945   Time Out 1030   Minutes 45     25 Minutes (10min gait; 15min transfers)       Viky Disla, PT, 10/04/18 at 12:22 PM

## 2018-10-05 NOTE — PROGRESS NOTES
Physical Therapy Rehab Treatment Note  Facility/Department: [facility]  Room: U089/C034-17       NAME: Rivas Mckeon  : 1970 (50 y.o.)  MRN: [id number]  CODE STATUS: Full Code    Date of Service: 10/5/2018  Chart Reviewed: Yes  Family / Caregiver Present: Yes  Restrictions:  Restrictions/Precautions: Fall Risk, Seizure    SUBJECTIVE:          Post Treatment Pain Screenin/10     OBJECTIVE:      Bed mobility  Rolling to Left: Stand by assistance  Rolling to Right: Stand by assistance  Supine to Sit: Contact guard assistance;Minimal assistance  Sit to Supine:  (NT d/t encouraging pt to sit up in chair.  )    Transfers  Sit to Stand: Contact guard assistance  Stand to sit: Contact guard assistance  Comment: VCs and guidence to reach back for the chair. Assist required to manage pants in bathroom while pt steadied self at regina rail. Ambulation  Ambulation?: Yes  Ambulation 1  Surface: level tile  Device: Rolling Walker  Other Apparatus: Wheelchair follow  Assistance: Minimal assistance  Quality of Gait: Pt ambulates posterior to [employer] with downward gaze. Assist required for [employer] management throughout. Distance: 40ft, 60ft         Exercises  Neurodevelopmental Techniques: Standing wt shifiting in various directions and marching in place with B HHA to music. VCs for posture. Standing reaching out of SUSIE with 1 UE support. ASSESSMENT/COMMENTS:  Assessment: Begining of session pt lying in fetal position with eyes closed. Pt inintially requires max encouragement to participate. Increased time to initiate tasks. Improved throughout session. By end of session pt dancing and having conversation with family and therapist.  VCs throughout for safety. Pt requesting to go back to bed but sat up with encouragement. PLAN OF CARE/Safety:   Plan Comment: Cont per POC.       Therapy Time:   Individual   Time In 1100   Time Out 1200   Minutes 60     Minutes:      Transfer/Bed mobility training:15

## 2018-10-05 NOTE — PROGRESS NOTES
accuracy with mod cues to assist the caregiver in obtaining important information regarding the patient's personal, medical, and safety needs. Long-term Goals  Timeframe for Long-term Goals: 3 weeks  Goal 1: Pt will improve her Receptive Language abilities to a supervised level for expression of complex wants, needs, feelings/ideas, and medical/safety information, within 3 weeks. Goal 2: Pt will improve problem solving from a dependent level to a mod assist level within 3 weeks. Goal 3: Pt will improve memory from a dependent level to a mod assist level within 3 weeks. Patient/family involved in developing goals and treatment plan: yes, \"I want to get better. \"    Subjective:   Previous level of function and limitations: unknown  Vision  Vision: Impaired (impaired visual perception)  Hearing  Hearing: Exceptions to [facility]     Objective:     Oral/Motor  Oral Motor: Exceptions to [facility]  Lingual Strength: Reduced    Auditory Comprehension  Comprehension: Exceptions  Yes/No Questions:  (4/6 with increased time needed)  Basic Questions: Moderate (4/6 correct)  One Step Basic Commands: Mild (increased time needed)  Two Step Basic Commands: Severe  Multistep Basic Commands:  (DNT)  Complex/Abstract Commands:  (DNT)  Common Objects:  (increased time needed)  Pictures:  (DNT)  Black/White Line Drawings:  (DNT)  L/R Discrimination:  (DNT)  Conversation: Moderate  Interfering Components: Attention - selective; Attention - sustained; Attention - divided; Attention to detail;Processing speed; Working memory  Effective Techniques: Extra processing time;Repetition;Stressing words;Pausing    Reading Comprehension  Reading Status:  (DNT)    Expression  Primary Mode of Expression: Verbal    Verbal Expression  Verbal Expression: Exceptions to functional limits  Repetition: Mild  Automatic Speech:  (increased time needed for days of the week and months of the year)  Confrontation: Mild (increased time needed)  Convergent: Mild (increased

## 2018-10-05 NOTE — PROGRESS NOTES
Ambulating  Equipment Used: Wheelchair;Bed;Other  Level of Asssistance: Contact guard assistance  Wheelchair Transfers Comments: EOB -> bathroom chair -> w/c with ww            Assessment      Activity Tolerance  Activity Tolerance: Treatment limited secondary to decreased cognition  Safety Devices  Safety Devices in place: Yes  Type of devices: All fall risk precautions in place          Plan   Plan  Times per week: 5-7 times per day for 15-90 minutes  Plan weeks:  1.5 - 2 weeks  Current Treatment Recommendations: Strengthening, ROM, Functional Mobility Training, Safety Education & Training, Cognitive Reorientation, Patient/Caregiver Education & Training, Home Management Training, Self-Care / ADL, Cognitive/Perceptual Training, Equipment Evaluation, Education, & procurement, Endurance Training  Plan Comment: Con't OT POC for d/c on 10-18-18  G-Code     OutComes Score                                           AM-PAC Score             Goals  Patient Goals   Patient goals : Patient was unable to state her goals.  Cousin that was present is hoping that patient can get support after discharge        Therapy Time   Individual Concurrent Group Co-treatment   Time In 0830         Time Out 0930         Minutes 60               Electronically signed by TIM Oden on 10/5/2018 at 1:22 PM  TIM Oden

## 2018-10-06 NOTE — PROGRESS NOTES
follow  Assistance: Minimal assistance  Quality of Gait: patient with shakey, ataxic, movements, unsteady, downward gaze, assist for ad management  Distance: 30 feet   Comments: patient declines further ambulation,      FIMS: Bed, Chair, Wheel Chair: 4 - Requires steadying assistance only <25% assist  and/or requires assist with one leg only  Walk: 1 - Total Assistance Walks < 50 feet OR requires two or more people OR patient performs < 25% of locomotion effort  Distance Walked: 40ft  Stairs: 0 - Activity Does not Occur ( 0 only for the admission assessment),  , Assessment: Patient with poor safety awareness, demonstrated difficulty with follow through with all tasks.      Occupational therapy: FIMS:  Eatin - Feeds self with setup/supervision/cues and/or requires only setup/supervision/cues to perform tube feedings  Groomin - Requires setup/cues to do all tasks  Bathin - Did not occur  Dressing-Upper: 4 - Requires assist with buttons/zippers only and/or requires assist with one arm only  Dressing-Lower: 4 - Requires assist with buttons/zippers/shoelaces and/or assist with shoes only  Toiletin - Did not occur  Toilet Transfer: 0 - Did not occur  Tub Transfer: 0 - Activity does not occur  Shower Transfer: 0 - Activity does not occur,  ,      Speech therapy: FIMS: Comprehension: 4 - Patient understands basic needs 75-90%+ of the time  Expression: 4 - Expresses basic ideas/needs 75-90%+ of the time  Social Interaction: 4 - Patient appropriate 75-90%+ of the time  Problem Solvin - Patient solves simple/routine tasks < 25%  Memory: 1 - Patient remembers < 25% of the time      Lab/X-ray studies reviewed, analyzed and discussed with patient and staff:   Recent Results (from the past 24 hour(s))   POCT Glucose    Collection Time: 10/05/18  4:15 PM   Result Value Ref Range    POC Glucose 146 (H) 60 - 115 mg/dl    Performed on ACCU-CHEK    POCT Glucose    Collection Time: 10/05/18  9:57 PM   Result Value Ref Range    POC Glucose 173 (H) 60 - 115 mg/dl    Performed on ACCU-CHEK    Basic Metabolic Panel    Collection Time: 10/06/18  5:40 AM   Result Value Ref Range    Sodium 136 132 - 144 mEq/L    Potassium 3.9 3.5 - 5.1 mEq/L    Chloride 108 (H) 98 - 107 mEq/L    CO2 15 (L) 22 - 29 mEq/L    Anion Gap 13 7 - 13 mEq/L    Glucose 125 (H) 74 - 109 mg/dL    BUN 9 6 - 20 mg/dL    CREATININE 0.46 (L) 0.50 - 0.90 mg/dL    GFR Non-African American >60.0 >60    GFR  >60.0 >60    Calcium 8.4 (L) 8.6 - 10.2 mg/dL   POCT Glucose    Collection Time: 10/06/18  6:22 AM   Result Value Ref Range    POC Glucose 120 (H) 60 - 115 mg/dl    Performed on ACCU-CHEK    POCT Glucose    Collection Time: 10/06/18 12:03 PM   Result Value Ref Range    POC Glucose 183 (H) 60 - 115 mg/dl    Performed on ACCU-CHEK        Previous extensive, complex labs, notes and diagnostics reviewed and analyzed. ALLERGIES:    Allergies as of 10/03/2018 - Review Complete 10/03/2018   Allergen Reaction Noted    Seasonal  06/03/2018      (please also verify by checking [facility])     Complex Physical Medicine & Rehab Issues Assess & Plan:   1. Severe abnormality of gait and mobility and impaired self-care and ADL's secondary to progressive hepatic encephalopathy . Functional and medical status reassessed regarding patients ability to participate in therapies and patient found to be able to participate in acute intensive comprehensive inpatient rehabilitation program including PT/OT to improve balance, ambulation, ADLs, and to improve the P/AROM. Therapeutic modifications regarding activities in therapies, place, amount of time per day and intensity of therapy made daily. In bed therapies or bedside therapies prn.   2. Bowel and Bladder dysfunction:  frequent toileting, ambulate to bathroom with assistance, check post void residuals.   Check for C.difficile x1 if >2 loose stools in 24 hours, continue bowel & bladder program.  Monitor bowel and bladder function. Lactinex 2 PO every AC. MOM prn, Brown Bomb prn, Glycerin suppository prn, enema prn. 3. Moderate generalized OA pain: reassess pain every shift and prior to and after each therapy session, give prn medications, modalities prn in therapy, Lidoderm, K-pad prn.   4. Skin healing and breakdown risk:  continue pressure relief program.  Daily skin exams and reports from nursing. 5. Nutritional and hydration deficiency:  continue to monitor I&Os, calorie counts prn, dietary consult prn.  6. Acute episodic insomnia with situational adjustment disorder:  prn Ambien, monitor for day time sedation. 7. Falls risk elevated:  patient to use call light to get nursing assistance to get up, bed and chair alarm. 8. Elevated DVT risk: progressive activities in PT, continue prophylaxis MALCOLM hose, elevation and  .  9. Complex discharge planning:  Weekly team meeting every Monday to assess progress towards goals, discuss and address social, psychological and medical comorbidities and to address difficulties they may be having progressing in therapy. Patient and family education is in progress. The patient is to follow-up with their family physician after discharge. Complex Active General Medical Issues that complicate care Assess & Plan:     1. Principal Problem:    Abnormality of gait and mobility due to Hepatic Encephalopathy with generalizd weakness. [facility] Re[facility] admit 10/03/18. Active Problems:    Seizure ([address]    Steatohepatitis, alcoholic    Severe sepsis without septic shock (CODE) (HCC)    E. coli UTI (urinary tract infection)    Jaundice, hepatocellular    Coagulopathy (HCC)    Lactic acid acidosis    Hyperglycemia due to type 2 diabetes mellitus (HCC)    NSTEMI (non-ST elevated myocardial infarction) (HCC)    Increased ammonia level    Alcoholism /alcohol abuse (HCC)    Severe malnutrition (HCC)    Hx of blood clots    Cirrhosis ([address]  Resolved Problems:    * No resolved hospital problems.

## 2018-10-06 NOTE — PROGRESS NOTES
Assumed care of pt at [address] [place] [address]. Pt was sleeping in fetal position in bed. Kendy alarm went off as pt was getting out of bed to go to toilet. PT assisted to toilet but gait was very slow and unsteady. PT had small bm and voided but was incontinent of loose stool in depends as well. Back to bed. Bed alarm on and call cristobal in reach. Kendy on. Will monitor on rounds.  VMORRIS RN

## 2018-10-06 NOTE — PROGRESS NOTES
Occupational Therapy  Facility/Department: [facility]  Daily Treatment Note  NAME: Toney Ro  : 1970  MRN: [id number]    Date of Service: 10/6/2018    Discharge Recommendations:  Continue to assess pending progress       Patient Diagnosis(es): There were no encounter diagnoses. has a past medical history of Acute deep vein thrombosis (DVT) of left lower extremity ([address] ANTONIO (acute kidney injury) ([address] Alcohol abuse; Alcoholic cirrhosis of liver ([address] Basal cell carcinoma of leg; Blood clot in vein; Cirrhosis ([address] Diabetes mellitus ([address] DVT (deep venous thrombosis) ([address] Hx of blood clots; Seizure ([facility] Seizures ([facility] Type 2 diabetes mellitus without complication ([facility] [address] [facility] and Withdrawal seizures ([facility] [address] has a past surgical history that includes Skin cancer excision (Left); US Paracentesis Subsequent (7/10/2017); skin biopsy; and pr esophagogastroduodenoscopy transoral diagnostic (N/A, 10/3/2017). Restrictions  Restrictions/Precautions  Restrictions/Precautions: Fall Risk, Seizure     Subjective   General  Chart Reviewed: Yes  Response to previous treatment: Patient reporting fatigue but able to participate  Family / Caregiver Present: No  Referring Practitioner: Dr Morgan Murphy  Diagnosis: Hepatic Encephalopathy with generalized weakness  Pre Treatment Pain Screening  Pain at present: 0  Scale Used: Numeric Score  Intervention List: Patient able to continue with treatment  Pain Assessment  Patient Currently in Pain: No  Vital Signs  Patient Currently in Pain: No     Objective    ADL  Grooming: Increased time to complete;Setup (for hair care while seated at sink )  UE Dressing: Minimal assistance; Increased time to complete (to pull shirt down in back )  LE Dressing: Minimal assistance; Increased time to complete (Assist to don pants past hips )              Transfers  Stand Pivot Transfers: Stand by assistance (EOB<>WC)  Transfer Comments: Pt declined ambulating to bathroom to perform ADLs. Cognition  Overall Cognitive Status: Exceptions  Arousal/Alertness: Delayed responses to stimuli;Inconsistent responses to stimuli  Following Commands: Inconsistently follows commands  Attention Span: Difficulty attending to directions  Safety Judgement: Decreased awareness of need for safety  Problem Solving: Decreased awareness of errors  Insights: Not aware of deficits  Initiation: Requires cues for all  Sequencing: Requires cues for all  Cognition Comment: Pt required a significant amount of time to process most information. Assessment   Activity Tolerance  Activity Tolerance: Treatment limited secondary to decreased cognition;Patient limited by fatigue  Safety Devices  Safety Devices in place: Yes  Type of devices: All fall risk precautions in place          Plan   Plan  Times per week: 5-7 times per day for 15-90 minutes  Plan weeks:  1.5 - 2 weeks  Current Treatment Recommendations: Strengthening, ROM, Functional Mobility Training, Safety Education & Training, Cognitive Reorientation, Patient/Caregiver Education & Training, Home Management Training, Self-Care / ADL, Cognitive/Perceptual Training, Equipment Evaluation, Education, & procurement, Endurance Training  Plan Comment: Con't OT POC for d/c on 10-18-18    Goals  Patient Goals   Patient goals : Patient was unable to state her goals.  Cousin that was present is hoping that patient can get support after discharge        Therapy Time   Individual Concurrent Group Co-treatment   Time In 0800         Time Out 0830         Minutes 30           Electronically signed by TIM Schmitz on 10/6/2018 at 8:42 AM    TIM Schmitz

## 2018-10-06 NOTE — PROGRESS NOTES
Physical Therapy Rehab Treatment Note  Facility/Department: [facility]  [id number]       NAME: Samir Rios  : 1970 (50 y.o.)  MRN: [id number]  CODE STATUS: Full Code    Date of Service: 10/6/2018  Chart Reviewed: Yes  Family / Caregiver Present: No  General Comment  Comments: patient presenting to therapy gym, agreeable to treatment    Restrictions:  Restrictions/Precautions: Fall Risk, Seizure       SUBJECTIVE: Subjective: Patient reports she has had a stomach ache since this morning. Pain Screening  Patient Currently in Pain: Yes  Pre Treatment Pain Screening  Pain at present: 9  Scale Used: Numeric Score  Intervention List: Patient able to continue with treatment  Comments / Details: 9/10 abdominal pain stabbing pain    Post Treatment Pain Screening:  Pain Assessment  Pain Assessment: 0-10  Pain Level: 9  Pain Type: Acute pain  Pain Location: Abdomen  Pain Descriptors: Sharp  Pain Frequency: Continuous    OBJECTIVE:               Neuromuscular Education  NDT Treatment: Standing  Neuromuscular Comments: static without ue support, mod-min assist      Bed mobility  Rolling to Left: Stand by assistance  Rolling to Right: Stand by assistance  Supine to Sit: Contact guard assistance;Minimal assistance  Comment: patient required cues for safety awareness    Transfers  Sit to Stand: Contact guard assistance  Stand to sit: Contact guard assistance  Bed to Chair: Minimal assistance; Moderate assistance    Ambulation  Ambulation?: Yes  Ambulation 1  Surface: level tile  Device: Rolling Walker  Assistance: Minimal assistance  Quality of Gait: patient with shakey, ataxic, movements, unsteady, downward gaze, assist for ad management  Distance: 30 feet   Comments: patient declines further ambulation  Stairs/Curb  Stairs?: No (safety concern)        Activity Tolerance  Activity Tolerance: Patient limited by cognitive status; Patient limited by pain  Activity Tolerance: difficulty following commands, attempts to

## 2018-10-06 NOTE — PROGRESS NOTES
Occupational Therapy  Facility/Department: [facility]  Daily Treatment Note  NAME: Toney Ro  : 1970  MRN: [id number]    Date of Service: 10/6/2018    Discharge Recommendations:  Continue to assess pending progress       Patient Diagnosis(es): There were no encounter diagnoses. has a past medical history of Acute deep vein thrombosis (DVT) of left lower extremity ([address] ANTONIO (acute kidney injury) ([facility] Alcohol abuse; Alcoholic cirrhosis of liver ([facility] Basal cell carcinoma of leg; Blood clot in vein; Cirrhosis ([facility] Diabetes mellitus ([facility] DVT (deep venous thrombosis) ([facility] Hx of blood clots; Seizure ([facility] Seizures ([facility] Type 2 diabetes mellitus without complication ([facility] and Withdrawal seizures ([facility] [address] [facility] has a past surgical history that includes Skin cancer excision (Left); US Paracentesis Subsequent (7/10/2017); skin biopsy; and pr esophagogastroduodenoscopy transoral diagnostic (N/A, 10/3/2017). Restrictions  Restrictions/Precautions  Restrictions/Precautions: Fall Risk, Seizure     Subjective   General  Chart Reviewed: Yes  Response to previous treatment: Patient reporting fatigue but able to participate  Family / Caregiver Present: No  Referring Practitioner: Dr Morgan Murphy  Diagnosis: Hepatic Encephalopathy with generalized weakness  Pre Treatment Pain Screening  Pain at present: 0  Scale Used: Numeric Score  Intervention List: Patient able to continue with treatment  Pain Assessment  Patient Currently in Pain: Yes  Pain Assessment: 0-10  Pain Level: 9  Pain Type: Acute pain  Pain Location: Abdomen  Pain Orientation: Lower  Pain Descriptors: Sharp  Pain Frequency: Continuous  Pain Intervention(s): RN notified; Rest  Response to Pain Intervention: None  Vital Signs  Patient Currently in Pain: Yes     Objective    Coordination  Fine Motor: Pennies: Pt used bilateral hands to pick pennies from the tabletop and insert them into a coin slot on a small container.  Pt was unable to  pennies

## 2018-10-06 NOTE — PROGRESS NOTES
Subjective: The patient complains of moderate  acute pain relieved by reswt and exacerbated by activity. I am concerned about patients fatigue. ROS x10: The patient also complains of severely impaired mobility and activities of daily living. Otherwise no new problems with vision, hearing, nose, mouth, throat, dermal, cardiovascular, GI, , pulmonary, musculoskeletal, psychiatric or neurological. See Rehab H&P on Rehab chart dated . Vital signs:  /69   Pulse 89   Temp 98 °F (36.7 °C) (Oral)   Resp 16   Ht 5' 3\" (1.6 m)   Wt 82 lb 14.3 oz (37.6 kg)   LMP  (LMP Unknown)   SpO2 99%   BMI 14.68 kg/m²   I/O:   PO/Intake:  fair PO intake, no problems observed or reported. Bowel/Bladder:  continent, no problems noted. General:  Patient is well developed, adequately nourished, non-obese and     well kempt. HEENT:    PERRLA, hearing intact to loud voice, external inspection of ear     and nose benign. Inspection of lips, tongue and gums benign  Musculoskeletal: No significant change in strength or tone. All joints stable. Inspection and palpation of digits and nails show no clubbing,       cyanosis or inflammatory conditions. Neuro/Psychiatric: Affect: flat but pleasant. Alert and oriented to person, place and     situation. No significant change in deep tendon reflexes or     sensation  Lungs:  CTA-B. Respiration effort is normal at rest.     Heart:   S1 = S2, RRR. No loud murmurs. Abdomen:  Soft, non-tender, no enlargement of liver or spleen. Extremities:  No significant lower extremity edema or tenderness. Skin:   Intact to general survey, no visualized or palpated problems.     Rehabilitation:  Physical therapy: FIMS:  Bed Mobility:      Transfers: Sit to Stand: Contact guard assistance  Stand to sit: Contact guard assistance  Bed to Chair: Minimal assistance, Moderate assistance, Ambulation 1  Surface: level tile  Device: Rolling Walker  Other Apparatus: Wheelchair Range    POC Glucose 173 (H) 60 - 115 mg/dl    Performed on ACCU-CHEK    Basic Metabolic Panel    Collection Time: 10/06/18  5:40 AM   Result Value Ref Range    Sodium 136 132 - 144 mEq/L    Potassium 3.9 3.5 - 5.1 mEq/L    Chloride 108 (H) 98 - 107 mEq/L    CO2 15 (L) 22 - 29 mEq/L    Anion Gap 13 7 - 13 mEq/L    Glucose 125 (H) 74 - 109 mg/dL    BUN 9 6 - 20 mg/dL    CREATININE 0.46 (L) 0.50 - 0.90 mg/dL    GFR Non-African American >60.0 >60    GFR  >60.0 >60    Calcium 8.4 (L) 8.6 - 10.2 mg/dL   POCT Glucose    Collection Time: 10/06/18  6:22 AM   Result Value Ref Range    POC Glucose 120 (H) 60 - 115 mg/dl    Performed on ACCU-CHEK    POCT Glucose    Collection Time: 10/06/18 12:03 PM   Result Value Ref Range    POC Glucose 183 (H) 60 - 115 mg/dl    Performed on ACCU-CHEK        Previous extensive, complex labs, notes and diagnostics reviewed and analyzed. ALLERGIES:    Allergies as of 10/03/2018 - Review Complete 10/03/2018   Allergen Reaction Noted    Seasonal  06/03/2018      (please also verify by checking [facility])     Complex Physical Medicine & Rehab Issues Assess & Plan:   1. Severe abnormality of gait and mobility and impaired self-care and ADL's secondary to progressive hepatic encephalopathy . Functional and medical status reassessed regarding patients ability to participate in therapies and patient found to be able to participate in acute intensive comprehensive inpatient rehabilitation program including PT/OT to improve balance, ambulation, ADLs, and to improve the P/AROM. Therapeutic modifications regarding activities in therapies, place, amount of time per day and intensity of therapy made daily. In bed therapies or bedside therapies prn.   2. Bowel and Bladder dysfunction:  frequent toileting, ambulate to bathroom with assistance, check post void residuals.   Check for C.difficile x1 if >2 loose stools in 24 hours, continue bowel & bladder program.  Monitor bowel and bladder *        Ramon Alberto D.O., PM&R     Attending    [facility]

## 2018-10-06 NOTE — PROGRESS NOTES
Patient was referred for evaluation and counseling due to history of alcoholism. She was admitted due to impairment in mobility and coordination due to hepatic encephalopathy with a decline in her mental status. She has been residing with an older man who has not been able to manage her or to restrict her from using alcohol. Patient was interviewed this pm with family members (sister, cousin) present. Her affect was blunted. Her  verbal expression was vague and somewhat circumstantial.     She was oriented to place, but her recall of her admission to the hospital was poor. She could not say when she was admitted. She stated the date as \"October 8, 1918\". She was able to name the current and past presidents. She was able to provide her date of birth. When asked about why she had been hospitalized, she provided vague information about difficulty walking. She did not mention her liver problems or alcoholism. Her understanding of her medical condition appeared to be limited. When asked about her medications, she could only name the Lactulose. I asked her about whether the doctors had suggested that she make changes in her lifestyle, and she said, \"Not that I recall\". I asked her about whether the doctors had told her why she was having problems with her liver, and she said, \"From drinking\". She acknowledged that she was continuing to drink at her friend's home (\"couple of beers\"). She agreed that she needed to live in a place where alcohol was not available. Family members thought that she would need supervision at home, and that she was unable to manage her medications. The cousin stated that Trisha Rios had not been taking her medications appropriately for the past year.     Assessment/plan: Trisha Rios has shown improvement in her mental status, but some memory impairment and deficits with regard to  executive functioning (insight, reasoning, problem solving) persist.  It is conceivable that she will need a legal

## 2018-10-07 NOTE — FLOWSHEET NOTE
Up to bathroom and voided small amount and had a bm. Complaining of some bladder pressure. Bladder scan done and result was 572cc's. Patient cathed and clear yellow urine returned and ua and culture sent. Patient requested henson be left in for now.

## 2018-10-08 NOTE — PLAN OF CARE
Problem: Falls - Risk of:  Goal: Will remain free from falls  Will remain free from falls      Outcome: Ongoing    Goal: Absence of physical injury  Absence of physical injury      Outcome: Ongoing      Problem: Risk for Impaired Skin Integrity  Goal: Tissue integrity - skin and mucous membranes  Structural intactness and normal physiological function of skin and  mucous membranes.    Outcome: Ongoing      Problem: Confusion - Acute:  Goal: Absence of continued neurological deterioration signs and symptoms  Absence of continued neurological deterioration signs and symptoms     Outcome: Ongoing    Goal: Mental status will be restored to baseline  Mental status will be restored to baseline     Outcome: Ongoing      Problem: Discharge Planning:  Goal: Ability to perform activities of daily living will improve  Ability to perform activities of daily living will improve     Outcome: Ongoing    Goal: Participates in care planning  Participates in care planning     Outcome: Ongoing      Problem: Injury - Risk of, Physical Injury:  Goal: Absence of physical injury  Absence of physical injury      Outcome: Ongoing    Goal: Will remain free from falls  Will remain free from falls      Outcome: Ongoing      Problem: Mood - Altered:  Goal: Mood stable  Mood stable     Outcome: Ongoing    Goal: Absence of abusive behavior  Absence of abusive behavior     Outcome: Ongoing    Goal: Verbalizations of feeling emotionally comfortable while being cared for will increase  Verbalizations of feeling emotionally comfortable while being cared for will increase     Outcome: Ongoing      Problem: Psychomotor Activity - Altered:  Goal: Absence of psychomotor disturbance signs and symptoms  Absence of psychomotor disturbance signs and symptoms     Outcome: Ongoing      Problem: Sensory Perception - Impaired:  Goal: Demonstrations of improved sensory functioning will increase  Demonstrations of improved sensory functioning will increase

## 2018-10-08 NOTE — CARE COORDINATION
50%-74% of the time (10/08/18 1250)  GOAL: Problem Solving: 3  GOAL: Memory: 3  RECREATIONAL THERAPY  Attendance to recreational therapy programs:    []  Pet Therapy  [] Music Therapy  [] Art Therapy    [] Recreation Therapy Group [] Support Group           Patient social interaction (mood, participation):      Patient strengths:    Patients goal:    Problems/Barriers:  1. Safety -           - Intervention / Plan:    ___ falls protocol     ___PT/OT    ____SP          - Results:    2. Potential DME needs         - Intervention / Plan:  ____ PT/OT     ____assess equipment needs/access         - Results:  3. Weakness          - Intervention / Plan:  ___ PT/OT      ____           - Results:    4. Discharge planning needs          - Intervention / Plan:  ___ Weekly team conference      ____family training          - Results:  5.            - Intervention / Plan:  ___           - Results:  6.            - Intervention / Plan:               - Results:    7.            - Intervention / Plan:               - Results:        Discharge Plan   Estimated Length of Stay:    Tentative Discharge date:      Anticipated Discharge Destination:       Team recommendations:    1. Follow up Therapy :     RN___     PT___    OT___  SP ____ SW ____ Aide _____    2.  [place] ______      OP ______    Other:     Equipment needed at Discharge:      Team Members Present at Conference:    Physician:     :     RN:    Occupational Therapist:     Physical Therapist:     Speech Therapist:    Recreational Therapist:    Other:    Other:

## 2018-10-08 NOTE — CONSULTS
Attempted to see patient. Was in with physical therapy and rehab psychology. Patient not available for assessment. .German Burden

## 2018-10-08 NOTE — PROGRESS NOTES
[facility] Occupational Therapy      Date: 10/8/2018  Patient Name: Isabel Seaman        MRN: [id number]  Account: [de-identified]   : 1970  (50 y.o.)  [facility]    Chart reviewed, attempted OT at 13:30 for 30 minutes. Patient unavailable 2° to:    [] Hold per nsg request    [x] Pt declined 2° not feeling well    [] Pt. . off floor for test/procedure. Will attempt again when able.     Electronically signed by TIM Boone on 10/8/2018 at 2:33 PM Will be taken directly to the NICU

## 2018-10-08 NOTE — PROGRESS NOTES
[facility]  Speech Language/Pathology  Rehab Daily Note                  Kameron Ying  10/8/2018    Rehab Dx/Hx: Abnormality of gait and mobility [R26.9]    Precautions: falls    ST Dx: [] Aphasia  [] Dysarthria  [] Apraxia   [] Oropharyngeal dysphagia              [x] Cognitive Impairment  [] Other:     Date of Admit: 10/3/2018  Room #: W811/Q471-68    Time in: 11:00  Time out: 11:30    Subjective:  Behavior: [x] Alert  [x] Cooperative  []  Pleasant  [] Confused  [] Agitated                                          [] Uncooperative  [] Distractible [] Motivated  [] Self-Limiting [] Anxious          [] Other:    Endurance:  [x] Adequate for participation in SLP sessions  [] Reduced overall              [] Lethargic  [] Other:              Interventions used this date:  [] Speech Treatment       [] Expressive Language  [] Receptive Language   [] Dysphagia Treatment   [x] Cognitive Skill Anil  [] Oral Motor  [] Voice Treatment       []  AAC     [] ESTIM  [] Speech production       [] Therapeutic Meal Monitor               [] Instruction in compensatory strategies       Objective/Assessment:  Patient progressing towards goals:  Short-term Goals  Timeframe for Short-term Goals: 2-3 weeks  Goal 1: To increase safety awareness and judgment for safe completion of ADLs secondary to pt's cognitive deficits,  pt will complete low level problem solving tasks related to ADLs  with 75% accuracy and max cues. Goal 2: To address pt's cognitive deficits and promote orientation, pt will state name of facility, time within 1 hour, reason in hospital, current month and year with 100% accuracy with max assist with use of external aid. Goal 3: To increase safety awareness and visual scanning/organization in all environments, pt will complete low level visuospatial tasks with 75% accuracy and max cues.   Goal 4: Pt will complete convergent and divergent naming tasks with 75% accuracy with max cues to promote

## 2018-10-08 NOTE — PROGRESS NOTES
extensive, complex labs, notes and diagnostics reviewed and analyzed. ALLERGIES:    Allergies as of 10/03/2018 - Review Complete 10/03/2018   Allergen Reaction Noted    Seasonal  06/03/2018      (please also verify by checking [facility])     Complex Physical Medicine & Rehab Issues Assess & Plan:   1. Severe abnormality of gait and mobility and impaired self-care and ADL's secondary to progressive hepatic encephalopathy . Functional and medical status reassessed regarding patients ability to participate in therapies and patient found to be able to participate in acute intensive comprehensive inpatient rehabilitation program including PT/OT to improve balance, ambulation, ADLs, and to improve the P/AROM. Therapeutic modifications regarding activities in therapies, place, amount of time per day and intensity of therapy made daily. In bed therapies or bedside therapies prn.   2. Bowel and Bladder dysfunction:  frequent toileting, ambulate to bathroom with assistance, check post void residuals. Check for C.difficile x1 if >2 loose stools in 24 hours, continue bowel & bladder program.  Monitor bowel and bladder function. Lactinex 2 PO every AC. MOM prn, Brown Bomb prn, Glycerin suppository prn, enema prn. 3. Moderate generalized OA pain: reassess pain every shift and prior to and after each therapy session, give prn medications, modalities prn in therapy, Lidoderm, K-pad prn.   4. Skin healing and breakdown risk:  continue pressure relief program.  Daily skin exams and reports from nursing. 5. Nutritional and hydration deficiency:  continue to monitor I&Os, calorie counts prn, dietary consult prn.  6. Acute episodic insomnia with situational adjustment disorder:  prn Ambien, monitor for day time sedation. 7. Falls risk elevated:  patient to use call light to get nursing assistance to get up, bed and chair alarm.   8. Elevated DVT risk: progressive activities in PT, continue prophylaxis MALCOLM hose, elevation and

## 2018-10-09 PROBLEM — K70.31 ASCITES DUE TO ALCOHOLIC CIRRHOSIS (HCC): Status: ACTIVE | Noted: 2018-01-01

## 2018-10-09 NOTE — PROGRESS NOTES
cues.    To increase safety awareness and judgment for safe completion of ADLs secondary to pt's cognitive deficits, pt will complete abstract reasoning tasks (i.e. Word deduction, convergent and divergent naming, similarities/differences) with 80% accuracy and min cues. To increase independence for functional activities for home and community, pt will complete mid level executive functioning tasks (i.e. Path finding, scheduling appointments, prioritizing tasks) with min cues. To decrease pt's cognitive deficits through the use of compensatory strategies, the pt will be educated on 3 different memory strategies and verbalize how he/she might use them at home in 3 ways with min cues. To promote awareness and functionality of compensatory strategies in light of pt's cognitive deficits, pt will recall 3 memory strategies with min cues and utilize them in  structured tasks in 80% of opportunities with min cues. Short-term Goals  Timeframe for Short-term Goals: 1-2 weeks  Goal 1: Pt will complete oral motor exercises as able. Goal 2: Pt will tolerate mechanical soft diet with thin liquids with no overt s/s of aspiration. Goal 3: Pt will tolerate therapeutic trials of soft/regular as able. Pt requested diet upgrade to RN. ANDRA Marquez) notified ST of pt's request. Pt was seen for a therapeutic meal monitor at lunch this date. Pt was observed to consume regular consistencies with thin liquids (via straw and cup sips) without any overt s/s of aspiration. Pt was able to form and clear a cohesive bolus independently in a timely manner. ST in agreement and rec: a diet upgrade to regular consistencies with thin liquids. Renetta SILVERMAN notified and a voicemail was left for Dr. Malena Slaughter.         Treatment/Activity Tolerance:     [x]  Patient tolerated treatment well []  Patient limited by fatique    []  Patient limited by pain  []  Patient limited by other medical complications   []  Other:     Plan: [x]  Continue

## 2018-10-09 NOTE — PROGRESS NOTES
Occupational Therapy  Facility/Department: [facility]  Daily Treatment Note  NAME: Mary Reyes  : 1970  MRN: [id number]    Date of Service: 10/9/2018    Discharge Recommendations:  Continue to assess pending progress       Patient Diagnosis(es): There were no encounter diagnoses. has a past medical history of Acute deep vein thrombosis (DVT) of left lower extremity ([address] ANTONIO (acute kidney injury) ([address] Alcohol abuse; Alcoholic cirrhosis of liver ([facility] Basal cell carcinoma of leg; Blood clot in vein; Cirrhosis ([address] Diabetes mellitus ([address] DVT (deep venous thrombosis) ([facility] Hx of blood clots; Seizure ([facility] Seizures ([facility] Type 2 diabetes mellitus without complication ([facility] [facility] and Withdrawal seizures ([facility] has a past surgical history that includes Skin cancer excision (Left); US Paracentesis Subsequent (7/10/2017); skin biopsy; and pr esophagogastroduodenoscopy transoral diagnostic (N/A, 10/3/2017). Restrictions  Restrictions/Precautions  Restrictions/Precautions: Fall Risk, Seizure     Subjective \"I hope this goes away soon. \"   General  Chart Reviewed: Yes  Response to previous treatment: Patient reporting fatigue but able to participate  Family / Caregiver Present: No  Referring Practitioner: Dr Kasey Muse  Diagnosis: Hepatic Encephalopathy with generalized weakness  Pre Treatment Pain Screening  Pain at present: 7  Scale Used: Numeric Score  Intervention List: Patient able to continue with treatment;Nurse called to administer meds  Pain Assessment  Patient Currently in Pain: Yes  Pain Assessment: 0-10  Pain Level: 7  Pain Type: Acute pain  Pain Location: Abdomen;Back (around entire mid section )  Pain Orientation: Lower  Pain Descriptors: Cramping  Pain Frequency: Intermittent  Clinical Progression: Gradually worsening  Pain Intervention(s): RN notified  Response to Pain Intervention: None  Vital Signs  Patient Currently in Pain: Yes     Objective    ADL  Feeding: Verbal cueing; Increased time

## 2018-10-09 NOTE — PROGRESS NOTES
Physical Therapy Missed Treatment   Facility/Department: [facility] SURG V896/[id number]    NAME: Kameron Ying    : 1970 (50 y.o.)  MRN: [id number]    Account: [de-identified]  Gender: female     [x] Patient Refused: Pt adamantly refusing d/t not feeling well. Encouraged pt to participate, however declined. Pt states she has no questions prior to possible D/C tomorrow. Will attempt PT treatment again at earliest convenience.       Electronically signed by Grace Osullivan PTA on 10/9/18 at 4:13 PM

## 2018-10-09 NOTE — PROGRESS NOTES
place      Therapy Time:   Individual   Time In 1100   Time Out 1130   Minutes 30     Minutes:30      Transfer/Bed mobility training:15      Gait training:15      Neuro re education:     Therapeutic ex:      [facility], [facility], 10/09/18 at 12:25 PM

## 2018-10-09 NOTE — PROGRESS NOTES
Occupational Therapy  Facility/Department: [facility]  Daily Treatment Note  NAME: Micah Barba  : 1970  MRN: [id number]    Date of Service: 10/9/2018    Discharge Recommendations:  Continue to assess pending progress       Patient Diagnosis(es): There were no encounter diagnoses. has a past medical history of Acute deep vein thrombosis (DVT) of left lower extremity ([address] ANTONIO (acute kidney injury) ([address] Alcohol abuse; Alcoholic cirrhosis of liver ([address] Basal cell carcinoma of leg; Blood clot in vein; Cirrhosis ([address] Diabetes mellitus ([address] DVT (deep venous thrombosis) ([address] Hx of blood clots; Seizure ([address] Seizures ([address] Type 2 diabetes mellitus without complication ([address] and Withdrawal seizures ([address] has a past surgical history that includes Skin cancer excision (Left); US Paracentesis Subsequent (7/10/2017); skin biopsy; and pr esophagogastroduodenoscopy transoral diagnostic (N/A, 10/3/2017).     Restrictions  Restrictions/Precautions  Restrictions/Precautions: Fall Risk, Seizure     Subjective   General  Chart Reviewed: Yes  Response to previous treatment: Patient reporting fatigue but able to participate  Family / Caregiver Present: No  Referring Practitioner: Dr Thierry Gallegos  Diagnosis: Hepatic Encephalopathy with generalized weakness  Pre Treatment Pain Screening  Pain at present: 7  Scale Used: Numeric Score  Intervention List: Patient able to continue with treatment;Nurse called to administer meds  Pain Assessment  Patient Currently in Pain: Yes  Pain Assessment: 0-10  Pain Level: 9  Pain Type: Acute pain  Pain Location: Abdomen  Pain Orientation: Lower  Pain Descriptors: Cramping  Pain Frequency: Intermittent  Clinical Progression: Gradually worsening  Pain Intervention(s): RN notified  Response to Pain Intervention: None  Vital Signs  Patient Currently in Pain: Yes     Objective  Transfers  Stand Step Transfers: Stand by assistance  Stand Pivot Transfers: Stand by assistance (EOB >

## 2018-10-09 NOTE — PROGRESS NOTES
precautions in place      Therapy Time:   Individual   Time In 1530   Time Out 1550   Minutes 20     Minutes:20      Transfer/Bed mobility training:      Gait training:15      Neuro re education:5     Therapeutic ex:      Anneliese Castro PTA, 10/09/18 at 4:12 PM

## 2018-10-09 NOTE — CONSULTS
PODIATRIC MEDICINE AND SURGERY  CONSULT HISTORY AND PHYSICAL      Consulting Service:  Rehab  Requesting Provider: Dr. Corinne Nipple, DO  Opinion/advice regarding: Elongated Toenails  Staff Doctor:  Dr. Bates Prow:  This 50 y.o. female with PMH of DVT, alcohol abuse, cirrhosis, DM2 and seizures presents with onychomycosis nails 1-5 b/l     Plan:  Exam and evaluation  Debrided nail 1-5 b/l without incident. Patient can be referred to either Dr. Luz Newman or Dr. Rose Colby for regular nail care      HPI: This is a 50y.o. year old female with PMH of DVT, alcohol abuse, cirrhosis, DM2 and seizures seen today for nail care. Pt was admitted to the hospital for AMS. States nails are difficult to trim due to thickness. Denies any open lesions to BLE. Denies any nausea, vomiting, fever, chills, sob, chest pain, diarrhea, constipation, or leg pain/cramping. Past Medical History:   Diagnosis Date    Acute deep vein thrombosis (DVT) of left lower extremity ([address] 8/17/2017    ANTONIO (acute kidney injury) ([address] 8/2/2017    Alcohol abuse     Alcoholic cirrhosis of liver ([address] 5/9/2017    Basal cell carcinoma of leg 10/3/2016    left leg    Blood clot in vein 10/03/2017    Being treated presently with Lovanox    Cirrhosis ([address]     Diabetes mellitus ([address]     DVT (deep venous thrombosis) ([address] 10/3/2018    Hx of blood clots     Seizure (HCC)     Seizures (HCC)     d/t alcohol    Type 2 diabetes mellitus without complication ([address]     Withdrawal seizures ([address]        Past Surgical History:   Procedure Laterality Date    MA ESOPHAGOGASTRODUODENOSCOPY TRANSORAL DIAGNOSTIC N/A 10/3/2017    EGD ESOPHAGOGASTRODUODENOSCOPY performed by Annalisa Urban MD at [address] Left     leg   [address]  7/10/2017            No current facility-administered medications on file prior to encounter.       Current Outpatient Prescriptions on File Prior to SpO2 99%   BMI 18.82 kg/m²   Patient is alert and oriented x 3 in NAD. Vascular:  Faintly palpable Dorsalis Pedis and unable to palpate Posterior Tibial Pulses B/L   Capillary Fill time < 5 seconds to B/L digits  Skin temperature warm to cool tibial tuberosity to the digits B/L  Hair growth absent to digits  mild perimalleolar edema, no varicosities     Neurological:   Epicritic sensation intact B/L. Withdrew from painful stimuli b/l    Musculoskeletal/Orthopaedic:    4/5 muscle strength Dorsiflexion, Plantarflexion, Inversion, Eversion B/L  ROM decreased pedal and ankle joints B/L. Dermatological:   Skin appears well hydrated and supple with good temperature, texture, turgor. No hyperkeratosis  noted. Interspaces 1-4 are clear and without debris B/L. No open lesions noted to foot. Nails 1 bilateral are thick, elongated, with subungual debris  Nails 2 bilateral are thick, elongated, with subungual debris  Nails 3 bilateral are thick, elongated, with subungual debris  Nails 4 bilateral are thick, elongated, with subungual debris  Nails 5 bilateral are thick, elongated, with subungual debris     LABS:   Lab Results   Component Value Date    WBC 13.4 (H) 10/08/2018    HGB 9.9 (L) 10/08/2018    HCT 29.0 (L) 10/08/2018    .3 (H) 10/08/2018    PLT 97 (L) 10/08/2018     Lab Results   Component Value Date     10/09/2018    K 4.1 10/09/2018    K 5.0 10/02/2018     10/09/2018    CO2 16 10/09/2018    BUN 9 10/09/2018    CREATININE 0.46 10/09/2018    GLUCOSE 134 10/09/2018    CALCIUM 8.1 10/09/2018      Lab Results   Component Value Date    LABALBU 2.5 (L) 10/03/2018     No results found for: Marley Tang  No results found for: CRP  Lab Results   Component Value Date    LABA1C 4.4 (L) 09/12/2018     No results found for: EAG    MICROBIOLOGY:   Type   Date  Results  Blood Culture:  9/2918  NGTD      Patient's case will be discussed with staff, who will provide final recommendations.     Thank you for

## 2018-10-10 PROBLEM — R18.8 ASCITES: Status: ACTIVE | Noted: 2018-01-01

## 2018-10-10 NOTE — CARE COORDINATION
[facility] and [facility] will not accept back. Pt would like DC to [facility]. They are reviewing to see if they can accept pt as she is pending Medicaid. If they can accept, pt will need PAS and LOC. [facility] can accept pt when PAS/LOC received which was sent.

## 2018-10-12 NOTE — DISCHARGE SUMMARY
BS+  Musculoskeletal:  No clubbing, cyanosis; LLE with +2 pitting edema    Skin: Jaundiced  Neurologic:  Asterixis +ve  Psychiatric:  AON x 3; calm and cooperative  Capillary Refill: Brisk,< 3 seconds   Peripheral Pulses: +2 palpable, equal bilaterally     Patient was seen by the following consultants while admitted to [facility]:   Consults:  IP CONSULT TO CASE MANAGEMENT  IP CONSULT TO RECREATION THERAPY  IP CONSULT TO GI  IP CONSULT TO PSYCHIATRY  IP CONSULT TO PSYCHOLOGY  IP CONSULT TO SOCIAL WORK  IP CONSULT TO ONCOLOGY    Significant Diagnostic Studies:    Us Abdomen Limited    Result Date: 10/9/2018  EXAMINATION:  US ABDOMEN LIMITED CLINICAL HISTORY:  ASCITES . ABDOMINAL PAIN. COMPARISONS:  NONE AVAILABLE TECHNIQUE:  Screening ultrasound of all 4 quadrants. FINDINGS:  There is ascites seen throughout all 4 quadrants. The appendix is not visualized. ASCITES IS PRESENT THROUGHOUT ALL 4 QUADRANTS    Us Dup Lower Extremity Left Bette    Result Date: 10/10/2018  EXAMINATION: US DUP LOWER EXTREMITY LEFT BETTE CLINICAL HISTORY: Swelling. Edema. COMPARISONS: None available. FINDINGS: Left lower extremity venous duplex sonography was performed showing an incompletely compressible left femoral vein, with partially recanalized thrombus in the midportion and distally. Common femoral vein is compressible. The popliteal vein is compressible. Calf veins are compressible. Deep venous blood flow is documented in the compressible portions of the left lower extremity deep veins using color Doppler images and pulsed Doppler waveform images. The recanalized thrombus in the femoral vein was best shown with color Doppler images. CONCLUSION: PARTIALLY RECANALIZED, NONOBSTRUCTIVE, NONACUTE APPEARING LEFT FEMORAL VEIN THROMBOSIS. THERE ARE NO PRIOR STUDIES AVAILABLE FOR COMPARISON. CORRELATION WITH PATIENT'S HISTORY MIGHT BE HELPFUL. THERE IS NO ACUTE, OCCLUSIVE DEEP VEIN THROMBOSIS.       Discharge Medications:

## 2018-10-12 NOTE — PROGRESS NOTES
Dr. Rosaura Hernandez on floor. Informed him of patient's plan to be discharge and the order isin pending his recommendations. States he will be in tomorrow to see patient.  Electronically signed by Tori Solorzano RN on 10/12/2018 at 7:33 AM

## 2018-10-12 NOTE — PROGRESS NOTES
Assumed care of pt from [facility]. Assited pt to bed side commode. Pt had loose bowel movement and urinated 200 mls. Agree with nurse assessment. Dressing on abdomen is dry and intact at this time. Will continue to monitor.

## 2018-10-12 NOTE — PROGRESS NOTES
[address] [facility] to give report. Went over d/c paperwork with pt. She has no complaints, call light in reach.

## 2018-10-12 NOTE — CONSULTS
AVAILABLE TECHNIQUE:  Screening ultrasound of all 4 quadrants. FINDINGS:  There is ascites seen throughout all 4 quadrants. The appendix is not visualized. ASCITES IS PRESENT THROUGHOUT ALL 4 QUADRANTS    Us Abdomen Limited    Result Date: 9/30/2018  Transabdominal sonography with color flow. HISTORY: Cirrhosis. Rule out ductal dilatation. FINDINGS: Liver is normal in size and shape spleen course echotexture. Mild intrahepatic ductal dilatation identified predominantly left hepatic lobe. Gallbladder contains both low level echoes, and shadowing echogenic foci. Para cholecystic fluid is identified at  the gallbladder. Gallbladder wall is at upper limits of normal measuring 5 mm. Color flow without anomaly. Common duct measures 2 mm. Portions of pancreatic body are visualized and are normal in size shape and echogenicity. However, the majority of the pancreas is not visualized. Mild intrahepatic ductal dilatation. Cholelithiasis, with possible cholecystitis. Us Dup Lower Extremity Left Bette    Result Date: 10/10/2018  EXAMINATION: US DUP LOWER EXTREMITY LEFT BETTE CLINICAL HISTORY: Swelling. Edema. COMPARISONS: None available. FINDINGS: Left lower extremity venous duplex sonography was performed showing an incompletely compressible left femoral vein, with partially recanalized thrombus in the midportion and distally. Common femoral vein is compressible. The popliteal vein is compressible. Calf veins are compressible. Deep venous blood flow is documented in the compressible portions of the left lower extremity deep veins using color Doppler images and pulsed Doppler waveform images. The recanalized thrombus in the femoral vein was best shown with color Doppler images. CONCLUSION: PARTIALLY RECANALIZED, NONOBSTRUCTIVE, NONACUTE APPEARING LEFT FEMORAL VEIN THROMBOSIS. THERE ARE NO PRIOR STUDIES AVAILABLE FOR COMPARISON. CORRELATION WITH PATIENT'S HISTORY MIGHT BE HELPFUL.  THERE IS NO ACUTE, OCCLUSIVE DEEP VEIN THROMBOSIS. ASSESSMENT AND PLAN  1. Pt has chronic thrombocytopenia and coagulopathy  due to liver cirrhosis. CBC will be monitored as out-pt. No gross bleeding at this time. 2. Chronic DVT LLE-pt is a poor candidate for anticoagulant tx. 3. Liver cirrhosis      Thank you, Dr. Avani Clifford,  for this consultation. Pt to follow-up in our office in 1 month.     Electronically signed by Beena Campos MD on 10/12/2018 at 10:57 AM

## 2018-10-29 PROBLEM — R41.82 ALTERED MENTAL STATUS: Status: ACTIVE | Noted: 2018-01-01

## 2018-10-30 PROBLEM — E87.20 ACIDOSIS, METABOLIC: Status: ACTIVE | Noted: 2018-01-01

## 2018-10-30 PROBLEM — E87.8 ELECTROLYTE DISTURBANCE: Status: ACTIVE | Noted: 2018-01-01

## 2018-10-30 NOTE — DISCHARGE SUMMARY
Hospital Medicine Discharge Summary    Hilliard Lefort  :  1970  MRN:  [id number]    Admit date:  10/29/2018  Discharge date:  10/30/2018    Admitting Physician: Wes Victor MD  Primary Care Physician:  Kenn Pike MD      Discharge Diagnoses:    Principal Problem:    Alcoholic cirrhosis of liver [facility])  Active Problems:    Jaundice, hepatocellular    Hyperammonemia ([address]    ANTONIO (acute kidney injury) ([address]    Severe malnutrition ([address]    Acute cystitis without hematuria    Ascites due to alcoholic cirrhosis ([address]    Altered mental status    Electrolyte disturbance    Acidosis, metabolic  Resolved Problems:    * No resolved hospital problems. *    Chief Complaint   Patient presents with    Altered Mental Status     sent from NH for increased confusion and elevated Ammonia level      Hospital Course:   Hilliard Lefort is a 50 y.o. female that was admitted and treated at [facility] for the following medical issues:     Principal Problem:    Alcoholic cirrhosis of liver ([address]  Active Problems:    Jaundice, hepatocellular    Hyperammonemia ([address]    ANTONIO (acute kidney injury) ([address]    Severe malnutrition ([address]    Acute cystitis without hematuria    Ascites due to alcoholic cirrhosis ([address]    Altered mental status    Electrolyte disturbance    Acidosis, metabolic  Resolved Problems:    * No resolved hospital problems. *    Patient was admitted from SNF for change in mental status. She has hepatic encephalopathy as well as jaundice due to alcoholic cirrhosis. Her family was aware of her poor prognosis and opted for hospice care. Pt was discharge to hospice    Exam on discharge:   BP (!) 96/49   Pulse 87   Temp 94 °F (34.4 °C) (Axillary)   Resp 11   Ht 5' 3\" (1.6 m)   Wt 108 lb 3.9 oz (49.1 kg)   LMP  (LMP Unknown)   SpO2 96%   BMI 19.17 kg/m²   General appearance:  Grossly jaundice, lethargic, no acute distress  HEENT:  Normal cephalic, atraumatic without obvious deformity.  Pupils

## 2018-10-30 NOTE — ED PROVIDER NOTES
ascites. She'll require readmission here and likely to the intensive care unit based on her hypotension. I do not see any EKG changes from her hyperkalemia. Initially this will be watched. Although her ammonia level is elevated today I think her altered mental status and weakness is likely secondary to the hyponatremia. She'll be watched intensive care unit. CRITICAL CARE TIME   Total Critical Care time was 39 minutes, excluding separately reportableprocedures. There was a high probability of clinicallysignificant/life threatening deterioration in the patient's condition which required my urgent intervention. CONSULTS:  None    PROCEDURES:  Unless otherwise noted below, none     Procedures    FINAL IMPRESSION      1. Somnolence    2. Hyponatremia    3. Hyperkalemia          DISPOSITION/PLAN   DISPOSITION Decision To Admit 10/29/2018 11:12:52 PM      PATIENT REFERRED TO:  No follow-up provider specified. DISCHARGE MEDICATIONS:  New Prescriptions    No medications on file          (Please note that portions of this note were completed with a voice recognition program.  Efforts were made to edit the dictations but occasionally words are mis-transcribed.)    Galindo Boone MD (electronically signed)  Attending Emergency Physician         Galindo Boone MD  10/29/18 4559     Patient had a complicated ED course. Her hypotension seemed to improve with a fluid bolus and then she started jerking back downward. She was given Narcan to see if that would improve her hypotension. Did not see any mention of difference. At this point he'll get the patient on pressors. Given her white blood count elevation have to assume that she is early sepsis now without a clear etiology. She can be put on broad-spectrum antibiotic while looking for source of blood cultures drawn.   Fluid management is a difficult in this patient with known ascites and now acute renal injury  Galindo Boone MD  10/29/18

## 2018-10-30 NOTE — ED NOTES
unable to obtain 2nd set of BC at this time.  states she will come up to ICU.       Edson Fine RN  10/30/18 [id number]

## 2018-10-30 NOTE — CARE COORDINATION
Hospice consulted Thierry Forrest from Hospice on unit and met with family. Plan to transfer patient to Hospice residential center.

## 2018-10-30 NOTE — PROCEDURES
Procedure Note      Procedure: Insertion of TLC via  Right internal jugular vein    Indications:  Hemodynamic/CVP monitoring and IV access    Anesthesia:  Local infiltration of 1% lidocaine. .    Consent:  Emergent. .    Technique:  Procedure was done using strict aseptic technique. The patient's neck was prepped and draped in the usual sterile fashion. Ultrasound was used to identify the vein. This area of the neck overlying the internal jugular vein was injected with 1% lidocaine. Then using the Seldinger technique, a large-bore needle was placed into the internal jugular vein with good backflow. A guidewire was placed. Then using an 11 blade, a small incision was made in the patient's skin. The large-bore needle was removed. A dilator was passed over the guidewire. Once completed, a triple lumen catheter was placed over the guidewire with the guidewire then subsequently removed. All three ports were drawn and flushed with good flow. Then the catheter was sutured in place, and a sterile dressing was applied. Number of sticks: 1. Number of Kits used: 1. Complications: No immediate complication. Estimated blood loss: About 5 ml. Comment: Patient tolerated the procedure well.      Placement:  CXR was ordered to confirm placement       Shey Brandon MD.

## 2018-10-30 NOTE — DISCHARGE INSTR - COC
Continuity of Care Form    Patient Name: Ketty Wu   :  1970  MRN:  [id number]    Admit date:  10/29/2018  Discharge date:  10/30/18      Code Status Order: [facility]   Advance Directives:     Admitting Physician: Trista Vicente MD  PCP: Deandre Drake MD     Discharging Nurse: [facility] Unit/Room#: IC14/IC14-01  Discharging Unit Phone Number: [phone]    Emergency Contact:   Extended Emergency Contact Information  Primary Emergency Contact: Chase Desir of [address] Phone: [phone]  Work Phone: [phone]  Mobile Phone: [phone]  Relation: Parent  Secondary Emergency Contact: Prince Mayra Red of [address]  Phone: [phone]  Work Phone: [phone]  Mobile Phone: [phone]  Relation: Brother/Sister    Past Surgical History:  Past Surgical History:   Procedure Laterality Date    SC ESOPHAGOGASTRODUODENOSCOPY TRANSORAL DIAGNOSTIC N/A 10/3/2017    EGD ESOPHAGOGASTRODUODENOSCOPY performed by Tawanda Martin MD at [address] [facility]  N Left     leg   [address]  7/10/2017            Immunization History: There is no immunization history on file for this patient.     Active Problems:  Patient Active Problem List   Diagnosis Code    Alcohol abuse F10.10    Anemia D64.9    Basal cell carcinoma of leg K39.781    Alcoholic cirrhosis of liver ([facility] K70.30    Steatohepatitis, alcoholic L38.03    Severe sepsis without septic shock (CODE) (HCC) R65.20    E. coli UTI (urinary tract infection) N39.0, B96.20    Cholelithiasis K80.20    Jaundice, hepatocellular K76.89    Acute blood loss anemia D62    Coagulopathy (HCC) D68.9    Lactic acid acidosis E87.2    Hyperammonemia (HCC) E72.20    ANTONIO (acute kidney injury) ([facility] N17.9    Hyperglycemia due to type 2 diabetes mellitus (HCC) E11.65    Acute deep vein thrombosis (DVT) of left lower extremity (HCC) I82.402    Edema of left Swallowing Test): not done    Treatments at the Time of Hospital Discharge:   Respiratory Treatments: ***  Oxygen Therapy:  {Therapy; copd oxygen:64597}  Ventilator:    {MH CC Vent PLJD:183902018}    Rehab Therapies: {THERAPEUTIC INTERVENTION:6649873862}  Weight Bearing Status/Restrictions: No weight bearing restirctions  Other Medical Equipment (for information only, NOT a DME order): Other Treatments: ***    Patient's personal belongings (please select all that are sent with patient):  clothes    RN SIGNATURE:  Electronically signed by Artur Gomez RN on 10/30/18 at 1:23 PM    CASE MANAGEMENT/SOCIAL WORK SECTION    Inpatient Status Date: ***    Readmission Risk Assessment Score:  Readmission Risk              Risk of Unplanned Readmission:        36           Discharging to Facility/ Agency   · Name:   · Address:  · Phone:  · Fax:    Dialysis Facility (if applicable)   · Name:  · Address:  · Dialysis Schedule:  · Phone:  · Fax:    / signature: {Esignature:[id number]}    PHYSICIAN SECTION    Prognosis: Poor    Condition at Discharge: Terminal    Rehab Potential (if transferring to Rehab):     Recommended Labs or Other Treatments After Discharge: Hospice care    Physician Certification: I certify the above information and transfer of Toney Ro  is necessary for the continuing treatment of the diagnosis listed and that she requires Hospice for less 30 days.      Update Admission H&P: No change in H&P    PHYSICIAN SIGNATURE:  Electronically signed by Jada Huerta MD on 10/30/18 at 1:12 PM

## 2018-10-30 NOTE — DISCHARGE INSTR - DIET

## 2018-10-30 NOTE — PROGRESS NOTES
Patient has been hypotensive for this shift. Monitor shows sinus rhythm without ectopy. Central line put in by dr Alejandra Vela. On high dose levophed and vasopressin. Prognosis poor per dr John Wesley.

## 2018-10-30 NOTE — CONSULTS
jaundice. COMPARISON: 9/29/2018. Two portable upright AP radiographs of the chest were obtained. FINDINGS: A poor inspiratory volume is present with a trace left pleural effusion mild probable left basilar atelectasis, which appears new from 9/29/2018. There is no cardiomegaly, significant pleural effusion, vascular congestion, pneumothorax, or displaced fractures identified. POOR INSPIRATION WITH A TRACE LEFT PLEURAL EFFUSION AND MILD PROBABLE LEFT BASILAR ATELECTASIS. Xr Chest Portable    Result Date: 10/30/2018  XR CHEST PORTABLE : 10/30/2018 CLINICAL HISTORY:  NEW RIJ CENTRAL LINE INSERTED . COMPARISON: 10/29/2018. A portable upright AP radiograph of the chest was obtained. FINDINGS: There has been interval placement of a right internal jugular approach central venous catheter with its tip overlying the cavoatrial junction. A poor inspiratory volume is present with a trace left pleural effusion mild probable left basilar atelectasis, unchanged from 10/29/2018. There is no cardiomegaly, significant pleural effusion, vascular congestion, pneumothorax, or displaced fractures identified. RIGHT INTERNAL JUGULAR APPROACH CENTRAL VENOUS CATHETER PLACEMENT IN GOOD APPARENT POSITION. NO COMPLICATION OR SIGNIFICANT CHANGE FROM 10/29/2018 IDENTIFIED. POOR INSPIRATION WITH A TRACE LEFT PLEURAL EFFUSION AND MILD PROBABLE LEFT BASILAR ATELECTASIS. Us Abdomen Limited    Result Date: 10/9/2018  EXAMINATION:  US ABDOMEN LIMITED CLINICAL HISTORY:  ASCITES . ABDOMINAL PAIN. COMPARISONS:  NONE AVAILABLE TECHNIQUE:  Screening ultrasound of all 4 quadrants. FINDINGS:  There is ascites seen throughout all 4 quadrants. The appendix is not visualized. ASCITES IS PRESENT THROUGHOUT ALL 4 QUADRANTS    Us Dup Lower Extremity Left Bette    Result Date: 10/10/2018  EXAMINATION: US DUP LOWER EXTREMITY LEFT BETTE CLINICAL HISTORY: Swelling. Edema. COMPARISONS: None available.  FINDINGS: Left lower extremity venous duplex sonography was performed showing an incompletely compressible left femoral vein, with partially recanalized thrombus in the midportion and distally. Common femoral vein is compressible. The popliteal vein is compressible. Calf veins are compressible. Deep venous blood flow is documented in the compressible portions of the left lower extremity deep veins using color Doppler images and pulsed Doppler waveform images. The recanalized thrombus in the femoral vein was best shown with color Doppler images. CONCLUSION: PARTIALLY RECANALIZED, NONOBSTRUCTIVE, NONACUTE APPEARING LEFT FEMORAL VEIN THROMBOSIS. THERE ARE NO PRIOR STUDIES AVAILABLE FOR COMPARISON. CORRELATION WITH PATIENT'S HISTORY MIGHT BE HELPFUL. THERE IS NO ACUTE, OCCLUSIVE DEEP VEIN THROMBOSIS. Assessment/  51 yo lady with ANTONIO. Severe in setting of underlying end stage liver disease due to alcoholic cirrhosis. Has hyperkalemia, acidosis, hyponatremia, ANTONIO. Likely some degree of sepsis with wbc 24k and hypotension    Plan/  1- long term prognosis is poor. MELD score likely over 35. Will order INR to calculate  2- IVF. Change to d5w with 3 amps of NaHCO3  3- do not think she needs RRT now but may need if not better 24 -48 hours  4- q 12 hour labs    Thank you for the consultation. Please do not hesitate to call with questions.     Saniya Sharma

## 2018-10-30 NOTE — H&P
EGD ESOPHAGOGASTRODUODENOSCOPY performed by Lexus Ribera MD at [address] Left     leg    US PARACENTESIS SUBSEQUENT  7/10/2017          FAMILY HISTORY:  History reviewed. No pertinent family history. SOCIAL HISTORY:    Social History     Social History    Marital status: Single     Spouse name: N/A    Number of children: 1    Years of education: N/A     Occupational History    disability-denied-was [employer]      Social History Main Topics    Smoking status: Never Smoker    Smokeless tobacco: Never Used    Alcohol use No      Comment: HISTORY OF ALCOHOL USE     Drug use: No    Sexual activity: Not Currently     Other Topics Concern    Not on file     Social History Narrative         Lives With: Sandhya Owens is 80years old and apparently drinking body of hers. diasbled dt alcohol--did not follow through on disability. Estranged from son Frances Canela    Mother lives in [place] nearby    They do not get along    She has cousins in the area however they are not close because of her alcoholism    Type of Home: House    Home Layout: One level    Home Access: Stairs to enter with rails    Entrance Stairs - Number of Steps: 4    Bathroom Shower/Tub: Tub/Shower unit    Bathroom Equipment: Grab bars in [place]: Cane    ADL Assistance: Independent    Homemaking Assistance: Needs assistance    Ambulation Assistance: Independent (\"was trying to use the cane\" more often recently)    Transfer Assistance: Independent               MEDICATIONS:   Prior to Admission medications    Medication Sig Start Date End Date Taking?  Authorizing Provider   insulin aspart (NOVOLOG FLEXPEN) 100 UNIT/ML injection pen Inject into the skin 3 times daily (before meals)    Historical Provider, MD   propranolol (INDERAL) 10 MG tablet Take 1 tablet by mouth 2 times daily 10/10/18   Javier Casey MD   prednisoLONE (ORAPRED) 15 MG/5ML solution Take 3.3 mLs by mouth daily BD PEN NEEDLE KAIN U/F 32G X 4 MM MISC TEST TID 9/2/17   Historical Provider, MD   thiamine 100 MG tablet Take 100 mg by mouth daily    Historical Provider, MD       ALLERGIES: Seasonal    REVIEW OF SYSTEM:   ROS as noted in HPI, 12 point ROS reviewed and otherwise negative. OBJECTIVE  PHYSICAL EXAM: BP (!) 87/56   Pulse 66   Temp 97.2 °F (36.2 °C) (Temporal)   Resp 14   Ht 5' 3\" (1.6 m)   Wt 100 lb (45.4 kg)   LMP  (LMP Unknown)   SpO2 99%   BMI 17.71 kg/m²     CONSTITUTIONAL:  awake, alert, distracted, confused, icteric and cachectic, chronically-ill appearing  EYES:  icteric bilaterally  LUNGS:  No increased work of breathing, good air exchange, clear to auscultation bilaterally, no crackles or wheezing and poor inspiratory effort  CARDIOVASCULAR:  regular rate and rhythm, normal S1 and S2 and ascites  ABDOMEN:  hypoactive bowel sounds, tense, distended, non-tender and ascites present   MUSCULOSKELETAL:  Weakness, + muscle wasting,  there is no redness, warmth, or swelling of the joints  NEUROLOGIC:  Positive asterixis, Awake, alert, oriented to name and knows she is in a hospital just not which one, thinks it is Thursday, April 2018, distracted, difficult to do nueor assessment. Cranial nerves II-XII are grossly intact. .  Sensory is intact. Cary Cecil SKIN:  jaundice noted    DATA:     Diagnostic tests reviewed for today's visit:    Most recent labs and imaging results reviewed.      LABS:    Recent Results (from the past 24 hour(s))   Comprehensive Metabolic Panel    Collection Time: 10/29/18  5:50 PM   Result Value Ref Range    Sodium 123 (L) 132 - 144 mEq/L    Potassium 6.1 (HH) 3.5 - 5.1 mEq/L    Chloride 94 (L) 98 - 107 mEq/L    CO2 9 (LL) 22 - 29 mEq/L    Anion Gap 20 (H) 7 - 13 mEq/L    Glucose 82 74 - 109 mg/dL    BUN 77 (HH) 6 - 20 mg/dL    CREATININE 2.95 (H) 0.50 - 0.90 mg/dL    GFR Non-African American 17.0 (L) >60    GFR  20.5 (L) >60    Calcium 8.2 (L) 8.6 - 10.2 mg/dL hyperkalemia with insulin, D50, calcium gluconate  IV fluids  BMP every 4 hours      Acidosis, metabolic (68/63/3938)    Assessment: Lactic acid elevated at 2.9, ABG shows pH 7.1 consistent with metabolic acidosis     Plan:   Treat with sodium bicarb and repeat ABG   Nephrology to follow           Plan of care discussed with: patient    SIGNATURE: VINEET Cleary CNP  DATE: October 30, 2018  TIME: 2:08 AM     Electronically signed by VINEET Cleary CNP on 10/30/2018 at 2:29 AM     Dr. Ny Madrigal MD - supervising physician    Attending's Note:  Pt was seen and evaluated and discussed care with NP. I agree with the above assessment and plan. AMS - hepatic encephalopathy vs. Acute hyponatremia. Ammonia is around baseline for the patient. Unknown her baseline mentation. Will monitor in the ICU. GI consulted for the liver cirrhosis. Ascites and cirrhosis - abd distention. Recently had paracentesis but likely requiring another. Will consult IR. GI is also consulted. Will continue lactulose    ANTONIO - unknown etiology at this point. Will check urine electrolytes. Will consult nephrology. Hepatorenal is a possibility but we will rule out other causes. Hypotension - BP dropped after morphine. It is not known if it is due to morphine or patient is in shock from another cause. Currently on 2 pressors. Will wean if possible    Severe metabolic acidosis - Mild lactic acidosis. Stated on bicarb drip. Will monitor ABG. Nephrology is consulted. Patient might not be candidate for dialysis due to her poor prognosis     Hyperkalemia - likely due to ANTONIO. Will shift with insulin and give calcium gluconate to protect the heart.  Nephrology consulted        Kristi Clemente, [address] [facility]

## 2018-10-30 NOTE — ED TRIAGE NOTES
Patient arrived to ER via squad from NH. Nursing home reports increased confusion and elevated Ammonia levels. 10/29/2018 labs shows Ammonia level of 114. Patient has significant history. Patient is jaundice. Patient is alert and oriented x2-3. Patient answers questions appropriately at times. Patient is alert to self, , place, and month and year.

## 2018-10-30 NOTE — FLOWSHEET NOTE
hugger, blankets, prevalon boots, and scd's removed for comfort. Medicated with MS 2mg ivp for comfort. No further change to assessment. _Electronically signed by Grace Forrest RN on 10/30/2018 at 4:38 PM  [id number] pressors stopped. Pt continues to be restless. Family at bedside. [facility] here, reviewed DNR form. Transferred to cot, continues to moan with any HOC. [id number] update called to [employer].  _Electronically signed by Grace Forrest RN on 10/30/2018 at 5:18 PM

## 2018-10-30 NOTE — PROGRESS NOTES
Nutrition Assessment    Type and Reason for Visit: Consult      Nutrition Assessment:  · Subjective Assessment: Consult recieved, plan is for pt to transfer to Hospice today, assessment deferred        Electronically signed by Rosa Aranda RD, LD on 10/30/18 at 11:36 AM

## 2018-10-30 NOTE — ED NOTES
BP cuff changed to a small size, and arm changed. BP 66/40. Liv RN at bedside to attempt additional IV.       Christie Sheehan, RN  10/29/18 0086

## 2018-11-04 LAB
BLOOD CULTURE, ROUTINE: NORMAL
CULTURE, BLOOD 2: NORMAL

## 2018-11-15 VITALS
HEART RATE: 62 BPM | SYSTOLIC BLOOD PRESSURE: 102 MMHG | DIASTOLIC BLOOD PRESSURE: 60 MMHG | TEMPERATURE: 98.4 F | OXYGEN SATURATION: 99 % | WEIGHT: 100.6 LBS | BODY MASS INDEX: 17.82 KG/M2 | RESPIRATION RATE: 18 BRPM

## 2019-01-31 NOTE — DISCHARGE SUMMARY
Transfer: 5 - Requires setup/supervision/cues  Tub Transfer: 0 - Activity does not occur  Shower Transfer: 0 - Activity does not occur ([place]),  , Assessment: Pt. demonstrates decreased safety and endurance. However, pt. performed room mobility with F safety. Speech therapy: FIMS: Comprehension: 5 - Patient understands basic needs (hungry/hot/pain)  Expression: 5 - Expresses basic ideas/needs only (hungry/hot/pain)  Social Interaction: 5 - Patient is appropriate with supervision/cues  Problem Solvin - Patient solves simple/routine tasks 75-90%+   Memory: 4 - Patient remembers 75-90%+ of the time      Lab/X-ray studies reviewed, analyzed and discussed with patient and staff:   No results found for this or any previous visit (from the past 24 hour(s)). Previous extensive, complex labs, notes and diagnostics reviewed and analyzed. ALLERGIES:    Allergies as of 10/03/2018 - Review Complete 10/03/2018   Allergen Reaction Noted    Seasonal  2018      (please also verify by checking [facility])     Complex Physical Medicine & Rehab Issues Assess & Plan:   1. Severe abnormality of gait and mobility and impaired self-care and ADL's secondary to progressive hepatic encephalopathy . Functional and medical status reassessed regarding patients ability to participate in therapies and patient found to be able to participate in acute intensive comprehensive inpatient rehabilitation program including PT/OT to improve balance, ambulation, ADLs, and to improve the P/AROM. Therapeutic modifications regarding activities in therapies, place, amount of time per day and intensity of therapy made daily. In bed therapies or bedside therapies prn.   2. Bowel and Bladder dysfunction:  frequent toileting, ambulate to bathroom with assistance, check post void residuals. Check for C.difficile x1 if >2 loose stools in 24 hours, continue bowel & bladder program.  Monitor bowel and bladder function.   Lactinex 2 PO every

## 2020-04-15 NOTE — PROGRESS NOTES
Pt laying down in bed and refusing meds and yelling at staff,  including her seizure and diuretic med. RN aware. Meds were retimed. Manager notified. Airway patent, Nasal mucosa clear. Mouth with normal mucosa. Throat has no vesicles, no oropharyngeal exudates and uvula is midline.

## 2021-01-18 NOTE — CARE COORDINATION
Called pt to discuss current needs and concerns. Pt is agreeable to attending DM education as she has only gotten some pamphlets on how to manage her DM. Advised I will send the request to Dr. Chance Mccall and see if she wants to order that for her. Pt is agreeable and verbalized understanding. General:

## 2023-12-08 NOTE — PROGRESS NOTES
auscultation  Skin: jaundice    Labs:   Recent Labs      09/30/18   0410  10/01/18   0552  10/02/18   0625   WBC  10.2  12.2*  9.2   HGB  10.0*  10.4*  9.5*   HCT  29.2*  30.2*  27.3*   PLT  63*  74*  74*     Recent Labs      09/30/18   0410   10/01/18   0552  10/02/18   0625  10/02/18   1630   NA   --    < >  134  136  135   K   --    < >  4.2  2.9*  5.0   CL   --    < >  105  107  107   CO2   --    < >  15*  14*  17*   BUN   --    < >  5*  6  6   CREATININE   --    < >  0.46*  0.46*  0.47*   CALCIUM   --    < >  7.6*  7.1*  7.3*   PHOS  0.5*   --   1.4*  0.8*   --    AST  93*   --   87*  69*   --    ALT  16   --   17  16   --    BILIDIR  >20.0*   --   >20.0*  >20.0*   --    BILITOT  29.0*   --   27.5*  24.6*   --    ALKPHOS  181*   --   182*  166*   --     < > = values in this interval not displayed. No results for input(s): INR in the last 72 hours. No results for input(s): Foster Gu in the last 72 hours. Radiology:  US ABDOMEN LIMITED   Final Result      Mild intrahepatic ductal dilatation. Cholelithiasis, with possible cholecystitis. XR CHEST STANDARD (2 VW)   Final Result       No acute intrathoracic process. CT HEAD WO CONTRAST   Final Result   Impression:      Negative CT brain         All CT scans at this facility use dose modulation, iterative reconstruction, and/or weight based dosing when appropriate to reduce radiation dose to as low as reasonably achievable. Assessment/Plan:    etoh intox with liver injury. Improving. Keeping patient to monitor lytes. Continue to monitor K,MG. Acute hepatitis: steroids  Underlying severe liver dysfunction with cirrhosis.      Okay to discharge when to acute rehab bed available, continue to monitor lytes while in acute rehab.    25 minutes total care time, >1/2 in unit/floor time and care coordination      Electronically signed by Alma Rosa Toscano MD on 10/2/2018 at 6:59 PM no